# Patient Record
Sex: MALE | Race: OTHER | NOT HISPANIC OR LATINO | URBAN - METROPOLITAN AREA
[De-identification: names, ages, dates, MRNs, and addresses within clinical notes are randomized per-mention and may not be internally consistent; named-entity substitution may affect disease eponyms.]

---

## 2014-12-15 RX ORDER — IPRATROPIUM/ALBUTEROL SULFATE 18-103MCG
3 AEROSOL WITH ADAPTER (GRAM) INHALATION
Qty: 0 | Refills: 0 | COMMUNITY
Start: 2014-12-15

## 2017-01-06 ENCOUNTER — INPATIENT (INPATIENT)
Facility: HOSPITAL | Age: 62
LOS: 6 days | Discharge: HOME HEALTH | End: 2017-01-13
Attending: INTERNAL MEDICINE | Admitting: INTERNAL MEDICINE
Payer: MEDICAID

## 2017-01-06 VITALS
DIASTOLIC BLOOD PRESSURE: 76 MMHG | HEART RATE: 89 BPM | RESPIRATION RATE: 24 BRPM | SYSTOLIC BLOOD PRESSURE: 142 MMHG | OXYGEN SATURATION: 89 % | TEMPERATURE: 98 F

## 2017-01-06 LAB
ALBUMIN SERPL ELPH-MCNC: 4.4 G/DL — SIGNIFICANT CHANGE UP (ref 3.3–5)
ALP SERPL-CCNC: 83 U/L — SIGNIFICANT CHANGE UP (ref 40–120)
ALT FLD-CCNC: 11 U/L — SIGNIFICANT CHANGE UP (ref 4–41)
APTT BLD: 37.7 SEC — HIGH (ref 27.5–37.4)
AST SERPL-CCNC: 18 U/L — SIGNIFICANT CHANGE UP (ref 4–40)
BASE EXCESS BLDV CALC-SCNC: 13.7 MMOL/L — SIGNIFICANT CHANGE UP
BASOPHILS # BLD AUTO: 0.02 K/UL — SIGNIFICANT CHANGE UP (ref 0–0.2)
BASOPHILS NFR BLD AUTO: 0.2 % — SIGNIFICANT CHANGE UP (ref 0–2)
BILIRUB SERPL-MCNC: 0.2 MG/DL — SIGNIFICANT CHANGE UP (ref 0.2–1.2)
BLOOD GAS VENOUS - CREATININE: SIGNIFICANT CHANGE UP MG/DL (ref 0.5–1.3)
BUN SERPL-MCNC: 11 MG/DL — SIGNIFICANT CHANGE UP (ref 7–23)
CALCIUM SERPL-MCNC: 9.7 MG/DL — SIGNIFICANT CHANGE UP (ref 8.4–10.5)
CHLORIDE BLDV-SCNC: 86 MMOL/L — LOW (ref 96–108)
CHLORIDE SERPL-SCNC: 86 MMOL/L — LOW (ref 98–107)
CO2 SERPL-SCNC: 37 MMOL/L — HIGH (ref 22–31)
CREAT SERPL-MCNC: 0.46 MG/DL — LOW (ref 0.5–1.3)
EOSINOPHIL # BLD AUTO: 0.02 K/UL — SIGNIFICANT CHANGE UP (ref 0–0.5)
EOSINOPHIL NFR BLD AUTO: 0.2 % — SIGNIFICANT CHANGE UP (ref 0–6)
GAS PNL BLDV: 129 MMOL/L — LOW (ref 136–146)
GLUCOSE BLDV-MCNC: 138 — HIGH (ref 70–99)
GLUCOSE SERPL-MCNC: 136 MG/DL — HIGH (ref 70–99)
HCO3 BLDV-SCNC: 34 MMOL/L — HIGH (ref 20–27)
HCT VFR BLD CALC: 36.5 % — LOW (ref 39–50)
HCT VFR BLDV CALC: 34.3 % — LOW (ref 39–51)
HGB BLD-MCNC: 11.2 G/DL — LOW (ref 13–17)
HGB BLDV-MCNC: 11.1 G/DL — LOW (ref 13–17)
IMM GRANULOCYTES NFR BLD AUTO: 0.2 % — SIGNIFICANT CHANGE UP (ref 0–1.5)
INR BLD: 0.96 — SIGNIFICANT CHANGE UP (ref 0.87–1.18)
LACTATE BLDV-MCNC: 1.2 MMOL/L — SIGNIFICANT CHANGE UP (ref 0.5–2)
LYMPHOCYTES # BLD AUTO: 1.03 K/UL — SIGNIFICANT CHANGE UP (ref 1–3.3)
LYMPHOCYTES # BLD AUTO: 11.7 % — LOW (ref 13–44)
MCHC RBC-ENTMCNC: 26.8 PG — LOW (ref 27–34)
MCHC RBC-ENTMCNC: 30.7 % — LOW (ref 32–36)
MCV RBC AUTO: 87.3 FL — SIGNIFICANT CHANGE UP (ref 80–100)
MONOCYTES # BLD AUTO: 1.12 K/UL — HIGH (ref 0–0.9)
MONOCYTES NFR BLD AUTO: 12.7 % — SIGNIFICANT CHANGE UP (ref 2–14)
NEUTROPHILS # BLD AUTO: 6.59 K/UL — SIGNIFICANT CHANGE UP (ref 1.8–7.4)
NEUTROPHILS NFR BLD AUTO: 75 % — SIGNIFICANT CHANGE UP (ref 43–77)
PCO2 BLDV: 94 MMHG — HIGH (ref 41–51)
PH BLDV: 7.26 PH — LOW (ref 7.32–7.43)
PLATELET # BLD AUTO: 189 K/UL — SIGNIFICANT CHANGE UP (ref 150–400)
PMV BLD: 9.9 FL — SIGNIFICANT CHANGE UP (ref 7–13)
PO2 BLDV: 31 MMHG — LOW (ref 35–40)
POTASSIUM BLDV-SCNC: 4.7 MMOL/L — HIGH (ref 3.4–4.5)
POTASSIUM SERPL-MCNC: 5 MMOL/L — SIGNIFICANT CHANGE UP (ref 3.5–5.3)
POTASSIUM SERPL-SCNC: 5 MMOL/L — SIGNIFICANT CHANGE UP (ref 3.5–5.3)
PROT SERPL-MCNC: 7.6 G/DL — SIGNIFICANT CHANGE UP (ref 6–8.3)
PROTHROM AB SERPL-ACNC: 10.9 SEC — SIGNIFICANT CHANGE UP (ref 10–13.1)
RBC # BLD: 4.18 M/UL — LOW (ref 4.2–5.8)
RBC # FLD: 15.9 % — HIGH (ref 10.3–14.5)
SAO2 % BLDV: 55.3 % — LOW (ref 60–85)
SODIUM SERPL-SCNC: 133 MMOL/L — LOW (ref 135–145)
WBC # BLD: 8.8 K/UL — SIGNIFICANT CHANGE UP (ref 3.8–10.5)
WBC # FLD AUTO: 8.8 K/UL — SIGNIFICANT CHANGE UP (ref 3.8–10.5)

## 2017-01-06 PROCEDURE — 71010: CPT | Mod: 26

## 2017-01-06 RX ORDER — SODIUM CHLORIDE 9 MG/ML
1000 INJECTION INTRAMUSCULAR; INTRAVENOUS; SUBCUTANEOUS ONCE
Qty: 0 | Refills: 0 | Status: COMPLETED | OUTPATIENT
Start: 2017-01-06 | End: 2017-01-06

## 2017-01-06 RX ORDER — IPRATROPIUM BROMIDE 0.2 MG/ML
500 SOLUTION, NON-ORAL INHALATION
Qty: 0 | Refills: 0 | Status: DISCONTINUED | OUTPATIENT
Start: 2017-01-06 | End: 2017-01-06

## 2017-01-06 RX ORDER — IPRATROPIUM/ALBUTEROL SULFATE 18-103MCG
3 AEROSOL WITH ADAPTER (GRAM) INHALATION
Qty: 0 | Refills: 0 | Status: COMPLETED | OUTPATIENT
Start: 2017-01-06 | End: 2017-01-06

## 2017-01-06 RX ADMIN — Medication 3 MILLILITER(S): at 22:51

## 2017-01-06 RX ADMIN — SODIUM CHLORIDE 1000 MILLILITER(S): 9 INJECTION INTRAMUSCULAR; INTRAVENOUS; SUBCUTANEOUS at 23:42

## 2017-01-06 RX ADMIN — Medication 3 MILLILITER(S): at 22:59

## 2017-01-06 RX ADMIN — Medication 3 MILLILITER(S): at 22:41

## 2017-01-06 RX ADMIN — Medication 125 MILLIGRAM(S): at 23:42

## 2017-01-06 NOTE — ED PROVIDER NOTE - MEDICAL DECISION MAKING DETAILS
60 y/o M with h/o COPD with COPD exacerbation. Increased work of breathing and likely hypercarbic. Will start on BiPAP. Duonebs, steroids, levofloxacin, IVF. cbc, cmp, vbg w/ lactate, CXR, Cristian, EKG

## 2017-01-06 NOTE — ED PROVIDER NOTE - OBJECTIVE STATEMENT
60 y/o M with h/o COPD on home O2 2L 24 hours/day, a-fib not on AC, DM2, HTN, HLD presents with lethargy and difficulty breathing. Patient has been complaining of productive cough x 4 days and worsening shortness of breath. Since last night patient has been lethargic appearing. Decreased PO intake. No fever at home. +chills. Denies chest pain. Patient never intubated but has been on BiPAP previously, most recently one month ago.

## 2017-01-06 NOTE — ED PROVIDER NOTE - ENMT, MLM
Airway patent, Nasal mucosa clear. Mouth with normal mucosa. Throat has no vesicles, no oropharyngeal exudates and uvula is midline. Dry mucous membranes

## 2017-01-06 NOTE — ED CLERICAL - NS ED CARE COORDINATION INFORMATION
This patient is eligible for (or currently enrolled in) an outpatient care management program available through [employer]. This program can coordinate outpatient follow up and assist the patient in accessing a variety of outpatient resources.  If discharged from the ED, the patient will be contacted to see if any additional resources are needed.                                                                                    Please call the Nurse Clinical Call Center at [phone] with any questions or for assistance in discharge planning.

## 2017-01-06 NOTE — ED ADULT TRIAGE NOTE - CHIEF COMPLAINT QUOTE
Pt with a pmhx of a-fib, COPD (2L home O2), prostate CA (s/p radiation 2010), CAD (s/p PCI with stent in 2012), DMII, HTN, HLD who required bipap during his past admission in November 2016 presents with c/o weakness, lethargy, sob and productive cough with white sputum. Pt's family member states "he is weaker now and not himself. If you leave him alone for a few minutes he falls asleep". Denies cp, nausea, vomiting, dizziness, diaphoresis, fever or chills.

## 2017-01-06 NOTE — ED PROVIDER NOTE - ATTENDING CONTRIBUTION TO CARE
ED Attending Dr. Collier: 62 yo female with HTN, COPD on home O2 24/7, DM, brought to ED due to 3 days of worsening SOB.  No fever but pt endorses chills.  On exam pt ill appearing, in moderate respiratory distress, heart RRR, lungs decreased air entry throughout, abd NTND, extremities without swelling, strength 5/5 in all extremities and skin without rash.

## 2017-01-06 NOTE — ED ADULT NURSE NOTE - PMH
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

## 2017-01-06 NOTE — ED ADULT NURSE NOTE - OBJECTIVE STATEMENT
10:20 received pt from Intake Triage sitting in Wheelchair with oxygen via NC 4liters in use, appears tachypenic, +cough, using accessory muscle. Sons x2 with pt. Pt minimal English Greenlandic speaking. Translation service offered. Declined. As per Son..."He has been very weak & confused on & off at home for few days...not eating, and coughing. Also c/o back pain and chills." Recent admission requiring Bipap in November.  HX of COPD, Afib, CAD stents, HTN,DM FBS in triage 130.  Pt placed on CM= sinus, vs as per flow sheet charting. Resp 24-26, desats to 70% on roomair. Decreased BS kesha bases, +upper rhonchi, on ascultation. Resident at bedside. sl & labs obtained. Resp called at bedside. NEB tx x3 given by resp therapist. Pt placed on BIPAP 10/5 fio2 30%. Pt tolerating well, appears less anxious, rr 20, less accessory muscle use, pox=97%, As per Son Pt verbalizing "Feeling better with Bipap".  Does c/o chronic mid back pain...taking tylenol at home. Positioned for comfort . Awaits portable xray. HOB elevated . Report to primary RN Kristin to follow.

## 2017-01-06 NOTE — ED PROVIDER NOTE - PROGRESS NOTE DETAILS
Dr. Shaffer: Pt clinically improved, mental status improved, awake and alert, however repeat pCO2 worse. Seen by MICU attg at bedside, BIPAP setting adjusted, will send repeat ABG in 30 mins and reassess

## 2017-01-07 DIAGNOSIS — I10 ESSENTIAL (PRIMARY) HYPERTENSION: ICD-10-CM

## 2017-01-07 DIAGNOSIS — E11.9 TYPE 2 DIABETES MELLITUS WITHOUT COMPLICATIONS: ICD-10-CM

## 2017-01-07 DIAGNOSIS — J44.1 CHRONIC OBSTRUCTIVE PULMONARY DISEASE WITH (ACUTE) EXACERBATION: ICD-10-CM

## 2017-01-07 DIAGNOSIS — I48.91 UNSPECIFIED ATRIAL FIBRILLATION: ICD-10-CM

## 2017-01-07 DIAGNOSIS — E78.5 HYPERLIPIDEMIA, UNSPECIFIED: ICD-10-CM

## 2017-01-07 DIAGNOSIS — D50.9 IRON DEFICIENCY ANEMIA, UNSPECIFIED: ICD-10-CM

## 2017-01-07 DIAGNOSIS — D29.1 BENIGN NEOPLASM OF PROSTATE: ICD-10-CM

## 2017-01-07 DIAGNOSIS — J44.9 CHRONIC OBSTRUCTIVE PULMONARY DISEASE, UNSPECIFIED: ICD-10-CM

## 2017-01-07 DIAGNOSIS — I25.10 ATHEROSCLEROTIC HEART DISEASE OF NATIVE CORONARY ARTERY WITHOUT ANGINA PECTORIS: ICD-10-CM

## 2017-01-07 LAB
ALBUMIN SERPL ELPH-MCNC: 4 G/DL — SIGNIFICANT CHANGE UP (ref 3.3–5)
ALP SERPL-CCNC: 77 U/L — SIGNIFICANT CHANGE UP (ref 40–120)
ALT FLD-CCNC: 13 U/L — SIGNIFICANT CHANGE UP (ref 4–41)
APPEARANCE UR: CLEAR — SIGNIFICANT CHANGE UP
AST SERPL-CCNC: 23 U/L — SIGNIFICANT CHANGE UP (ref 4–40)
B PERT DNA SPEC QL NAA+PROBE: SIGNIFICANT CHANGE UP
BASE EXCESS BLDA CALC-SCNC: 12.6 MMOL/L — SIGNIFICANT CHANGE UP
BASE EXCESS BLDA CALC-SCNC: 17 MMOL/L — SIGNIFICANT CHANGE UP
BASE EXCESS BLDV CALC-SCNC: 13.8 MMOL/L — SIGNIFICANT CHANGE UP
BASOPHILS # BLD AUTO: 0.01 K/UL — SIGNIFICANT CHANGE UP (ref 0–0.2)
BASOPHILS NFR BLD AUTO: 0.2 % — SIGNIFICANT CHANGE UP (ref 0–2)
BILIRUB SERPL-MCNC: 0.4 MG/DL — SIGNIFICANT CHANGE UP (ref 0.2–1.2)
BILIRUB UR-MCNC: NEGATIVE — SIGNIFICANT CHANGE UP
BLOOD GAS VENOUS - CREATININE: < 0.36 MG/DL — LOW (ref 0.5–1.3)
BLOOD UR QL VISUAL: NEGATIVE — SIGNIFICANT CHANGE UP
BUN SERPL-MCNC: 9 MG/DL — SIGNIFICANT CHANGE UP (ref 7–23)
C PNEUM DNA SPEC QL NAA+PROBE: NOT DETECTED — SIGNIFICANT CHANGE UP
CALCIUM SERPL-MCNC: 9.1 MG/DL — SIGNIFICANT CHANGE UP (ref 8.4–10.5)
CHLORIDE BLDA-SCNC: 88 MMOL/L — LOW (ref 96–108)
CHLORIDE BLDA-SCNC: 90 MMOL/L — LOW (ref 96–108)
CHLORIDE BLDV-SCNC: 91 MMOL/L — LOW (ref 96–108)
CHLORIDE SERPL-SCNC: 89 MMOL/L — LOW (ref 98–107)
CHOLEST SERPL-MCNC: 165 MG/DL — SIGNIFICANT CHANGE UP (ref 120–199)
CK SERPL-CCNC: 62 U/L — SIGNIFICANT CHANGE UP (ref 30–200)
CO2 SERPL-SCNC: 32 MMOL/L — HIGH (ref 22–31)
COLOR SPEC: YELLOW — SIGNIFICANT CHANGE UP
CREAT BLDA-MCNC: < 0.36 MG/DL — LOW (ref 0.5–1.3)
CREAT BLDA-MCNC: < 0.36 MG/DL — LOW (ref 0.5–1.3)
CREAT SERPL-MCNC: 0.39 MG/DL — LOW (ref 0.5–1.3)
EOSINOPHIL # BLD AUTO: 0 K/UL — SIGNIFICANT CHANGE UP (ref 0–0.5)
EOSINOPHIL NFR BLD AUTO: 0 % — SIGNIFICANT CHANGE UP (ref 0–6)
FERRITIN SERPL-MCNC: 38.28 NG/ML — SIGNIFICANT CHANGE UP (ref 30–400)
FLUAV H1 2009 PAND RNA SPEC QL NAA+PROBE: NOT DETECTED — SIGNIFICANT CHANGE UP
FLUAV H1 RNA SPEC QL NAA+PROBE: NOT DETECTED — SIGNIFICANT CHANGE UP
FLUAV H3 RNA SPEC QL NAA+PROBE: NOT DETECTED — SIGNIFICANT CHANGE UP
FLUAV SUBTYP SPEC NAA+PROBE: SIGNIFICANT CHANGE UP
FLUBV RNA SPEC QL NAA+PROBE: NOT DETECTED — SIGNIFICANT CHANGE UP
FOLATE SERPL-MCNC: > 20 NG/ML — HIGH (ref 4.7–20)
GAS PNL BLDV: 130 MMOL/L — LOW (ref 136–146)
GLUCOSE BLDA-MCNC: 125 MG/DL — HIGH (ref 70–99)
GLUCOSE BLDA-MCNC: 134 MG/DL — HIGH (ref 70–99)
GLUCOSE BLDV-MCNC: 137 — HIGH (ref 70–99)
GLUCOSE SERPL-MCNC: 136 MG/DL — HIGH (ref 70–99)
GLUCOSE UR-MCNC: 500 — SIGNIFICANT CHANGE UP
HADV DNA SPEC QL NAA+PROBE: NOT DETECTED — SIGNIFICANT CHANGE UP
HAV IGM SER-ACNC: NONREACTIVE — SIGNIFICANT CHANGE UP
HBA1C BLD-MCNC: 6 % — HIGH (ref 4–5.6)
HBV CORE IGM SER-ACNC: NONREACTIVE — SIGNIFICANT CHANGE UP
HBV SURFACE AG SER-ACNC: NONREACTIVE — SIGNIFICANT CHANGE UP
HCO3 BLDA-SCNC: 34 MMOL/L — HIGH (ref 22–26)
HCO3 BLDA-SCNC: 39 MMOL/L — HIGH (ref 22–26)
HCO3 BLDV-SCNC: 29 MMOL/L — HIGH (ref 20–27)
HCOV 229E RNA SPEC QL NAA+PROBE: NOT DETECTED — SIGNIFICANT CHANGE UP
HCOV HKU1 RNA SPEC QL NAA+PROBE: NOT DETECTED — SIGNIFICANT CHANGE UP
HCOV NL63 RNA SPEC QL NAA+PROBE: NOT DETECTED — SIGNIFICANT CHANGE UP
HCOV OC43 RNA SPEC QL NAA+PROBE: NOT DETECTED — SIGNIFICANT CHANGE UP
HCT VFR BLD CALC: 35.1 % — LOW (ref 39–50)
HCT VFR BLDA CALC: 31.1 % — LOW (ref 39–51)
HCT VFR BLDA CALC: 32.9 % — LOW (ref 39–51)
HCT VFR BLDV CALC: 49.7 % — SIGNIFICANT CHANGE UP (ref 39–51)
HCV AB S/CO SERPL IA: 0.07 S/CO — SIGNIFICANT CHANGE UP
HCV AB SERPL-IMP: SIGNIFICANT CHANGE UP
HDLC SERPL-MCNC: 112 MG/DL — HIGH (ref 35–55)
HGB BLD-MCNC: 10.8 G/DL — LOW (ref 13–17)
HGB BLDA-MCNC: 10.1 G/DL — LOW (ref 13–17)
HGB BLDA-MCNC: 10.6 G/DL — LOW (ref 13–17)
HGB BLDV-MCNC: 16.3 G/DL — SIGNIFICANT CHANGE UP (ref 13–17)
HMPV RNA SPEC QL NAA+PROBE: NOT DETECTED — SIGNIFICANT CHANGE UP
HPIV1 RNA SPEC QL NAA+PROBE: NOT DETECTED — SIGNIFICANT CHANGE UP
HPIV2 RNA SPEC QL NAA+PROBE: NOT DETECTED — SIGNIFICANT CHANGE UP
HPIV3 RNA SPEC QL NAA+PROBE: NOT DETECTED — SIGNIFICANT CHANGE UP
HPIV4 RNA SPEC QL NAA+PROBE: NOT DETECTED — SIGNIFICANT CHANGE UP
IMM GRANULOCYTES NFR BLD AUTO: 0.2 % — SIGNIFICANT CHANGE UP (ref 0–1.5)
IRON SATN MFR SERPL: 25 UG/DL — LOW (ref 45–165)
IRON SATN MFR SERPL: 367 UG/DL — SIGNIFICANT CHANGE UP (ref 155–535)
KETONES UR-MCNC: SIGNIFICANT CHANGE UP
LACTATE BLDA-SCNC: 0.6 MMOL/L — SIGNIFICANT CHANGE UP (ref 0.5–2)
LACTATE BLDA-SCNC: 0.7 MMOL/L — SIGNIFICANT CHANGE UP (ref 0.5–2)
LACTATE BLDV-MCNC: 1.9 MMOL/L — SIGNIFICANT CHANGE UP (ref 0.5–2)
LEUKOCYTE ESTERASE UR-ACNC: NEGATIVE — SIGNIFICANT CHANGE UP
LIPID PNL WITH DIRECT LDL SERPL: 58 MG/DL — SIGNIFICANT CHANGE UP
LYMPHOCYTES # BLD AUTO: 0.37 K/UL — LOW (ref 1–3.3)
LYMPHOCYTES # BLD AUTO: 5.9 % — LOW (ref 13–44)
M PNEUMO DNA SPEC QL NAA+PROBE: NOT DETECTED — SIGNIFICANT CHANGE UP
MAGNESIUM SERPL-MCNC: 1.5 MG/DL — LOW (ref 1.6–2.6)
MCHC RBC-ENTMCNC: 26.7 PG — LOW (ref 27–34)
MCHC RBC-ENTMCNC: 30.8 % — LOW (ref 32–36)
MCV RBC AUTO: 86.9 FL — SIGNIFICANT CHANGE UP (ref 80–100)
MONOCYTES # BLD AUTO: 0.06 K/UL — SIGNIFICANT CHANGE UP (ref 0–0.9)
MONOCYTES NFR BLD AUTO: 1 % — LOW (ref 2–14)
MUCOUS THREADS # UR AUTO: SIGNIFICANT CHANGE UP
NEUTROPHILS # BLD AUTO: 5.8 K/UL — SIGNIFICANT CHANGE UP (ref 1.8–7.4)
NEUTROPHILS NFR BLD AUTO: 92.7 % — HIGH (ref 43–77)
NITRITE UR-MCNC: NEGATIVE — SIGNIFICANT CHANGE UP
PCO2 BLDA: 90 MMHG — CRITICAL HIGH (ref 35–48)
PCO2 BLDA: 92 MMHG — CRITICAL HIGH (ref 35–48)
PCO2 BLDV: > 110 MMHG — HIGH (ref 41–51)
PH BLDA: 7.26 PH — LOW (ref 7.35–7.45)
PH BLDA: 7.31 PH — LOW (ref 7.35–7.45)
PH BLDV: 7.2 PH — LOW (ref 7.32–7.43)
PH UR: 6 — SIGNIFICANT CHANGE UP (ref 4.6–8)
PHOSPHATE SERPL-MCNC: 3.8 MG/DL — SIGNIFICANT CHANGE UP (ref 2.5–4.5)
PLATELET # BLD AUTO: 152 K/UL — SIGNIFICANT CHANGE UP (ref 150–400)
PMV BLD: 9.4 FL — SIGNIFICANT CHANGE UP (ref 7–13)
PO2 BLDA: 143 MMHG — HIGH (ref 83–108)
PO2 BLDA: 98 MMHG — SIGNIFICANT CHANGE UP (ref 83–108)
PO2 BLDV: < 24 MMHG — LOW (ref 35–40)
POTASSIUM BLDA-SCNC: 4.3 MMOL/L — SIGNIFICANT CHANGE UP (ref 3.4–4.5)
POTASSIUM BLDA-SCNC: 4.7 MMOL/L — HIGH (ref 3.4–4.5)
POTASSIUM BLDV-SCNC: 4.1 MMOL/L — SIGNIFICANT CHANGE UP (ref 3.4–4.5)
POTASSIUM SERPL-MCNC: 5.1 MMOL/L — SIGNIFICANT CHANGE UP (ref 3.5–5.3)
POTASSIUM SERPL-SCNC: 5.1 MMOL/L — SIGNIFICANT CHANGE UP (ref 3.5–5.3)
PROT SERPL-MCNC: 7 G/DL — SIGNIFICANT CHANGE UP (ref 6–8.3)
PROT UR-MCNC: 30 — SIGNIFICANT CHANGE UP
RBC # BLD: 4.04 M/UL — LOW (ref 4.2–5.8)
RBC # FLD: 15.8 % — HIGH (ref 10.3–14.5)
RBC CASTS # UR COMP ASSIST: SIGNIFICANT CHANGE UP (ref 0–?)
RSV RNA SPEC QL NAA+PROBE: POSITIVE — HIGH
RV+EV RNA SPEC QL NAA+PROBE: NOT DETECTED — SIGNIFICANT CHANGE UP
SAO2 % BLDA: 98 % — SIGNIFICANT CHANGE UP (ref 95–99)
SAO2 % BLDA: 99.3 % — HIGH (ref 95–99)
SAO2 % BLDV: 11.2 % — LOW (ref 60–85)
SODIUM BLDA-SCNC: 132 MMOL/L — LOW (ref 136–146)
SODIUM BLDA-SCNC: 134 MMOL/L — LOW (ref 136–146)
SODIUM SERPL-SCNC: 135 MMOL/L — SIGNIFICANT CHANGE UP (ref 135–145)
SP GR SPEC: 1.02 — SIGNIFICANT CHANGE UP (ref 1–1.03)
SPECIMEN SOURCE: SIGNIFICANT CHANGE UP
SQUAMOUS # UR AUTO: SIGNIFICANT CHANGE UP
TRIGL SERPL-MCNC: 40 MG/DL — SIGNIFICANT CHANGE UP (ref 10–149)
TSH SERPL-MCNC: 0.13 UIU/ML — LOW (ref 0.27–4.2)
UIBC SERPL-MCNC: 342 UG/DL — SIGNIFICANT CHANGE UP (ref 110–370)
UROBILINOGEN FLD QL: NORMAL E.U. — SIGNIFICANT CHANGE UP (ref 0.1–0.2)
VIT B12 SERPL-MCNC: 1155 PG/ML — HIGH (ref 200–900)
WBC # BLD: 6.25 K/UL — SIGNIFICANT CHANGE UP (ref 3.8–10.5)
WBC # FLD AUTO: 6.25 K/UL — SIGNIFICANT CHANGE UP (ref 3.8–10.5)
WBC UR QL: SIGNIFICANT CHANGE UP (ref 0–?)

## 2017-01-07 PROCEDURE — 99223 1ST HOSP IP/OBS HIGH 75: CPT

## 2017-01-07 RX ORDER — AZITHROMYCIN 500 MG/1
TABLET, FILM COATED ORAL
Qty: 0 | Refills: 0 | Status: DISCONTINUED | OUTPATIENT
Start: 2017-01-07 | End: 2017-01-13

## 2017-01-07 RX ORDER — LACTOBACILLUS ACIDOPHILUS 100MM CELL
1 CAPSULE ORAL EVERY 12 HOURS
Qty: 0 | Refills: 0 | Status: DISCONTINUED | OUTPATIENT
Start: 2017-01-07 | End: 2017-01-13

## 2017-01-07 RX ORDER — MONTELUKAST 4 MG/1
10 TABLET, CHEWABLE ORAL DAILY
Qty: 0 | Refills: 0 | Status: DISCONTINUED | OUTPATIENT
Start: 2017-01-07 | End: 2017-01-13

## 2017-01-07 RX ORDER — TIOTROPIUM BROMIDE 18 UG/1
1 CAPSULE ORAL; RESPIRATORY (INHALATION) DAILY
Qty: 0 | Refills: 0 | Status: DISCONTINUED | OUTPATIENT
Start: 2017-01-07 | End: 2017-01-08

## 2017-01-07 RX ORDER — AZITHROMYCIN 500 MG/1
500 TABLET, FILM COATED ORAL ONCE
Qty: 0 | Refills: 0 | Status: COMPLETED | OUTPATIENT
Start: 2017-01-07 | End: 2017-01-07

## 2017-01-07 RX ORDER — SUCRALFATE 1 G
1 TABLET ORAL THREE TIMES A DAY
Qty: 0 | Refills: 0 | Status: DISCONTINUED | OUTPATIENT
Start: 2017-01-07 | End: 2017-01-13

## 2017-01-07 RX ORDER — DEXTROSE 50 % IN WATER 50 %
1 SYRINGE (ML) INTRAVENOUS ONCE
Qty: 0 | Refills: 0 | Status: DISCONTINUED | OUTPATIENT
Start: 2017-01-07 | End: 2017-01-13

## 2017-01-07 RX ORDER — LOSARTAN POTASSIUM 100 MG/1
50 TABLET, FILM COATED ORAL DAILY
Qty: 0 | Refills: 0 | Status: DISCONTINUED | OUTPATIENT
Start: 2017-01-07 | End: 2017-01-13

## 2017-01-07 RX ORDER — GLUCAGON INJECTION, SOLUTION 0.5 MG/.1ML
1 INJECTION, SOLUTION SUBCUTANEOUS ONCE
Qty: 0 | Refills: 0 | Status: DISCONTINUED | OUTPATIENT
Start: 2017-01-07 | End: 2017-01-13

## 2017-01-07 RX ORDER — TAMSULOSIN HYDROCHLORIDE 0.4 MG/1
0.4 CAPSULE ORAL AT BEDTIME
Qty: 0 | Refills: 0 | Status: DISCONTINUED | OUTPATIENT
Start: 2017-01-07 | End: 2017-01-13

## 2017-01-07 RX ORDER — IPRATROPIUM/ALBUTEROL SULFATE 18-103MCG
3 AEROSOL WITH ADAPTER (GRAM) INHALATION EVERY 6 HOURS
Qty: 0 | Refills: 0 | Status: DISCONTINUED | OUTPATIENT
Start: 2017-01-07 | End: 2017-01-13

## 2017-01-07 RX ORDER — DEXTROSE 50 % IN WATER 50 %
25 SYRINGE (ML) INTRAVENOUS ONCE
Qty: 0 | Refills: 0 | Status: DISCONTINUED | OUTPATIENT
Start: 2017-01-07 | End: 2017-01-13

## 2017-01-07 RX ORDER — DEXTROSE 50 % IN WATER 50 %
12.5 SYRINGE (ML) INTRAVENOUS ONCE
Qty: 0 | Refills: 0 | Status: DISCONTINUED | OUTPATIENT
Start: 2017-01-07 | End: 2017-01-13

## 2017-01-07 RX ORDER — MAGNESIUM SULFATE 500 MG/ML
2 VIAL (ML) INJECTION ONCE
Qty: 0 | Refills: 0 | Status: COMPLETED | OUTPATIENT
Start: 2017-01-07 | End: 2017-01-07

## 2017-01-07 RX ORDER — INSULIN LISPRO 100/ML
VIAL (ML) SUBCUTANEOUS EVERY 6 HOURS
Qty: 0 | Refills: 0 | Status: DISCONTINUED | OUTPATIENT
Start: 2017-01-07 | End: 2017-01-09

## 2017-01-07 RX ORDER — SODIUM CHLORIDE 9 MG/ML
1000 INJECTION, SOLUTION INTRAVENOUS
Qty: 0 | Refills: 0 | Status: DISCONTINUED | OUTPATIENT
Start: 2017-01-07 | End: 2017-01-13

## 2017-01-07 RX ORDER — AZITHROMYCIN 500 MG/1
500 TABLET, FILM COATED ORAL EVERY 24 HOURS
Qty: 0 | Refills: 0 | Status: DISCONTINUED | OUTPATIENT
Start: 2017-01-08 | End: 2017-01-13

## 2017-01-07 RX ORDER — SIMVASTATIN 20 MG/1
20 TABLET, FILM COATED ORAL AT BEDTIME
Qty: 0 | Refills: 0 | Status: DISCONTINUED | OUTPATIENT
Start: 2017-01-07 | End: 2017-01-13

## 2017-01-07 RX ORDER — ASPIRIN/CALCIUM CARB/MAGNESIUM 324 MG
81 TABLET ORAL DAILY
Qty: 0 | Refills: 0 | Status: DISCONTINUED | OUTPATIENT
Start: 2017-01-07 | End: 2017-01-13

## 2017-01-07 RX ORDER — PANTOPRAZOLE SODIUM 20 MG/1
40 TABLET, DELAYED RELEASE ORAL DAILY
Qty: 0 | Refills: 0 | Status: DISCONTINUED | OUTPATIENT
Start: 2017-01-07 | End: 2017-01-13

## 2017-01-07 RX ORDER — FERROUS SULFATE 325(65) MG
325 TABLET ORAL
Qty: 0 | Refills: 0 | Status: DISCONTINUED | OUTPATIENT
Start: 2017-01-07 | End: 2017-01-13

## 2017-01-07 RX ORDER — HEPARIN SODIUM 5000 [USP'U]/ML
5000 INJECTION INTRAVENOUS; SUBCUTANEOUS EVERY 12 HOURS
Qty: 0 | Refills: 0 | Status: DISCONTINUED | OUTPATIENT
Start: 2017-01-07 | End: 2017-01-13

## 2017-01-07 RX ADMIN — Medication 1 TABLET(S): at 07:15

## 2017-01-07 RX ADMIN — SIMVASTATIN 20 MILLIGRAM(S): 20 TABLET, FILM COATED ORAL at 22:44

## 2017-01-07 RX ADMIN — Medication 1 TABLET(S): at 17:47

## 2017-01-07 RX ADMIN — TIOTROPIUM BROMIDE 1 CAPSULE(S): 18 CAPSULE ORAL; RESPIRATORY (INHALATION) at 10:24

## 2017-01-07 RX ADMIN — Medication 40 MILLIGRAM(S): at 07:15

## 2017-01-07 RX ADMIN — HEPARIN SODIUM 5000 UNIT(S): 5000 INJECTION INTRAVENOUS; SUBCUTANEOUS at 07:15

## 2017-01-07 RX ADMIN — Medication 3 MILLILITER(S): at 16:23

## 2017-01-07 RX ADMIN — Medication 40 MILLIGRAM(S): at 13:00

## 2017-01-07 RX ADMIN — LOSARTAN POTASSIUM 50 MILLIGRAM(S): 100 TABLET, FILM COATED ORAL at 09:34

## 2017-01-07 RX ADMIN — Medication 81 MILLIGRAM(S): at 13:00

## 2017-01-07 RX ADMIN — HEPARIN SODIUM 5000 UNIT(S): 5000 INJECTION INTRAVENOUS; SUBCUTANEOUS at 17:47

## 2017-01-07 RX ADMIN — PANTOPRAZOLE SODIUM 40 MILLIGRAM(S): 20 TABLET, DELAYED RELEASE ORAL at 06:59

## 2017-01-07 RX ADMIN — Medication 50 GRAM(S): at 09:35

## 2017-01-07 RX ADMIN — Medication 3 MILLILITER(S): at 22:42

## 2017-01-07 RX ADMIN — AZITHROMYCIN 250 MILLIGRAM(S): 500 TABLET, FILM COATED ORAL at 11:49

## 2017-01-07 RX ADMIN — Medication 40 MILLIGRAM(S): at 22:44

## 2017-01-07 RX ADMIN — Medication 1 GRAM(S): at 13:00

## 2017-01-07 RX ADMIN — Medication 3 MILLILITER(S): at 12:52

## 2017-01-07 RX ADMIN — Medication 1 GRAM(S): at 22:44

## 2017-01-07 RX ADMIN — TAMSULOSIN HYDROCHLORIDE 0.4 MILLIGRAM(S): 0.4 CAPSULE ORAL at 22:44

## 2017-01-07 RX ADMIN — MONTELUKAST 10 MILLIGRAM(S): 4 TABLET, CHEWABLE ORAL at 13:00

## 2017-01-07 NOTE — H&P ADULT. - ASSESSMENT
Note: Need to CONFIRM home Meds in the morning.     62 y/o male HX of COPD on Home O2, EX Smoker, HX of GI Bleed, HX of A Fib on ASA, HTN, High Cholesterol, DM, CAD with Stent, Prostate cancer, S/P RTX, Iron def. Anemia , pt was admitted for SOB, Lethargy, decreased po intake, + Productive cough, + Chills, x 4-5 days, no fever, NO CP, no abdominal pain, no HA, no dizziness, no dysuria, never intubated, has been on BIPAP and PO Steroid in the past, pt with hypercarbia on ABG, seen by MICU and was Rejected, on BIPAP now with help, pt is awake now, A+O X 3, feels better at this time, RVP + for RSV, pt was placed on Isolation, S/P IV Levaquin, Duoneb, IV Solumedrol in the ER, No diarrhea, no Abdominal pain, no legs pain, + weakness, No N/V

## 2017-01-07 NOTE — H&P ADULT. - HISTORY OF PRESENT ILLNESS
Note: Need to CONFIRM home Meds in the morning.     62 y/o male HX of COPD on Home O2, EX Smoker, HX of GI Bleed, HX of A Fib on ASA, HTN, High Cholesterol, DM, CAD with Stent, Prostate cancer, S/P RTX, Iron def. Anemia , pt was admitted for SOB, Lethargy, decreased po intake, + Productive cough, + Chills, x 4-5 days, no fever, NO CP, no abdominal pain, no HA, no dizziness, no dysuria, never intubated, has been on BIPAP and PO Steroid in the past, pt with hypercarbia on ABG, seen by MICU and was Rejected, on BIPAP now with help, pt is awake now, A+O X 3, feels better at this time, RVP + for RSV, pt was placed on Isolation, S/P IV Levaquin, Duoneb, IV Solumedrol in the ER, No diarrhea, no Abdominal pain, no legs pain, + weakness, No N/V     Labs: ABG: PH 7.26, PCO2=92, WQ3=222,   UA: Glucose 500, trace Ketone, RVP+ RSV,   Na 133, K+ 5, CO2=37, BUN 11, Creatinine 0Glucose 136, WBC 8.8, Hgb 11.2, Platelet 189, PT 10.9, INR 0.96, PTT 37.7     Vitals: Tem 98.7, HR 74, /72, RR 18, 99% on Fio2 40% (BIPAP)

## 2017-01-07 NOTE — H&P ADULT. - PROBLEM SELECTOR PLAN 1
Pulmonary Service Care, + RSV, Hold Levaquin for now, IV Solumedrol, IV Protonix, BIPAP, Fall/aspiration precaution  Duoneb, NPO for now , Singulair   F/U CBC, CMP, Cultures   DVT Prophylaxis: SQ Heparin

## 2017-01-07 NOTE — ED ADULT NURSE REASSESSMENT NOTE - NS ED NURSE REASSESS COMMENT FT1
pt received at shift change. in no acute distress. sats 100% on bipap. resting comfortably in bed. awaits micu consult.

## 2017-01-07 NOTE — H&P ADULT. - MUSCULOSKELETAL
details… detailed exam no joint swelling/no calf tenderness/no joint erythema/ROM intact/no joint warmth

## 2017-01-07 NOTE — H&P ADULT. - ATTENDING COMMENTS
Pt was seen & Examined by me, Dr. HOLLIS Beck on 01/07/2017.     Pulmonary Service will resume the care of pt from this morning.

## 2017-01-08 LAB
ALBUMIN SERPL ELPH-MCNC: 4.3 G/DL — SIGNIFICANT CHANGE UP (ref 3.3–5)
ALP SERPL-CCNC: 76 U/L — SIGNIFICANT CHANGE UP (ref 40–120)
ALT FLD-CCNC: 14 U/L — SIGNIFICANT CHANGE UP (ref 4–41)
AST SERPL-CCNC: 25 U/L — SIGNIFICANT CHANGE UP (ref 4–40)
BACTERIA UR CULT: SIGNIFICANT CHANGE UP
BASOPHILS # BLD AUTO: 0.01 K/UL — SIGNIFICANT CHANGE UP (ref 0–0.2)
BASOPHILS NFR BLD AUTO: 0.2 % — SIGNIFICANT CHANGE UP (ref 0–2)
BILIRUB SERPL-MCNC: 0.2 MG/DL — SIGNIFICANT CHANGE UP (ref 0.2–1.2)
BUN SERPL-MCNC: 19 MG/DL — SIGNIFICANT CHANGE UP (ref 7–23)
CALCIUM SERPL-MCNC: 9.5 MG/DL — SIGNIFICANT CHANGE UP (ref 8.4–10.5)
CHLORIDE SERPL-SCNC: 92 MMOL/L — LOW (ref 98–107)
CO2 SERPL-SCNC: 40 MMOL/L — HIGH (ref 22–31)
CREAT SERPL-MCNC: 0.59 MG/DL — SIGNIFICANT CHANGE UP (ref 0.5–1.3)
EOSINOPHIL # BLD AUTO: 0 K/UL — SIGNIFICANT CHANGE UP (ref 0–0.5)
EOSINOPHIL NFR BLD AUTO: 0 % — SIGNIFICANT CHANGE UP (ref 0–6)
GLUCOSE SERPL-MCNC: 174 MG/DL — HIGH (ref 70–99)
HCT VFR BLD CALC: 38.4 % — LOW (ref 39–50)
HGB BLD-MCNC: 11.6 G/DL — LOW (ref 13–17)
IMM GRANULOCYTES NFR BLD AUTO: 0.3 % — SIGNIFICANT CHANGE UP (ref 0–1.5)
LYMPHOCYTES # BLD AUTO: 0.8 K/UL — LOW (ref 1–3.3)
LYMPHOCYTES # BLD AUTO: 12.5 % — LOW (ref 13–44)
MAGNESIUM SERPL-MCNC: 2 MG/DL — SIGNIFICANT CHANGE UP (ref 1.6–2.6)
MCHC RBC-ENTMCNC: 26.6 PG — LOW (ref 27–34)
MCHC RBC-ENTMCNC: 30.2 % — LOW (ref 32–36)
MCV RBC AUTO: 88.1 FL — SIGNIFICANT CHANGE UP (ref 80–100)
MONOCYTES # BLD AUTO: 0.54 K/UL — SIGNIFICANT CHANGE UP (ref 0–0.9)
MONOCYTES NFR BLD AUTO: 8.5 % — SIGNIFICANT CHANGE UP (ref 2–14)
NEUTROPHILS # BLD AUTO: 5.02 K/UL — SIGNIFICANT CHANGE UP (ref 1.8–7.4)
NEUTROPHILS NFR BLD AUTO: 78.5 % — HIGH (ref 43–77)
PHOSPHATE SERPL-MCNC: 3.9 MG/DL — SIGNIFICANT CHANGE UP (ref 2.5–4.5)
PLATELET # BLD AUTO: 213 K/UL — SIGNIFICANT CHANGE UP (ref 150–400)
PMV BLD: 9.6 FL — SIGNIFICANT CHANGE UP (ref 7–13)
POTASSIUM SERPL-MCNC: 5.3 MMOL/L — SIGNIFICANT CHANGE UP (ref 3.5–5.3)
POTASSIUM SERPL-SCNC: 5.3 MMOL/L — SIGNIFICANT CHANGE UP (ref 3.5–5.3)
PROT SERPL-MCNC: 7.5 G/DL — SIGNIFICANT CHANGE UP (ref 6–8.3)
RBC # BLD: 4.36 M/UL — SIGNIFICANT CHANGE UP (ref 4.2–5.8)
RBC # FLD: 16.1 % — HIGH (ref 10.3–14.5)
SODIUM SERPL-SCNC: 141 MMOL/L — SIGNIFICANT CHANGE UP (ref 135–145)
SPECIMEN SOURCE: SIGNIFICANT CHANGE UP
SPECIMEN SOURCE: SIGNIFICANT CHANGE UP
WBC # BLD: 6.39 K/UL — SIGNIFICANT CHANGE UP (ref 3.8–10.5)
WBC # FLD AUTO: 6.39 K/UL — SIGNIFICANT CHANGE UP (ref 3.8–10.5)

## 2017-01-08 PROCEDURE — 99233 SBSQ HOSP IP/OBS HIGH 50: CPT | Mod: GC

## 2017-01-08 RX ORDER — BUDESONIDE AND FORMOTEROL FUMARATE DIHYDRATE 160; 4.5 UG/1; UG/1
2 AEROSOL RESPIRATORY (INHALATION)
Qty: 0 | Refills: 0 | Status: DISCONTINUED | OUTPATIENT
Start: 2017-01-08 | End: 2017-01-13

## 2017-01-08 RX ADMIN — Medication 3 MILLILITER(S): at 04:41

## 2017-01-08 RX ADMIN — AZITHROMYCIN 250 MILLIGRAM(S): 500 TABLET, FILM COATED ORAL at 09:25

## 2017-01-08 RX ADMIN — PANTOPRAZOLE SODIUM 40 MILLIGRAM(S): 20 TABLET, DELAYED RELEASE ORAL at 11:22

## 2017-01-08 RX ADMIN — Medication 40 MILLIGRAM(S): at 13:17

## 2017-01-08 RX ADMIN — HEPARIN SODIUM 5000 UNIT(S): 5000 INJECTION INTRAVENOUS; SUBCUTANEOUS at 07:19

## 2017-01-08 RX ADMIN — HEPARIN SODIUM 5000 UNIT(S): 5000 INJECTION INTRAVENOUS; SUBCUTANEOUS at 18:00

## 2017-01-08 RX ADMIN — Medication 1 TABLET(S): at 18:00

## 2017-01-08 RX ADMIN — Medication 1 GRAM(S): at 22:08

## 2017-01-08 RX ADMIN — Medication 3 MILLILITER(S): at 15:28

## 2017-01-08 RX ADMIN — Medication 1 GRAM(S): at 07:16

## 2017-01-08 RX ADMIN — Medication 1 GRAM(S): at 13:18

## 2017-01-08 RX ADMIN — MONTELUKAST 10 MILLIGRAM(S): 4 TABLET, CHEWABLE ORAL at 12:08

## 2017-01-08 RX ADMIN — Medication 3 MILLILITER(S): at 22:30

## 2017-01-08 RX ADMIN — TAMSULOSIN HYDROCHLORIDE 0.4 MILLIGRAM(S): 0.4 CAPSULE ORAL at 22:08

## 2017-01-08 RX ADMIN — Medication 1: at 12:18

## 2017-01-08 RX ADMIN — Medication 325 MILLIGRAM(S): at 18:00

## 2017-01-08 RX ADMIN — Medication 3 MILLILITER(S): at 09:48

## 2017-01-08 RX ADMIN — Medication 40 MILLIGRAM(S): at 07:17

## 2017-01-08 RX ADMIN — LOSARTAN POTASSIUM 50 MILLIGRAM(S): 100 TABLET, FILM COATED ORAL at 07:16

## 2017-01-08 RX ADMIN — Medication 1: at 17:59

## 2017-01-08 RX ADMIN — BUDESONIDE AND FORMOTEROL FUMARATE DIHYDRATE 2 PUFF(S): 160; 4.5 AEROSOL RESPIRATORY (INHALATION) at 22:31

## 2017-01-08 RX ADMIN — SIMVASTATIN 20 MILLIGRAM(S): 20 TABLET, FILM COATED ORAL at 22:08

## 2017-01-08 RX ADMIN — Medication 81 MILLIGRAM(S): at 11:21

## 2017-01-08 RX ADMIN — Medication 1 TABLET(S): at 07:16

## 2017-01-09 LAB
BUN SERPL-MCNC: 20 MG/DL — SIGNIFICANT CHANGE UP (ref 7–23)
CALCIUM SERPL-MCNC: 9.4 MG/DL — SIGNIFICANT CHANGE UP (ref 8.4–10.5)
CHLORIDE SERPL-SCNC: 91 MMOL/L — LOW (ref 98–107)
CO2 SERPL-SCNC: 44 MMOL/L — HIGH (ref 22–31)
CREAT SERPL-MCNC: 0.44 MG/DL — LOW (ref 0.5–1.3)
GLUCOSE SERPL-MCNC: 136 MG/DL — HIGH (ref 70–99)
POTASSIUM SERPL-MCNC: 4.9 MMOL/L — SIGNIFICANT CHANGE UP (ref 3.5–5.3)
POTASSIUM SERPL-SCNC: 4.9 MMOL/L — SIGNIFICANT CHANGE UP (ref 3.5–5.3)
SODIUM SERPL-SCNC: 142 MMOL/L — SIGNIFICANT CHANGE UP (ref 135–145)

## 2017-01-09 PROCEDURE — 99233 SBSQ HOSP IP/OBS HIGH 50: CPT | Mod: GC

## 2017-01-09 RX ORDER — INSULIN LISPRO 100/ML
VIAL (ML) SUBCUTANEOUS
Qty: 0 | Refills: 0 | Status: DISCONTINUED | OUTPATIENT
Start: 2017-01-09 | End: 2017-01-13

## 2017-01-09 RX ORDER — INSULIN LISPRO 100/ML
VIAL (ML) SUBCUTANEOUS AT BEDTIME
Qty: 0 | Refills: 0 | Status: DISCONTINUED | OUTPATIENT
Start: 2017-01-09 | End: 2017-01-13

## 2017-01-09 RX ADMIN — Medication 1: at 13:16

## 2017-01-09 RX ADMIN — Medication 1 GRAM(S): at 21:40

## 2017-01-09 RX ADMIN — BUDESONIDE AND FORMOTEROL FUMARATE DIHYDRATE 2 PUFF(S): 160; 4.5 AEROSOL RESPIRATORY (INHALATION) at 21:40

## 2017-01-09 RX ADMIN — Medication 81 MILLIGRAM(S): at 13:17

## 2017-01-09 RX ADMIN — BUDESONIDE AND FORMOTEROL FUMARATE DIHYDRATE 2 PUFF(S): 160; 4.5 AEROSOL RESPIRATORY (INHALATION) at 09:33

## 2017-01-09 RX ADMIN — Medication 1 TABLET(S): at 06:18

## 2017-01-09 RX ADMIN — Medication 40 MILLIGRAM(S): at 06:18

## 2017-01-09 RX ADMIN — LOSARTAN POTASSIUM 50 MILLIGRAM(S): 100 TABLET, FILM COATED ORAL at 06:18

## 2017-01-09 RX ADMIN — HEPARIN SODIUM 5000 UNIT(S): 5000 INJECTION INTRAVENOUS; SUBCUTANEOUS at 17:44

## 2017-01-09 RX ADMIN — Medication 1 GRAM(S): at 13:28

## 2017-01-09 RX ADMIN — Medication 325 MILLIGRAM(S): at 09:36

## 2017-01-09 RX ADMIN — TAMSULOSIN HYDROCHLORIDE 0.4 MILLIGRAM(S): 0.4 CAPSULE ORAL at 21:40

## 2017-01-09 RX ADMIN — Medication 3 MILLILITER(S): at 16:07

## 2017-01-09 RX ADMIN — Medication 325 MILLIGRAM(S): at 17:44

## 2017-01-09 RX ADMIN — SIMVASTATIN 20 MILLIGRAM(S): 20 TABLET, FILM COATED ORAL at 21:40

## 2017-01-09 RX ADMIN — HEPARIN SODIUM 5000 UNIT(S): 5000 INJECTION INTRAVENOUS; SUBCUTANEOUS at 06:18

## 2017-01-09 RX ADMIN — Medication 20 MILLIGRAM(S): at 21:55

## 2017-01-09 RX ADMIN — Medication 3 MILLILITER(S): at 23:18

## 2017-01-09 RX ADMIN — Medication 3 MILLILITER(S): at 04:26

## 2017-01-09 RX ADMIN — Medication 20 MILLIGRAM(S): at 13:31

## 2017-01-09 RX ADMIN — Medication 3 MILLILITER(S): at 11:29

## 2017-01-09 RX ADMIN — MONTELUKAST 10 MILLIGRAM(S): 4 TABLET, CHEWABLE ORAL at 13:28

## 2017-01-09 RX ADMIN — Medication 1 TABLET(S): at 17:44

## 2017-01-09 RX ADMIN — PANTOPRAZOLE SODIUM 40 MILLIGRAM(S): 20 TABLET, DELAYED RELEASE ORAL at 13:17

## 2017-01-09 RX ADMIN — AZITHROMYCIN 250 MILLIGRAM(S): 500 TABLET, FILM COATED ORAL at 10:29

## 2017-01-09 RX ADMIN — Medication 1 GRAM(S): at 06:17

## 2017-01-10 LAB
BUN SERPL-MCNC: 19 MG/DL — SIGNIFICANT CHANGE UP (ref 7–23)
CALCIUM SERPL-MCNC: 9.1 MG/DL — SIGNIFICANT CHANGE UP (ref 8.4–10.5)
CHLORIDE SERPL-SCNC: 88 MMOL/L — LOW (ref 98–107)
CO2 SERPL-SCNC: 44 MMOL/L — HIGH (ref 22–31)
CREAT SERPL-MCNC: 0.39 MG/DL — LOW (ref 0.5–1.3)
GLUCOSE SERPL-MCNC: 140 MG/DL — HIGH (ref 70–99)
HCT VFR BLD CALC: 34.1 % — LOW (ref 39–50)
HGB BLD-MCNC: 10.2 G/DL — LOW (ref 13–17)
MCHC RBC-ENTMCNC: 26.4 PG — LOW (ref 27–34)
MCHC RBC-ENTMCNC: 29.9 % — LOW (ref 32–36)
MCV RBC AUTO: 88.3 FL — SIGNIFICANT CHANGE UP (ref 80–100)
PLATELET # BLD AUTO: 175 K/UL — SIGNIFICANT CHANGE UP (ref 150–400)
PMV BLD: 9.4 FL — SIGNIFICANT CHANGE UP (ref 7–13)
POTASSIUM SERPL-MCNC: 5 MMOL/L — SIGNIFICANT CHANGE UP (ref 3.5–5.3)
POTASSIUM SERPL-SCNC: 5 MMOL/L — SIGNIFICANT CHANGE UP (ref 3.5–5.3)
RBC # BLD: 3.86 M/UL — LOW (ref 4.2–5.8)
RBC # FLD: 15.9 % — HIGH (ref 10.3–14.5)
SODIUM SERPL-SCNC: 140 MMOL/L — SIGNIFICANT CHANGE UP (ref 135–145)
WBC # BLD: 4.47 K/UL — SIGNIFICANT CHANGE UP (ref 3.8–10.5)
WBC # FLD AUTO: 4.47 K/UL — SIGNIFICANT CHANGE UP (ref 3.8–10.5)

## 2017-01-10 PROCEDURE — 99233 SBSQ HOSP IP/OBS HIGH 50: CPT | Mod: GC

## 2017-01-10 RX ADMIN — Medication 3 MILLILITER(S): at 17:07

## 2017-01-10 RX ADMIN — SIMVASTATIN 20 MILLIGRAM(S): 20 TABLET, FILM COATED ORAL at 22:18

## 2017-01-10 RX ADMIN — Medication 1 GRAM(S): at 13:45

## 2017-01-10 RX ADMIN — Medication 1 GRAM(S): at 22:18

## 2017-01-10 RX ADMIN — HEPARIN SODIUM 5000 UNIT(S): 5000 INJECTION INTRAVENOUS; SUBCUTANEOUS at 05:28

## 2017-01-10 RX ADMIN — HEPARIN SODIUM 5000 UNIT(S): 5000 INJECTION INTRAVENOUS; SUBCUTANEOUS at 17:56

## 2017-01-10 RX ADMIN — Medication 3 MILLILITER(S): at 09:37

## 2017-01-10 RX ADMIN — Medication 20 MILLIGRAM(S): at 13:45

## 2017-01-10 RX ADMIN — TAMSULOSIN HYDROCHLORIDE 0.4 MILLIGRAM(S): 0.4 CAPSULE ORAL at 22:18

## 2017-01-10 RX ADMIN — LOSARTAN POTASSIUM 50 MILLIGRAM(S): 100 TABLET, FILM COATED ORAL at 05:28

## 2017-01-10 RX ADMIN — MONTELUKAST 10 MILLIGRAM(S): 4 TABLET, CHEWABLE ORAL at 12:38

## 2017-01-10 RX ADMIN — Medication 3 MILLILITER(S): at 04:57

## 2017-01-10 RX ADMIN — Medication 20 MILLIGRAM(S): at 05:29

## 2017-01-10 RX ADMIN — Medication 325 MILLIGRAM(S): at 17:55

## 2017-01-10 RX ADMIN — PANTOPRAZOLE SODIUM 40 MILLIGRAM(S): 20 TABLET, DELAYED RELEASE ORAL at 12:39

## 2017-01-10 RX ADMIN — Medication 81 MILLIGRAM(S): at 12:39

## 2017-01-10 RX ADMIN — Medication 325 MILLIGRAM(S): at 09:13

## 2017-01-10 RX ADMIN — Medication 1 TABLET(S): at 05:28

## 2017-01-10 RX ADMIN — BUDESONIDE AND FORMOTEROL FUMARATE DIHYDRATE 2 PUFF(S): 160; 4.5 AEROSOL RESPIRATORY (INHALATION) at 09:13

## 2017-01-10 RX ADMIN — Medication 20 MILLIGRAM(S): at 22:17

## 2017-01-10 RX ADMIN — Medication 1 TABLET(S): at 17:55

## 2017-01-10 RX ADMIN — Medication 3 MILLILITER(S): at 22:09

## 2017-01-10 RX ADMIN — Medication 1 GRAM(S): at 05:29

## 2017-01-10 RX ADMIN — AZITHROMYCIN 250 MILLIGRAM(S): 500 TABLET, FILM COATED ORAL at 09:47

## 2017-01-10 RX ADMIN — BUDESONIDE AND FORMOTEROL FUMARATE DIHYDRATE 2 PUFF(S): 160; 4.5 AEROSOL RESPIRATORY (INHALATION) at 22:09

## 2017-01-11 LAB
BACTERIA BLD CULT: SIGNIFICANT CHANGE UP
BUN SERPL-MCNC: 19 MG/DL — SIGNIFICANT CHANGE UP (ref 7–23)
CALCIUM SERPL-MCNC: 9.6 MG/DL — SIGNIFICANT CHANGE UP (ref 8.4–10.5)
CHLORIDE SERPL-SCNC: 87 MMOL/L — LOW (ref 98–107)
CO2 SERPL-SCNC: 44 MMOL/L — HIGH (ref 22–31)
CREAT SERPL-MCNC: 0.47 MG/DL — LOW (ref 0.5–1.3)
GLUCOSE SERPL-MCNC: 150 MG/DL — HIGH (ref 70–99)
HCT VFR BLD CALC: 36.3 % — LOW (ref 39–50)
HGB BLD-MCNC: 10.9 G/DL — LOW (ref 13–17)
MCHC RBC-ENTMCNC: 26.4 PG — LOW (ref 27–34)
MCHC RBC-ENTMCNC: 30 % — LOW (ref 32–36)
MCV RBC AUTO: 87.9 FL — SIGNIFICANT CHANGE UP (ref 80–100)
PLATELET # BLD AUTO: 225 K/UL — SIGNIFICANT CHANGE UP (ref 150–400)
PMV BLD: 10.4 FL — SIGNIFICANT CHANGE UP (ref 7–13)
POTASSIUM SERPL-MCNC: 4.2 MMOL/L — SIGNIFICANT CHANGE UP (ref 3.5–5.3)
POTASSIUM SERPL-SCNC: 4.2 MMOL/L — SIGNIFICANT CHANGE UP (ref 3.5–5.3)
RBC # BLD: 4.13 M/UL — LOW (ref 4.2–5.8)
RBC # FLD: 15.6 % — HIGH (ref 10.3–14.5)
SODIUM SERPL-SCNC: 141 MMOL/L — SIGNIFICANT CHANGE UP (ref 135–145)
WBC # BLD: 4.74 K/UL — SIGNIFICANT CHANGE UP (ref 3.8–10.5)
WBC # FLD AUTO: 4.74 K/UL — SIGNIFICANT CHANGE UP (ref 3.8–10.5)

## 2017-01-11 PROCEDURE — 99233 SBSQ HOSP IP/OBS HIGH 50: CPT | Mod: GC

## 2017-01-11 RX ADMIN — Medication 4: at 13:30

## 2017-01-11 RX ADMIN — AZITHROMYCIN 250 MILLIGRAM(S): 500 TABLET, FILM COATED ORAL at 11:21

## 2017-01-11 RX ADMIN — Medication 1 GRAM(S): at 05:52

## 2017-01-11 RX ADMIN — MONTELUKAST 10 MILLIGRAM(S): 4 TABLET, CHEWABLE ORAL at 11:22

## 2017-01-11 RX ADMIN — Medication 1 TABLET(S): at 05:52

## 2017-01-11 RX ADMIN — TAMSULOSIN HYDROCHLORIDE 0.4 MILLIGRAM(S): 0.4 CAPSULE ORAL at 22:36

## 2017-01-11 RX ADMIN — Medication 325 MILLIGRAM(S): at 17:48

## 2017-01-11 RX ADMIN — LOSARTAN POTASSIUM 50 MILLIGRAM(S): 100 TABLET, FILM COATED ORAL at 05:52

## 2017-01-11 RX ADMIN — PANTOPRAZOLE SODIUM 40 MILLIGRAM(S): 20 TABLET, DELAYED RELEASE ORAL at 11:21

## 2017-01-11 RX ADMIN — HEPARIN SODIUM 5000 UNIT(S): 5000 INJECTION INTRAVENOUS; SUBCUTANEOUS at 05:50

## 2017-01-11 RX ADMIN — HEPARIN SODIUM 5000 UNIT(S): 5000 INJECTION INTRAVENOUS; SUBCUTANEOUS at 17:48

## 2017-01-11 RX ADMIN — Medication 1 TABLET(S): at 17:48

## 2017-01-11 RX ADMIN — Medication 1 GRAM(S): at 22:36

## 2017-01-11 RX ADMIN — BUDESONIDE AND FORMOTEROL FUMARATE DIHYDRATE 2 PUFF(S): 160; 4.5 AEROSOL RESPIRATORY (INHALATION) at 09:07

## 2017-01-11 RX ADMIN — Medication 3 MILLILITER(S): at 05:28

## 2017-01-11 RX ADMIN — Medication 1: at 09:06

## 2017-01-11 RX ADMIN — Medication 3 MILLILITER(S): at 10:55

## 2017-01-11 RX ADMIN — Medication 20 MILLIGRAM(S): at 05:51

## 2017-01-11 RX ADMIN — Medication 1 GRAM(S): at 13:30

## 2017-01-11 RX ADMIN — SIMVASTATIN 20 MILLIGRAM(S): 20 TABLET, FILM COATED ORAL at 22:36

## 2017-01-11 RX ADMIN — Medication 40 MILLIGRAM(S): at 11:23

## 2017-01-11 RX ADMIN — Medication 81 MILLIGRAM(S): at 11:21

## 2017-01-11 RX ADMIN — BUDESONIDE AND FORMOTEROL FUMARATE DIHYDRATE 2 PUFF(S): 160; 4.5 AEROSOL RESPIRATORY (INHALATION) at 23:51

## 2017-01-11 RX ADMIN — Medication 325 MILLIGRAM(S): at 09:06

## 2017-01-11 RX ADMIN — Medication 3 MILLILITER(S): at 23:28

## 2017-01-11 RX ADMIN — Medication 3 MILLILITER(S): at 16:27

## 2017-01-12 ENCOUNTER — TRANSCRIPTION ENCOUNTER (OUTPATIENT)
Age: 62
End: 2017-01-12

## 2017-01-12 LAB
BACTERIA BLD CULT: SIGNIFICANT CHANGE UP
BASE EXCESS BLDA CALC-SCNC: 21.9 MMOL/L — SIGNIFICANT CHANGE UP
BUN SERPL-MCNC: 19 MG/DL — SIGNIFICANT CHANGE UP (ref 7–23)
CALCIUM SERPL-MCNC: 9.9 MG/DL — SIGNIFICANT CHANGE UP (ref 8.4–10.5)
CHLORIDE SERPL-SCNC: 88 MMOL/L — LOW (ref 98–107)
CO2 SERPL-SCNC: 46 MMOL/L — CRITICAL HIGH (ref 22–31)
CREAT SERPL-MCNC: 0.41 MG/DL — LOW (ref 0.5–1.3)
GLUCOSE SERPL-MCNC: 114 MG/DL — HIGH (ref 70–99)
HCO3 BLDA-SCNC: 44 MMOL/L — HIGH (ref 22–26)
MAGNESIUM SERPL-MCNC: 2.1 MG/DL — SIGNIFICANT CHANGE UP (ref 1.6–2.6)
PCO2 BLDA: 83 MMHG — CRITICAL HIGH (ref 35–48)
PH BLDA: 7.38 PH — SIGNIFICANT CHANGE UP (ref 7.35–7.45)
PHOSPHATE SERPL-MCNC: 2.2 MG/DL — LOW (ref 2.5–4.5)
PO2 BLDA: 87 MMHG — SIGNIFICANT CHANGE UP (ref 83–108)
POTASSIUM SERPL-MCNC: 3.8 MMOL/L — SIGNIFICANT CHANGE UP (ref 3.5–5.3)
POTASSIUM SERPL-SCNC: 3.8 MMOL/L — SIGNIFICANT CHANGE UP (ref 3.5–5.3)
SAO2 % BLDA: 97.2 % — SIGNIFICANT CHANGE UP (ref 95–99)
SODIUM SERPL-SCNC: 142 MMOL/L — SIGNIFICANT CHANGE UP (ref 135–145)

## 2017-01-12 PROCEDURE — 99233 SBSQ HOSP IP/OBS HIGH 50: CPT | Mod: GC

## 2017-01-12 RX ORDER — SODIUM,POTASSIUM PHOSPHATES 278-250MG
1 POWDER IN PACKET (EA) ORAL
Qty: 0 | Refills: 0 | Status: COMPLETED | OUTPATIENT
Start: 2017-01-12 | End: 2017-01-13

## 2017-01-12 RX ADMIN — Medication 1 GRAM(S): at 13:20

## 2017-01-12 RX ADMIN — Medication 325 MILLIGRAM(S): at 18:04

## 2017-01-12 RX ADMIN — Medication 1 GRAM(S): at 22:09

## 2017-01-12 RX ADMIN — PANTOPRAZOLE SODIUM 40 MILLIGRAM(S): 20 TABLET, DELAYED RELEASE ORAL at 12:04

## 2017-01-12 RX ADMIN — HEPARIN SODIUM 5000 UNIT(S): 5000 INJECTION INTRAVENOUS; SUBCUTANEOUS at 06:36

## 2017-01-12 RX ADMIN — Medication 1 TABLET(S): at 18:04

## 2017-01-12 RX ADMIN — Medication 325 MILLIGRAM(S): at 09:03

## 2017-01-12 RX ADMIN — BUDESONIDE AND FORMOTEROL FUMARATE DIHYDRATE 2 PUFF(S): 160; 4.5 AEROSOL RESPIRATORY (INHALATION) at 22:11

## 2017-01-12 RX ADMIN — Medication 1 TABLET(S): at 06:36

## 2017-01-12 RX ADMIN — HEPARIN SODIUM 5000 UNIT(S): 5000 INJECTION INTRAVENOUS; SUBCUTANEOUS at 18:04

## 2017-01-12 RX ADMIN — Medication 3 MILLILITER(S): at 09:47

## 2017-01-12 RX ADMIN — Medication 2: at 13:20

## 2017-01-12 RX ADMIN — Medication 40 MILLIGRAM(S): at 06:35

## 2017-01-12 RX ADMIN — Medication 3 MILLILITER(S): at 05:45

## 2017-01-12 RX ADMIN — AZITHROMYCIN 250 MILLIGRAM(S): 500 TABLET, FILM COATED ORAL at 10:46

## 2017-01-12 RX ADMIN — BUDESONIDE AND FORMOTEROL FUMARATE DIHYDRATE 2 PUFF(S): 160; 4.5 AEROSOL RESPIRATORY (INHALATION) at 09:03

## 2017-01-12 RX ADMIN — Medication 1 GRAM(S): at 06:41

## 2017-01-12 RX ADMIN — TAMSULOSIN HYDROCHLORIDE 0.4 MILLIGRAM(S): 0.4 CAPSULE ORAL at 22:09

## 2017-01-12 RX ADMIN — Medication 1 TABLET(S): at 12:04

## 2017-01-12 RX ADMIN — Medication 81 MILLIGRAM(S): at 12:03

## 2017-01-12 RX ADMIN — Medication 3 MILLILITER(S): at 21:45

## 2017-01-12 RX ADMIN — LOSARTAN POTASSIUM 50 MILLIGRAM(S): 100 TABLET, FILM COATED ORAL at 06:36

## 2017-01-12 RX ADMIN — Medication 3 MILLILITER(S): at 15:52

## 2017-01-12 RX ADMIN — SIMVASTATIN 20 MILLIGRAM(S): 20 TABLET, FILM COATED ORAL at 22:09

## 2017-01-12 RX ADMIN — MONTELUKAST 10 MILLIGRAM(S): 4 TABLET, CHEWABLE ORAL at 12:03

## 2017-01-12 NOTE — DISCHARGE NOTE ADULT - PLAN OF CARE
infection will resolve Control of chronic illness Blood pressure within normal limits Follow up with Dr. Bailey pulmonologist within one week. Call to make an appointment.  Continue prednisone taper, all inhalers as ordered. Notify Dr. Bailey if you have worsening shortness of breath, fever, chills or if you become very sleepy Use BiPAP 16/5 every night at bedtime. Nasal oxygen during the day. Continue to take your blood pressure medicine and follow up with your medical doctor within one week  Low salt, low fat diet A1C < 7.0 Carbohydrate consistent diet  Take metformin dose as you did before you came to the hospital. Please check you blood glucose before breakfast and dinner. Notify your doctor if it is less than 70 or more than 300. Your blood glucose may start to run lower as you decrease your dose of prednisone. Observe for symptoms of hypoglycemia (low blood sugar) such as confusion, sweating, feeling faint or fainting, increased hunger and notify your doctor immediately if they occur

## 2017-01-12 NOTE — PROVIDER CONTACT NOTE (CRITICAL VALUE NOTIFICATION) - ACTION/TREATMENT ORDERED:
As per NP, pCO2 of 83 is an improvement as previous levels in the 90s.  Will continue to monitor.
provider aware, will change bipap settings to 16/5

## 2017-01-12 NOTE — DISCHARGE NOTE ADULT - HOME CARE AGENCY
[facility] [phone]  [facility] [phone] for Visiting Nurse Services, Home Physical Therapy, The 1st Visiting Nurse Visit is for Saturday 1/14/2017. The Nurse will call to arrange the Visit time. [facility] [phone]  [facility] [phone] for Visiting Nurse Services, Home Physical Therapy, The 1st Visiting Nurse Visit will be within 24-48 hrs of your D/C Home.. The Nurse will call to arrange the Visit time.

## 2017-01-12 NOTE — DISCHARGE NOTE ADULT - DURABLE MEDICAL EQUIPMENT AGENCY
[facility] [phone] for Home Oxygen, Home Nebulizer, & Rollatoer Walker. The Home Oxygen was delivered to your Home today 1/13/17.  A Portable Oxygen Tank will be delivered to your Hospital Room for Transport Home. [facility] [phone] for  A [place] Oxygen Homefill Concentrator, Home Nebulizer, Home BIPAP. The Oxygen Concentrator & Nebulizer will be Delivered to your home this evening. The Home BIPAP Machine will be brought to your Home by a Respiratory Therapist from [facility] after 7PM. Walker delivered to Bedside.

## 2017-01-12 NOTE — DISCHARGE NOTE ADULT - CARE PROVIDERS DIRECT ADDRESSES
[email or web address]

## 2017-01-12 NOTE — DISCHARGE NOTE ADULT - PATIENT PORTAL LINK FT
“You can access the FollowHealth Patient Portal, offered by [facility], by registering with the following website: [email or web address]”

## 2017-01-12 NOTE — DISCHARGE NOTE ADULT - CARE PROVIDER_API CALL
Shannan Bailey), Critical Care Medicine; Internal Medicine; Pulmonary Disease; Sleep Medicine  [address] [place] [address]  Phone: [phone]  Fax: [phone]

## 2017-01-12 NOTE — DISCHARGE NOTE ADULT - CARE PLAN
Principal Discharge DX:	RSV (acute bronchiolitis due to respiratory syncytial virus)  Goal:	infection will resolve  Secondary Diagnosis:	COPD exacerbation  Goal:	Control of chronic illness  Secondary Diagnosis:	HTN (hypertension)  Goal:	Blood pressure within normal limits  Secondary Diagnosis:	Diabetes Mellitus Principal Discharge DX:	RSV (acute bronchiolitis due to respiratory syncytial virus)  Goal:	infection will resolve  Instructions for follow-up, activity and diet:	Follow up with Dr. Bailey pulmonologist within one week. Call to make an appointment.  Continue prednisone taper, all inhalers as ordered. Notify Dr. Bailey if you have worsening shortness of breath, fever, chills or if you become very sleepy  Secondary Diagnosis:	COPD exacerbation  Goal:	Control of chronic illness  Instructions for follow-up, activity and diet:	Use BiPAP 16/5 every night at bedtime. Nasal oxygen during the day.  Secondary Diagnosis:	HTN (hypertension)  Goal:	Blood pressure within normal limits  Instructions for follow-up, activity and diet:	Continue to take your blood pressure medicine and follow up with your medical doctor within one week  Low salt, low fat diet  Secondary Diagnosis:	Diabetes Mellitus  Goal:	A1C < 7.0  Instructions for follow-up, activity and diet:	Carbohydrate consistent diet  Take metformin dose as you did before you came to the hospital. Please check you blood glucose before breakfast and dinner. Notify your doctor if it is less than 70 or more than 300. Your blood glucose may start to run lower as you decrease your dose of prednisone. Observe for symptoms of hypoglycemia (low blood sugar) such as confusion, sweating, feeling faint or fainting, increased hunger and notify your doctor immediately if they occur

## 2017-01-12 NOTE — DISCHARGE NOTE ADULT - HOSPITAL COURSE
60 y/o male HX of COPD on Home O2, EX Smoker,  GI Bleed, A Fib on ASA, HTN, High Cholesterol, DM, CAD with Stent, Prostate cancer, S/P RTX, Iron def. Anemia , presented with  SOB, Lethargy, decreased po intake, found to be RSV positive. Admitted for COPD exacerbation 2/2 viral illness    COPD exacerbation with hypercarbic resp failure in the setting of RSV infection   - admitted to [facility]  - steroids, BIPAPIV Solumedrol, IV Protonix, BIPAP, Fall/aspiration precaution  - Azithro IV  - Duoneb  - met criteria for home BIPAP-- delivery arranged for discharge    Atrial fibrillation.    - continued on ASA.     Diabetes Mellitus.    - HgA1C - 6.0%   - corrective scale insulin    Adenoma of Prostate.    - on Flomax.     Dyslipidemia.   - On Zocor    CAD (coronary artery disease).    - on ASA, HX of Stent. 62 y/o male HX of COPD on Home O2, EX Smoker,  GI Bleed, A Fib on ASA, HTN, High Cholesterol, DM, CAD with Stent, Prostate cancer, S/P RTX, Iron def. Anemia , presented with  SOB, Lethargy, decreased po intake, found to be RSV positive. Admitted for COPD exacerbation 2/2 viral illness    COPD exacerbation with hypercarbic resp failure in the setting of RSV infection   - admitted to [facility]  - steroids, BIPAP, IV Solumedrol, IV Protonix  - Azithro IV  - Duoneb  - met criteria for home BIPAP-- delivery arranged for discharge    Atrial fibrillation.    - continued on ASA.     Diabetes Mellitus.    - HgA1C - 6.0%   - corrective scale insulin   -discharged home on home dose of metformin, pt to f/u with PMD for dosing recommendations, especially as he weans off prednisone    Adenoma of Prostate.    -  Flomax  - no issues with retention    Discharged home in stable condition with home care, home O2, BIPAP, and home PT arranged

## 2017-01-12 NOTE — DISCHARGE NOTE ADULT - MEDICATION SUMMARY - MEDICATIONS TO TAKE
I will START or STAY ON the medications listed below when I get home from the hospital:    predniSONE 10 mg oral tablet  -- 4 tab(s) by mouth once a day for 3 days, then 3 tabs daily x 3 days, then 2 tabs daily x 3 days, then 1 tab daily for 3 days  -- It is very important that you take or use this exactly as directed.  Do not skip doses or discontinue unless directed by your doctor.  Obtain medical advice before taking any non-prescription drugs as some may affect the action of this medication.  Take with food or milk.    -- Indication: For COPD exacerbation    aspirin 81 mg oral tablet, chewable  -- 1 tab(s) by mouth once a day  -- Indication: For CAD (coronary artery disease)    Cozaar 50 mg oral tablet  -- 1 tab(s) by mouth once a day  -- Indication: For HTN (hypertension)    tamsulosin 0.4 mg oral capsule  -- 1 cap(s) by mouth once a day  -- Indication: For Adenoma of Prostate    diltiaZEM 30 mg oral tablet  -- 1 tab(s) by mouth every 6 hours  -- Indication: For HTN (hypertension)    metFORMIN 1000 mg oral tablet  -- 1 tab(s) by mouth 2 times a day (with meals)  -- Indication: For Diabetes Mellitus    simvastatin 20 mg oral tablet  -- 1 tab(s) by mouth once a day (at bedtime)  -- Indication: For Dyslipidemia    ProAir HFA  -- 2 puff(s) inhaled 4 times a day, As Needed  -- Indication: For Chronic obstructive pulmonary disease    tiotropium 18 mcg inhalation capsule  -- 1 cap(s) inhaled once a day  -- Indication: For Chronic obstructive pulmonary disease    fluticasone-salmeterol  -- 1 puff(s) inhaled 2 times a day  -- Indication: For Chronic obstructive pulmonary disease    albuterol-ipratropium 2.5 mg-0.5 mg/3 mL inhalation solution  -- 3 milliliter(s) inhaled every 6 hours, As Needed for shortness of breath or wheezing  -- Indication: For Chronic obstructive pulmonary disease    FeroSul 325 mg (65 mg elemental iron) oral tablet  -- 1 tab(s) by mouth 2 times a day  -- Indication: For Iron deficiency anemia    montelukast 10 mg oral tablet  -- 1 tab(s) by mouth once a day  -- Indication: For Allergic    azithromycin 250 mg oral tablet  -- 1 by mouth 3 times a week on Monday, Wednesday and Friday  -- Do not take dairy products, antacids, or iron preparations within one hour of this medication.  Finish all this medication unless otherwise directed by prescriber.    -- Indication: For Chronic obstructive pulmonary disease    sucralfate 1 g oral tablet  -- 1 tab(s) by mouth 3 times a day  -- Indication: For Acid reflux    lactobacillus acidophilus oral capsule  -- 1 cap(s) by mouth 3 times a day (with meals)  -- Indication: For prevent diarrhea    omeprazole 40 mg oral delayed release capsule  -- 1 cap(s) by mouth once a day  -- Indication: For Acid reflux I will START or STAY ON the medications listed below when I get home from the hospital:    predniSONE 10 mg oral tablet  -- 4 tab(s) by mouth once a day for 3 days, then 3 tabs daily x 3 days, then 2 tabs daily x 3 days, then 1 tab daily for 3 days  -- It is very important that you take or use this exactly as directed.  Do not skip doses or discontinue unless directed by your doctor.  Obtain medical advice before taking any non-prescription drugs as some may affect the action of this medication.  Take with food or milk.    -- Indication: For COPD exacerbation    aspirin 81 mg oral tablet, chewable  -- 1 tab(s) by mouth once a day  -- Indication: For CAD (coronary artery disease)    Cozaar 50 mg oral tablet  -- 1 tab(s) by mouth once a day  -- Indication: For HTN (hypertension)    tamsulosin 0.4 mg oral capsule  -- 1 cap(s) by mouth once a day  -- Indication: For Adenoma of Prostate    diltiaZEM 30 mg oral tablet  -- 1 tab(s) by mouth every 6 hours  -- Indication: For HTN (hypertension)    metFORMIN 1000 mg oral tablet  -- 1 tab(s) by mouth 2 times a day (with meals)  -- Indication: For Diabetes Mellitus    simvastatin 20 mg oral tablet  -- 1 tab(s) by mouth once a day (at bedtime)  -- Indication: For Dyslipidemia    ProAir HFA  -- 2 puff(s) inhaled 4 times a day, As Needed  -- Indication: For Chronic obstructive pulmonary disease    tiotropium 18 mcg inhalation capsule  -- 1 cap(s) inhaled once a day  -- Indication: For Chronic obstructive pulmonary disease    fluticasone-salmeterol  -- 1 puff(s) inhaled 2 times a day  -- Indication: For Chronic obstructive pulmonary disease    albuterol-ipratropium 2.5 mg-0.5 mg/3 mL inhalation solution  -- 3 milliliter(s) inhaled every 6 hours, As Needed for shortness of breath or wheezing  -- Indication: For Chronic obstructive pulmonary disease    FeroSul 325 mg (65 mg elemental iron) oral tablet  -- 1 tab(s) by mouth 2 times a day  -- Indication: For Iron deficiency anemia    montelukast 10 mg oral tablet  -- 1 tab(s) by mouth once a day  -- Indication: For Allergic    azithromycin 250 mg oral tablet  -- 1 by mouth 3 times a week on Monday, Wednesday and Friday  -- Do not take dairy products, antacids, or iron preparations within one hour of this medication.  Finish all this medication unless otherwise directed by prescriber.    -- Indication: For Chronic obstructive pulmonary disease    azithromycin 250 mg oral tablet  -- 1 tab(s) by mouth 3 times a week  -- Do not take dairy products, antacids, or iron preparations within one hour of this medication.  Finish all this medication unless otherwise directed by prescriber.    -- Indication: For Chronic obstructive pulmonary disease    sucralfate 1 g oral tablet  -- 1 tab(s) by mouth 3 times a day  -- Indication: For Acid reflux    lactobacillus acidophilus oral capsule  -- 1 cap(s) by mouth 3 times a day (with meals)  -- Indication: For prevent diarrhea    omeprazole 40 mg oral delayed release capsule  -- 1 cap(s) by mouth once a day  -- Indication: For Acid reflux

## 2017-01-13 VITALS
DIASTOLIC BLOOD PRESSURE: 75 MMHG | HEART RATE: 101 BPM | OXYGEN SATURATION: 100 % | TEMPERATURE: 99 F | SYSTOLIC BLOOD PRESSURE: 115 MMHG | RESPIRATION RATE: 18 BRPM

## 2017-01-13 LAB
BUN SERPL-MCNC: 17 MG/DL — SIGNIFICANT CHANGE UP (ref 7–23)
CALCIUM SERPL-MCNC: 9.5 MG/DL — SIGNIFICANT CHANGE UP (ref 8.4–10.5)
CHLORIDE SERPL-SCNC: 89 MMOL/L — LOW (ref 98–107)
CO2 SERPL-SCNC: 45 MMOL/L — CRITICAL HIGH (ref 22–31)
CREAT SERPL-MCNC: 0.36 MG/DL — LOW (ref 0.5–1.3)
GLUCOSE SERPL-MCNC: 96 MG/DL — SIGNIFICANT CHANGE UP (ref 70–99)
MAGNESIUM SERPL-MCNC: 1.8 MG/DL — SIGNIFICANT CHANGE UP (ref 1.6–2.6)
PHOSPHATE SERPL-MCNC: 2.4 MG/DL — LOW (ref 2.5–4.5)
POTASSIUM SERPL-MCNC: 3.7 MMOL/L — SIGNIFICANT CHANGE UP (ref 3.5–5.3)
POTASSIUM SERPL-SCNC: 3.7 MMOL/L — SIGNIFICANT CHANGE UP (ref 3.5–5.3)
SODIUM SERPL-SCNC: 142 MMOL/L — SIGNIFICANT CHANGE UP (ref 135–145)

## 2017-01-13 PROCEDURE — 99238 HOSP IP/OBS DSCHRG MGMT 30/<: CPT

## 2017-01-13 RX ORDER — DOCUSATE SODIUM 100 MG
100 CAPSULE ORAL
Qty: 0 | Refills: 0 | Status: DISCONTINUED | OUTPATIENT
Start: 2017-01-13 | End: 2017-01-13

## 2017-01-13 RX ORDER — TIOTROPIUM BROMIDE 18 UG/1
1 CAPSULE ORAL; RESPIRATORY (INHALATION) DAILY
Qty: 0 | Refills: 0 | Status: DISCONTINUED | OUTPATIENT
Start: 2017-01-13 | End: 2017-01-13

## 2017-01-13 RX ORDER — LACTOBACILLUS ACIDOPHILUS 100MM CELL
1 CAPSULE ORAL
Qty: 30 | Refills: 0 | OUTPATIENT
Start: 2017-01-13 | End: 2017-01-23

## 2017-01-13 RX ORDER — AZITHROMYCIN 500 MG/1
1 TABLET, FILM COATED ORAL
Qty: 15 | Refills: 0 | OUTPATIENT
Start: 2017-01-13

## 2017-01-13 RX ADMIN — Medication 3 MILLILITER(S): at 03:44

## 2017-01-13 RX ADMIN — Medication 3 MILLILITER(S): at 16:20

## 2017-01-13 RX ADMIN — Medication 1 TABLET(S): at 17:14

## 2017-01-13 RX ADMIN — HEPARIN SODIUM 5000 UNIT(S): 5000 INJECTION INTRAVENOUS; SUBCUTANEOUS at 06:20

## 2017-01-13 RX ADMIN — Medication 1 GRAM(S): at 06:21

## 2017-01-13 RX ADMIN — BUDESONIDE AND FORMOTEROL FUMARATE DIHYDRATE 2 PUFF(S): 160; 4.5 AEROSOL RESPIRATORY (INHALATION) at 09:33

## 2017-01-13 RX ADMIN — PANTOPRAZOLE SODIUM 40 MILLIGRAM(S): 20 TABLET, DELAYED RELEASE ORAL at 12:59

## 2017-01-13 RX ADMIN — Medication 1 TABLET(S): at 10:24

## 2017-01-13 RX ADMIN — Medication 1: at 17:25

## 2017-01-13 RX ADMIN — Medication 1 GRAM(S): at 13:01

## 2017-01-13 RX ADMIN — AZITHROMYCIN 250 MILLIGRAM(S): 500 TABLET, FILM COATED ORAL at 10:22

## 2017-01-13 RX ADMIN — Medication 325 MILLIGRAM(S): at 17:14

## 2017-01-13 RX ADMIN — Medication 3: at 13:00

## 2017-01-13 RX ADMIN — HEPARIN SODIUM 5000 UNIT(S): 5000 INJECTION INTRAVENOUS; SUBCUTANEOUS at 17:14

## 2017-01-13 RX ADMIN — Medication 40 MILLIGRAM(S): at 06:21

## 2017-01-13 RX ADMIN — Medication 3 MILLILITER(S): at 10:11

## 2017-01-13 RX ADMIN — Medication 81 MILLIGRAM(S): at 12:59

## 2017-01-13 RX ADMIN — Medication 325 MILLIGRAM(S): at 09:33

## 2017-01-13 RX ADMIN — Medication 1 TABLET(S): at 06:21

## 2017-01-13 RX ADMIN — LOSARTAN POTASSIUM 50 MILLIGRAM(S): 100 TABLET, FILM COATED ORAL at 06:21

## 2017-01-13 RX ADMIN — TIOTROPIUM BROMIDE 1 CAPSULE(S): 18 CAPSULE ORAL; RESPIRATORY (INHALATION) at 13:00

## 2017-01-13 NOTE — PROVIDER CONTACT NOTE (CRITICAL VALUE NOTIFICATION) - BACKGROUND
61 year old male admitted with copd exacerbation rsv positive requiring bipap at night
Patient with elevated CO2 levels and repeat ABG drawn
61 year old male admitted with rsv
admitted for COPD

## 2017-01-13 NOTE — PROVIDER CONTACT NOTE (CRITICAL VALUE NOTIFICATION) - ASSESSMENT
alert and oriented x4.
pCO2 on ABG 83
Alert, cont on BIpap
forgetful with reorientation of time needed

## 2017-02-01 ENCOUNTER — OTHER (OUTPATIENT)
Age: 62
End: 2017-02-01

## 2017-02-08 ENCOUNTER — APPOINTMENT (OUTPATIENT)
Dept: PULMONOLOGY | Facility: CLINIC | Age: 62
End: 2017-02-08

## 2017-02-13 ENCOUNTER — INPATIENT (INPATIENT)
Facility: HOSPITAL | Age: 62
LOS: 9 days | Discharge: HOME HEALTH | End: 2017-02-23
Attending: INTERNAL MEDICINE | Admitting: INTERNAL MEDICINE
Payer: MEDICAID

## 2017-02-13 VITALS — WEIGHT: 130.07 LBS | HEIGHT: 66 IN

## 2017-02-13 DIAGNOSIS — I95.9 HYPOTENSION, UNSPECIFIED: ICD-10-CM

## 2017-02-13 DIAGNOSIS — E11.9 TYPE 2 DIABETES MELLITUS WITHOUT COMPLICATIONS: ICD-10-CM

## 2017-02-13 DIAGNOSIS — J44.1 CHRONIC OBSTRUCTIVE PULMONARY DISEASE WITH (ACUTE) EXACERBATION: ICD-10-CM

## 2017-02-13 DIAGNOSIS — I48.91 UNSPECIFIED ATRIAL FIBRILLATION: ICD-10-CM

## 2017-02-13 LAB
ALBUMIN SERPL ELPH-MCNC: 2.9 G/DL — LOW (ref 3.3–5)
ALP SERPL-CCNC: 87 U/L — SIGNIFICANT CHANGE UP (ref 40–120)
ALT FLD-CCNC: 17 U/L — SIGNIFICANT CHANGE UP (ref 12–78)
ANION GAP SERPL CALC-SCNC: <5 MMOL/L — SIGNIFICANT CHANGE UP (ref 5–17)
APPEARANCE UR: CLEAR — SIGNIFICANT CHANGE UP
AST SERPL-CCNC: 17 U/L — SIGNIFICANT CHANGE UP (ref 15–37)
BACTERIA # UR AUTO: ABNORMAL
BASE EXCESS BLDA CALC-SCNC: 11 MMOL/L — HIGH (ref -2–2)
BASE EXCESS BLDA CALC-SCNC: 14.5 MMOL/L — HIGH (ref -2–2)
BASOPHILS # BLD AUTO: 0.1 K/UL — SIGNIFICANT CHANGE UP (ref 0–0.2)
BASOPHILS NFR BLD AUTO: 1.8 % — SIGNIFICANT CHANGE UP (ref 0–2)
BILIRUB SERPL-MCNC: 0.2 MG/DL — SIGNIFICANT CHANGE UP (ref 0.2–1.2)
BILIRUB UR-MCNC: NEGATIVE — SIGNIFICANT CHANGE UP
BLOOD GAS COMMENTS: SIGNIFICANT CHANGE UP
BLOOD GAS SOURCE: SIGNIFICANT CHANGE UP
BLOOD GAS SOURCE: SIGNIFICANT CHANGE UP
BUN SERPL-MCNC: 6 MG/DL — LOW (ref 7–23)
CALCIUM SERPL-MCNC: 9.1 MG/DL — SIGNIFICANT CHANGE UP (ref 8.5–10.1)
CHLORIDE SERPL-SCNC: 78 MMOL/L — LOW (ref 96–108)
CO2 SERPL-SCNC: >45 MMOL/L — CRITICAL HIGH (ref 22–31)
COLOR SPEC: YELLOW — SIGNIFICANT CHANGE UP
CREAT SERPL-MCNC: 0.29 MG/DL — LOW (ref 0.5–1.3)
DIFF PNL FLD: NEGATIVE — SIGNIFICANT CHANGE UP
EOSINOPHIL # BLD AUTO: 0.1 K/UL — SIGNIFICANT CHANGE UP (ref 0–0.5)
EOSINOPHIL NFR BLD AUTO: 1.4 % — SIGNIFICANT CHANGE UP (ref 0–6)
EPI CELLS # UR: SIGNIFICANT CHANGE UP
FLUAV SPEC QL CULT: NEGATIVE — SIGNIFICANT CHANGE UP
FLUBV AG SPEC QL IA: NEGATIVE — SIGNIFICANT CHANGE UP
GLUCOSE SERPL-MCNC: 97 MG/DL — SIGNIFICANT CHANGE UP (ref 70–99)
GLUCOSE UR QL: 250 MG/DL
HCO3 BLDA-SCNC: 38 MMOL/L — HIGH (ref 21–29)
HCO3 BLDA-SCNC: 40 MMOL/L — HIGH (ref 21–29)
HCT VFR BLD CALC: 33.5 % — LOW (ref 39–50)
HGB BLD-MCNC: 11.1 G/DL — LOW (ref 13–17)
HOROWITZ INDEX BLDA+IHG-RTO: 30 — SIGNIFICANT CHANGE UP
HOROWITZ INDEX BLDA+IHG-RTO: 30 — SIGNIFICANT CHANGE UP
KETONES UR-MCNC: ABNORMAL
LEUKOCYTE ESTERASE UR-ACNC: NEGATIVE — SIGNIFICANT CHANGE UP
LIDOCAIN IGE QN: 268 U/L — SIGNIFICANT CHANGE UP (ref 73–393)
LYMPHOCYTES # BLD AUTO: 1.8 K/UL — SIGNIFICANT CHANGE UP (ref 1–3.3)
LYMPHOCYTES # BLD AUTO: 23.5 % — SIGNIFICANT CHANGE UP (ref 13–44)
MCHC RBC-ENTMCNC: 27 PG — SIGNIFICANT CHANGE UP (ref 27–34)
MCHC RBC-ENTMCNC: 33.2 GM/DL — SIGNIFICANT CHANGE UP (ref 32–36)
MCV RBC AUTO: 81.2 FL — SIGNIFICANT CHANGE UP (ref 80–100)
MONOCYTES # BLD AUTO: 1.1 K/UL — HIGH (ref 0–0.9)
MONOCYTES NFR BLD AUTO: 14 % — SIGNIFICANT CHANGE UP (ref 2–14)
NEUTROPHILS # BLD AUTO: 4.6 K/UL — SIGNIFICANT CHANGE UP (ref 1.8–7.4)
NEUTROPHILS NFR BLD AUTO: 59.3 % — SIGNIFICANT CHANGE UP (ref 43–77)
NITRITE UR-MCNC: NEGATIVE — SIGNIFICANT CHANGE UP
NT-PROBNP SERPL-SCNC: 935 PG/ML — HIGH (ref 0–125)
PCO2 BLDA: 58 MMHG — HIGH (ref 32–46)
PCO2 BLDA: 66 MMHG — HIGH (ref 32–46)
PH BLD: 7.37 — SIGNIFICANT CHANGE UP (ref 7.35–7.45)
PH BLD: 7.45 — SIGNIFICANT CHANGE UP (ref 7.35–7.45)
PH UR: 7 — SIGNIFICANT CHANGE UP (ref 4.8–8)
PLATELET # BLD AUTO: 554 K/UL — HIGH (ref 150–400)
PO2 BLDA: 65 MMHG — LOW (ref 74–108)
PO2 BLDA: 72 MMHG — LOW (ref 74–108)
POTASSIUM SERPL-MCNC: 4.6 MMOL/L — SIGNIFICANT CHANGE UP (ref 3.5–5.3)
POTASSIUM SERPL-SCNC: 4.6 MMOL/L — SIGNIFICANT CHANGE UP (ref 3.5–5.3)
PROT SERPL-MCNC: 7.7 GM/DL — SIGNIFICANT CHANGE UP (ref 6–8.3)
PROT UR-MCNC: 15 MG/DL
RBC # BLD: 4.13 M/UL — LOW (ref 4.2–5.8)
RBC # FLD: 15.4 % — HIGH (ref 11–15)
SAO2 % BLDA: 94 % — SIGNIFICANT CHANGE UP (ref 92–96)
SAO2 % BLDA: 95 % — SIGNIFICANT CHANGE UP (ref 92–96)
SODIUM SERPL-SCNC: 128 MMOL/L — LOW (ref 135–145)
SP GR SPEC: 1 — LOW (ref 1.01–1.02)
TROPONIN I SERPL-MCNC: 0.03 NG/ML — SIGNIFICANT CHANGE UP (ref 0.01–0.04)
UROBILINOGEN FLD QL: NEGATIVE MG/DL — SIGNIFICANT CHANGE UP
WBC # BLD: 7.7 K/UL — SIGNIFICANT CHANGE UP (ref 3.8–10.5)
WBC # FLD AUTO: 7.7 K/UL — SIGNIFICANT CHANGE UP (ref 3.8–10.5)
WBC UR QL: SIGNIFICANT CHANGE UP

## 2017-02-13 PROCEDURE — 99291 CRITICAL CARE FIRST HOUR: CPT

## 2017-02-13 PROCEDURE — 71010: CPT | Mod: 26

## 2017-02-13 PROCEDURE — 70450 CT HEAD/BRAIN W/O DYE: CPT | Mod: 26

## 2017-02-13 RX ORDER — CHLORHEXIDINE GLUCONATE 213 G/1000ML
1 SOLUTION TOPICAL DAILY
Qty: 0 | Refills: 0 | Status: DISCONTINUED | OUTPATIENT
Start: 2017-02-13 | End: 2017-02-22

## 2017-02-13 RX ORDER — INSULIN LISPRO 100/ML
VIAL (ML) SUBCUTANEOUS EVERY 6 HOURS
Qty: 0 | Refills: 0 | Status: DISCONTINUED | OUTPATIENT
Start: 2017-02-13 | End: 2017-02-14

## 2017-02-13 RX ORDER — SODIUM CHLORIDE 9 MG/ML
1000 INJECTION, SOLUTION INTRAVENOUS
Qty: 0 | Refills: 0 | Status: DISCONTINUED | OUTPATIENT
Start: 2017-02-13 | End: 2017-02-23

## 2017-02-13 RX ORDER — CEFEPIME 1 G/1
2000 INJECTION, POWDER, FOR SOLUTION INTRAMUSCULAR; INTRAVENOUS ONCE
Qty: 0 | Refills: 0 | Status: COMPLETED | OUTPATIENT
Start: 2017-02-13 | End: 2017-02-13

## 2017-02-13 RX ORDER — SIMVASTATIN 20 MG/1
20 TABLET, FILM COATED ORAL AT BEDTIME
Qty: 0 | Refills: 0 | Status: DISCONTINUED | OUTPATIENT
Start: 2017-02-13 | End: 2017-02-14

## 2017-02-13 RX ORDER — ASPIRIN/CALCIUM CARB/MAGNESIUM 324 MG
81 TABLET ORAL DAILY
Qty: 0 | Refills: 0 | Status: DISCONTINUED | OUTPATIENT
Start: 2017-02-13 | End: 2017-02-23

## 2017-02-13 RX ORDER — CEFEPIME 1 G/1
INJECTION, POWDER, FOR SOLUTION INTRAMUSCULAR; INTRAVENOUS
Qty: 0 | Refills: 0 | Status: DISCONTINUED | OUTPATIENT
Start: 2017-02-13 | End: 2017-02-14

## 2017-02-13 RX ORDER — IPRATROPIUM/ALBUTEROL SULFATE 18-103MCG
3 AEROSOL WITH ADAPTER (GRAM) INHALATION
Qty: 0 | Refills: 0 | Status: COMPLETED | OUTPATIENT
Start: 2017-02-13 | End: 2017-02-13

## 2017-02-13 RX ORDER — AZITHROMYCIN 500 MG/1
500 TABLET, FILM COATED ORAL ONCE
Qty: 0 | Refills: 0 | Status: COMPLETED | OUTPATIENT
Start: 2017-02-13 | End: 2017-02-13

## 2017-02-13 RX ORDER — DEXTROSE 50 % IN WATER 50 %
25 SYRINGE (ML) INTRAVENOUS ONCE
Qty: 0 | Refills: 0 | Status: DISCONTINUED | OUTPATIENT
Start: 2017-02-13 | End: 2017-02-23

## 2017-02-13 RX ORDER — ENOXAPARIN SODIUM 100 MG/ML
40 INJECTION SUBCUTANEOUS EVERY 24 HOURS
Qty: 0 | Refills: 0 | Status: DISCONTINUED | OUTPATIENT
Start: 2017-02-13 | End: 2017-02-23

## 2017-02-13 RX ORDER — GLUCAGON INJECTION, SOLUTION 0.5 MG/.1ML
1 INJECTION, SOLUTION SUBCUTANEOUS ONCE
Qty: 0 | Refills: 0 | Status: DISCONTINUED | OUTPATIENT
Start: 2017-02-13 | End: 2017-02-23

## 2017-02-13 RX ORDER — LOSARTAN POTASSIUM 100 MG/1
50 TABLET, FILM COATED ORAL DAILY
Qty: 0 | Refills: 0 | Status: DISCONTINUED | OUTPATIENT
Start: 2017-02-13 | End: 2017-02-13

## 2017-02-13 RX ORDER — DEXTROSE 50 % IN WATER 50 %
12.5 SYRINGE (ML) INTRAVENOUS ONCE
Qty: 0 | Refills: 0 | Status: DISCONTINUED | OUTPATIENT
Start: 2017-02-13 | End: 2017-02-23

## 2017-02-13 RX ORDER — AZITHROMYCIN 500 MG/1
500 TABLET, FILM COATED ORAL EVERY 24 HOURS
Qty: 0 | Refills: 0 | Status: DISCONTINUED | OUTPATIENT
Start: 2017-02-13 | End: 2017-02-22

## 2017-02-13 RX ORDER — DEXTROSE 50 % IN WATER 50 %
1 SYRINGE (ML) INTRAVENOUS ONCE
Qty: 0 | Refills: 0 | Status: DISCONTINUED | OUTPATIENT
Start: 2017-02-13 | End: 2017-02-23

## 2017-02-13 RX ORDER — CEFEPIME 1 G/1
2000 INJECTION, POWDER, FOR SOLUTION INTRAMUSCULAR; INTRAVENOUS EVERY 12 HOURS
Qty: 0 | Refills: 0 | Status: DISCONTINUED | OUTPATIENT
Start: 2017-02-13 | End: 2017-02-14

## 2017-02-13 RX ORDER — VANCOMYCIN HCL 1 G
1000 VIAL (EA) INTRAVENOUS EVERY 12 HOURS
Qty: 0 | Refills: 0 | Status: DISCONTINUED | OUTPATIENT
Start: 2017-02-13 | End: 2017-02-14

## 2017-02-13 RX ADMIN — SIMVASTATIN 20 MILLIGRAM(S): 20 TABLET, FILM COATED ORAL at 21:56

## 2017-02-13 RX ADMIN — CEFEPIME 100 MILLIGRAM(S): 1 INJECTION, POWDER, FOR SOLUTION INTRAMUSCULAR; INTRAVENOUS at 18:14

## 2017-02-13 RX ADMIN — AZITHROMYCIN 255 MILLIGRAM(S): 500 TABLET, FILM COATED ORAL at 12:14

## 2017-02-13 RX ADMIN — CHLORHEXIDINE GLUCONATE 1 APPLICATION(S): 213 SOLUTION TOPICAL at 12:16

## 2017-02-13 RX ADMIN — Medication 3 MILLILITER(S): at 04:57

## 2017-02-13 RX ADMIN — Medication 3 MILLILITER(S): at 00:57

## 2017-02-13 RX ADMIN — CEFEPIME 100 MILLIGRAM(S): 1 INJECTION, POWDER, FOR SOLUTION INTRAMUSCULAR; INTRAVENOUS at 04:52

## 2017-02-13 RX ADMIN — Medication 3 MILLILITER(S): at 04:56

## 2017-02-13 RX ADMIN — AZITHROMYCIN 255 MILLIGRAM(S): 500 TABLET, FILM COATED ORAL at 01:54

## 2017-02-13 RX ADMIN — ENOXAPARIN SODIUM 40 MILLIGRAM(S): 100 INJECTION SUBCUTANEOUS at 05:00

## 2017-02-13 RX ADMIN — Medication 250 MILLIGRAM(S): at 18:15

## 2017-02-13 RX ADMIN — Medication 250 MILLIGRAM(S): at 04:56

## 2017-02-13 RX ADMIN — Medication 40 MILLIGRAM(S): at 14:45

## 2017-02-13 RX ADMIN — Medication 81 MILLIGRAM(S): at 12:14

## 2017-02-13 RX ADMIN — Medication 40 MILLIGRAM(S): at 21:56

## 2017-02-13 NOTE — ED ADULT NURSE NOTE - CAS TRG GENERAL NORM CIRC DET
Strong peripheral pulses/No visible significant external bleeding/Capillary refill less/equal to 2 seconds

## 2017-02-13 NOTE — PROVIDER CONTACT NOTE (EICU) - SITUATION
61M PMH HTN, hyperlipidemia, CAD s/p stent, a-fib, DM, COPD, chronic hyperbaric and hypoxic respiratory failure on home O2 and BiPAP, prostate CA dx 2007 s/p radiation on Lupron, iron deficiency anemia, GERD, lower GI bleed s/p colonoscopy with non-bleeding colonic angioectasia in the cecum s/p  cautery with GI 11/2016, diverticulosis with multiple admissions for COPD exacerbation most recently at [facility] 4 weeks ago for SOB, COPD exacerbation due to RSV infection. Returns with lethargy and worsening SOB with cough x1 day. Placed on BiPAP for hypercarbic respiratory failure. Pt hypotensive in ER 50/30s improved s/p 1L NS bolus.

## 2017-02-13 NOTE — ED PROVIDER NOTE - CRITICAL CARE PROVIDED
direct patient care (not related to procedure)/documentation/consult w/ pt's family directly relating to pts condition/additional history taking/conducted a detailed discussion of DNR status/interpretation of diagnostic studies/consultation with other physicians

## 2017-02-13 NOTE — H&P ADULT. - ATTENDING COMMENTS
Agree with above.  61M with end-stage COPD (NIPPV / home O2 dependent) with multiple recent exacerbations but never intubated  p/w cough dyspnea desaturation and AMS. Admitted to ICU on NIPPV for COPD exacerbation.  Initial ABG 7.38/83/87/44 BE+22; consistent with chronic hypercapneic respiratory failure and metabolic compensation.  Placed on AVAPS @VT 500cc with overcompensation of ventilation.  Patient will be placed back on home NIPPV 16/6, titrate FiO2 as tolerated to maintain spO2 >92%  Empirically on azith / vanco / cefepime. Solu-Medrol 40mg Q8h can rapid taper. CXR is clear. NEBs PRN (lungs are clear with diminished breath sounds B/L but no evidence of wheezing).  RVP negative, influenza rapid screen negative. Awaiting final results.  Continue NIPPV at home levels.  Monitor mental status. If mental status improves can consider transfer to general medicine on NIPPV.  DVT ppx: Lovenox    Critical care time: 35 minutes  Patient will remain in ICU while mental status is questionable. If decompensates will need to have discussion with family regarding overall goals of care given poor baseline respiratory status.

## 2017-02-13 NOTE — H&P ADULT. - ASSESSMENT
Patient is a 60 y/o male HX of COPD on Home O2 and bipap machine (16/6 and 2L oxygen), EX Smoker, HX of GI Bleed, HX of A Fib on ASA, recently stopped AC due to GIB,  HTN, High Cholesterol, DM, CAD s/p PTCA, Prostate cancer, S/P RTX, Iron def. Anemia. Pt being admitted to critical care for COPD exacerbation.

## 2017-02-13 NOTE — H&P ADULT. - PROBLEM SELECTOR PLAN 2
-pt received 1L of normal saline   -BP responded and came up from SBP 50 to 90  -observe, if contineus to drop may need vasopressors

## 2017-02-13 NOTE — ED PROVIDER NOTE - MEDICAL DECISION MAKING DETAILS
Ddx: COPD exacerbation/ CO2 retention/ PNA  Plan: Bipap, cxr, labs, abx, cultures, admit, icu consult

## 2017-02-13 NOTE — H&P ADULT. - PROBLEM SELECTOR PLAN 1
-admit to critical care  -steroids, antibtiotics and duonebs  -on bipap machine now, wean as tolearted  -follow up with abg, if condition worsening may need intubation.  -will do rapid viral panel

## 2017-02-13 NOTE — ED PROVIDER NOTE - OBJECTIVE STATEMENT
Pt is a 60 yo gentleman with a pmhx of Pt is a 60 yo gentleman with a pmhx of COPD, HTN, DM, CAD w stents, afib, prostate ca who presents to the ED with SOB. He uses bipap at home. Sons say he has increased SOB and drowsiness. He denies any chest pain, cough, fevers, abdominal pain. Was in hospital 4 wks ago for similar issue. En route, EMS gave nebs, and 125 solumedrol.

## 2017-02-13 NOTE — H&P ADULT. - HISTORY OF PRESENT ILLNESS
Patient is a 62 y/o male HX of COPD on Home O2 and bipap machine (16/6 and 2L oxygen), EX Smoker, HX of GI Bleed, HX of A Fib on ASA, recently stopped AC due to GIB,  HTN, High Cholesterol, DM, CAD s/p PTCA, Prostate cancer, S/P RTX, Iron def. Anemia , pt was admitted for SOB, Lethargy, decreased po intake, + Productive cough (pink sputum) x 2 days,  no fever, NO CP, no abdominal pain, no HA, no dizziness, no dysuria, never intubated as per son. Pt placed on bipap machine in ED,  hypercarbia on ABG. Pt being admitted to critical care for further care.  Going through records, pt recent admitted to [facility] about 4 weeks ago for similar issue and multiple admissions for COPD exacerbation for the past 6 months.  Spoke to sons on bedside for GOC and wants full code.

## 2017-02-13 NOTE — ED ADULT NURSE NOTE - OBJECTIVE STATEMENT
Reports having shortness of breathing increasing over several days, increased use of home bipap, family at bedside denies patient fever, chills, nausea, vomiting.

## 2017-02-13 NOTE — PROVIDER CONTACT NOTE (EICU) - ASSESSMENT
- BiPAP for mild acute on chronic hypercarbic respiratory failure  - repeat ABG  - check RVP  - cover with broad spectrum antibiotics for HCAP coverage with recent hospitalizations  - GOC discussion with family: pt remains full code

## 2017-02-14 ENCOUNTER — APPOINTMENT (OUTPATIENT)
Dept: PULMONOLOGY | Facility: CLINIC | Age: 62
End: 2017-02-14

## 2017-02-14 LAB
ANION GAP SERPL CALC-SCNC: 7 MMOL/L — SIGNIFICANT CHANGE UP (ref 5–17)
BUN SERPL-MCNC: 9 MG/DL — SIGNIFICANT CHANGE UP (ref 7–23)
CALCIUM SERPL-MCNC: 8.9 MG/DL — SIGNIFICANT CHANGE UP (ref 8.5–10.1)
CHLORIDE SERPL-SCNC: 83 MMOL/L — LOW (ref 96–108)
CO2 SERPL-SCNC: 40 MMOL/L — HIGH (ref 22–31)
CREAT SERPL-MCNC: 0.28 MG/DL — LOW (ref 0.5–1.3)
CULTURE RESULTS: NO GROWTH — SIGNIFICANT CHANGE UP
GLUCOSE SERPL-MCNC: 137 MG/DL — HIGH (ref 70–99)
HBA1C BLD-MCNC: 6.2 % — HIGH (ref 4–5.6)
HCT VFR BLD CALC: 30 % — LOW (ref 39–50)
HGB BLD-MCNC: 10.1 G/DL — LOW (ref 13–17)
LEGIONELLA AG UR QL: NEGATIVE — SIGNIFICANT CHANGE UP
MAGNESIUM SERPL-MCNC: 1.5 MG/DL — LOW (ref 1.8–2.4)
MCHC RBC-ENTMCNC: 27.2 PG — SIGNIFICANT CHANGE UP (ref 27–34)
MCHC RBC-ENTMCNC: 33.8 GM/DL — SIGNIFICANT CHANGE UP (ref 32–36)
MCV RBC AUTO: 80.5 FL — SIGNIFICANT CHANGE UP (ref 80–100)
PHOSPHATE SERPL-MCNC: 3.2 MG/DL — SIGNIFICANT CHANGE UP (ref 2.5–4.5)
PLATELET # BLD AUTO: 547 K/UL — HIGH (ref 150–400)
POTASSIUM SERPL-MCNC: 4.2 MMOL/L — SIGNIFICANT CHANGE UP (ref 3.5–5.3)
POTASSIUM SERPL-SCNC: 4.2 MMOL/L — SIGNIFICANT CHANGE UP (ref 3.5–5.3)
RBC # BLD: 3.73 M/UL — LOW (ref 4.2–5.8)
RBC # FLD: 15.5 % — HIGH (ref 11–15)
SODIUM SERPL-SCNC: 130 MMOL/L — LOW (ref 135–145)
SPECIMEN SOURCE: SIGNIFICANT CHANGE UP
WBC # BLD: 7.3 K/UL — SIGNIFICANT CHANGE UP (ref 3.8–10.5)
WBC # FLD AUTO: 7.3 K/UL — SIGNIFICANT CHANGE UP (ref 3.8–10.5)

## 2017-02-14 PROCEDURE — 99233 SBSQ HOSP IP/OBS HIGH 50: CPT | Mod: GC

## 2017-02-14 RX ORDER — MAGNESIUM SULFATE 500 MG/ML
1 VIAL (ML) INJECTION ONCE
Qty: 0 | Refills: 0 | Status: COMPLETED | OUTPATIENT
Start: 2017-02-14 | End: 2017-02-14

## 2017-02-14 RX ORDER — INSULIN LISPRO 100/ML
VIAL (ML) SUBCUTANEOUS
Qty: 0 | Refills: 0 | Status: DISCONTINUED | OUTPATIENT
Start: 2017-02-14 | End: 2017-02-23

## 2017-02-14 RX ORDER — SIMVASTATIN 20 MG/1
10 TABLET, FILM COATED ORAL AT BEDTIME
Qty: 0 | Refills: 0 | Status: DISCONTINUED | OUTPATIENT
Start: 2017-02-14 | End: 2017-02-23

## 2017-02-14 RX ORDER — ALBUTEROL 90 UG/1
1.25 AEROSOL, METERED ORAL
Qty: 0 | Refills: 0 | Status: DISCONTINUED | OUTPATIENT
Start: 2017-02-14 | End: 2017-02-23

## 2017-02-14 RX ORDER — LOSARTAN POTASSIUM 100 MG/1
50 TABLET, FILM COATED ORAL DAILY
Qty: 0 | Refills: 0 | Status: DISCONTINUED | OUTPATIENT
Start: 2017-02-14 | End: 2017-02-23

## 2017-02-14 RX ORDER — TIOTROPIUM BROMIDE 18 UG/1
1 CAPSULE ORAL; RESPIRATORY (INHALATION) DAILY
Qty: 0 | Refills: 0 | Status: DISCONTINUED | OUTPATIENT
Start: 2017-02-14 | End: 2017-02-23

## 2017-02-14 RX ORDER — PANTOPRAZOLE SODIUM 20 MG/1
40 TABLET, DELAYED RELEASE ORAL
Qty: 0 | Refills: 0 | Status: DISCONTINUED | OUTPATIENT
Start: 2017-02-14 | End: 2017-02-23

## 2017-02-14 RX ORDER — FLUTICASONE PROPIONATE AND SALMETEROL 50; 250 UG/1; UG/1
1 POWDER ORAL; RESPIRATORY (INHALATION)
Qty: 0 | Refills: 0 | Status: DISCONTINUED | OUTPATIENT
Start: 2017-02-14 | End: 2017-02-23

## 2017-02-14 RX ORDER — TAMSULOSIN HYDROCHLORIDE 0.4 MG/1
0.4 CAPSULE ORAL AT BEDTIME
Qty: 0 | Refills: 0 | Status: DISCONTINUED | OUTPATIENT
Start: 2017-02-14 | End: 2017-02-23

## 2017-02-14 RX ORDER — INSULIN LISPRO 100/ML
VIAL (ML) SUBCUTANEOUS AT BEDTIME
Qty: 0 | Refills: 0 | Status: DISCONTINUED | OUTPATIENT
Start: 2017-02-14 | End: 2017-02-23

## 2017-02-14 RX ADMIN — FLUTICASONE PROPIONATE AND SALMETEROL 1 DOSE(S): 50; 250 POWDER ORAL; RESPIRATORY (INHALATION) at 17:59

## 2017-02-14 RX ADMIN — CHLORHEXIDINE GLUCONATE 1 APPLICATION(S): 213 SOLUTION TOPICAL at 12:57

## 2017-02-14 RX ADMIN — TIOTROPIUM BROMIDE 1 CAPSULE(S): 18 CAPSULE ORAL; RESPIRATORY (INHALATION) at 12:51

## 2017-02-14 RX ADMIN — SIMVASTATIN 10 MILLIGRAM(S): 20 TABLET, FILM COATED ORAL at 22:20

## 2017-02-14 RX ADMIN — Medication 81 MILLIGRAM(S): at 12:51

## 2017-02-14 RX ADMIN — Medication 40 MILLIGRAM(S): at 22:21

## 2017-02-14 RX ADMIN — Medication 40 MILLIGRAM(S): at 14:00

## 2017-02-14 RX ADMIN — CEFEPIME 100 MILLIGRAM(S): 1 INJECTION, POWDER, FOR SOLUTION INTRAMUSCULAR; INTRAVENOUS at 05:10

## 2017-02-14 RX ADMIN — Medication 100 GRAM(S): at 06:39

## 2017-02-14 RX ADMIN — Medication 200 MILLIGRAM(S): at 18:07

## 2017-02-14 RX ADMIN — TAMSULOSIN HYDROCHLORIDE 0.4 MILLIGRAM(S): 0.4 CAPSULE ORAL at 22:20

## 2017-02-14 RX ADMIN — Medication 1: at 18:01

## 2017-02-14 RX ADMIN — AZITHROMYCIN 255 MILLIGRAM(S): 500 TABLET, FILM COATED ORAL at 12:51

## 2017-02-14 RX ADMIN — Medication 40 MILLIGRAM(S): at 05:10

## 2017-02-14 RX ADMIN — Medication 250 MILLIGRAM(S): at 05:10

## 2017-02-14 RX ADMIN — ENOXAPARIN SODIUM 40 MILLIGRAM(S): 100 INJECTION SUBCUTANEOUS at 05:11

## 2017-02-14 NOTE — PROGRESS NOTE ADULT - SUBJECTIVE AND OBJECTIVE BOX
CC: Dyspnea    ## HPI:  61M with end-stage COPD (NIPPV / home O2 dependent) with multiple recent exacerbations but never intubated p/w cough dyspnea desaturation and AMS. Admitted to ICU on NIPPV for COPD exacerbation.  Initial ABG 7.38/83/87/44 BE+22; consistent with chronic hypercapneic respiratory failure and metabolic compensation. Placed on AVAPS @VT 500cc with overcompensation of ventilation.  Patient placed back on home NIPPV 16/6.    O/N: Tolerated NIPPV overnight. Now titrated to nasal cannula.    ## ROS:  CONSTITUTIONAL: No fever, chills  EYES: No eye pain, visual disturbances  ENMT:  No difficulty hearing, No sinus or throat pain  RESPIRATORY: No cough, wheezing, hemoptysis; No shortness of breath  CARDIOVASCULAR: No chest pain, palpitations  GASTROINTESTINAL: No abdominal or epigastric pain. No nausea, vomiting, or hematemesis; No diarrhea.  GENITOURINARY: No dysuria, frequency, hematuria  NEUROLOGICAL: No headaches  ENDOCRINE: No heat or cold intolerance  MUSCULOSKELETAL: No joint pain or swelling; No muscle, back, or extremity pain    ## Labs:  CBC:             10.1   7.3   )-----------( 547      ( 14 Feb 2017 03:32 )             30.0     Chem:  14 Feb 2017 03:32  130    |  83     |  9      ----------------------------<  137    4.2     |  40     |  0.28     Ca    8.9        14 Feb 2017 03:32  Phos  3.2       14 Feb 2017 03:32  Mg     1.5       14 Feb 2017 03:32    TPro  7.7    /  Alb  2.9    /  TBili  0.2    /  DBili  x      /  AST  17     /  ALT  17     /  AlkPhos  87     13 Feb 2017 00:50    CAPILLARY BLOOD GLUCOSE  136 (14 Feb 2017 12:20)  145 (14 Feb 2017 06:00)  133 (13 Feb 2017 23:47)  136 (13 Feb 2017 18:27)    ## Imaging:  No new imaging.    ## Medications:  diltiazem    Tablet 30milliGRAM(s) Oral every 6 hours  losartan 50milliGRAM(s) Oral daily  tamsulosin 0.4milliGRAM(s) Oral at bedtime    azithromycin  IVPB 500milliGRAM(s) IV Intermittent every 24 hours    tiotropium 18 MICROgram(s) Capsule 1Capsule(s) Inhalation daily  ALBUTerol   0.042% 1.25milliGRAM(s) Nebulizer every 3 hours PRN  fluticasone / salmeterol 250-50 MICROgram(s) Diskus 1Dose(s) Inhalation two times a day  guaiFENesin   Syrup  (Sugar-Free) 200milliGRAM(s) Oral every 6 hours PRN    enoxaparin Injectable 40milliGRAM(s) SubCutaneous every 24 hours  aspirin  chewable 81milliGRAM(s) Oral daily    pantoprazole    Tablet 40milliGRAM(s) Oral before breakfast  methylPREDNISolone sodium succinate Injectable 40milliGRAM(s) IV Push every 8 hours  insulin lispro (HumaLOG) corrective regimen sliding scale  SubCutaneous every 6 hours  simvastatin 10milliGRAM(s) Oral at bedtime    ## O/E:  T(C): 37.1, Max: 37.1 (02-14 @ 15:45)  HR: 97 (76 - 199)  BP: 133/76 (130/74 - 153/64)  BP(mean): 91 (70 - 113)  RR: 19 (13 - 24)  SpO2: 100% (96% - 100%)  IN: 850 ml / OUT: 1400 ml / NET: -550 ml  Gen: lying comfortably in bed in no apparent distress on nasal cannula  HEENT: PERRL  Resp: diminished breath sounds B/L no wheezes or crackles appreciated  CVS: S1S2 no m/r/g  Abd: soft NT/ND +BS  Ext: no c/c/e  Neuro: A&Ox3    ## Daily Issues  - Analgesia: N/A  - Sedation: N/A  - HOB elevation: Y  - GI ppx (ulcers): N/A  - DVT ppx: Lovenox  - Nutrition: PO diet    ## Assessment / Plan:  61M with COPD  - Exacerbation, now improved.  - Per patient, on 3-4LNC at home and NIPPV QHS and PRN. Can continue in-hospital.  - No evidence of infection, will D/C vanco / cefepime  - Start Spiriva and LABA / ICS with albuterol NEBs PRN. Consider LABA/LAMA on D/C.  - Episode of AF/RVR today, restarted on home-dose of CCB.    ## Code status:  Goals of care discussion: [x] yes [ ] no  [x] full code  [ ] DNR  [ ] DNI  [ ] MOLST    Patient can be transferred to general medicine floor.

## 2017-02-15 DIAGNOSIS — I48.0 PAROXYSMAL ATRIAL FIBRILLATION: ICD-10-CM

## 2017-02-15 DIAGNOSIS — I25.10 ATHEROSCLEROTIC HEART DISEASE OF NATIVE CORONARY ARTERY WITHOUT ANGINA PECTORIS: ICD-10-CM

## 2017-02-15 DIAGNOSIS — I10 ESSENTIAL (PRIMARY) HYPERTENSION: ICD-10-CM

## 2017-02-15 DIAGNOSIS — J96.21 ACUTE AND CHRONIC RESPIRATORY FAILURE WITH HYPOXIA: ICD-10-CM

## 2017-02-15 DIAGNOSIS — E11.65 TYPE 2 DIABETES MELLITUS WITH HYPERGLYCEMIA: ICD-10-CM

## 2017-02-15 DIAGNOSIS — E44.0 MODERATE PROTEIN-CALORIE MALNUTRITION: ICD-10-CM

## 2017-02-15 LAB
ANION GAP SERPL CALC-SCNC: 4 MMOL/L — LOW (ref 5–17)
BASE EXCESS BLDA CALC-SCNC: 17.4 MMOL/L — HIGH (ref -2–2)
BLOOD GAS COMMENTS: SIGNIFICANT CHANGE UP
BLOOD GAS SOURCE: SIGNIFICANT CHANGE UP
BUN SERPL-MCNC: 13 MG/DL — SIGNIFICANT CHANGE UP (ref 7–23)
CALCIUM SERPL-MCNC: 8.9 MG/DL — SIGNIFICANT CHANGE UP (ref 8.5–10.1)
CHLORIDE SERPL-SCNC: 83 MMOL/L — LOW (ref 96–108)
CO2 SERPL-SCNC: 44 MMOL/L — HIGH (ref 22–31)
CREAT SERPL-MCNC: 0.35 MG/DL — LOW (ref 0.5–1.3)
CULTURE RESULTS: SIGNIFICANT CHANGE UP
GLUCOSE SERPL-MCNC: 149 MG/DL — HIGH (ref 70–99)
GRAM STN FLD: SIGNIFICANT CHANGE UP
HCO3 BLDA-SCNC: 45 MMOL/L — HIGH (ref 21–29)
HCT VFR BLD CALC: 30.9 % — LOW (ref 39–50)
HGB BLD-MCNC: 10.4 G/DL — LOW (ref 13–17)
HOROWITZ INDEX BLDA+IHG-RTO: 30 — SIGNIFICANT CHANGE UP
MAGNESIUM SERPL-MCNC: 1.9 MG/DL — SIGNIFICANT CHANGE UP (ref 1.8–2.4)
MCHC RBC-ENTMCNC: 27 PG — SIGNIFICANT CHANGE UP (ref 27–34)
MCHC RBC-ENTMCNC: 33.7 GM/DL — SIGNIFICANT CHANGE UP (ref 32–36)
MCV RBC AUTO: 80 FL — SIGNIFICANT CHANGE UP (ref 80–100)
PCO2 BLDA: 72 MMHG — CRITICAL HIGH (ref 32–46)
PH BLD: 7.41 — SIGNIFICANT CHANGE UP (ref 7.35–7.45)
PHOSPHATE SERPL-MCNC: 2.4 MG/DL — LOW (ref 2.5–4.5)
PLATELET # BLD AUTO: 534 K/UL — HIGH (ref 150–400)
PO2 BLDA: 76 MMHG — SIGNIFICANT CHANGE UP (ref 74–108)
POTASSIUM SERPL-MCNC: 4.5 MMOL/L — SIGNIFICANT CHANGE UP (ref 3.5–5.3)
POTASSIUM SERPL-SCNC: 4.5 MMOL/L — SIGNIFICANT CHANGE UP (ref 3.5–5.3)
RBC # BLD: 3.86 M/UL — LOW (ref 4.2–5.8)
RBC # FLD: 15.4 % — HIGH (ref 11–15)
SAO2 % BLDA: 95 % — SIGNIFICANT CHANGE UP (ref 92–96)
SODIUM SERPL-SCNC: 131 MMOL/L — LOW (ref 135–145)
SPECIMEN SOURCE: SIGNIFICANT CHANGE UP
WBC # BLD: 7.9 K/UL — SIGNIFICANT CHANGE UP (ref 3.8–10.5)
WBC # FLD AUTO: 7.9 K/UL — SIGNIFICANT CHANGE UP (ref 3.8–10.5)

## 2017-02-15 PROCEDURE — 99223 1ST HOSP IP/OBS HIGH 75: CPT

## 2017-02-15 PROCEDURE — 99233 SBSQ HOSP IP/OBS HIGH 50: CPT

## 2017-02-15 RX ORDER — VANCOMYCIN HCL 1 G
VIAL (EA) INTRAVENOUS
Qty: 0 | Refills: 0 | Status: DISCONTINUED | OUTPATIENT
Start: 2017-02-15 | End: 2017-02-15

## 2017-02-15 RX ORDER — INSULIN GLARGINE 100 [IU]/ML
12 INJECTION, SOLUTION SUBCUTANEOUS AT BEDTIME
Qty: 0 | Refills: 0 | Status: DISCONTINUED | OUTPATIENT
Start: 2017-02-15 | End: 2017-02-23

## 2017-02-15 RX ORDER — VANCOMYCIN HCL 1 G
1000 VIAL (EA) INTRAVENOUS ONCE
Qty: 0 | Refills: 0 | Status: DISCONTINUED | OUTPATIENT
Start: 2017-02-15 | End: 2017-02-15

## 2017-02-15 RX ORDER — VANCOMYCIN HCL 1 G
750 VIAL (EA) INTRAVENOUS EVERY 12 HOURS
Qty: 0 | Refills: 0 | Status: DISCONTINUED | OUTPATIENT
Start: 2017-02-16 | End: 2017-02-16

## 2017-02-15 RX ORDER — VANCOMYCIN HCL 1 G
VIAL (EA) INTRAVENOUS
Qty: 0 | Refills: 0 | Status: DISCONTINUED | OUTPATIENT
Start: 2017-02-15 | End: 2017-02-16

## 2017-02-15 RX ORDER — VANCOMYCIN HCL 1 G
750 VIAL (EA) INTRAVENOUS ONCE
Qty: 0 | Refills: 0 | Status: COMPLETED | OUTPATIENT
Start: 2017-02-15 | End: 2017-02-15

## 2017-02-15 RX ORDER — VANCOMYCIN HCL 1 G
1000 VIAL (EA) INTRAVENOUS EVERY 8 HOURS
Qty: 0 | Refills: 0 | Status: DISCONTINUED | OUTPATIENT
Start: 2017-02-15 | End: 2017-02-15

## 2017-02-15 RX ADMIN — FLUTICASONE PROPIONATE AND SALMETEROL 1 DOSE(S): 50; 250 POWDER ORAL; RESPIRATORY (INHALATION) at 06:09

## 2017-02-15 RX ADMIN — CHLORHEXIDINE GLUCONATE 1 APPLICATION(S): 213 SOLUTION TOPICAL at 18:22

## 2017-02-15 RX ADMIN — Medication 40 MILLIGRAM(S): at 22:50

## 2017-02-15 RX ADMIN — Medication 4: at 16:54

## 2017-02-15 RX ADMIN — FLUTICASONE PROPIONATE AND SALMETEROL 1 DOSE(S): 50; 250 POWDER ORAL; RESPIRATORY (INHALATION) at 18:22

## 2017-02-15 RX ADMIN — ENOXAPARIN SODIUM 40 MILLIGRAM(S): 100 INJECTION SUBCUTANEOUS at 06:08

## 2017-02-15 RX ADMIN — AZITHROMYCIN 255 MILLIGRAM(S): 500 TABLET, FILM COATED ORAL at 17:14

## 2017-02-15 RX ADMIN — PANTOPRAZOLE SODIUM 40 MILLIGRAM(S): 20 TABLET, DELAYED RELEASE ORAL at 06:09

## 2017-02-15 RX ADMIN — Medication 40 MILLIGRAM(S): at 06:09

## 2017-02-15 RX ADMIN — Medication 2: at 07:57

## 2017-02-15 RX ADMIN — LOSARTAN POTASSIUM 50 MILLIGRAM(S): 100 TABLET, FILM COATED ORAL at 06:09

## 2017-02-15 RX ADMIN — TIOTROPIUM BROMIDE 1 CAPSULE(S): 18 CAPSULE ORAL; RESPIRATORY (INHALATION) at 12:57

## 2017-02-15 RX ADMIN — TAMSULOSIN HYDROCHLORIDE 0.4 MILLIGRAM(S): 0.4 CAPSULE ORAL at 22:51

## 2017-02-15 RX ADMIN — Medication 150 MILLIGRAM(S): at 18:03

## 2017-02-15 RX ADMIN — INSULIN GLARGINE 12 UNIT(S): 100 INJECTION, SOLUTION SUBCUTANEOUS at 22:50

## 2017-02-15 RX ADMIN — Medication 4: at 12:53

## 2017-02-15 RX ADMIN — SIMVASTATIN 10 MILLIGRAM(S): 20 TABLET, FILM COATED ORAL at 22:51

## 2017-02-15 RX ADMIN — Medication 81 MILLIGRAM(S): at 12:54

## 2017-02-15 RX ADMIN — Medication 40 MILLIGRAM(S): at 16:53

## 2017-02-15 NOTE — DIETITIAN INITIAL EVALUATION ADULT. - ENERGY NEEDS
Height (cm): 167.6 (02-13)  Weight (kg): 45.4 (02-15)  BMI (kg/m2): 16.1 (02-15)  IBW:  61.8 kg +/- 10%        % IBW:    73%         UBW:   50 kg (August 2016)     %UBW:90%

## 2017-02-15 NOTE — CONSULT NOTE ADULT - ASSESSMENT
61 yr old cachectic ex smoker with end stage COPD on home oxygen and BIPAP with multiple admissions in the last 6 months for acute resp distress seen with

## 2017-02-15 NOTE — PROGRESS NOTE ADULT - SUBJECTIVE AND OBJECTIVE BOX
Patient is a 61y old  Male who presents with a chief complaint of sob (13 Feb 2017 19:18)      INTERVAL HPI/OVERNIGHT EVENTS:  feels a little more sob today, says he was able to sit in chair yesterday but not able to tolerate today  MEDICATIONS  (STANDING):  methylPREDNISolone sodium succinate Injectable 40milliGRAM(s) IV Push every 8 hours  azithromycin  IVPB 500milliGRAM(s) IV Intermittent every 24 hours  enoxaparin Injectable 40milliGRAM(s) SubCutaneous every 24 hours  chlorhexidine 4% Liquid 1Application(s) Topical daily  dextrose 5%. 1000milliLiter(s) IV Continuous <Continuous>  dextrose 50% Injectable 12.5Gram(s) IV Push once  dextrose 50% Injectable 25Gram(s) IV Push once  dextrose 50% Injectable 25Gram(s) IV Push once  aspirin  chewable 81milliGRAM(s) Oral daily  tiotropium 18 MICROgram(s) Capsule 1Capsule(s) Inhalation daily  fluticasone / salmeterol 250-50 MICROgram(s) Diskus 1Dose(s) Inhalation two times a day  diltiazem    Tablet 30milliGRAM(s) Oral every 6 hours  simvastatin 10milliGRAM(s) Oral at bedtime  losartan 50milliGRAM(s) Oral daily  pantoprazole    Tablet 40milliGRAM(s) Oral before breakfast  tamsulosin 0.4milliGRAM(s) Oral at bedtime  insulin lispro (HumaLOG) corrective regimen sliding scale  SubCutaneous three times a day before meals  insulin lispro (HumaLOG) corrective regimen sliding scale  SubCutaneous at bedtime  vancomycin  IVPB  IV Intermittent     MEDICATIONS  (PRN):  dextrose Gel 1Dose(s) Oral once PRN Blood Glucose LESS THAN 70 milliGRAM(s)/deciliter  glucagon  Injectable 1milliGRAM(s) IntraMuscular once PRN Glucose LESS THAN 70 milligrams/deciliter  ALBUTerol   0.042% 1.25milliGRAM(s) Nebulizer every 3 hours PRN Shortness of Breath and/or Wheezing  guaiFENesin   Syrup  (Sugar-Free) 200milliGRAM(s) Oral every 6 hours PRN Cough      Allergies    No Known Allergies    Intolerances        REVIEW OF SYSTEMS:  CONSTITUTIONAL:+faitgue, weight loss  EYES: No eye pain, visual disturbances, or discharge  ENMT:  No difficulty hearing, tinnitus, vertigo; No sinus or throat pain  NECK: No pain or stiffness  BREASTS: No pain, masses, or nipple discharge  RESPIRATORY: + sob, +valera, +cough  CARDIOVASCULAR: No chest pain, palpitations, dizziness, or leg swelling  GASTROINTESTINAL: No abdominal or epigastric pain. No nausea, vomiting, or hematemesis; No diarrhea or constipation. No melena or hematochezia.  GENITOURINARY: No dysuria, frequency, hematuria, or incontinence  NEUROLOGICAL: No headaches, memory loss, loss of strength, numbness, or tremors  SKIN: No itching, burning, rashes, or lesions   LYMPH NODES: No enlarged glands  ENDOCRINE: No heat or cold intolerance; No hair loss  MUSCULOSKELETAL: No joint pain or swelling; No muscle, back, or extremity pain  PSYCHIATRIC: No depression, anxiety, mood swings, or difficulty sleeping  HEME/LYMPH: No easy bruising, or bleeding gums  ALLERGY AND IMMUNOLOGIC: No hives or eczema    Vital Signs Last 24 Hrs  T(C): 37.1, Max: 37.1 (02-15 @ 17:48)  T(F): 98.8, Max: 98.8 (02-15 @ 17:48)  HR: 93 (58 - 93)  BP: 110/55 (110/55 - 149/65)  BP(mean): 82 (82 - 82)  RR: 17 (16 - 17)  SpO2: 100% (96% - 100%)    PHYSICAL EXAM:  GENERAL: NAD, well-groomed, well-developed  HEAD:  Atraumatic, Normocephalic  EYES: EOMI, PERRLA, conjunctiva and sclera clear  ENMT: No tonsillar erythema, exudates, or enlargement; Moist mucous membranes, Good dentition, No lesions  NECK: Supple, No JVD, Normal thyroid  NERVOUS SYSTEM:  Alert & Oriented X3, Good concentration; Motor Strength 5/5 B/L upper and lower extremities; DTRs 2+ intact and symmetric  CHEST/LUNG: Clear to percussion bilaterally; No rales, rhonchi, wheezing, or rubs  HEART: Regular rate and rhythm; No murmurs, rubs, or gallops  ABDOMEN: Soft, Nontender, Nondistended; Bowel sounds present  EXTREMITIES:  2+ Peripheral Pulses, No clubbing, cyanosis, or edema  LYMPH: No lymphadenopathy noted  SKIN: No rashes or lesions    LABS:                        10.4   7.9   )-----------( 534      ( 15 Feb 2017 07:42 )             30.9     15 Feb 2017 07:42    131    |  83     |  13     ----------------------------<  149    4.5     |  44     |  0.35     Ca    8.9        15 Feb 2017 07:42  Phos  2.4       15 Feb 2017 07:42  Mg     1.9       15 Feb 2017 07:42          CAPILLARY BLOOD GLUCOSE  219 (15 Feb 2017 16:52)  202 (15 Feb 2017 12:48)  159 (15 Feb 2017 07:25)  178 (15 Feb 2017 00:30)      RADIOLOGY & ADDITIONAL TESTS:    Imaging Personally Reviewed:  [ ] YES  [ ] NO    Consultant(s) Notes Reviewed:  [x ] YES  [ ] NO    Care Discussed with Consultants/Other Providers [x ] YES  [ ] NO

## 2017-02-15 NOTE — DIETITIAN INITIAL EVALUATION ADULT. - PERTINENT LABORATORY DATA
02-15 Na131 mmol/L<L> Glu 149 mg/dL<H> K+ 4.5 mmol/L Cr  0.35 mg/dL<L> BUN 13 mg/dL Phos 2.4 mg/dL<L> Alb n/a   PAB n/a; (2/14) HgbA1c 6.2H

## 2017-02-15 NOTE — PROGRESS NOTE ADULT - ASSESSMENT
Patient is a 62 y/o male HX of COPD on Home O2 and bipap machine (16/6 and 2L oxygen), EX Smoker, HX of GI Bleed, HX of A Fib on ASA, recently stopped AC due to GIB,  HTN, High Cholesterol, DM, CAD s/p PTCA, Prostate cancer, S/P RTX, Iron def. Anemia , pt was admitted for SOB, Lethargy, decreased po intake, + Productive cough (pink sputum) x 2 days,  no fever, NO CP, no abdominal pain, no HA, no dizziness, no dysuria, never intubated as per son. Pt placed on bipap machine in ED,  hypercarbia on ABG. Pt being admitted to critical care   Going through records, pt recent admitted to [facility] about 4 weeks ago for similar issue and multiple admissions for COPD exacerbation for the past 6 months.  Spoke to sons on bedside for GOC and wants full code.   Pt. transferred out of ICU to tele floor

## 2017-02-15 NOTE — DIETITIAN INITIAL EVALUATION ADULT. - OTHER INFO
Pt seen for consult - BMI < 19. Pt c multiple hospital admissions within last 6 months. Pt lives c wife, son, & daughter-in-law who assist in his care. Pt reports UBW for quite sometime was 110#; decreased appetite 2/2 COPD; difficulty breathing; lethargy. Pt c DM, takes Metformin (100 mg x 2/day) to control BG levels. Appetite in hospital poor 2/2 dislike of food; agreeable to trying supplement. Denies any N/V/C/D or chew/swallowing issues.

## 2017-02-15 NOTE — CHART NOTE - NSCHARTNOTEFT_GEN_A_CORE
Upon Nutritional Assessment by the Registered Dietitian your patient was determined to meet criteria / has evidence of the following diagnosis/diagnoses:          [ ]  Mild Protein Calorie Malnutrition        [x ]  Moderate Protein Calorie Malnutrition        [ ] Severe Protein Calorie Malnutrition        [ ] Unspecified Protein Calorie Malnutrition        [x ] Underweight / BMI <19        [ ] Morbid Obesity / BMI > 40      Findings as based on:  •  Comprehensive nutrition assessment and consultation  •  Calorie counts (nutrient intake analysis)  •  Food acceptance and intake status from observations by staff  •  Follow up  •  Patient education  •  Intervention secondary to interdisciplinary rounds  •   concerns      Treatment:    The following diet has been recommended: Consistent CHO diet c snack + Glucerna Shake x 3/day (provides 660 kcal, 30 g protein)    PROVIDER Section:     By signing this assessment you are acknowledging and agree with the diagnosis/diagnoses assigned by the Registered Dietitian    Comments:

## 2017-02-15 NOTE — DIETITIAN INITIAL EVALUATION ADULT. - PHYSICAL APPEARANCE
debilitated/BMI = 16.1; no edema noted; physical signs of malnutrition present: moderate temporal & clavicle muscle wasting; moderate orbital fat depletion/emaciated

## 2017-02-15 NOTE — CONSULT NOTE ADULT - SUBJECTIVE AND OBJECTIVE BOX
Subjective Complaints:  Historian:       Consult requested by ER doctor:                  Attending: Dr MALDONADO    HPI:  Patient is a 62 y/o male HX of COPD on Home O2 and bipap machine (16/6 and 2L oxygen), EX Smoker, HX of GI Bleed, HX of A Fib on ASA, recently stopped AC due to GIB,  HTN, High Cholesterol, DM, CAD s/p PTCA, Prostate cancer, S/P RTX, Iron def. Anemia , pt was admitted for SOB, Lethargy, decreased po intake, + Productive cough (pink sputum) x 2 days,  no fever, NO CP, no abdominal pain, no HA, no dizziness, no dysuria, never intubated as per son. Pt placed on bipap machine in ED,  hypercarbia on ABG. Pt being admitted to critical care for further care.  Going through records, pt recent admitted to [facility] about 4 weeks ago for similar issue and multiple admissions for COPD exacerbation for the past 6 months.  Spoke to sons on bedside for GOC and wants full code. 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 1000milliLiter(s) IV Continuous <Continuous>  dextrose 50% Injectable 12.5Gram(s) IV Push once  dextrose 50% Injectable 25Gram(s) IV Push once  dextrose 50% Injectable 25Gram(s) IV Push once  aspirin  chewable 81milliGRAM(s) Oral daily  tiotropium 18 MICROgram(s) Capsule 1Capsule(s) Inhalation daily  fluticasone / salmeterol 250-50 MICROgram(s) Diskus 1Dose(s) Inhalation two times a day  diltiazem    Tablet 30milliGRAM(s) Oral every 6 hours  simvastatin 10milliGRAM(s) Oral at bedtime  losartan 50milliGRAM(s) Oral daily  pantoprazole    Tablet 40milliGRAM(s) Oral before breakfast  tamsulosin 0.4milliGRAM(s) Oral at bedtime  insulin lispro (HumaLOG) corrective regimen sliding scale  SubCutaneous three times a day before meals  insulin lispro (HumaLOG) corrective regimen sliding scale  SubCutaneous at bedtime    MEDICATIONS  (PRN):  dextrose Gel 1Dose(s) Oral once PRN Blood Glucose LESS THAN 70 milliGRAM(s)/deciliter  glucagon  Injectable 1milliGRAM(s) IntraMuscular once PRN Glucose LESS THAN 70 milligrams/deciliter  ALBUTerol   0.042% 1.25milliGRAM(s) Nebulizer every 3 hours PRN Shortness of Breath and/or Wheezing  guaiFENesin   Syrup  (Sugar-Free) 200milliGRAM(s) Oral every 6 hours PRN Cough      Allergies    No Known Allergies    Intolerances      FAMILY HISTORY:  No pertinent family history in first degree relatives      REVIEW OF SYSTEMS:  General:  No wt loss, fevers, chills, night sweats  Eyes:  Good vision, no reported pain  ENT:  No sore throat, pain, runny nose, dysphagia  CV:  No pain, palpitatioins, hypo/hypertension  Resp:  No dyspnea, cough, tachypnea, wheezing  GI:  No pain, nausea, vomiting, diarrhea, constipatiion  :  No pain, bleeding, incontinence, nocturia  Muscle:  No pain, weakness  Breast:  No pain, abscess, mass, discharge  Neuro:  No weakness, tingling, memory problems CONFUSION  Psych:  No fatigue, insomnia, mood problems, depression  Endocrine:  No polyuria, polydypsia, cold/heat intolerance  Heme:  No petechiae, ecchymosis, easy bruisability  Skin:  No rash, tattoos, scars, edema      Vital Signs Last 24 Hrs  T(C): 36.1, Max: 37.1 (-14 @ 15:45)  T(F): 97, Max: 98.8 (-14 @ 15:45)  HR: 58 (58 - 199)  BP: 126/62 (103/57 - 149/75)  BP(mean): 82 (70 - 109)  RR: 16 (15 - 22)  SpO2: 99% (96% - 100%)    GENERAL PHYSICAL EXAM:  General:  Appears stated age, well-groomed, well-nourished, no distress  HEENT:  NC/AT, patent nares w/ pink mucosa, OP clear w/o lesions, PERRL, EOMI, conjunctivae clear, no thyromegaly, nodules, adenopathy, no JVD  Chest:  Full & symmetric excursion, no increased effort, breath sounds clear  Cardiovascular:  Regular rhythm, S1, S2, no murmur/rub/S3/S4, no carotid/femoral/abdominal bruit, radial/pedal pulses 2+, no edema  Abdomen:  Soft, non-tender, non-distended, normoactive bowel sounds, no HSM  Extremities:  Gait & station:   Digits:   Nails:   Joints, Bones, Muscles:   ROM:   Stability:  Skin:  No rash/erythema/ecchymoses/petechiae/wounds/abscess/warm/dry  Musculoskeletal:  Full ROM in all joints w/o swelling/tenderness/effusion    NEUROLOGICAL EXAM:  HENT:  Normocephalic head; atraumatic head.  Neck supple.  ENT: normal looking.  Mental State:    Alert.  Fully oriented to person, place and date.  Coherent.  Speech clear and intact.  Cooperative.  Responds appropriately.    Cranial Nerves:  II-XII:   Pupils round and reactive to light and accommodation.  Extraocular movements full.  Visual fields full (no homonymous hemianopsia).  Visual acuity wnl.  Facial symmetry intact.  Tongue midline.  Motor Functions:  Moves all extremities.  No pronator drift of UE.  Claps hand well.  Hand  intact bilaterally.  Ambulatory.    Sensory Functions:   Intact to touch and pinprick to face and extremities.    Reflexes:  Deep tendon reflexes normoactive to biceps, knees and ankles.  Babinski absent (present).  Cerebellar Testing:    Finger to nose intact.  Nystagmus absent.  Neurovascular: Carotid auscultation full without bruits.      LABS:                        10.4   7.9   )-----------( 534      ( 15 Feb 2017 07:42 )             30.9     15 Feb 2017 07:42    131    |  83     |  13     ----------------------------<  149    4.5     |  44     |  0.35     Ca    8.9        15 Feb 2017 07:42  Phos  2.4       15 Feb 2017 07:42  Mg     1.9       15 Feb 2017 07:42          Urinalysis Basic - ( 2017 14:37 )    Color: Yellow / Appearance: Clear / S.005 / pH: x  Gluc: x / Ketone: Moderate  / Bili: Negative / Urobili: Negative mg/dL   Blood: x / Protein: 15 mg/dL / Nitrite: Negative   Leuk Esterase: Negative / RBC: x / WBC 0-2   Sq Epi: x / Non Sq Epi: Few / Bacteria: Few        RADIOLOGY & ADDITIONAL STUDIES:    Oxygen Delivery Therapy:   Oxygen Method: Nasal Cannula  LPM: 2  Maintain SpO2 Above: 92    Additional Instructions:  titrate fio2 as tolerated ( @ 10:43)  Diet, Consistent Carbohydrate/No Snacks ( @ 10:47)  tiotropium 18 MICROgram(s) Capsule: Ordered as SPIRIVA  1 Capsule(s), Inhalation, daily  Administration Instructions: for inhalation  Provider&#x27;s Contact #: [phone] ( @ 10:51)  ALBUTerol   0.042%: Known as PROVENTIL 0.042% Inhalation  1.25 milliGRAM(s), Nebulizer, every 3 hours, PRN for Shortness of Breath and/or Wheezing  Administration Instructions: for inhalation  Provider&#x27;s Contact #: [phone] ( @ 10:51)  fluticasone / salmeterol 250-50 MICROgram(s) Diskus: Ordered as ADVAIR 250/50  1 Dose(s), Inhalation, two times a day  Administration Instructions: for inhalation  Provider&#x27;s Contact #: [phone] ( @ 10:51)  Consult- PT Evaluate and Treat:   *Reason for Consult - Must select at least one choice*     Other: copd exac  Weight Bearing Restrictions: No ( @ 10:52)  Transfer in Level of Care and or Service:     Transfer to Service: medical/surgical ( @ 10:53) [discontinued]  Transfer in Physician:     Transfer to Service: medical/surgical    Transfer to Physician: Dr. Meraz ( @ 10:53) [completed]  Vital Signs:     Frequency:  every 4 hours    Additional Instructions:  until transferred out of ICU ( @ 10:59)  Basic Metabolic Panel: AM Sched. Collection: 15-Feb-2017 04:00 ( @ 11:01)  Complete Blood Count: AM Sched. Collection: 15-Feb-2017 04:00 ( @ 11:01)  Magnesium, Serum: AM Sched. Collection: 15-Feb-2017 04:00 ( @ 11:01)  Phosphorus Level, Serum: AM Sched. Collection: 15-Feb-2017 04:00 ( @ 11:01)  diltiazem Injectable: [Ordered as CARDIZEM Injectable]  10 milliGRAM(s), IV Push, once, Stop After 1 Doses  Administration Instructions: IF IV PUSH, ADMINISTER OVER 2 MIN  Provider&#x27;s Contact #: [phone] ( @ 13:11) [completed]  diltiazem    Tablet: [Ordered as CARDIZEM]  30 milliGRAM(s), Oral, every 6 hours  Special Instructions: hold for SBP &lt; 110 and HR &lt; 60  Provider&#x27;s Contact #: [phone] ( @ 13:16)  simvastatin: [Ordered as ZOCOR]  10 milliGRAM(s), Oral, at bedtime  Provider&#x27;s Contact #: [phone] ( @ 13:23)  Transfer in Level of Care and or Service:     Transfer to Service: Telemetry ( @ 15:01)  losartan: [Ordered as COZAAR]  50 milliGRAM(s), Oral, daily  Provider&#x27;s Contact #: [phone] ( @ 15:05)  pantoprazole    Tablet: [Ordered as PROTONIX   DR]  40 milliGRAM(s), Oral, before breakfast  Administration Instructions: swallow whole * don&#x27;t crush/chew  Provider&#x27;s Contact #: [phone] ( @ 15:05)  tamsulosin: [Ordered as FLOMAX]  0.4 milliGRAM(s), Oral, at bedtime  Provider&#x27;s Contact #: [phone] ( @ 15:05)  guaiFENesin   Syrup  (Sugar-Free): [Ordered as ROBITUSSIN (Sugar-Free)]  200 milliGRAM(s), Oral, every 6 hours, PRN for Cough  Administration Instructions: alcohol free * sugar free  Provider&#x27;s Contact #: [phone] ( @ 17:02)  Intra Hospital Transport ( @ 22:09) [completed]  Standby Therapist Each 15 Minutes ( @ 22:09) [completed]  Blood Glucose Point Of Care Testing:     Frequency:  Before meals and at bedtime    Additional Instructions:  Before meals and at bedtime ( @ 22:53)  Blood Glucose Point Of Care Testing:     Frequency:  every 15 minutes    Additional Instructions:  After carbohydrate administration for hypoglycemia, repeat every 15 minute(s) until blood glucose is GREATER THAN or EQUAL  milliGRAM(s)/deciLiter twice consecutively ( @ 22:53)  Notify Provider For:     Additional Instructions:  Blood glucose LESS THAN 70 milliGRAM(s)/deciLiter or GREATER THAN 400 milliGRAM(s)/deciLiter ( @ 22:53)  Education:     Diabetes    Other: Diet, Exercise    Additional Instructions: Diet, Exercise, Diabetes ( @ 22:53)  insulin lispro (HumaLOG) corrective regimen sliding scale:       2 Unit(s) if Glucose 151 - 200      4 Unit(s) if Glucose 201 - 250      6 Unit(s) if Glucose 251 - 300      8 Unit(s) if Glucose 301 - 350      10 Unit(s) if Glucose 351 - 400      12 Unit(s) if Glucose Greater Than 400 + Contact MD  SubCutaneous, three times a day before meals  Special Instructions: Give correctional scale insulin REGARDLESS of PO status NOTIFY Provider for blood glucose LESS THAN 70 milliGRAM(s)/deciLiter or above 400 milliGRAM(s)/deciLiter.  Administration Instructions: *Per Sliding Scale*  Provider&#x27;s Contact #: [phone] ( @ 22:53)  insulin lispro (HumaLOG) corrective regimen sliding scale:       0 Unit(s) if Glucose 61 - 250      2 Unit(s) if Glucose 251 - 300      4 Unit(s) if Glucose 301 - 350      6 Unit(s) if Glucose 351 - 400      8 Unit(s) if Glucose Greater Than 400 + Contact MD  SubCutaneous, at bedtime  Special Instructions: Give correctional scale insulin REGARDLESS of PO status NOTIFY Provider for blood glucose LESS THAN 70 milliGRAM(s)/deciLiter or above 400 milliGRAM(s)/deciLiter.  Administration Instructions: *Per Sliding Scale*  Provider&#x27;s Contact #: [phone] ( @ 22:53)  Administer Carbohydrates:     Additional Instructions:  HYPOGLYCEMIA PROTOCOL ( @ 22:53)  Provider to RN:       UNRESPONSIVE patient and Blood Glucose LESS THAN 70 milliGRAM(s)/deciLiter call Rapid Response.  HYPOGLYCEMIA PROTOCOL ( @ 22:53)  Blood Gas Profile - Arterial: STAT (02-15 @ 10:29)      Assessment & Opinion:metabolic encephalopathy ( MULTIFACTORIAL )    Recommendations:    Carotid doppler.  Echocardiogram.  EEG.   DVT prophylaxis as ordered.  Medications:
Infectious Diseases - Attending at Dr. Brown    HPI:  Patient is a 60 y/o male HX of COPD on Home O2 and bipap machine (16/6 and 2L oxygen), Ex Smoker, HX of GI Bleed, HX of A Fib on ASA, recently stopped AC due to GIB,  HTN, High Cholesterol, DM, CAD s/p PTCA, Prostate cancer, S/P RTX, Iron def. Anemia , pt was admitted for SOB, Lethargy, decreased po intake, + Productive cough (pink sputum) x 2 days,  no fever, NO CP, no abdominal pain, no HA, no dizziness, no dysuria, never intubated as per son. Pt placed on bipap machine in ED,  hypercarbia on ABG. Pt being admitted to critical care for further care.  Going through records, pt recent admitted to [facility] about 4 weeks ago for similar issue and multiple admissions for COPD exacerbation for the past 6 months.  Spoke to sons on bedside for GOC and wants full code. (13 Feb 2017 04:32)  Pt is Sinhala speaking and history taken in Sinhala.He says he is more short of breath since last 1 week. Denies worsening cough ,phlegm production ,fever ,chills ,Nausea,vomitting ,diarrhea, dysuria etc.  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  Accelerated Essential Hypertension: x3 years  S/P appendectomy      Allergies    No Known Allergies    Intolerances        FAMILY HISTORY:  No pertinent family history in first degree relatives      SOCIAL HISTORY:quit smoking    REVIEW OF SYSTEMS:    Constitutional: No fever, weight loss or fatigue  Eyes: No eye pain, visual disturbances, or discharge  ENT:  No difficulty hearing, tinnitus, vertigo; No sinus or throat pain  Respiratory: No cough, wheezing, chills or hemoptysis  Cardiovascular: No chest pain, palpitations,+ shortness of breath, dizziness or leg swelling  Gastrointestinal: No abdominal or epigastric pain. No nausea, vomiting or hematemesis; No diarrhea or constipation. No melena or hematochezia.  Genitourinary: No dysuria, frequency, hematuria or incontinence  Rectal: No pain, hemorrhoids or incontinence  Neurological: No headaches, memory loss, loss of strength, numbness or tremors  Skin: No itching, burning, rashes or lesions   Lymph Nodes: No enlarged glands  Endocrine: No heat or cold intolerance; No hair loss  Musculoskeletal: No joint pain or swelling; No muscle, back or extremity pain  Psychiatric: No depression, anxiety, mood swings or difficulty sleeping  Heme/Lymph: No easy bruising or bleeding gums  Allergy and Immunologic: No hives or eczema    MEDICATIONS  (STANDING):  methylPREDNISolone sodium succinate Injectable 40milliGRAM(s) IV Push every 8 hours  azithromycin  IVPB 500milliGRAM(s) IV Intermittent every 24 hours  enoxaparin Injectable 40milliGRAM(s) SubCutaneous every 24 hours  chlorhexidine 4% Liquid 1Application(s) Topical daily  dextrose 5%. 1000milliLiter(s) IV Continuous <Continuous>  dextrose 50% Injectable 12.5Gram(s) IV Push once  dextrose 50% Injectable 25Gram(s) IV Push once  dextrose 50% Injectable 25Gram(s) IV Push once  aspirin  chewable 81milliGRAM(s) Oral daily  tiotropium 18 MICROgram(s) Capsule 1Capsule(s) Inhalation daily  fluticasone / salmeterol 250-50 MICROgram(s) Diskus 1Dose(s) Inhalation two times a day  diltiazem    Tablet 30milliGRAM(s) Oral every 6 hours  simvastatin 10milliGRAM(s) Oral at bedtime  losartan 50milliGRAM(s) Oral daily  pantoprazole    Tablet 40milliGRAM(s) Oral before breakfast  tamsulosin 0.4milliGRAM(s) Oral at bedtime  insulin lispro (HumaLOG) corrective regimen sliding scale  SubCutaneous three times a day before meals  insulin lispro (HumaLOG) corrective regimen sliding scale  SubCutaneous at bedtime  vancomycin  IVPB 750milliGRAM(s) IV Intermittent every 12 hours  vancomycin  IVPB  IV Intermittent   insulin glargine Injectable (LANTUS) 12Unit(s) SubCutaneous at bedtime    MEDICATIONS  (PRN):  dextrose Gel 1Dose(s) Oral once PRN Blood Glucose LESS THAN 70 milliGRAM(s)/deciliter  glucagon  Injectable 1milliGRAM(s) IntraMuscular once PRN Glucose LESS THAN 70 milligrams/deciliter  ALBUTerol   0.042% 1.25milliGRAM(s) Nebulizer every 3 hours PRN Shortness of Breath and/or Wheezing  guaiFENesin   Syrup  (Sugar-Free) 200milliGRAM(s) Oral every 6 hours PRN Cough      Vital Signs Last 24 Hrs  T(C): 36.8, Max: 37.1 (02-15 @ 17:48)  T(F): 98.2, Max: 98.8 (02-15 @ 17:48)  HR: 65 (61 - 93)  BP: 136/74 (110/55 - 143/76)  BP(mean): --  RR: 18 (17 - 18)  SpO2: 98% (94% - 100%)    PHYSICAL EXAM:    Constitutional: NAD, well-groomed, well-developed  HEENT: PERRLA, EOMI, Normal Hearing, MMM  Neck: No LAD, No JVD  Back: Normal spine flexure, No CVA tenderness  Respiratory: CTAB/L  Cardiovascular: S1 and S2, RRR, no M/G/R  Gastrointestinal: BS+, soft, NT/ND  Extremities: No peripheral edema  Vascular: 2+ peripheral pulses  Neurological: A/O x 3, no focal deficits  Skin: No rashes      LABS:                        10.4   7.9   )-----------( 534      ( 15 Feb 2017 07:42 )             30.9     15 Feb 2017 07:42    131    |  83     |  13     ----------------------------<  149    4.5     |  44     |  0.35     Ca    8.9        15 Feb 2017 07:42  Phos  2.4       15 Feb 2017 07:42  Mg     1.9       15 Feb 2017 07:42            MICROBIOLOGY:  RECENT CULTURES:  02-13 .Urine Clean Catch (Midstream) XXXX XXXX   No growth    02-13 .Blood Blood XXXX   Growth in anaerobic bottle:  Gram Positive Cocci in Clusters   No growth to date.          RADIOLOGY & ADDITIONAL STUDIES:    Cxray  IMPRESSION: There are hyperinflated lungs with bilateral increased   reticular opacities. Questionable trace left pleural effusion. Faint   patchy opacities at the left lung base. Heart size is within normal   limits. The osseous structures are intact.
Patient is a 61y old  Male who presents with a chief complaint of sob .      HPI:  Patient is a 60 y/o male HX of COPD on Home O2 and bipap machine (16/6 and 2L oxygen), EX Smoker, HX of GI Bleed, HX of A Fib on ASA, recently stopped AC due to GIB,  HTN, High Cholesterol, DM, CAD s/p PTCA, Prostate cancer, S/P RTX, Iron def. Anemia , pt was admitted for SOB, Lethargy, decreased po intake, + Productive cough (pink sputum) x 2 days,  no fever, NO CP, no abdominal pain, no HA, no dizziness, no dysuria, never intubated as per son. Pt placed on bipap machine in ED,  hypercarbia on ABG. Admitted to critical care with BIPAP support..  Now out of ICU, awake, responsive but some what confused.    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    Allergies  No Known Allergies    MEDICATIONS  (STANDING):  methylPREDNISolone sodium succinate Injectable 40milliGRAM(s) IV Push every 8 hours  azithromycin  IVPB 500milliGRAM(s) IV Intermittent every 24 hours  enoxaparin Injectable 40milliGRAM(s) SubCutaneous every 24 hours  chlorhexidine 4% Liquid 1Application(s) Topical daily  dextrose 5%. 1000milliLiter(s) IV Continuous <Continuous>  dextrose 50% Injectable 12.5Gram(s) IV Push once  dextrose 50% Injectable 25Gram(s) IV Push once  dextrose 50% Injectable 25Gram(s) IV Push once  aspirin  chewable 81milliGRAM(s) Oral daily  tiotropium 18 MICROgram(s) Capsule 1Capsule(s) Inhalation daily  fluticasone / salmeterol 250-50 MICROgram(s) Diskus 1Dose(s) Inhalation two times a day  diltiazem    Tablet 30milliGRAM(s) Oral every 6 hours  simvastatin 10milliGRAM(s) Oral at bedtime  losartan 50milliGRAM(s) Oral daily  pantoprazole    Tablet 40milliGRAM(s) Oral before breakfast  tamsulosin 0.4milliGRAM(s) Oral at bedtime  insulin lispro (HumaLOG) corrective regimen sliding scale  SubCutaneous three times a day before meals  insulin lispro (HumaLOG) corrective regimen sliding scale  SubCutaneous at bedtime  vancomycin  IVPB  IV Intermittent     MEDICATIONS  (PRN):  dextrose Gel 1Dose(s) Oral once PRN Blood Glucose LESS THAN 70 milliGRAM(s)/deciliter  glucagon  Injectable 1milliGRAM(s) IntraMuscular once PRN Glucose LESS THAN 70 milligrams/deciliter  ALBUTerol   0.042% 1.25milliGRAM(s) Nebulizer every 3 hours PRN Shortness of Breath and/or Wheezing  guaiFENesin   Syrup  (Sugar-Free) 200milliGRAM(s) Oral every 6 hours PRN Cough    REVIEW OF SYSTEMS:    Constitutional:            No fever, weight loss or fatigue  HEENT:                      No difficulty hearing, tinnitus, vertigo; No sinus or throat pain  Respiratory:                sob, productive cough.  Cardiovascular:           No chest pain, palpitations  Gastrointestinal:        No abdominal or epigastric pain. No N/V/diarrhea or hematemesis  Genitourinary:            No dysuria, frequency, hematuria or incontinence  SKIN:                             no rash  Musculoskeletal:        No joint pain or swelling  Extremities:                No swelling  Neurological:              No headaches  Psychiatric:                 No depression, anxiety    Vital Signs Last 24 Hrs  T(C): 36.6, Max: 36.6 (02-15 @ 11:33)  T(F): 97.8, Max: 97.8 (02-15 @ 11:33)  HR: 80 (58 - 97)  BP: 124/69 (103/57 - 149/65)  BP(mean): 82 (70 - 91)  RR: 17 (15 - 19)  SpO2: 98% (96% - 100%)    PHYSICAL EXAM:  GEN:        Awake, responsive and comfortable.  HEENT:    Normal.    RESP:      decreased air entry.  CVS:         Regular rate and rhythm.   ABD:         Soft, non-tender, non-distended;   :            No costovertebral angle tenderness  SKIN:         Warm and dry.  EXTR:          No clubbing, cyanosis or edema  CNS:           Intact sensory and motor function.  PSYCH:        cooperative, no anxiety or depression    LABS:                        10.4   7.9   )-----------( 534      ( 15 Feb 2017 07:42 )             30.9     15 Feb 2017 07:42    131    |  83     |  13     ----------------------------<  149    4.5     |  44     |  0.35     Ca    8.9        15 Feb 2017 07:42  Phos  2.4       15 Feb 2017 07:42  Mg     1.9       15 Feb 2017 07:42    ABG - 02-15 @ 11:16  pH: 7.41 / pcO2: 72 / pO2: 76  on 40% Oxygen.    EKG: A FIB.    RADIOLOGY & ADDITIONAL STUDIES:    EXAM:  CHEST SINGLE VIEW                            PROCEDURE DATE:  02/13/2017        INTERPRETATION:  CLINICAL INFORMATION:Shortness of breath    TECHNIQUE: AP chest film. Comparison is 11/21/2016    FINDINGS AND   IMPRESSION: There are hyperinflated lungs with bilateral increased   reticular opacities. Questionable trace left pleural effusion. Faint   patchy opacities at the left lung base. Heart size is within normal   limits. The osseous structures are intact.    ANJEL SCHROEDER, ATTENDING RADIOLOGIST  This document has been electronically signed. Feb 13 2017  7:36AM      ASSESSMENT AND PLAN:  ·	SOB.  ·	Acute COPD exacerbation  ·	Chronic hypercarbia.  ·	CAD with PTCA.  ·	A Fib.  ·	Anemia.  ·	DM.  ·	HTN.  ·	Prostate CA.    Continue O2 with BIPAP as needed.  Continue Advair, Spiriva and nebulizer.

## 2017-02-15 NOTE — PHYSICAL THERAPY INITIAL EVALUATION ADULT - MODIFIED CLINICAL TEST OF SENSORY INTEGRATION IN BALANCE TEST
Barthel Index: Feeding Score _10__, Bathing Score __0_, Grooming Score _0_, Dressing Score 0___, Bowels Score __0_, Bladder Score _0__, Toilet Score __0_, Transfers Score __0_, Mobility Score _0__, Stairs Score __0_,     Total Score _10__

## 2017-02-16 DIAGNOSIS — A49.9 BACTERIAL INFECTION, UNSPECIFIED: ICD-10-CM

## 2017-02-16 DIAGNOSIS — R78.81 BACTEREMIA: ICD-10-CM

## 2017-02-16 DIAGNOSIS — I48.91 UNSPECIFIED ATRIAL FIBRILLATION: ICD-10-CM

## 2017-02-16 LAB
GRAM STN FLD: SIGNIFICANT CHANGE UP
SPECIMEN SOURCE: SIGNIFICANT CHANGE UP

## 2017-02-16 PROCEDURE — 99232 SBSQ HOSP IP/OBS MODERATE 35: CPT

## 2017-02-16 PROCEDURE — 99233 SBSQ HOSP IP/OBS HIGH 50: CPT

## 2017-02-16 RX ORDER — IPRATROPIUM/ALBUTEROL SULFATE 18-103MCG
3 AEROSOL WITH ADAPTER (GRAM) INHALATION EVERY 6 HOURS
Qty: 0 | Refills: 0 | Status: DISCONTINUED | OUTPATIENT
Start: 2017-02-16 | End: 2017-02-23

## 2017-02-16 RX ORDER — ACETYLCYSTEINE 200 MG/ML
2 VIAL (ML) MISCELLANEOUS EVERY 6 HOURS
Qty: 0 | Refills: 0 | Status: DISCONTINUED | OUTPATIENT
Start: 2017-02-16 | End: 2017-02-21

## 2017-02-16 RX ADMIN — Medication 150 MILLIGRAM(S): at 06:11

## 2017-02-16 RX ADMIN — Medication 2 MILLILITER(S): at 17:32

## 2017-02-16 RX ADMIN — ENOXAPARIN SODIUM 40 MILLIGRAM(S): 100 INJECTION SUBCUTANEOUS at 06:10

## 2017-02-16 RX ADMIN — Medication 6: at 14:25

## 2017-02-16 RX ADMIN — PANTOPRAZOLE SODIUM 40 MILLIGRAM(S): 20 TABLET, DELAYED RELEASE ORAL at 06:10

## 2017-02-16 RX ADMIN — LOSARTAN POTASSIUM 50 MILLIGRAM(S): 100 TABLET, FILM COATED ORAL at 07:58

## 2017-02-16 RX ADMIN — Medication 81 MILLIGRAM(S): at 14:27

## 2017-02-16 RX ADMIN — CHLORHEXIDINE GLUCONATE 1 APPLICATION(S): 213 SOLUTION TOPICAL at 23:59

## 2017-02-16 RX ADMIN — INSULIN GLARGINE 12 UNIT(S): 100 INJECTION, SOLUTION SUBCUTANEOUS at 21:53

## 2017-02-16 RX ADMIN — Medication 200 MILLIGRAM(S): at 14:27

## 2017-02-16 RX ADMIN — Medication 40 MILLIGRAM(S): at 14:27

## 2017-02-16 RX ADMIN — AZITHROMYCIN 255 MILLIGRAM(S): 500 TABLET, FILM COATED ORAL at 17:58

## 2017-02-16 RX ADMIN — Medication 3 MILLILITER(S): at 17:31

## 2017-02-16 RX ADMIN — Medication 40 MILLIGRAM(S): at 21:53

## 2017-02-16 RX ADMIN — SIMVASTATIN 10 MILLIGRAM(S): 20 TABLET, FILM COATED ORAL at 21:53

## 2017-02-16 RX ADMIN — Medication 2: at 08:50

## 2017-02-16 RX ADMIN — TIOTROPIUM BROMIDE 1 CAPSULE(S): 18 CAPSULE ORAL; RESPIRATORY (INHALATION) at 14:26

## 2017-02-16 RX ADMIN — Medication 40 MILLIGRAM(S): at 06:09

## 2017-02-16 RX ADMIN — TAMSULOSIN HYDROCHLORIDE 0.4 MILLIGRAM(S): 0.4 CAPSULE ORAL at 21:53

## 2017-02-16 RX ADMIN — FLUTICASONE PROPIONATE AND SALMETEROL 1 DOSE(S): 50; 250 POWDER ORAL; RESPIRATORY (INHALATION) at 06:10

## 2017-02-16 NOTE — PROGRESS NOTE ADULT - SUBJECTIVE AND OBJECTIVE BOX
Patient is a 61y old  Male who presents with a chief complaint of sob (13 Feb 2017 19:18)      INTERVAL HPI/OVERNIGHT EVENTS:  no new complaints, still with sig sob    MEDICATIONS  (STANDING):  methylPREDNISolone sodium succinate Injectable 40milliGRAM(s) IV Push every 8 hours  azithromycin  IVPB 500milliGRAM(s) IV Intermittent every 24 hours  enoxaparin Injectable 40milliGRAM(s) SubCutaneous every 24 hours  chlorhexidine 4% Liquid 1Application(s) Topical daily  dextrose 5%. 1000milliLiter(s) IV Continuous <Continuous>  dextrose 50% Injectable 12.5Gram(s) IV Push once  dextrose 50% Injectable 25Gram(s) IV Push once  dextrose 50% Injectable 25Gram(s) IV Push once  aspirin  chewable 81milliGRAM(s) Oral daily  tiotropium 18 MICROgram(s) Capsule 1Capsule(s) Inhalation daily  fluticasone / salmeterol 250-50 MICROgram(s) Diskus 1Dose(s) Inhalation two times a day  diltiazem    Tablet 30milliGRAM(s) Oral every 6 hours  simvastatin 10milliGRAM(s) Oral at bedtime  losartan 50milliGRAM(s) Oral daily  pantoprazole    Tablet 40milliGRAM(s) Oral before breakfast  tamsulosin 0.4milliGRAM(s) Oral at bedtime  insulin lispro (HumaLOG) corrective regimen sliding scale  SubCutaneous three times a day before meals  insulin lispro (HumaLOG) corrective regimen sliding scale  SubCutaneous at bedtime  insulin glargine Injectable (LANTUS) 12Unit(s) SubCutaneous at bedtime  ALBUTerol/ipratropium for Nebulization 3milliLiter(s) Nebulizer every 6 hours  acetylcysteine 10% Inhalation 2milliLiter(s) Inhalation every 6 hours    MEDICATIONS  (PRN):  dextrose Gel 1Dose(s) Oral once PRN Blood Glucose LESS THAN 70 milliGRAM(s)/deciliter  glucagon  Injectable 1milliGRAM(s) IntraMuscular once PRN Glucose LESS THAN 70 milligrams/deciliter  ALBUTerol   0.042% 1.25milliGRAM(s) Nebulizer every 3 hours PRN Shortness of Breath and/or Wheezing  guaiFENesin   Syrup  (Sugar-Free) 200milliGRAM(s) Oral every 6 hours PRN Cough      Allergies    No Known Allergies    Intolerances        REVIEW OF SYSTEMS:  CONSTITUTIONAL: No fever, weight loss, + fatigue  EYES: No eye pain, visual disturbances, or discharge  ENMT:  No difficulty hearing, tinnitus, vertigo; No sinus or throat pain  NECK: No pain or stiffness  BREASTS: No pain, masses, or nipple discharge  RESPIRATORY: No cough, wheezing, chills or hemoptysis; +sob  CARDIOVASCULAR: No chest pain, palpitations, dizziness, or leg swelling  GASTROINTESTINAL: No abdominal or epigastric pain. No nausea, vomiting, or hematemesis; No diarrhea or constipation. No melena or hematochezia.  GENITOURINARY: No dysuria, frequency, hematuria, or incontinence  NEUROLOGICAL: No headaches, memory loss, loss of strength, numbness, or tremors  SKIN: No itching, burning, rashes, or lesions   LYMPH NODES: No enlarged glands  ENDOCRINE: No heat or cold intolerance; No hair loss  MUSCULOSKELETAL: No joint pain or swelling; No muscle, back, or extremity pain  PSYCHIATRIC: No depression, anxiety, mood swings, or difficulty sleeping  HEME/LYMPH: No easy bruising, or bleeding gums  ALLERGY AND IMMUNOLOGIC: No hives or eczema    Vital Signs Last 24 Hrs  T(C): 36.6, Max: 36.9 (02-16 @ 11:24)  T(F): 97.8, Max: 98.4 (02-16 @ 11:24)  HR: 79 (61 - 146)  BP: 133/78 (110/62 - 143/76)  BP(mean): --  RR: 18 (17 - 26)  SpO2: 93% (86% - 98%)    PHYSICAL EXAM:  GENERAL: NAD,thin, chronically ill appearing  HEAD:  Atraumatic, Normocephalic  EYES: EOMI, PERRLA, conjunctiva and sclera clear  ENMT: No tonsillar erythema, exudates, or enlargement; Moist mucous membranes, Good dentition, No lesions  NECK: Supple, No JVD, Normal thyroid  NERVOUS SYSTEM:  Alert & Oriented X3,; Motor Strength 4/5 B/L upper and lower extremities; DTRs 2+ intact and symmetric  CHEST/LUNG: Clear to percussion bilaterally; No rales, rhonchi, wheezing, or rubs  HEART: irregularly irregular  ABDOMEN: Soft, Nontender, Nondistended; Bowel sounds present  EXTREMITIES:  2+ Peripheral Pulses, No clubbing, cyanosis, or edema  LYMPH: No lymphadenopathy noted  SKIN: No rashes or lesions    LABS:                        10.4   7.9   )-----------( 534      ( 15 Feb 2017 07:42 )             30.9     15 Feb 2017 07:42    131    |  83     |  13     ----------------------------<  149    4.5     |  44     |  0.35     Ca    8.9        15 Feb 2017 07:42  Phos  2.4       15 Feb 2017 07:42  Mg     1.9       15 Feb 2017 07:42          CAPILLARY BLOOD GLUCOSE  93 (16 Feb 2017 17:14)  264 (16 Feb 2017 11:50)  173 (16 Feb 2017 08:46)  227 (15 Feb 2017 22:45)      RADIOLOGY & ADDITIONAL TESTS:    Imaging Personally Reviewed:  [ x] YES  [ ] NO    Consultant(s) Notes Reviewed:  [ x] YES  [ ] NO    Care Discussed with Consultants/Other Providers [ x] YES  [ ] NO

## 2017-02-16 NOTE — PROGRESS NOTE ADULT - SUBJECTIVE AND OBJECTIVE BOX
Patient is a 61y old  Male who presents with a chief complaint of sob (13 Feb 2017 19:18)      INTERVAL HPI / OVERNIGHT EVENTS: c/o cough but unable to bring up phlegm    MEDICATIONS  (STANDING):  methylPREDNISolone sodium succinate Injectable 40milliGRAM(s) IV Push every 8 hours  azithromycin  IVPB 500milliGRAM(s) IV Intermittent every 24 hours  enoxaparin Injectable 40milliGRAM(s) SubCutaneous every 24 hours  chlorhexidine 4% Liquid 1Application(s) Topical daily  dextrose 5%. 1000milliLiter(s) IV Continuous <Continuous>  dextrose 50% Injectable 12.5Gram(s) IV Push once  dextrose 50% Injectable 25Gram(s) IV Push once  dextrose 50% Injectable 25Gram(s) IV Push once  aspirin  chewable 81milliGRAM(s) Oral daily  tiotropium 18 MICROgram(s) Capsule 1Capsule(s) Inhalation daily  fluticasone / salmeterol 250-50 MICROgram(s) Diskus 1Dose(s) Inhalation two times a day  diltiazem    Tablet 30milliGRAM(s) Oral every 6 hours  simvastatin 10milliGRAM(s) Oral at bedtime  losartan 50milliGRAM(s) Oral daily  pantoprazole    Tablet 40milliGRAM(s) Oral before breakfast  tamsulosin 0.4milliGRAM(s) Oral at bedtime  insulin lispro (HumaLOG) corrective regimen sliding scale  SubCutaneous three times a day before meals  insulin lispro (HumaLOG) corrective regimen sliding scale  SubCutaneous at bedtime  insulin glargine Injectable (LANTUS) 12Unit(s) SubCutaneous at bedtime  ALBUTerol/ipratropium for Nebulization 3milliLiter(s) Nebulizer every 6 hours  acetylcysteine 10% Inhalation 2milliLiter(s) Inhalation every 6 hours    MEDICATIONS  (PRN):  dextrose Gel 1Dose(s) Oral once PRN Blood Glucose LESS THAN 70 milliGRAM(s)/deciliter  glucagon  Injectable 1milliGRAM(s) IntraMuscular once PRN Glucose LESS THAN 70 milligrams/deciliter  ALBUTerol   0.042% 1.25milliGRAM(s) Nebulizer every 3 hours PRN Shortness of Breath and/or Wheezing  guaiFENesin   Syrup  (Sugar-Free) 200milliGRAM(s) Oral every 6 hours PRN Cough      Vital Signs Last 24 Hrs  T(C): 36.9, Max: 37.1 (02-15 @ 17:48)  T(F): 98.4, Max: 98.8 (02-15 @ 17:48)  HR: 146 (61 - 146)  BP: 130/69 (110/55 - 143/76)  BP(mean): --  RR: 26 (17 - 26)  SpO2: 86% (86% - 100%)    PHYSICAL EXAM:    Constitutional: NAD, well-groomed, well-developed  Respiratory: CTAB/L  Cardiovascular: S1 and S2, RRR, no M/G/R  Gastrointestinal: BS+, soft, NT/ND  Extremities: No peripheral edema  Vascular: 2+ peripheral pulses  Skin: No rashes      LABS:                        10.4   7.9   )-----------( 534      ( 15 Feb 2017 07:42 )             30.9     15 Feb 2017 07:42    131    |  83     |  13     ----------------------------<  149    4.5     |  44     |  0.35     Ca    8.9        15 Feb 2017 07:42  Phos  2.4       15 Feb 2017 07:42  Mg     1.9       15 Feb 2017 07:42              MICROBIOLOGY:  RECENT CULTURES:  02-13 .Urine Clean Catch (Midstream) XXXX XXXX   No growth    02-13 .Blood Blood XXXX   Growth in anaerobic bottle:  Gram Positive Cocci in Clusters   Coag neg staph           RADIOLOGY & ADDITIONAL STUDIES:

## 2017-02-16 NOTE — PROGRESS NOTE ADULT - ASSESSMENT
Patient is a 60 y/o male HX of COPD on Home O2 and bipap machine (16/6 and 2L oxygen), EX Smoker, HX of GI Bleed, HX of A Fib on ASA, recently stopped AC due to GIB,  HTN, High Cholesterol, DM, CAD s/p PTCA, Prostate cancer, S/P RTX, Iron def. Anemia , pt was admitted for SOB, Lethargy, decreased po intake, + Productive cough (pink sputum) x 2 days,  no fever, NO CP, no abdominal pain, no HA, no dizziness, no dysuria, never intubated as per son. Pt placed on bipap machine in ED,  hypercarbia on ABG. Pt being admitted to critical care   Going through records, pt recent admitted to [facility] about 4 weeks ago for similar issue and multiple admissions for COPD exacerbation for the past 6 months.  Spoke to sons on bedside for GOC and wants full code.   Pt. transferred out of ICU to tele floor, also with +blood cultures , possibly contaminant

## 2017-02-16 NOTE — PROGRESS NOTE ADULT - SUBJECTIVE AND OBJECTIVE BOX
INTERVAL HPI/OVERNIGHT EVENTS:  Patient is a 60 y/o male HX of COPD on Home O2 and bipap machine (16/6 and 2L oxygen), EX Smoker, HX of GI Bleed, HX of A Fib on ASA, recently stopped AC due to GIB,  HTN, High Cholesterol, DM, CAD s/p PTCA, Prostate cancer, S/P RTX, Iron def. Anemia , pt was admitted for SOB, Lethargy, decreased po intake, + Productive cough (pink sputum) x 2 days,  no fever, NO CP, no abdominal pain, no HA, no dizziness, no dysuria, never intubated as per son. Pt placed on bipap machine in ED,  hypercarbia on ABG. Admitted to critical care with BIPAP support..  Now out of ICU, awake, responsive feels tired. SOB improving.    Vital Signs Last 24 Hrs  T(C): 36.9, Max: 37.1 (02-15 @ 17:48)  T(F): 98.4, Max: 98.8 (02-15 @ 17:48)  HR: 114 (61 - 114)  BP: 130/69 (110/55 - 143/76)  BP(mean): --  RR: 17 (17 - 18)  SpO2: 96% (94% - 100%)    PHYSICAL EXAM:  GEN:        Awake, responsive and comfortable.  HEENT:    Normal.    RESP:      crackles.  CVS:         Regular rate and rhythm.   ABD:         Soft, non-tender, non-distended;   :            No costovertebral angle tenderness  EXTR:          No clubbing, cyanosis or edema  CNS:           Intact sensory and motor function.    MEDICATIONS  (STANDING):  methylPREDNISolone sodium succinate Injectable 40milliGRAM(s) IV Push every 8 hours  azithromycin  IVPB 500milliGRAM(s) IV Intermittent every 24 hours  enoxaparin Injectable 40milliGRAM(s) SubCutaneous every 24 hours  chlorhexidine 4% Liquid 1Application(s) Topical daily  dextrose 5%. 1000milliLiter(s) IV Continuous <Continuous>  dextrose 50% Injectable 12.5Gram(s) IV Push once  dextrose 50% Injectable 25Gram(s) IV Push once  dextrose 50% Injectable 25Gram(s) IV Push once  aspirin  chewable 81milliGRAM(s) Oral daily  tiotropium 18 MICROgram(s) Capsule 1Capsule(s) Inhalation daily  fluticasone / salmeterol 250-50 MICROgram(s) Diskus 1Dose(s) Inhalation two times a day  diltiazem    Tablet 30milliGRAM(s) Oral every 6 hours  simvastatin 10milliGRAM(s) Oral at bedtime  losartan 50milliGRAM(s) Oral daily  pantoprazole    Tablet 40milliGRAM(s) Oral before breakfast  tamsulosin 0.4milliGRAM(s) Oral at bedtime  insulin lispro (HumaLOG) corrective regimen sliding scale  SubCutaneous three times a day before meals  insulin lispro (HumaLOG) corrective regimen sliding scale  SubCutaneous at bedtime  insulin glargine Injectable (LANTUS) 12Unit(s) SubCutaneous at bedtime    MEDICATIONS  (PRN):  dextrose Gel 1Dose(s) Oral once PRN Blood Glucose LESS THAN 70 milliGRAM(s)/deciliter  glucagon  Injectable 1milliGRAM(s) IntraMuscular once PRN Glucose LESS THAN 70 milligrams/deciliter  ALBUTerol   0.042% 1.25milliGRAM(s) Nebulizer every 3 hours PRN Shortness of Breath and/or Wheezing  guaiFENesin   Syrup  (Sugar-Free) 200milliGRAM(s) Oral every 6 hours PRN Cough    LABS:                        10.4   7.9   )-----------( 534      ( 15 Feb 2017 07:42 )             30.9     15 Feb 2017 07:42    131    |  83     |  13     ----------------------------<  149    4.5     |  44     |  0.35     Ca    8.9        15 Feb 2017 07:42  Phos  2.4       15 Feb 2017 07:42  Mg     1.9       15 Feb 2017 07:42    ASSESSMENT AND PLAN:  ·	SOB.  ·	Acute COPD exacerbation  ·	Chronic hypercarbia.  ·	CAD with PTCA.  ·	A Fib.  ·	Anemia.  ·	DM.  ·	HTN.  ·	Prostate CA.    Continue treatment with O2 and PRN BIPAP.  On steroids, antibiotics and nebulizer.

## 2017-02-17 LAB
ANION GAP SERPL CALC-SCNC: 4 MMOL/L — LOW (ref 5–17)
BASOPHILS # BLD AUTO: 0 K/UL — SIGNIFICANT CHANGE UP (ref 0–0.2)
BASOPHILS NFR BLD AUTO: 0.5 % — SIGNIFICANT CHANGE UP (ref 0–2)
BUN SERPL-MCNC: 15 MG/DL — SIGNIFICANT CHANGE UP (ref 7–23)
CALCIUM SERPL-MCNC: 9.1 MG/DL — SIGNIFICANT CHANGE UP (ref 8.5–10.1)
CHLORIDE SERPL-SCNC: 86 MMOL/L — LOW (ref 96–108)
CO2 SERPL-SCNC: 44 MMOL/L — HIGH (ref 22–31)
CREAT SERPL-MCNC: 0.34 MG/DL — LOW (ref 0.5–1.3)
EOSINOPHIL # BLD AUTO: 0 K/UL — SIGNIFICANT CHANGE UP (ref 0–0.5)
EOSINOPHIL NFR BLD AUTO: 0 % — SIGNIFICANT CHANGE UP (ref 0–6)
GLUCOSE SERPL-MCNC: 124 MG/DL — HIGH (ref 70–99)
HCT VFR BLD CALC: 32.2 % — LOW (ref 39–50)
HGB BLD-MCNC: 11.1 G/DL — LOW (ref 13–17)
LYMPHOCYTES # BLD AUTO: 0.6 K/UL — LOW (ref 1–3.3)
LYMPHOCYTES # BLD AUTO: 5.8 % — LOW (ref 13–44)
MCHC RBC-ENTMCNC: 27.5 PG — SIGNIFICANT CHANGE UP (ref 27–34)
MCHC RBC-ENTMCNC: 34.4 GM/DL — SIGNIFICANT CHANGE UP (ref 32–36)
MCV RBC AUTO: 79.8 FL — LOW (ref 80–100)
MONOCYTES # BLD AUTO: 0.5 K/UL — SIGNIFICANT CHANGE UP (ref 0–0.9)
MONOCYTES NFR BLD AUTO: 5.1 % — SIGNIFICANT CHANGE UP (ref 2–14)
NEUTROPHILS # BLD AUTO: 9 K/UL — HIGH (ref 1.8–7.4)
NEUTROPHILS NFR BLD AUTO: 88.6 % — HIGH (ref 43–77)
PLATELET # BLD AUTO: 494 K/UL — HIGH (ref 150–400)
POTASSIUM SERPL-MCNC: 4.5 MMOL/L — SIGNIFICANT CHANGE UP (ref 3.5–5.3)
POTASSIUM SERPL-SCNC: 4.5 MMOL/L — SIGNIFICANT CHANGE UP (ref 3.5–5.3)
RBC # BLD: 4.04 M/UL — LOW (ref 4.2–5.8)
RBC # FLD: 15.4 % — HIGH (ref 11–15)
SODIUM SERPL-SCNC: 134 MMOL/L — LOW (ref 135–145)
WBC # BLD: 10.1 K/UL — SIGNIFICANT CHANGE UP (ref 3.8–10.5)
WBC # FLD AUTO: 10.1 K/UL — SIGNIFICANT CHANGE UP (ref 3.8–10.5)

## 2017-02-17 PROCEDURE — 99233 SBSQ HOSP IP/OBS HIGH 50: CPT

## 2017-02-17 RX ADMIN — Medication 3 MILLILITER(S): at 05:15

## 2017-02-17 RX ADMIN — AZITHROMYCIN 255 MILLIGRAM(S): 500 TABLET, FILM COATED ORAL at 19:01

## 2017-02-17 RX ADMIN — LOSARTAN POTASSIUM 50 MILLIGRAM(S): 100 TABLET, FILM COATED ORAL at 05:05

## 2017-02-17 RX ADMIN — Medication 2 MILLILITER(S): at 17:07

## 2017-02-17 RX ADMIN — Medication 4: at 12:00

## 2017-02-17 RX ADMIN — TAMSULOSIN HYDROCHLORIDE 0.4 MILLIGRAM(S): 0.4 CAPSULE ORAL at 21:52

## 2017-02-17 RX ADMIN — PANTOPRAZOLE SODIUM 40 MILLIGRAM(S): 20 TABLET, DELAYED RELEASE ORAL at 06:13

## 2017-02-17 RX ADMIN — Medication 2 MILLILITER(S): at 00:35

## 2017-02-17 RX ADMIN — Medication 40 MILLIGRAM(S): at 21:54

## 2017-02-17 RX ADMIN — TIOTROPIUM BROMIDE 1 CAPSULE(S): 18 CAPSULE ORAL; RESPIRATORY (INHALATION) at 12:00

## 2017-02-17 RX ADMIN — SIMVASTATIN 10 MILLIGRAM(S): 20 TABLET, FILM COATED ORAL at 21:53

## 2017-02-17 RX ADMIN — Medication 2 MILLILITER(S): at 12:24

## 2017-02-17 RX ADMIN — Medication 40 MILLIGRAM(S): at 05:06

## 2017-02-17 RX ADMIN — FLUTICASONE PROPIONATE AND SALMETEROL 1 DOSE(S): 50; 250 POWDER ORAL; RESPIRATORY (INHALATION) at 19:01

## 2017-02-17 RX ADMIN — CHLORHEXIDINE GLUCONATE 1 APPLICATION(S): 213 SOLUTION TOPICAL at 22:36

## 2017-02-17 RX ADMIN — INSULIN GLARGINE 12 UNIT(S): 100 INJECTION, SOLUTION SUBCUTANEOUS at 21:53

## 2017-02-17 RX ADMIN — Medication 40 MILLIGRAM(S): at 16:06

## 2017-02-17 RX ADMIN — Medication 2 MILLILITER(S): at 05:15

## 2017-02-17 RX ADMIN — Medication 3 MILLILITER(S): at 17:07

## 2017-02-17 RX ADMIN — ENOXAPARIN SODIUM 40 MILLIGRAM(S): 100 INJECTION SUBCUTANEOUS at 05:06

## 2017-02-17 RX ADMIN — FLUTICASONE PROPIONATE AND SALMETEROL 1 DOSE(S): 50; 250 POWDER ORAL; RESPIRATORY (INHALATION) at 05:06

## 2017-02-17 RX ADMIN — FLUTICASONE PROPIONATE AND SALMETEROL 1 DOSE(S): 50; 250 POWDER ORAL; RESPIRATORY (INHALATION) at 22:00

## 2017-02-17 RX ADMIN — Medication 81 MILLIGRAM(S): at 12:01

## 2017-02-17 RX ADMIN — Medication 3 MILLILITER(S): at 12:24

## 2017-02-17 RX ADMIN — Medication 3 MILLILITER(S): at 00:35

## 2017-02-17 NOTE — PROGRESS NOTE ADULT - SUBJECTIVE AND OBJECTIVE BOX
INTERVAL HPI/OVERNIGHT EVENTS:  Patient is a 60 y/o male HX of COPD on Home O2 and bipap machine (16/6 and 2L oxygen), EX Smoker, HX of GI Bleed, HX of A Fib on ASA, recently stopped AC due to GIB,  HTN, High Cholesterol, DM, CAD s/p PTCA, Prostate cancer, S/P RTX, Iron def. Anemia , pt was admitted for SOB, Lethargy, decreased po intake, + Productive cough (pink sputum) x 2 days,  no fever, NO CP, no abdominal pain, no HA, no dizziness, no dysuria, never intubated as per son. Pt placed on bipap machine in ED,  hypercarbia on ABG. Admitted to critical care with BIPAP support..  Now out of ICU, awake, responsive feels tired. SOB improving but still requires BIPAP at times.    Vital Signs Last 24 Hrs  T(C): 37.1, Max: 37.1 (02-17 @ 12:20)  T(F): 98.8, Max: 98.8 (02-17 @ 12:20)  HR: 95 (72 - 146)  BP: 150/82 (133/78 - 150/82)  BP(mean): --  RR: 16 (16 - 26)  SpO2: 100% (86% - 100%)    PHYSICAL EXAM:  GEN:         Awake, responsive and comfortable.  HEENT:    Normal.    RESP:      crackles  CVS:          Regular rate and rhythm.   ABD:         Soft, non-tender, non-distended;   :             No costovertebral angle tenderness  EXTR:            No clubbing, cyanosis or edema  CNS:              Intact sensory and motor function.    MEDICATIONS  (STANDING):  methylPREDNISolone sodium succinate Injectable 40milliGRAM(s) IV Push every 8 hours  azithromycin  IVPB 500milliGRAM(s) IV Intermittent every 24 hours  enoxaparin Injectable 40milliGRAM(s) SubCutaneous every 24 hours  chlorhexidine 4% Liquid 1Application(s) Topical daily  dextrose 5%. 1000milliLiter(s) IV Continuous <Continuous>  dextrose 50% Injectable 12.5Gram(s) IV Push once  dextrose 50% Injectable 25Gram(s) IV Push once  dextrose 50% Injectable 25Gram(s) IV Push once  aspirin  chewable 81milliGRAM(s) Oral daily  tiotropium 18 MICROgram(s) Capsule 1Capsule(s) Inhalation daily  fluticasone / salmeterol 250-50 MICROgram(s) Diskus 1Dose(s) Inhalation two times a day  diltiazem    Tablet 30milliGRAM(s) Oral every 6 hours  simvastatin 10milliGRAM(s) Oral at bedtime  losartan 50milliGRAM(s) Oral daily  pantoprazole    Tablet 40milliGRAM(s) Oral before breakfast  tamsulosin 0.4milliGRAM(s) Oral at bedtime  insulin lispro (HumaLOG) corrective regimen sliding scale  SubCutaneous three times a day before meals  insulin lispro (HumaLOG) corrective regimen sliding scale  SubCutaneous at bedtime  insulin glargine Injectable (LANTUS) 12Unit(s) SubCutaneous at bedtime  ALBUTerol/ipratropium for Nebulization 3milliLiter(s) Nebulizer every 6 hours  acetylcysteine 10% Inhalation 2milliLiter(s) Inhalation every 6 hours    MEDICATIONS  (PRN):  dextrose Gel 1Dose(s) Oral once PRN Blood Glucose LESS THAN 70 milliGRAM(s)/deciliter  glucagon  Injectable 1milliGRAM(s) IntraMuscular once PRN Glucose LESS THAN 70 milligrams/deciliter  ALBUTerol   0.042% 1.25milliGRAM(s) Nebulizer every 3 hours PRN Shortness of Breath and/or Wheezing  guaiFENesin   Syrup  (Sugar-Free) 200milliGRAM(s) Oral every 6 hours PRN Cough    LABS:                        11.1   10.1  )-----------( 494      ( 17 Feb 2017 07:46 )             32.2     17 Feb 2017 07:46    134    |  86     |  15     ----------------------------<  124    4.5     |  44     |  0.34     Ca    9.1        17 Feb 2017 07:46    ASSESSMENT AND PLAN:  ·	SOB.  ·	Acute COPD exacerbation  ·	Chronic hypercarbia.  ·	CAD with PTCA.  ·	A Fib.  ·	Anemia.  ·	DM.  ·	HTN.  ·	Prostate CA.    Try to keep on nasal O2 while awake with SPO2 88-95%.  Use BIPAP on as needed basis.  Continue nebs with Mucomyst.  Out of bed to chair as tolerated.

## 2017-02-17 NOTE — PROGRESS NOTE ADULT - ASSESSMENT
Patient is a 62 y/o male HX of COPD on Home O2 and bipap machine (16/6 and 2L oxygen), EX Smoker, HX of GI Bleed, HX of A Fib on ASA, recently stopped AC due to GIB,  HTN, High Cholesterol, DM, CAD s/p PTCA, Prostate cancer, S/P RTX, Iron def. Anemia , pt was admitted for SOB, Lethargy, decreased po intake, + Productive cough (pink sputum) x 2 days,  no fever, NO CP, no abdominal pain, no HA, no dizziness, no dysuria, never intubated as per son. Pt placed on bipap machine in ED,  hypercarbia on ABG. Pt being admitted to critical care   Going through records, pt recent admitted to [facility] about 4 weeks ago for similar issue and multiple admissions for COPD exacerbation for the past 6 months.  Spoke to sons on bedside for GOC and wants full code.   Pt. transferred out of ICU to tele floor, also with +blood cultures , contaminant as per ID  trying to wean from bipap

## 2017-02-17 NOTE — PROGRESS NOTE ADULT - SUBJECTIVE AND OBJECTIVE BOX
Patient is a 61y old  Male who presents with a chief complaint of sob (13 Feb 2017 19:18)      INTERVAL HPI/OVERNIGHT EVENTS:  still with sob requiring bipap support prn    MEDICATIONS  (STANDING):  methylPREDNISolone sodium succinate Injectable 40milliGRAM(s) IV Push every 8 hours  azithromycin  IVPB 500milliGRAM(s) IV Intermittent every 24 hours  enoxaparin Injectable 40milliGRAM(s) SubCutaneous every 24 hours  chlorhexidine 4% Liquid 1Application(s) Topical daily  dextrose 5%. 1000milliLiter(s) IV Continuous <Continuous>  dextrose 50% Injectable 12.5Gram(s) IV Push once  dextrose 50% Injectable 25Gram(s) IV Push once  dextrose 50% Injectable 25Gram(s) IV Push once  aspirin  chewable 81milliGRAM(s) Oral daily  tiotropium 18 MICROgram(s) Capsule 1Capsule(s) Inhalation daily  fluticasone / salmeterol 250-50 MICROgram(s) Diskus 1Dose(s) Inhalation two times a day  diltiazem    Tablet 30milliGRAM(s) Oral every 6 hours  simvastatin 10milliGRAM(s) Oral at bedtime  losartan 50milliGRAM(s) Oral daily  pantoprazole    Tablet 40milliGRAM(s) Oral before breakfast  tamsulosin 0.4milliGRAM(s) Oral at bedtime  insulin lispro (HumaLOG) corrective regimen sliding scale  SubCutaneous three times a day before meals  insulin lispro (HumaLOG) corrective regimen sliding scale  SubCutaneous at bedtime  insulin glargine Injectable (LANTUS) 12Unit(s) SubCutaneous at bedtime  ALBUTerol/ipratropium for Nebulization 3milliLiter(s) Nebulizer every 6 hours  acetylcysteine 10% Inhalation 2milliLiter(s) Inhalation every 6 hours    MEDICATIONS  (PRN):  dextrose Gel 1Dose(s) Oral once PRN Blood Glucose LESS THAN 70 milliGRAM(s)/deciliter  glucagon  Injectable 1milliGRAM(s) IntraMuscular once PRN Glucose LESS THAN 70 milligrams/deciliter  ALBUTerol   0.042% 1.25milliGRAM(s) Nebulizer every 3 hours PRN Shortness of Breath and/or Wheezing  guaiFENesin   Syrup  (Sugar-Free) 200milliGRAM(s) Oral every 6 hours PRN Cough      Allergies    No Known Allergies    Intolerances        REVIEW OF SYSTEMS:  CONSTITUTIONAL: No fever, weight loss, or fatigue  EYES: No eye pain, visual disturbances, or discharge  ENMT:  No difficulty hearing, tinnitus, vertigo; No sinus or throat pain  NECK: No pain or stiffness  BREASTS: No pain, masses, or nipple discharge  RESPIRATORY:+sob, cough  CARDIOVASCULAR: No chest pain, palpitations, dizziness, or leg swelling  GASTROINTESTINAL: No abdominal or epigastric pain. No nausea, vomiting, or hematemesis; No diarrhea or constipation. No melena or hematochezia.  GENITOURINARY: No dysuria, frequency, hematuria, or incontinence  NEUROLOGICAL: No headaches, memory loss, loss of strength, numbness, or tremors  SKIN: No itching, burning, rashes, or lesions   LYMPH NODES: No enlarged glands  ENDOCRINE: No heat or cold intolerance; No hair loss  MUSCULOSKELETAL: No joint pain or swelling; No muscle, back, or extremity pain  PSYCHIATRIC: No depression, anxiety, mood swings, or difficulty sleeping  HEME/LYMPH: No easy bruising, or bleeding gums  ALLERGY AND IMMUNOLOGIC: No hives or eczema    Vital Signs Last 24 Hrs  T(C): 36.9, Max: 37.1 (02-17 @ 12:20)  T(F): 98.4, Max: 98.8 (02-17 @ 12:20)  HR: 78 (76 - 95)  BP: 143/71 (141/77 - 150/82)  BP(mean): --  RR: 18 (16 - 18)  SpO2: 100% (95% - 100%)    PHYSICAL EXAM:  GENERAL: some distress d/t sob  HEAD:  Atraumatic, Normocephalic  EYES: EOMI, PERRLA, conjunctiva and sclera clear  ENMT: No tonsillar erythema, exudates, or enlargement; Moist mucous membranes,   NECK: Supple, No JVD, Normal thyroid  NERVOUS SYSTEM:  Alert & Oriented X3, Good concentration; Motor Strength 5/5 B/L upper and lower extremities; DTRs 2+ intact and symmetric  CHEST/LUNG: decreased bs b/l, no wheeze  HEART: Regular rate and rhythm; No murmurs, rubs, or gallops  ABDOMEN: Soft, Nontender, Nondistended; Bowel sounds present  EXTREMITIES:  2+ Peripheral Pulses, No clubbing, cyanosis, or edema  LYMPH: No lymphadenopathy noted  SKIN: No rashes or lesions    LABS:                        11.1   10.1  )-----------( 494      ( 17 Feb 2017 07:46 )             32.2     17 Feb 2017 07:46    134    |  86     |  15     ----------------------------<  124    4.5     |  44     |  0.34     Ca    9.1        17 Feb 2017 07:46          CAPILLARY BLOOD GLUCOSE  85 (17 Feb 2017 17:18)  225 (17 Feb 2017 11:38)  124 (17 Feb 2017 09:04)  161 (16 Feb 2017 21:44)      RADIOLOGY & ADDITIONAL TESTS:    Imaging Personally Reviewed:  [ ] YES  [ ] NO    Consultant(s) Notes Reviewed:  [ x] YES  [ ] NO    Care Discussed with Consultants/Other Providers [x ] YES  [ ] NO

## 2017-02-18 LAB
CULTURE RESULTS: SIGNIFICANT CHANGE UP
CULTURE RESULTS: SIGNIFICANT CHANGE UP
GRAM STN FLD: SIGNIFICANT CHANGE UP
SPECIMEN SOURCE: SIGNIFICANT CHANGE UP
SPECIMEN SOURCE: SIGNIFICANT CHANGE UP

## 2017-02-18 PROCEDURE — 99233 SBSQ HOSP IP/OBS HIGH 50: CPT

## 2017-02-18 RX ORDER — DOCUSATE SODIUM 100 MG
100 CAPSULE ORAL
Qty: 0 | Refills: 0 | Status: DISCONTINUED | OUTPATIENT
Start: 2017-02-18 | End: 2017-02-23

## 2017-02-18 RX ADMIN — Medication 100 MILLIGRAM(S): at 18:00

## 2017-02-18 RX ADMIN — Medication 3 MILLILITER(S): at 17:55

## 2017-02-18 RX ADMIN — ENOXAPARIN SODIUM 40 MILLIGRAM(S): 100 INJECTION SUBCUTANEOUS at 05:55

## 2017-02-18 RX ADMIN — Medication 81 MILLIGRAM(S): at 11:57

## 2017-02-18 RX ADMIN — AZITHROMYCIN 255 MILLIGRAM(S): 500 TABLET, FILM COATED ORAL at 17:25

## 2017-02-18 RX ADMIN — Medication 3 MILLILITER(S): at 06:04

## 2017-02-18 RX ADMIN — SIMVASTATIN 10 MILLIGRAM(S): 20 TABLET, FILM COATED ORAL at 21:27

## 2017-02-18 RX ADMIN — CHLORHEXIDINE GLUCONATE 1 APPLICATION(S): 213 SOLUTION TOPICAL at 21:29

## 2017-02-18 RX ADMIN — Medication 2 MILLILITER(S): at 17:55

## 2017-02-18 RX ADMIN — FLUTICASONE PROPIONATE AND SALMETEROL 1 DOSE(S): 50; 250 POWDER ORAL; RESPIRATORY (INHALATION) at 18:00

## 2017-02-18 RX ADMIN — TIOTROPIUM BROMIDE 1 CAPSULE(S): 18 CAPSULE ORAL; RESPIRATORY (INHALATION) at 11:57

## 2017-02-18 RX ADMIN — Medication 6: at 11:46

## 2017-02-18 RX ADMIN — Medication 3 MILLILITER(S): at 00:03

## 2017-02-18 RX ADMIN — Medication 40 MILLIGRAM(S): at 21:27

## 2017-02-18 RX ADMIN — Medication 40 MILLIGRAM(S): at 15:57

## 2017-02-18 RX ADMIN — Medication 2 MILLILITER(S): at 00:03

## 2017-02-18 RX ADMIN — LOSARTAN POTASSIUM 50 MILLIGRAM(S): 100 TABLET, FILM COATED ORAL at 05:55

## 2017-02-18 RX ADMIN — INSULIN GLARGINE 12 UNIT(S): 100 INJECTION, SOLUTION SUBCUTANEOUS at 21:28

## 2017-02-18 RX ADMIN — FLUTICASONE PROPIONATE AND SALMETEROL 1 DOSE(S): 50; 250 POWDER ORAL; RESPIRATORY (INHALATION) at 05:56

## 2017-02-18 RX ADMIN — PANTOPRAZOLE SODIUM 40 MILLIGRAM(S): 20 TABLET, DELAYED RELEASE ORAL at 06:53

## 2017-02-18 RX ADMIN — Medication 2 MILLILITER(S): at 12:02

## 2017-02-18 RX ADMIN — Medication 2 MILLILITER(S): at 06:04

## 2017-02-18 RX ADMIN — Medication 40 MILLIGRAM(S): at 05:55

## 2017-02-18 RX ADMIN — Medication 2: at 08:33

## 2017-02-18 RX ADMIN — TAMSULOSIN HYDROCHLORIDE 0.4 MILLIGRAM(S): 0.4 CAPSULE ORAL at 21:27

## 2017-02-18 RX ADMIN — Medication 3 MILLILITER(S): at 12:02

## 2017-02-18 NOTE — PROGRESS NOTE ADULT - PROBLEM SELECTOR PLAN 1
continue with oxygen and bipap support prn, solumedrol, advair, spiriva,nebs with metabolic alkalosis  continue with oxygen support with bipap hs/prn and O2 via NC, solumedrol, advair, nebs  c/w azithromax  mucomyst  pulmonary following.

## 2017-02-18 NOTE — PROGRESS NOTE ADULT - SUBJECTIVE AND OBJECTIVE BOX
INTERVAL HPI/OVERNIGHT EVENTS:  Patient is a 60 y/o male HX of COPD on Home O2 and bipap machine (16/6 and 2L oxygen), EX Smoker, HX of GI Bleed, HX of A Fib on ASA, recently stopped AC due to GIB,  HTN, High Cholesterol, DM, CAD s/p PTCA, Prostate cancer, S/P RTX, Iron def. Anemia , pt was admitted for SOB, Lethargy, decreased po intake, + Productive cough (pink sputum) x 2 days,  no fever, NO CP, no abdominal pain, no HA, no dizziness, no dysuria, never intubated as per son. Pt placed on bipap machine in ED,  hypercarbia on ABG. Admitted to critical care with BIPAP support..  Now out of ICU,  Starts feeling sob  without BIPAP although spo2 stays above 90%  according to RN.    Vital Signs Last 24 Hrs  T(C): 36.6, Max: 36.7 (02-18 @ 12:10)  T(F): 97.8, Max: 98.1 (02-18 @ 12:10)  HR: 78 (71 - 98)  BP: 137/83 (126/60 - 145/69)  BP(mean): --  RR: 18 (14 - 18)  SpO2: 98% (90% - 100%)    PHYSICAL EXAM:  GEN:         Awake, responsive and comfortable.  HEENT:      On BIPAP    RESP:       no wheezing.  CVS:          Regular rate and rhythm.   ABD:         Soft, non-tender, non-distended;   :             No costovertebral angle tenderness  EXTR:            No clubbing, cyanosis or edema  CNS:              Intact sensory and motor function.    MEDICATIONS  (STANDING):  methylPREDNISolone sodium succinate Injectable 40milliGRAM(s) IV Push every 8 hours  azithromycin  IVPB 500milliGRAM(s) IV Intermittent every 24 hours  enoxaparin Injectable 40milliGRAM(s) SubCutaneous every 24 hours  chlorhexidine 4% Liquid 1Application(s) Topical daily  dextrose 5%. 1000milliLiter(s) IV Continuous <Continuous>  dextrose 50% Injectable 12.5Gram(s) IV Push once  dextrose 50% Injectable 25Gram(s) IV Push once  dextrose 50% Injectable 25Gram(s) IV Push once  aspirin  chewable 81milliGRAM(s) Oral daily  tiotropium 18 MICROgram(s) Capsule 1Capsule(s) Inhalation daily  fluticasone / salmeterol 250-50 MICROgram(s) Diskus 1Dose(s) Inhalation two times a day  diltiazem    Tablet 30milliGRAM(s) Oral every 6 hours  simvastatin 10milliGRAM(s) Oral at bedtime  losartan 50milliGRAM(s) Oral daily  pantoprazole    Tablet 40milliGRAM(s) Oral before breakfast  tamsulosin 0.4milliGRAM(s) Oral at bedtime  insulin lispro (HumaLOG) corrective regimen sliding scale  SubCutaneous three times a day before meals  insulin lispro (HumaLOG) corrective regimen sliding scale  SubCutaneous at bedtime  insulin glargine Injectable (LANTUS) 12Unit(s) SubCutaneous at bedtime  ALBUTerol/ipratropium for Nebulization 3milliLiter(s) Nebulizer every 6 hours  acetylcysteine 10% Inhalation 2milliLiter(s) Inhalation every 6 hours  docusate sodium 100milliGRAM(s) Oral two times a day    MEDICATIONS  (PRN):  dextrose Gel 1Dose(s) Oral once PRN Blood Glucose LESS THAN 70 milliGRAM(s)/deciliter  glucagon  Injectable 1milliGRAM(s) IntraMuscular once PRN Glucose LESS THAN 70 milligrams/deciliter  ALBUTerol   0.042% 1.25milliGRAM(s) Nebulizer every 3 hours PRN Shortness of Breath and/or Wheezing  guaiFENesin   Syrup  (Sugar-Free) 200milliGRAM(s) Oral every 6 hours PRN Cough    LABS:                        11.1   10.1  )-----------( 494      ( 17 Feb 2017 07:46 )             32.2     17 Feb 2017 07:46    134    |  86     |  15     ----------------------------<  124    4.5     |  44     |  0.34     Ca    9.1        17 Feb 2017 07:46    ASSESSMENT AND PLAN:  ·	SOB.  ·	Acute COPD exacerbation  ·	Chronic hypercarbia.  ·	CAD with PTCA.  ·	A Fib.  ·	Anemia.  ·	DM.  ·	HTN.  ·	Prostate CA.    Will start decreasing PSV on BIPAP.  Continue nebulizer with Mucomyst  and steroids.

## 2017-02-18 NOTE — PROGRESS NOTE ADULT - PROBLEM SELECTOR PLAN 4
ISS, add LA, adjust in the setting of IV steroids, FS a little variable, monitor ISS, and LA, FSBS monitoring

## 2017-02-18 NOTE — PROGRESS NOTE ADULT - PROBLEM SELECTOR PLAN 2
continue with aspirin, off full AC d/t GI bleed continue with aspirin, off full AC d/t recent h/o GI bleed  Monitor for nay bleed while on Anti Platelets  cardizem for rate control

## 2017-02-18 NOTE — PROGRESS NOTE ADULT - ASSESSMENT
Patient is a 62 y/o male HX of COPD on Home O2 and bipap machine (16/6 and 2L oxygen), EX Smoker, HX of GI Bleed, HX of A Fib on ASA, recently stopped AC due to GIB,  HTN, High Cholesterol, DM, CAD s/p PTCA, Prostate cancer, S/P RTX, Iron def. Anemia , pt was admitted for SOB, Lethargy, decreased po intake, + Productive cough (pink sputum) x 2 days,  no fever, NO CP, no abdominal pain, no HA, no dizziness, no dysuria, never intubated as per son. Pt placed on bipap machine in ED,  hypercarbia on ABG. Pt being admitted to critical care   Going through records, pt recent admitted to [facility] about 4 weeks ago for similar issue and multiple admissions for COPD exacerbation for the past 6 months.  Spoke to sons on bedside for GOC and wants full code.   Pt. transferred out of ICU to tele floor, also with +blood cultures , contaminant as per ID  trying to wean from bipap Patient is a 62 y/o male HX of COPD on Home O2 and bipap machine (16/6 and 2L oxygen), EX Smoker, HX of GI Bleed, HX of A Fib on ASA, recently stopped AC due to GIB,  HTN, High Cholesterol, DM, CAD s/p PTCA, Prostate cancer, S/P RTX, Iron def. Anemia , pt was admitted to ICU with acute copd exacerbation , for SOB, Lethargy, decreased po intake, + Productive cough (pink sputum) x 2 days,  no fever, Pt placed on bipap machine for acute resp. failure,  hypercarbia on ABG. and monitored in critical care   pt recently admitted to [facility] about 4 weeks ago for similar issue and multiple admissions for COPD exacerbation for the past 6 months.   Now pt. stepped down to telemetry floor on Bipap HS/PRN and oxygen via NC , also found to have +blood cultures , contaminant as per ID

## 2017-02-18 NOTE — PROGRESS NOTE ADULT - SUBJECTIVE AND OBJECTIVE BOX
CHIEF COMPLAINT/INTERVAL HISTORY:    Patient is a 61y old  Male who presents with a chief complaint of sob (13 Feb 2017 19:18)      HPI:  Patient is a 62 y/o male HX of COPD on Home O2 and bipap machine (16/6 and 2L oxygen), EX Smoker, HX of GI Bleed, HX of A Fib on ASA, recently stopped AC due to GIB,  HTN, High Cholesterol, DM, CAD s/p PTCA, Prostate cancer, S/P RTX, Iron def. Anemia , pt was admitted for SOB, Lethargy, decreased po intake, + Productive cough (pink sputum) x 2 days,  no fever, NO CP, no abdominal pain, no HA, no dizziness, no dysuria, never intubated as per son. Pt placed on bipap machine in ED,  hypercarbia on ABG. Pt being admitted to critical care for further care.  Going through records, pt recent admitted to [facility] about 4 weeks ago for similar issue and multiple admissions for COPD exacerbation for the past 6 months.  Spoke to sons on bedside for GOC and wants full code. (13 Feb 2017 04:32)      SUBJECTIVE & OBJECTIVE: Pt seen and examined at bedside.   Still with SOB, unable to expectorate, tolerated BiPAP well last night, no overnight events as per RN, gets sob whiel talking and pursing lips.  Also c/o hard stools and request for stool softners.  Denies chest pain, nausea/vomiting/diarrhea, No dizziness, HA or blurry vision, all other ROS negative.        Vital Signs Last 24 Hrs  T(C): 36.7, Max: 36.9 (02-17 @ 17:52)  T(F): 98.1, Max: 98.4 (02-17 @ 17:52)  HR: 82 (71 - 91)  BP: 128/61 (126/60 - 145/69)  BP(mean): --  ABP: --  ABP(mean): --  RR: 16 (14 - 18)  SpO2: 91% (91% - 100%)        MEDICATIONS  (STANDING):  methylPREDNISolone sodium succinate Injectable 40milliGRAM(s) IV Push every 8 hours  azithromycin  IVPB 500milliGRAM(s) IV Intermittent every 24 hours  enoxaparin Injectable 40milliGRAM(s) SubCutaneous every 24 hours  chlorhexidine 4% Liquid 1Application(s) Topical daily  dextrose 5%. 1000milliLiter(s) IV Continuous <Continuous>  dextrose 50% Injectable 12.5Gram(s) IV Push once  dextrose 50% Injectable 25Gram(s) IV Push once  dextrose 50% Injectable 25Gram(s) IV Push once  aspirin  chewable 81milliGRAM(s) Oral daily  tiotropium 18 MICROgram(s) Capsule 1Capsule(s) Inhalation daily  fluticasone / salmeterol 250-50 MICROgram(s) Diskus 1Dose(s) Inhalation two times a day  diltiazem    Tablet 30milliGRAM(s) Oral every 6 hours  simvastatin 10milliGRAM(s) Oral at bedtime  losartan 50milliGRAM(s) Oral daily  pantoprazole    Tablet 40milliGRAM(s) Oral before breakfast  tamsulosin 0.4milliGRAM(s) Oral at bedtime  insulin lispro (HumaLOG) corrective regimen sliding scale  SubCutaneous three times a day before meals  insulin lispro (HumaLOG) corrective regimen sliding scale  SubCutaneous at bedtime  insulin glargine Injectable (LANTUS) 12Unit(s) SubCutaneous at bedtime  ALBUTerol/ipratropium for Nebulization 3milliLiter(s) Nebulizer every 6 hours  acetylcysteine 10% Inhalation 2milliLiter(s) Inhalation every 6 hours  docusate sodium 100milliGRAM(s) Oral two times a day    MEDICATIONS  (PRN):  dextrose Gel 1Dose(s) Oral once PRN Blood Glucose LESS THAN 70 milliGRAM(s)/deciliter  glucagon  Injectable 1milliGRAM(s) IntraMuscular once PRN Glucose LESS THAN 70 milligrams/deciliter  ALBUTerol   0.042% 1.25milliGRAM(s) Nebulizer every 3 hours PRN Shortness of Breath and/or Wheezing  guaiFENesin   Syrup  (Sugar-Free) 200milliGRAM(s) Oral every 6 hours PRN Cough        PHYSICAL EXAM:    GENERAL: NAD, malnourished  HEAD:  Atraumatic, Normocephalic  EYES: EOMI, PERRLA, conjunctiva and sclera clear  ENMT: Moist mucous membranes  NECK: Supple, No JVD  NERVOUS SYSTEM:  Alert & Oriented X3, Motor Strength 5/5 B/L upper and lower extremities;   HEART: Regular rate and rhythm;   LUNGS: generalized decreased BS  ABDOMEN: Soft, Nontender, Nondistended; Bowel sounds present  EXTREMITIES:  2+ Peripheral Pulses, No clubbing, cyanosis, or edema    LABS:                        11.1   10.1  )-----------( 494      ( 17 Feb 2017 07:46 )             32.2     17 Feb 2017 07:46    134    |  86     |  15     ----------------------------<  124    4.5     |  44     |  0.34     Ca    9.1        17 Feb 2017 07:46            CAPILLARY BLOOD GLUCOSE  257 (18 Feb 2017 11:44)  169 (18 Feb 2017 06:06)  210 (17 Feb 2017 21:24)  85 (17 Feb 2017 17:18) CHIEF COMPLAINT/INTERVAL HISTORY:    Patient is a 61y old  Male who presents with a chief complaint of sob (13 Feb 2017 19:18)      HPI:  Patient is a 62 y/o male HX of COPD on Home O2 and bipap machine (16/6 and 2L oxygen), EX Smoker, HX of GI Bleed, HX of A Fib on ASA, recently stopped AC due to GIB,  HTN, High Cholesterol, DM, CAD s/p PTCA, Prostate cancer, S/P RTX, Iron def. Anemia , pt was admitted for SOB, Lethargy, decreased po intake, + Productive cough (pink sputum) x 2 days,  no fever, NO CP, no abdominal pain, no HA, no dizziness, no dysuria, never intubated as per son. Pt placed on bipap machine in ED,  hypercarbia on ABG. Pt being admitted to critical care for further care.  Going through records, pt recent admitted to [facility] about 4 weeks ago for similar issue and multiple admissions for COPD exacerbation for the past 6 months.  Spoke to sons on bedside for GOC and wants full code. (13 Feb 2017 04:32)      SUBJECTIVE & OBJECTIVE: Pt seen and examined at bedside.   Still with SOB, unable to expectorate, tolerated BiPAP well last night, no overnight events as per RN, gets sob whiel talking and pursing lips.  Also c/o hard stools and request for stool softners.  Denies chest pain, nausea/vomiting/diarrhea, No dizziness, HA or blurry vision, all other ROS negative.        Vital Signs Last 24 Hrs  T(C): 36.7, Max: 36.9 (02-17 @ 17:52)  T(F): 98.1, Max: 98.4 (02-17 @ 17:52)  HR: 82 (71 - 91)  BP: 128/61 (126/60 - 145/69)  BP(mean): --  ABP: --  ABP(mean): --  RR: 16 (14 - 18)  SpO2: 91% (91% - 100%)        MEDICATIONS  (STANDING):  methylPREDNISolone sodium succinate Injectable 40milliGRAM(s) IV Push every 8 hours  azithromycin  IVPB 500milliGRAM(s) IV Intermittent every 24 hours  enoxaparin Injectable 40milliGRAM(s) SubCutaneous every 24 hours  chlorhexidine 4% Liquid 1Application(s) Topical daily  dextrose 5%. 1000milliLiter(s) IV Continuous <Continuous>  dextrose 50% Injectable 12.5Gram(s) IV Push once  dextrose 50% Injectable 25Gram(s) IV Push once  dextrose 50% Injectable 25Gram(s) IV Push once  aspirin  chewable 81milliGRAM(s) Oral daily  tiotropium 18 MICROgram(s) Capsule 1Capsule(s) Inhalation daily  fluticasone / salmeterol 250-50 MICROgram(s) Diskus 1Dose(s) Inhalation two times a day  diltiazem    Tablet 30milliGRAM(s) Oral every 6 hours  simvastatin 10milliGRAM(s) Oral at bedtime  losartan 50milliGRAM(s) Oral daily  pantoprazole    Tablet 40milliGRAM(s) Oral before breakfast  tamsulosin 0.4milliGRAM(s) Oral at bedtime  insulin lispro (HumaLOG) corrective regimen sliding scale  SubCutaneous three times a day before meals  insulin lispro (HumaLOG) corrective regimen sliding scale  SubCutaneous at bedtime  insulin glargine Injectable (LANTUS) 12Unit(s) SubCutaneous at bedtime  ALBUTerol/ipratropium for Nebulization 3milliLiter(s) Nebulizer every 6 hours  acetylcysteine 10% Inhalation 2milliLiter(s) Inhalation every 6 hours  docusate sodium 100milliGRAM(s) Oral two times a day    MEDICATIONS  (PRN):  dextrose Gel 1Dose(s) Oral once PRN Blood Glucose LESS THAN 70 milliGRAM(s)/deciliter  glucagon  Injectable 1milliGRAM(s) IntraMuscular once PRN Glucose LESS THAN 70 milligrams/deciliter  ALBUTerol   0.042% 1.25milliGRAM(s) Nebulizer every 3 hours PRN Shortness of Breath and/or Wheezing  guaiFENesin   Syrup  (Sugar-Free) 200milliGRAM(s) Oral every 6 hours PRN Cough        PHYSICAL EXAM:    GENERAL: NAD, malnourished  HEAD:  Atraumatic, Normocephalic  EYES: EOMI, PERRLA, conjunctiva and sclera clear  ENMT: Moist mucous membranes  NECK: Supple, No JVD  NERVOUS SYSTEM:  Alert & Oriented X3, Motor Strength 5/5 B/L upper and lower extremities;   HEART: Regular rate and rhythm;   LUNGS: generalized decreased BS  ABDOMEN: Soft, Nontender, Nondistended; Bowel sounds present  EXTREMITIES:  2+ Peripheral Pulses, No clubbing, cyanosis, or edema    LABS:                        11.1   10.1  )-----------( 494      ( 17 Feb 2017 07:46 )             32.2     17 Feb 2017 07:46    134    |  86     |  15     ----------------------------<  124    4.5     |  44     |  0.34     Ca    9.1        17 Feb 2017 07:46            CAPILLARY BLOOD GLUCOSE  257 (18 Feb 2017 11:44)  169 (18 Feb 2017 06:06)  210 (17 Feb 2017 21:24)  85 (17 Feb 2017 17:18)    labs and imaging reviewed, care discussed with consultants , labs ordered for am.

## 2017-02-19 LAB
ANION GAP SERPL CALC-SCNC: 4 MMOL/L — LOW (ref 5–17)
BUN SERPL-MCNC: 19 MG/DL — SIGNIFICANT CHANGE UP (ref 7–23)
CALCIUM SERPL-MCNC: 8.9 MG/DL — SIGNIFICANT CHANGE UP (ref 8.5–10.1)
CHLORIDE SERPL-SCNC: 84 MMOL/L — LOW (ref 96–108)
CO2 SERPL-SCNC: 44 MMOL/L — HIGH (ref 22–31)
CREAT SERPL-MCNC: 0.3 MG/DL — LOW (ref 0.5–1.3)
GLUCOSE SERPL-MCNC: 130 MG/DL — HIGH (ref 70–99)
HCT VFR BLD CALC: 33.4 % — LOW (ref 39–50)
HGB BLD-MCNC: 11.1 G/DL — LOW (ref 13–17)
MCHC RBC-ENTMCNC: 26.6 PG — LOW (ref 27–34)
MCHC RBC-ENTMCNC: 33.2 GM/DL — SIGNIFICANT CHANGE UP (ref 32–36)
MCV RBC AUTO: 79.9 FL — LOW (ref 80–100)
PLATELET # BLD AUTO: 426 K/UL — HIGH (ref 150–400)
POTASSIUM SERPL-MCNC: 4.4 MMOL/L — SIGNIFICANT CHANGE UP (ref 3.5–5.3)
POTASSIUM SERPL-SCNC: 4.4 MMOL/L — SIGNIFICANT CHANGE UP (ref 3.5–5.3)
RBC # BLD: 4.18 M/UL — LOW (ref 4.2–5.8)
RBC # FLD: 14.9 % — SIGNIFICANT CHANGE UP (ref 11–15)
SODIUM SERPL-SCNC: 132 MMOL/L — LOW (ref 135–145)
WBC # BLD: 14.4 K/UL — HIGH (ref 3.8–10.5)
WBC # FLD AUTO: 14.4 K/UL — HIGH (ref 3.8–10.5)

## 2017-02-19 PROCEDURE — 99233 SBSQ HOSP IP/OBS HIGH 50: CPT

## 2017-02-19 RX ADMIN — ENOXAPARIN SODIUM 40 MILLIGRAM(S): 100 INJECTION SUBCUTANEOUS at 05:48

## 2017-02-19 RX ADMIN — Medication 3 MILLILITER(S): at 05:30

## 2017-02-19 RX ADMIN — Medication 40 MILLIGRAM(S): at 15:30

## 2017-02-19 RX ADMIN — FLUTICASONE PROPIONATE AND SALMETEROL 1 DOSE(S): 50; 250 POWDER ORAL; RESPIRATORY (INHALATION) at 17:43

## 2017-02-19 RX ADMIN — Medication 2 MILLILITER(S): at 05:31

## 2017-02-19 RX ADMIN — Medication 2 MILLILITER(S): at 00:24

## 2017-02-19 RX ADMIN — INSULIN GLARGINE 12 UNIT(S): 100 INJECTION, SOLUTION SUBCUTANEOUS at 21:59

## 2017-02-19 RX ADMIN — Medication 2 MILLILITER(S): at 18:14

## 2017-02-19 RX ADMIN — LOSARTAN POTASSIUM 50 MILLIGRAM(S): 100 TABLET, FILM COATED ORAL at 05:48

## 2017-02-19 RX ADMIN — AZITHROMYCIN 255 MILLIGRAM(S): 500 TABLET, FILM COATED ORAL at 17:32

## 2017-02-19 RX ADMIN — PANTOPRAZOLE SODIUM 40 MILLIGRAM(S): 20 TABLET, DELAYED RELEASE ORAL at 08:17

## 2017-02-19 RX ADMIN — Medication 6: at 16:47

## 2017-02-19 RX ADMIN — Medication 3 MILLILITER(S): at 18:14

## 2017-02-19 RX ADMIN — Medication 2 MILLILITER(S): at 11:25

## 2017-02-19 RX ADMIN — Medication 3 MILLILITER(S): at 00:25

## 2017-02-19 RX ADMIN — Medication 81 MILLIGRAM(S): at 12:31

## 2017-02-19 RX ADMIN — CHLORHEXIDINE GLUCONATE 1 APPLICATION(S): 213 SOLUTION TOPICAL at 22:00

## 2017-02-19 RX ADMIN — Medication 10 MILLIGRAM(S): at 17:31

## 2017-02-19 RX ADMIN — TAMSULOSIN HYDROCHLORIDE 0.4 MILLIGRAM(S): 0.4 CAPSULE ORAL at 21:57

## 2017-02-19 RX ADMIN — FLUTICASONE PROPIONATE AND SALMETEROL 1 DOSE(S): 50; 250 POWDER ORAL; RESPIRATORY (INHALATION) at 05:48

## 2017-02-19 RX ADMIN — Medication 4: at 12:31

## 2017-02-19 RX ADMIN — SIMVASTATIN 10 MILLIGRAM(S): 20 TABLET, FILM COATED ORAL at 21:57

## 2017-02-19 RX ADMIN — Medication 3 MILLILITER(S): at 11:25

## 2017-02-19 RX ADMIN — Medication 20 MILLIGRAM(S): at 21:57

## 2017-02-19 RX ADMIN — Medication 100 MILLIGRAM(S): at 17:43

## 2017-02-19 RX ADMIN — Medication 100 MILLIGRAM(S): at 05:48

## 2017-02-19 RX ADMIN — TIOTROPIUM BROMIDE 1 CAPSULE(S): 18 CAPSULE ORAL; RESPIRATORY (INHALATION) at 12:36

## 2017-02-19 RX ADMIN — Medication 40 MILLIGRAM(S): at 05:48

## 2017-02-19 NOTE — PROGRESS NOTE ADULT - SUBJECTIVE AND OBJECTIVE BOX
CHIEF COMPLAINT/INTERVAL HISTORY:    Patient is a 61y old  Male who presents with a chief complaint of sob (13 Feb 2017 19:18)      HPI:  Patient is a 62 y/o male HX of COPD on Home O2 and bipap machine (16/6 and 2L oxygen), EX Smoker, HX of GI Bleed, HX of A Fib on ASA, recently stopped AC due to GIB,  HTN, High Cholesterol, DM, CAD s/p PTCA, Prostate cancer, S/P RTX, Iron def. Anemia , pt was admitted for SOB, Lethargy, decreased po intake, + Productive cough (pink sputum) x 2 days,  no fever, NO CP, no abdominal pain, no HA, no dizziness, no dysuria, never intubated as per son. Pt placed on bipap machine in ED,  hypercarbia on ABG. Pt being admitted to critical care for further care.  Going through records, pt recent admitted to [facility] about 4 weeks ago for similar issue and multiple admissions for COPD exacerbation for the past 6 months.  Spoke to sons on bedside for GOC and wants full code. (13 Feb 2017 04:32)      SUBJECTIVE & OBJECTIVE: Pt seen and examined at bedside.   feels better today.  Tolerated BiPAP well last night, no overnight events as per RN, gets sob while talking and minimal exertion.  Denies chest pain, nausea/vomiting/diarrhea, No dizziness, HA or blurry vision, all other ROS negative.  Tolerating PO well, denies pain    Vital Signs Last 24 Hrs  T(C): 36.2, Max: 36.7 (02-18 @ 12:10)  T(F): 97.2, Max: 98.1 (02-18 @ 12:10)  HR: 86 (70 - 107)  BP: 120/65 (120/65 - 137/83)  BP(mean): --  RR: 18 (16 - 18)  SpO2: 97% (90% - 99%)    MEDICATIONS  (STANDING):  methylPREDNISolone sodium succinate Injectable 40milliGRAM(s) IV Push every 8 hours  azithromycin  IVPB 500milliGRAM(s) IV Intermittent every 24 hours  enoxaparin Injectable 40milliGRAM(s) SubCutaneous every 24 hours  chlorhexidine 4% Liquid 1Application(s) Topical daily  dextrose 5%. 1000milliLiter(s) IV Continuous <Continuous>  dextrose 50% Injectable 12.5Gram(s) IV Push once  dextrose 50% Injectable 25Gram(s) IV Push once  dextrose 50% Injectable 25Gram(s) IV Push once  aspirin  chewable 81milliGRAM(s) Oral daily  tiotropium 18 MICROgram(s) Capsule 1Capsule(s) Inhalation daily  fluticasone / salmeterol 250-50 MICROgram(s) Diskus 1Dose(s) Inhalation two times a day  diltiazem    Tablet 30milliGRAM(s) Oral every 6 hours  simvastatin 10milliGRAM(s) Oral at bedtime  losartan 50milliGRAM(s) Oral daily  pantoprazole    Tablet 40milliGRAM(s) Oral before breakfast  tamsulosin 0.4milliGRAM(s) Oral at bedtime  insulin lispro (HumaLOG) corrective regimen sliding scale  SubCutaneous three times a day before meals  insulin lispro (HumaLOG) corrective regimen sliding scale  SubCutaneous at bedtime  insulin glargine Injectable (LANTUS) 12Unit(s) SubCutaneous at bedtime  ALBUTerol/ipratropium for Nebulization 3milliLiter(s) Nebulizer every 6 hours  acetylcysteine 10% Inhalation 2milliLiter(s) Inhalation every 6 hours  docusate sodium 100milliGRAM(s) Oral two times a day    MEDICATIONS  (PRN):  dextrose Gel 1Dose(s) Oral once PRN Blood Glucose LESS THAN 70 milliGRAM(s)/deciliter  glucagon  Injectable 1milliGRAM(s) IntraMuscular once PRN Glucose LESS THAN 70 milligrams/deciliter  ALBUTerol   0.042% 1.25milliGRAM(s) Nebulizer every 3 hours PRN Shortness of Breath and/or Wheezing  guaiFENesin   Syrup  (Sugar-Free) 200milliGRAM(s) Oral every 6 hours PRN Cough        PHYSICAL EXAM:    GENERAL: NAD, malnourished  HEAD:  Atraumatic, Normocephalic  EYES: EOMI, PERRLA, conjunctiva and sclera clear  ENMT: Moist mucous membranes  NECK: Supple, No JVD  NERVOUS SYSTEM:  Alert & Oriented X3, Motor Strength 5/5 B/L upper and lower extremities;   HEART: Regular rate and rhythm;   LUNGS: generalized decreased BS  ABDOMEN: Soft, Nontender, Nondistended; Bowel sounds present  EXTREMITIES:  No cyanosis, or edema    LABS:                          11.1   14.4  )-----------( 426      ( 19 Feb 2017 08:12 )             33.4                           11.1   10.1  )-----------( 494      ( 17 Feb 2017 07:46 )             32.2     19 Feb 2017 08:12    132    |  84     |  19     ----------------------------<  130    4.4     |  44     |  0.30     Ca    8.9        19 Feb 2017 08:12      17 Feb 2017 07:46    134    |  86     |  15     ----------------------------<  124    4.5     |  44     |  0.34     Ca    9.1        17 Feb 2017 07:46      labs and imaging reviewed, care discussed with consultants     CHIEF COMPLAINT/INTERVAL HISTORY:    Patient is a 61y old  Male who presents with a chief complaint of sob (13 Feb 2017 19:18)      HPI:  Patient is a 62 y/o male HX of COPD on Home O2 and bipap machine (16/6 and 2L oxygen), EX Smoker, HX of GI Bleed, HX of A Fib on ASA, recently stopped AC due to GIB,  HTN, High Cholesterol, DM, CAD s/p PTCA, Prostate cancer, S/P RTX, Iron def. Anemia , pt was admitted for SOB, Lethargy, decreased po intake, + Productive cough (pink sputum) x 2 days,  no fever, NO CP, no abdominal pain, no HA, no dizziness, no dysuria, never intubated as per son. Pt placed on bipap machine in ED,  hypercarbia on ABG. Pt being admitted to critical care for further care.  Going through records, pt recent admitted to [facility] about 4 weeks ago for similar issue and multiple admissions for COPD exacerbation for the past 6 months.  Spoke to sons on bedside for GOC and wants full code. (13 Feb 2017 04:32)      SUBJECTIVE & OBJECTIVE: Pt seen and examined at bedside.   Still with SOB, unable to expectorate, tolerated BiPAP well last night, no overnight events as per RN, gets sob whiel talking and pursing lips.  Also c/o hard stools and request for stool softners.  Denies chest pain, nausea/vomiting/diarrhea, No dizziness, HA or blurry vision, all other ROS negative.        Vital Signs Last 24 Hrs  T(C): 36.7, Max: 36.9 (02-17 @ 17:52)  T(F): 98.1, Max: 98.4 (02-17 @ 17:52)  HR: 82 (71 - 91)  BP: 128/61 (126/60 - 145/69)  BP(mean): --  ABP: --  ABP(mean): --  RR: 16 (14 - 18)  SpO2: 91% (91% - 100%)        MEDICATIONS  (STANDING):  methylPREDNISolone sodium succinate Injectable 40milliGRAM(s) IV Push every 8 hours  azithromycin  IVPB 500milliGRAM(s) IV Intermittent every 24 hours  enoxaparin Injectable 40milliGRAM(s) SubCutaneous every 24 hours  chlorhexidine 4% Liquid 1Application(s) Topical daily  dextrose 5%. 1000milliLiter(s) IV Continuous <Continuous>  dextrose 50% Injectable 12.5Gram(s) IV Push once  dextrose 50% Injectable 25Gram(s) IV Push once  dextrose 50% Injectable 25Gram(s) IV Push once  aspirin  chewable 81milliGRAM(s) Oral daily  tiotropium 18 MICROgram(s) Capsule 1Capsule(s) Inhalation daily  fluticasone / salmeterol 250-50 MICROgram(s) Diskus 1Dose(s) Inhalation two times a day  diltiazem    Tablet 30milliGRAM(s) Oral every 6 hours  simvastatin 10milliGRAM(s) Oral at bedtime  losartan 50milliGRAM(s) Oral daily  pantoprazole    Tablet 40milliGRAM(s) Oral before breakfast  tamsulosin 0.4milliGRAM(s) Oral at bedtime  insulin lispro (HumaLOG) corrective regimen sliding scale  SubCutaneous three times a day before meals  insulin lispro (HumaLOG) corrective regimen sliding scale  SubCutaneous at bedtime  insulin glargine Injectable (LANTUS) 12Unit(s) SubCutaneous at bedtime  ALBUTerol/ipratropium for Nebulization 3milliLiter(s) Nebulizer every 6 hours  acetylcysteine 10% Inhalation 2milliLiter(s) Inhalation every 6 hours  docusate sodium 100milliGRAM(s) Oral two times a day    MEDICATIONS  (PRN):  dextrose Gel 1Dose(s) Oral once PRN Blood Glucose LESS THAN 70 milliGRAM(s)/deciliter  glucagon  Injectable 1milliGRAM(s) IntraMuscular once PRN Glucose LESS THAN 70 milligrams/deciliter  ALBUTerol   0.042% 1.25milliGRAM(s) Nebulizer every 3 hours PRN Shortness of Breath and/or Wheezing  guaiFENesin   Syrup  (Sugar-Free) 200milliGRAM(s) Oral every 6 hours PRN Cough        PHYSICAL EXAM:    GENERAL: NAD, malnourished  HEAD:  Atraumatic, Normocephalic  EYES: EOMI, PERRLA, conjunctiva and sclera clear  ENMT: Moist mucous membranes  NECK: Supple, No JVD  NERVOUS SYSTEM:  Alert & Oriented X3, Motor Strength 5/5 B/L upper and lower extremities;   HEART: Regular rate and rhythm;   LUNGS: generalized decreased BS  ABDOMEN: Soft, Nontender, Nondistended; Bowel sounds present  EXTREMITIES:  2+ Peripheral Pulses, No clubbing, cyanosis, or edema    LABS:                        11.1   10.1  )-----------( 494      ( 17 Feb 2017 07:46 )             32.2     17 Feb 2017 07:46    134    |  86     |  15     ----------------------------<  124    4.5     |  44     |  0.34     Ca    9.1        17 Feb 2017 07:46            CAPILLARY BLOOD GLUCOSE  257 (18 Feb 2017 11:44)  169 (18 Feb 2017 06:06)  210 (17 Feb 2017 21:24)  85 (17 Feb 2017 17:18)    Assessment and Plan:   · Assessment		  Patient is a 62 y/o male HX of COPD on Home O2 and bipap machine (16/6 and 2L oxygen), EX Smoker, HX of GI Bleed, HX of A Fib on ASA, recently stopped AC due to GIB,  HTN, High Cholesterol, DM, CAD s/p PTCA, Prostate cancer, S/P RTX, Iron def. Anemia , pt was admitted to ICU with acute copd exacerbation , for SOB, Lethargy, decreased po intake, + Productive cough (pink sputum) x 2 days,  no fever, Pt placed on bipap machine for acute resp. failure,  hypercarbia on ABG. and monitored in critical care   pt recently admitted to [facility] about 4 weeks ago for similar issue and multiple admissions for COPD exacerbation for the past 6 months.   Now pt. stepped down to telemetry floor on Bipap HS/PRN and oxygen via NC , also found to have +blood cultures , contaminant as per ID      Patient is a 62 y/o male HX of COPD on Home O2 and bipap machine (16/6 and 2L oxygen), EX Smoker, HX of GI Bleed, HX of A Fib on ASA, recently stopped AC due to GIB,  HTN, High Cholesterol, DM, CAD s/p PTCA, Prostate cancer, S/P RTX, Iron def. Anemia , pt was admitted for SOB, Lethargy, decreased po intake, + Productive cough (pink sputum) x 2 days,  no fever, NO CP, no abdominal pain, no HA, no dizziness, no dysuria, never intubated as per son. Pt placed on bipap machine in ED,  hypercarbia on ABG. Pt being admitted to critical care   Going through records, pt recent admitted to [facility] about 4 weeks ago for similar issue and multiple admissions for COPD exacerbation for the past 6 months.  Spoke to sons on bedside for GOC and wants full code.   Pt. transferred out of ICU to tele floor, also with +blood cultures , contaminant as per ID  trying to wean from bipap    Problem/Plan - 1:  ·  Problem: Acute on chronic respiratory failure with hypoxia and hypercapnia.  Plan: with metabolic alkalosis secondary to acute copd exacerbation.  continue with oxygen support with bipap hs/prn and O2 via NC, solumedrol, advair,   c/w azithromax  c/w mucomyst and duonebs  pulmonary following.continue with oxygen and bipap support prn, solumedrol, advair, spiriva,nebsas per pulm. start decreasing PSV on BiPAP.     Problem/Plan - 2:  ·  Problem: Paroxysmal atrial fibrillation.  Plan: continue with aspirin, off full AC d/t recent h/o GI bleed  Monitor for nay bleed while on Anti Platelets  cardizem for rate controlcontinue with aspirin, off full AC d/t GI bleed.     Problem/Plan - 3:  ·  Problem: Coronary artery disease due to lipid rich plaque.  Plan: continue with current medscontinue with current cardiac meds.     Problem/Plan - 4:  ·  Problem: Type 2 diabetes mellitus with hyperglycemia, with long-term current use of insulin.  Plan: ISS, and LA, FSBS monitoringISS, add LA, adjust in the setting of IV steroids, FS a little variable, monitor.     Problem/Plan - 5:  ·  Problem: Essential hypertension.  Plan: c/w losartan.     Problem/Plan - 6:  Problem: Moderate protein-calorie malnutrition. Plan: protein supplementsnutrition.    Problem/Plan - 7:  ·  Problem: Bacteremia.  Plan: coag negative staph on BC, as per ID, contaminant, vanco discontinued.     Problem/Plan - 8  Problem: leukocytosis, likely steroid induced  monitor wbc/temp curve.

## 2017-02-19 NOTE — PROGRESS NOTE ADULT - SUBJECTIVE AND OBJECTIVE BOX
INTERVAL HPI/OVERNIGHT EVENTS:  Patient is a 62 y/o male HX of COPD on Home O2 and bipap machine (16/6 and 2L oxygen), EX Smoker, HX of GI Bleed, HX of A Fib on ASA, recently stopped AC due to GIB,  HTN, High Cholesterol, DM, CAD s/p PTCA, Prostate cancer, S/P RTX, Iron def. Anemia , pt was admitted for SOB, Lethargy, decreased po intake, + Productive cough (pink sputum) x 2 days,  no fever, NO CP, no abdominal pain, no HA, no dizziness, no dysuria, never intubated as per son. Pt placed on bipap machine in ED,  hypercarbia on ABG. Admitted to critical care with BIPAP support..  Now out of ICU.  Looks and feels better today. Now on nasal O2.    Vital Signs Last 24 Hrs  T(C): 36.1, Max: 36.6 (02-18 @ 17:42)  T(F): 97, Max: 97.8 (02-18 @ 17:42)  HR: 85 (70 - 107)  BP: 125/73 (120/65 - 137/83)  BP(mean): --  RR: 18 (16 - 18)  SpO2: 97% (90% - 100%)    PHYSICAL EXAM:  GEN:         Awake, responsive and comfortable.  HEENT:    Normal.    RESP:      crackles.  CVS:          Regular rate and rhythm.   ABD:         Soft, non-tender, non-distended;   :             No costovertebral angle tenderness  EXTR:            No clubbing, cyanosis or edema  CNS:              Intact sensory and motor function.    MEDICATIONS  (STANDING):  methylPREDNISolone sodium succinate Injectable 40milliGRAM(s) IV Push every 8 hours  azithromycin  IVPB 500milliGRAM(s) IV Intermittent every 24 hours  enoxaparin Injectable 40milliGRAM(s) SubCutaneous every 24 hours  chlorhexidine 4% Liquid 1Application(s) Topical daily  dextrose 5%. 1000milliLiter(s) IV Continuous <Continuous>  dextrose 50% Injectable 12.5Gram(s) IV Push once  dextrose 50% Injectable 25Gram(s) IV Push once  dextrose 50% Injectable 25Gram(s) IV Push once  aspirin  chewable 81milliGRAM(s) Oral daily  tiotropium 18 MICROgram(s) Capsule 1Capsule(s) Inhalation daily  fluticasone / salmeterol 250-50 MICROgram(s) Diskus 1Dose(s) Inhalation two times a day  diltiazem    Tablet 30milliGRAM(s) Oral every 6 hours  simvastatin 10milliGRAM(s) Oral at bedtime  losartan 50milliGRAM(s) Oral daily  pantoprazole    Tablet 40milliGRAM(s) Oral before breakfast  tamsulosin 0.4milliGRAM(s) Oral at bedtime  insulin lispro (HumaLOG) corrective regimen sliding scale  SubCutaneous three times a day before meals  insulin lispro (HumaLOG) corrective regimen sliding scale  SubCutaneous at bedtime  insulin glargine Injectable (LANTUS) 12Unit(s) SubCutaneous at bedtime  ALBUTerol/ipratropium for Nebulization 3milliLiter(s) Nebulizer every 6 hours  acetylcysteine 10% Inhalation 2milliLiter(s) Inhalation every 6 hours  docusate sodium 100milliGRAM(s) Oral two times a day    MEDICATIONS  (PRN):  dextrose Gel 1Dose(s) Oral once PRN Blood Glucose LESS THAN 70 milliGRAM(s)/deciliter  glucagon  Injectable 1milliGRAM(s) IntraMuscular once PRN Glucose LESS THAN 70 milligrams/deciliter  ALBUTerol   0.042% 1.25milliGRAM(s) Nebulizer every 3 hours PRN Shortness of Breath and/or Wheezing  guaiFENesin   Syrup  (Sugar-Free) 200milliGRAM(s) Oral every 6 hours PRN Cough    LABS:                        11.1   14.4  )-----------( 426      ( 19 Feb 2017 08:12 )             33.4     19 Feb 2017 08:12    132    |  84     |  19     ----------------------------<  130    4.4     |  44     |  0.30     Ca    8.9        19 Feb 2017 08:12    ASSESSMENT AND PLAN:  ·	SOB.  ·	Acute COPD exacerbation  ·	Chronic hypercarbia.  ·	CAD with PTCA.  ·	A Fib.  ·	Anemia.  ·	DM.  ·	HTN.  ·	Prostate CA.    Continue O2 with PRN BIPAP.  Will reduce steroids.

## 2017-02-20 DIAGNOSIS — E11.9 TYPE 2 DIABETES MELLITUS WITHOUT COMPLICATIONS: ICD-10-CM

## 2017-02-20 LAB
HCT VFR BLD CALC: 34.2 % — LOW (ref 39–50)
HGB BLD-MCNC: 11.3 G/DL — LOW (ref 13–17)
MCHC RBC-ENTMCNC: 27 PG — SIGNIFICANT CHANGE UP (ref 27–34)
MCHC RBC-ENTMCNC: 33 GM/DL — SIGNIFICANT CHANGE UP (ref 32–36)
MCV RBC AUTO: 81.7 FL — SIGNIFICANT CHANGE UP (ref 80–100)
PLATELET # BLD AUTO: 369 K/UL — SIGNIFICANT CHANGE UP (ref 150–400)
RBC # BLD: 4.19 M/UL — LOW (ref 4.2–5.8)
RBC # FLD: 15.2 % — HIGH (ref 11–15)
WBC # BLD: 14.1 K/UL — HIGH (ref 3.8–10.5)
WBC # FLD AUTO: 14.1 K/UL — HIGH (ref 3.8–10.5)

## 2017-02-20 PROCEDURE — 99233 SBSQ HOSP IP/OBS HIGH 50: CPT

## 2017-02-20 RX ORDER — SODIUM CHLORIDE 0.65 %
1 AEROSOL, SPRAY (ML) NASAL THREE TIMES A DAY
Qty: 0 | Refills: 0 | Status: DISCONTINUED | OUTPATIENT
Start: 2017-02-20 | End: 2017-02-23

## 2017-02-20 RX ORDER — ACETAMINOPHEN 500 MG
650 TABLET ORAL ONCE
Qty: 0 | Refills: 0 | Status: COMPLETED | OUTPATIENT
Start: 2017-02-20 | End: 2017-02-20

## 2017-02-20 RX ADMIN — ENOXAPARIN SODIUM 40 MILLIGRAM(S): 100 INJECTION SUBCUTANEOUS at 06:10

## 2017-02-20 RX ADMIN — Medication 81 MILLIGRAM(S): at 12:27

## 2017-02-20 RX ADMIN — FLUTICASONE PROPIONATE AND SALMETEROL 1 DOSE(S): 50; 250 POWDER ORAL; RESPIRATORY (INHALATION) at 06:10

## 2017-02-20 RX ADMIN — Medication 100 MILLIGRAM(S): at 17:36

## 2017-02-20 RX ADMIN — TAMSULOSIN HYDROCHLORIDE 0.4 MILLIGRAM(S): 0.4 CAPSULE ORAL at 21:43

## 2017-02-20 RX ADMIN — Medication 3 MILLILITER(S): at 11:44

## 2017-02-20 RX ADMIN — Medication 2 MILLILITER(S): at 11:44

## 2017-02-20 RX ADMIN — Medication 2 MILLILITER(S): at 06:48

## 2017-02-20 RX ADMIN — INSULIN GLARGINE 12 UNIT(S): 100 INJECTION, SOLUTION SUBCUTANEOUS at 21:43

## 2017-02-20 RX ADMIN — Medication 3 MILLILITER(S): at 06:48

## 2017-02-20 RX ADMIN — AZITHROMYCIN 255 MILLIGRAM(S): 500 TABLET, FILM COATED ORAL at 17:39

## 2017-02-20 RX ADMIN — TIOTROPIUM BROMIDE 1 CAPSULE(S): 18 CAPSULE ORAL; RESPIRATORY (INHALATION) at 13:56

## 2017-02-20 RX ADMIN — Medication 2 MILLILITER(S): at 17:22

## 2017-02-20 RX ADMIN — PANTOPRAZOLE SODIUM 40 MILLIGRAM(S): 20 TABLET, DELAYED RELEASE ORAL at 06:10

## 2017-02-20 RX ADMIN — CHLORHEXIDINE GLUCONATE 1 APPLICATION(S): 213 SOLUTION TOPICAL at 21:44

## 2017-02-20 RX ADMIN — Medication 3 MILLILITER(S): at 00:02

## 2017-02-20 RX ADMIN — Medication 100 MILLIGRAM(S): at 06:10

## 2017-02-20 RX ADMIN — FLUTICASONE PROPIONATE AND SALMETEROL 1 DOSE(S): 50; 250 POWDER ORAL; RESPIRATORY (INHALATION) at 17:35

## 2017-02-20 RX ADMIN — Medication 20 MILLIGRAM(S): at 13:59

## 2017-02-20 RX ADMIN — Medication 650 MILLIGRAM(S): at 22:37

## 2017-02-20 RX ADMIN — Medication 3 MILLILITER(S): at 17:22

## 2017-02-20 RX ADMIN — LOSARTAN POTASSIUM 50 MILLIGRAM(S): 100 TABLET, FILM COATED ORAL at 06:10

## 2017-02-20 RX ADMIN — Medication 4: at 12:29

## 2017-02-20 RX ADMIN — Medication 2 MILLILITER(S): at 00:02

## 2017-02-20 RX ADMIN — Medication 20 MILLIGRAM(S): at 06:10

## 2017-02-20 RX ADMIN — Medication 20 MILLIGRAM(S): at 21:43

## 2017-02-20 RX ADMIN — SIMVASTATIN 10 MILLIGRAM(S): 20 TABLET, FILM COATED ORAL at 21:43

## 2017-02-20 NOTE — PROGRESS NOTE ADULT - SUBJECTIVE AND OBJECTIVE BOX
INTERVAL HPI/OVERNIGHT EVENTS:  Patient is a 62 y/o male HX of COPD on Home O2 and bipap machine (16/6 and 2L oxygen), EX Smoker, HX of GI Bleed, HX of A Fib on ASA, recently stopped AC due to GIB,  HTN, High Cholesterol, DM, CAD s/p PTCA, Prostate cancer, S/P RTX, Iron def. Anemia , pt was admitted for SOB, Lethargy, decreased po intake, + Productive cough (pink sputum) x 2 days,  no fever, NO CP, no abdominal pain, no HA, no dizziness, no dysuria, never intubated as per son. Pt placed on bipap machine in ED,  hypercarbia on ABG. Admitted to critical care with BIPAP support..  Now out of ICU.  Looks and feels better today. Now on nasal O2.    Vital Signs Last 24 Hrs  T(C): 36.6, Max: 36.6 (02-20 @ 00:47)  T(F): 97.8, Max: 97.8 (02-20 @ 00:47)  HR: 83 (71 - 107)  BP: 148/78 (113/60 - 166/83)  BP(mean): --  RR: 18 (16 - 20)  SpO2: 100% (84% - 100%)    PHYSICAL EXAM:  GEN:         Awake, responsive and comfortable.  HEENT:    Normal.    RESP:        crackles.  CVS:             Regular rate and rhythm.   ABD:         Soft, non-tender, non-distended;   :             No costovertebral angle tenderness  EXTR:            No clubbing, cyanosis or edema  CNS:              Intact sensory and motor function.    MEDICATIONS  (STANDING):  azithromycin  IVPB 500milliGRAM(s) IV Intermittent every 24 hours  enoxaparin Injectable 40milliGRAM(s) SubCutaneous every 24 hours  chlorhexidine 4% Liquid 1Application(s) Topical daily  dextrose 5%. 1000milliLiter(s) IV Continuous <Continuous>  dextrose 50% Injectable 12.5Gram(s) IV Push once  dextrose 50% Injectable 25Gram(s) IV Push once  dextrose 50% Injectable 25Gram(s) IV Push once  aspirin  chewable 81milliGRAM(s) Oral daily  tiotropium 18 MICROgram(s) Capsule 1Capsule(s) Inhalation daily  fluticasone / salmeterol 250-50 MICROgram(s) Diskus 1Dose(s) Inhalation two times a day  diltiazem    Tablet 30milliGRAM(s) Oral every 6 hours  simvastatin 10milliGRAM(s) Oral at bedtime  losartan 50milliGRAM(s) Oral daily  pantoprazole    Tablet 40milliGRAM(s) Oral before breakfast  tamsulosin 0.4milliGRAM(s) Oral at bedtime  insulin lispro (HumaLOG) corrective regimen sliding scale  SubCutaneous three times a day before meals  insulin lispro (HumaLOG) corrective regimen sliding scale  SubCutaneous at bedtime  insulin glargine Injectable (LANTUS) 12Unit(s) SubCutaneous at bedtime  ALBUTerol/ipratropium for Nebulization 3milliLiter(s) Nebulizer every 6 hours  acetylcysteine 10% Inhalation 2milliLiter(s) Inhalation every 6 hours  docusate sodium 100milliGRAM(s) Oral two times a day  methylPREDNISolone sodium succinate Injectable 20milliGRAM(s) IV Push every 8 hours    MEDICATIONS  (PRN):  dextrose Gel 1Dose(s) Oral once PRN Blood Glucose LESS THAN 70 milliGRAM(s)/deciliter  glucagon  Injectable 1milliGRAM(s) IntraMuscular once PRN Glucose LESS THAN 70 milligrams/deciliter  ALBUTerol   0.042% 1.25milliGRAM(s) Nebulizer every 3 hours PRN Shortness of Breath and/or Wheezing  guaiFENesin   Syrup  (Sugar-Free) 200milliGRAM(s) Oral every 6 hours PRN Cough    LABS:                        11.3   14.1  )-----------( 369      ( 20 Feb 2017 07:51 )             34.2     19 Feb 2017 08:12    132    |  84     |  19     ----------------------------<  130    4.4     |  44     |  0.30     Ca    8.9        19 Feb 2017 08:12    ASSESSMENT AND PLAN:  ·	SOB.  ·	Acute COPD exacerbation  ·	Chronic hypercarbia.  ·	CAD with PTCA.  ·	A Fib.  ·	Anemia.  ·	DM.  ·	HTN.  ·	Prostate CA.    Continue treatment.  Out of bed to chair and PT evaluation.

## 2017-02-20 NOTE — PROGRESS NOTE ADULT - PROBLEM SELECTOR PROBLEM 4
Type 2 diabetes mellitus with hyperglycemia, with long-term current use of insulin Essential hypertension

## 2017-02-20 NOTE — PROGRESS NOTE ADULT - ASSESSMENT
62 y/o male HX of COPD on Home O2 and bipap machine (16/6 and 2L oxygen), EX Smoker, HX of GI Bleed, HX of A Fib on ASA, recently stopped AC due to GIB,  HTN, High Cholesterol, DM, CAD s/p PTCA, Prostate cancer, S/P RTX, Iron def. Anemia , pt was admitted to ICU with acute copd exacerbation , for SOB, Lethargy, decreased po intake, + Productive cough (pink sputum) x 2 days,  no fever, Pt placed on bipap machine for acute resp. failure,  hypercarbia on ABG. and monitored in critical care    Now pt. stepped down to telemetry floor on Bipap HS/PRN and oxygen via NC , also found to have +blood cultures , contaminant as per ID

## 2017-02-20 NOTE — PROGRESS NOTE ADULT - PROBLEM SELECTOR PROBLEM 3
Coronary artery disease due to lipid rich plaque Type 2 diabetes mellitus without complication, without long-term current use of insulin

## 2017-02-20 NOTE — PROGRESS NOTE ADULT - PROBLEM SELECTOR PLAN 1
-continue with oxygen support with bipap hs/prn and O2 via NC, solumedrol, advair, nebs  -c/w azithromax  - pulmonary f/u

## 2017-02-20 NOTE — PROGRESS NOTE ADULT - PROBLEM SELECTOR PLAN 7
as per ID, contaminant, vanco discontinued.

## 2017-02-20 NOTE — PROGRESS NOTE ADULT - SUBJECTIVE AND OBJECTIVE BOX
Patient is a 61y old  Male who presents with a chief complaint of sob (13 Feb 2017 19:18)       OVERNIGHT EVENTS: no reported acute events    MEDICATIONS  (STANDING):  azithromycin  IVPB 500milliGRAM(s) IV Intermittent every 24 hours  enoxaparin Injectable 40milliGRAM(s) SubCutaneous every 24 hours  chlorhexidine 4% Liquid 1Application(s) Topical daily  dextrose 5%. 1000milliLiter(s) IV Continuous <Continuous>  dextrose 50% Injectable 12.5Gram(s) IV Push once  dextrose 50% Injectable 25Gram(s) IV Push once  dextrose 50% Injectable 25Gram(s) IV Push once  aspirin  chewable 81milliGRAM(s) Oral daily  tiotropium 18 MICROgram(s) Capsule 1Capsule(s) Inhalation daily  fluticasone / salmeterol 250-50 MICROgram(s) Diskus 1Dose(s) Inhalation two times a day  diltiazem    Tablet 30milliGRAM(s) Oral every 6 hours  simvastatin 10milliGRAM(s) Oral at bedtime  losartan 50milliGRAM(s) Oral daily  pantoprazole    Tablet 40milliGRAM(s) Oral before breakfast  tamsulosin 0.4milliGRAM(s) Oral at bedtime  insulin lispro (HumaLOG) corrective regimen sliding scale  SubCutaneous three times a day before meals  insulin lispro (HumaLOG) corrective regimen sliding scale  SubCutaneous at bedtime  insulin glargine Injectable (LANTUS) 12Unit(s) SubCutaneous at bedtime  ALBUTerol/ipratropium for Nebulization 3milliLiter(s) Nebulizer every 6 hours  acetylcysteine 10% Inhalation 2milliLiter(s) Inhalation every 6 hours  docusate sodium 100milliGRAM(s) Oral two times a day  methylPREDNISolone sodium succinate Injectable 20milliGRAM(s) IV Push every 8 hours    MEDICATIONS  (PRN):  dextrose Gel 1Dose(s) Oral once PRN Blood Glucose LESS THAN 70 milliGRAM(s)/deciliter  glucagon  Injectable 1milliGRAM(s) IntraMuscular once PRN Glucose LESS THAN 70 milligrams/deciliter  ALBUTerol   0.042% 1.25milliGRAM(s) Nebulizer every 3 hours PRN Shortness of Breath and/or Wheezing  guaiFENesin   Syrup  (Sugar-Free) 200milliGRAM(s) Oral every 6 hours PRN Cough     Vital Signs Last 24 Hrs  T(C): 36.1, Max: 36.6 (02-20 @ 00:47)  T(F): 97, Max: 97.8 (02-20 @ 00:47)  HR: 87 (71 - 107)  BP: 166/83 (113/60 - 166/83)  BP(mean): --  RR: 18 (16 - 18)  SpO2: 97% (94% - 100%)    PHYSICAL EXAM:  GENERAL: NAD, well-groomed   HEAD:  Atraumatic, Normocephalic  EYES: EOMI, PERRLA, conjunctiva and sclera clear  ENMT: No tonsillar erythema, exudates, or enlargement; Moist mucous membranes   NECK: Supple, No JVD  CHEST/LUNG: decreased BS B/L  HEART: Regular rate and rhythm; No murmurs, rubs, or gallops  ABDOMEN: Soft, Nontender, Nondistended; Bowel sounds present  EXTREMITIES:  2+ Peripheral Pulses, No clubbing, cyanosis, or edema       LABS:                        11.3   14.1  )-----------( 369      ( 20 Feb 2017 07:51 )             34.2     19 Feb 2017 08:12    132    |  84     |  19     ----------------------------<  130    4.4     |  44     |  0.30     Ca    8.9        19 Feb 2017 08:12         cardiac markers     CAPILLARY BLOOD GLUCOSE  240 (20 Feb 2017 12:29)  142 (20 Feb 2017 08:53)  190 (19 Feb 2017 22:00)  274 (19 Feb 2017 16:42)    Cultures    RADIOLOGY & ADDITIONAL TESTS:    Imaging Personally Reviewed:  [x ] YES  [ ] NO    Consultant(s) Notes Reviewed:  [x ] YES  [ ] NO    Care Discussed with Consultants/Other Providers [ x] YES  [ ] NO

## 2017-02-20 NOTE — PROGRESS NOTE ADULT - PROBLEM SELECTOR PROBLEM 1
Acute on chronic respiratory failure with hypoxia and hypercapnia
Bacteremia due to Gram-positive bacteria
Acute on chronic respiratory failure with hypoxia and hypercapnia

## 2017-02-21 PROCEDURE — 99232 SBSQ HOSP IP/OBS MODERATE 35: CPT

## 2017-02-21 RX ADMIN — Medication 20 MILLIGRAM(S): at 21:42

## 2017-02-21 RX ADMIN — CHLORHEXIDINE GLUCONATE 1 APPLICATION(S): 213 SOLUTION TOPICAL at 21:47

## 2017-02-21 RX ADMIN — Medication 2 MILLILITER(S): at 05:30

## 2017-02-21 RX ADMIN — Medication 20 MILLIGRAM(S): at 14:36

## 2017-02-21 RX ADMIN — Medication 2 MILLILITER(S): at 12:31

## 2017-02-21 RX ADMIN — Medication 100 MILLIGRAM(S): at 17:15

## 2017-02-21 RX ADMIN — PANTOPRAZOLE SODIUM 40 MILLIGRAM(S): 20 TABLET, DELAYED RELEASE ORAL at 06:11

## 2017-02-21 RX ADMIN — Medication 1 SPRAY(S): at 06:11

## 2017-02-21 RX ADMIN — Medication 100 MILLIGRAM(S): at 06:11

## 2017-02-21 RX ADMIN — Medication 3 MILLILITER(S): at 12:31

## 2017-02-21 RX ADMIN — ENOXAPARIN SODIUM 40 MILLIGRAM(S): 100 INJECTION SUBCUTANEOUS at 06:10

## 2017-02-21 RX ADMIN — Medication 3 MILLILITER(S): at 05:30

## 2017-02-21 RX ADMIN — Medication 2 MILLILITER(S): at 17:52

## 2017-02-21 RX ADMIN — FLUTICASONE PROPIONATE AND SALMETEROL 1 DOSE(S): 50; 250 POWDER ORAL; RESPIRATORY (INHALATION) at 17:14

## 2017-02-21 RX ADMIN — Medication 4: at 11:25

## 2017-02-21 RX ADMIN — TAMSULOSIN HYDROCHLORIDE 0.4 MILLIGRAM(S): 0.4 CAPSULE ORAL at 21:42

## 2017-02-21 RX ADMIN — FLUTICASONE PROPIONATE AND SALMETEROL 1 DOSE(S): 50; 250 POWDER ORAL; RESPIRATORY (INHALATION) at 06:11

## 2017-02-21 RX ADMIN — AZITHROMYCIN 255 MILLIGRAM(S): 500 TABLET, FILM COATED ORAL at 17:18

## 2017-02-21 RX ADMIN — Medication 3 MILLILITER(S): at 17:52

## 2017-02-21 RX ADMIN — Medication 2 MILLILITER(S): at 00:07

## 2017-02-21 RX ADMIN — LOSARTAN POTASSIUM 50 MILLIGRAM(S): 100 TABLET, FILM COATED ORAL at 06:11

## 2017-02-21 RX ADMIN — Medication 81 MILLIGRAM(S): at 11:23

## 2017-02-21 RX ADMIN — SIMVASTATIN 10 MILLIGRAM(S): 20 TABLET, FILM COATED ORAL at 21:42

## 2017-02-21 RX ADMIN — INSULIN GLARGINE 12 UNIT(S): 100 INJECTION, SOLUTION SUBCUTANEOUS at 21:43

## 2017-02-21 RX ADMIN — TIOTROPIUM BROMIDE 1 CAPSULE(S): 18 CAPSULE ORAL; RESPIRATORY (INHALATION) at 11:24

## 2017-02-21 RX ADMIN — Medication 3 MILLILITER(S): at 00:07

## 2017-02-21 RX ADMIN — Medication 650 MILLIGRAM(S): at 00:00

## 2017-02-21 RX ADMIN — Medication 20 MILLIGRAM(S): at 06:11

## 2017-02-21 NOTE — PROGRESS NOTE ADULT - SUBJECTIVE AND OBJECTIVE BOX
INTERVAL HPI/OVERNIGHT EVENTS:  Patient is a 62 y/o male HX of COPD on Home O2 and bipap machine (16/6 and 2L oxygen), EX Smoker, HX of GI Bleed, HX of A Fib on ASA, recently stopped AC due to GIB,  HTN, High Cholesterol, DM, CAD s/p PTCA, Prostate cancer, S/P RTX, Iron def. Anemia , pt was admitted for SOB, Lethargy, decreased po intake, + Productive cough (pink sputum) x 2 days,  no fever, NO CP, no abdominal pain, no HA, no dizziness, no dysuria, never intubated as per son. Pt placed on bipap machine in ED,  hypercarbia on ABG. Admitted to critical care with BIPAP support..  Now out of ICU.  Looks and feels better today. Now on nasal O2. Feels weak.    Vital Signs Last 24 Hrs  T(C): 36.6, Max: 37.1 (02-21 @ 00:19)  T(F): 97.8, Max: 98.8 (02-21 @ 00:19)  HR: 72 (71 - 94)  BP: 102/69 (102/55 - 138/72)  BP(mean): --  RR: 18 (16 - 18)  SpO2: 94% (93% - 99%)    PHYSICAL EXAM:  GEN:         Awake, responsive and comfortable.  HEENT:    Normal.    RESP:      no wheezing.  CVS:         Regular rate and rhythm.   ABD:         Soft, non-tender, non-distended;   :             No costovertebral angle tenderness  EXTR:            No clubbing, cyanosis or edema  CNS:              Intact sensory and motor function.    MEDICATIONS  (STANDING):  azithromycin  IVPB 500milliGRAM(s) IV Intermittent every 24 hours  enoxaparin Injectable 40milliGRAM(s) SubCutaneous every 24 hours  chlorhexidine 4% Liquid 1Application(s) Topical daily  dextrose 5%. 1000milliLiter(s) IV Continuous <Continuous>  dextrose 50% Injectable 12.5Gram(s) IV Push once  dextrose 50% Injectable 25Gram(s) IV Push once  dextrose 50% Injectable 25Gram(s) IV Push once  aspirin  chewable 81milliGRAM(s) Oral daily  tiotropium 18 MICROgram(s) Capsule 1Capsule(s) Inhalation daily  fluticasone / salmeterol 250-50 MICROgram(s) Diskus 1Dose(s) Inhalation two times a day  simvastatin 10milliGRAM(s) Oral at bedtime  losartan 50milliGRAM(s) Oral daily  pantoprazole    Tablet 40milliGRAM(s) Oral before breakfast  tamsulosin 0.4milliGRAM(s) Oral at bedtime  insulin lispro (HumaLOG) corrective regimen sliding scale  SubCutaneous three times a day before meals  insulin lispro (HumaLOG) corrective regimen sliding scale  SubCutaneous at bedtime  insulin glargine Injectable (LANTUS) 12Unit(s) SubCutaneous at bedtime  ALBUTerol/ipratropium for Nebulization 3milliLiter(s) Nebulizer every 6 hours  acetylcysteine 10% Inhalation 2milliLiter(s) Inhalation every 6 hours  docusate sodium 100milliGRAM(s) Oral two times a day  methylPREDNISolone sodium succinate Injectable 20milliGRAM(s) IV Push every 8 hours    MEDICATIONS  (PRN):  dextrose Gel 1Dose(s) Oral once PRN Blood Glucose LESS THAN 70 milliGRAM(s)/deciliter  glucagon  Injectable 1milliGRAM(s) IntraMuscular once PRN Glucose LESS THAN 70 milligrams/deciliter  ALBUTerol   0.042% 1.25milliGRAM(s) Nebulizer every 3 hours PRN Shortness of Breath and/or Wheezing  guaiFENesin   Syrup  (Sugar-Free) 200milliGRAM(s) Oral every 6 hours PRN Cough  sodium chloride 0.65% Nasal 1Spray(s) Both Nostrils three times a day PRN Nasal Congestion    LABS:                        11.3   14.1  )-----------( 369      ( 20 Feb 2017 07:51 )             34.2     ASSESSMENT AND PLAN:  ·	SOB improving  ·	Acute COPD exacerbation improving.  ·	Chronic hypercarbia.  ·	CAD with PTCA.  ·	A Fib.  ·	Anemia.  ·	DM.  ·	HTN.  ·	Prostate CA.      Will DC Mucom[facility].  Continue O2, PRN BIPAP and steroids.

## 2017-02-22 PROCEDURE — 99232 SBSQ HOSP IP/OBS MODERATE 35: CPT

## 2017-02-22 RX ORDER — BACITRACIN ZINC 500 UNIT/G
1 OINTMENT IN PACKET (EA) TOPICAL
Qty: 0 | Refills: 0 | Status: DISCONTINUED | OUTPATIENT
Start: 2017-02-22 | End: 2017-02-23

## 2017-02-22 RX ADMIN — Medication 81 MILLIGRAM(S): at 11:55

## 2017-02-22 RX ADMIN — Medication 3 MILLILITER(S): at 11:09

## 2017-02-22 RX ADMIN — Medication 3 MILLILITER(S): at 05:22

## 2017-02-22 RX ADMIN — Medication 20 MILLIGRAM(S): at 15:10

## 2017-02-22 RX ADMIN — Medication 3 MILLILITER(S): at 00:08

## 2017-02-22 RX ADMIN — LOSARTAN POTASSIUM 50 MILLIGRAM(S): 100 TABLET, FILM COATED ORAL at 06:07

## 2017-02-22 RX ADMIN — SIMVASTATIN 10 MILLIGRAM(S): 20 TABLET, FILM COATED ORAL at 23:04

## 2017-02-22 RX ADMIN — PANTOPRAZOLE SODIUM 40 MILLIGRAM(S): 20 TABLET, DELAYED RELEASE ORAL at 06:07

## 2017-02-22 RX ADMIN — Medication 2: at 11:55

## 2017-02-22 RX ADMIN — Medication 1 APPLICATION(S): at 18:10

## 2017-02-22 RX ADMIN — TAMSULOSIN HYDROCHLORIDE 0.4 MILLIGRAM(S): 0.4 CAPSULE ORAL at 23:04

## 2017-02-22 RX ADMIN — ENOXAPARIN SODIUM 40 MILLIGRAM(S): 100 INJECTION SUBCUTANEOUS at 06:07

## 2017-02-22 RX ADMIN — INSULIN GLARGINE 12 UNIT(S): 100 INJECTION, SOLUTION SUBCUTANEOUS at 23:04

## 2017-02-22 RX ADMIN — FLUTICASONE PROPIONATE AND SALMETEROL 1 DOSE(S): 50; 250 POWDER ORAL; RESPIRATORY (INHALATION) at 06:09

## 2017-02-22 RX ADMIN — Medication 3 MILLILITER(S): at 17:02

## 2017-02-22 RX ADMIN — FLUTICASONE PROPIONATE AND SALMETEROL 1 DOSE(S): 50; 250 POWDER ORAL; RESPIRATORY (INHALATION) at 18:09

## 2017-02-22 RX ADMIN — Medication 100 MILLIGRAM(S): at 06:07

## 2017-02-22 RX ADMIN — TIOTROPIUM BROMIDE 1 CAPSULE(S): 18 CAPSULE ORAL; RESPIRATORY (INHALATION) at 11:55

## 2017-02-22 RX ADMIN — Medication 100 MILLIGRAM(S): at 18:09

## 2017-02-22 RX ADMIN — Medication 20 MILLIGRAM(S): at 06:07

## 2017-02-22 NOTE — PROGRESS NOTE ADULT - SUBJECTIVE AND OBJECTIVE BOX
INTERVAL HPI/OVERNIGHT EVENTS:  Patient is a 62 y/o male HX of COPD on Home O2 and bipap machine (16/6 and 2L oxygen), EX Smoker, HX of GI Bleed, HX of A Fib on ASA, recently stopped AC due to GIB,  HTN, High Cholesterol, DM, CAD s/p PTCA, Prostate cancer, S/P RTX, Iron def. Anemia , pt was admitted for SOB, Lethargy, decreased po intake, + Productive cough (pink sputum) x 2 days,  no fever, NO CP, no abdominal pain, no HA, no dizziness, no dysuria, never intubated as per son. Pt placed on bipap machine in ED,  hypercarbia on ABG. Admitted to critical care with BIPAP support..  Now out of ICU.  Out of bed to chair, comfortable. Seen by PT.    Vital Signs Last 24 Hrs  T(C): 36.6, Max: 36.8 (02-22 @ 11:52)  T(F): 97.8, Max: 98.2 (02-22 @ 11:52)  HR: 99 (75 - 99)  BP: 137/56 (111/70 - 137/56)  BP(mean): --  RR: 18 (17 - 18)  SpO2: 97% (93% - 100%)    PHYSICAL EXAM:  GEN:         Awake, responsive and comfortable.  HEENT:    Normal.    RESP:      crackles.  CVS:           Regular rate and rhythm.   ABD:         Soft, non-tender, non-distended;   :             No costovertebral angle tenderness  EXTR:            No clubbing, cyanosis or edema  CNS:              Intact sensory and motor function.    MEDICATIONS  (STANDING):  enoxaparin Injectable 40milliGRAM(s) SubCutaneous every 24 hours  chlorhexidine 4% Liquid 1Application(s) Topical daily  dextrose 5%. 1000milliLiter(s) IV Continuous <Continuous>  dextrose 50% Injectable 12.5Gram(s) IV Push once  dextrose 50% Injectable 25Gram(s) IV Push once  dextrose 50% Injectable 25Gram(s) IV Push once  aspirin  chewable 81milliGRAM(s) Oral daily  tiotropium 18 MICROgram(s) Capsule 1Capsule(s) Inhalation daily  fluticasone / salmeterol 250-50 MICROgram(s) Diskus 1Dose(s) Inhalation two times a day  simvastatin 10milliGRAM(s) Oral at bedtime  losartan 50milliGRAM(s) Oral daily  pantoprazole    Tablet 40milliGRAM(s) Oral before breakfast  tamsulosin 0.4milliGRAM(s) Oral at bedtime  insulin lispro (HumaLOG) corrective regimen sliding scale  SubCutaneous three times a day before meals  insulin lispro (HumaLOG) corrective regimen sliding scale  SubCutaneous at bedtime  insulin glargine Injectable (LANTUS) 12Unit(s) SubCutaneous at bedtime  ALBUTerol/ipratropium for Nebulization 3milliLiter(s) Nebulizer every 6 hours  docusate sodium 100milliGRAM(s) Oral two times a day  BACItracin   Ointment 1Application(s) Topical two times a day  predniSONE   Tablet 40milliGRAM(s) Oral daily    MEDICATIONS  (PRN):  dextrose Gel 1Dose(s) Oral once PRN Blood Glucose LESS THAN 70 milliGRAM(s)/deciliter  glucagon  Injectable 1milliGRAM(s) IntraMuscular once PRN Glucose LESS THAN 70 milligrams/deciliter  ALBUTerol   0.042% 1.25milliGRAM(s) Nebulizer every 3 hours PRN Shortness of Breath and/or Wheezing  guaiFENesin   Syrup  (Sugar-Free) 200milliGRAM(s) Oral every 6 hours PRN Cough  sodium chloride 0.65% Nasal 1Spray(s) Both Nostrils three times a day PRN Nasal Congestion    ASSESSMENT AND PLAN:  ·	SOB improving  ·	Acute COPD exacerbation improving.  ·	Chronic hypercarbia.  ·	CAD with PTCA.  ·	A Fib.  ·	Anemia.  ·	DM.  ·	HTN.  ·	Prostate CA.    Continue supportive treatment.  Slow prednisone taper.  DC planning.

## 2017-02-22 NOTE — PROGRESS NOTE ADULT - SUBJECTIVE AND OBJECTIVE BOX
CC/F/U for: COPD exacerbation    INTERVAL HPI/OVERNIGHT EVENTS: none  Pt seen and examined at bedside.     Allergies/Intolerance: No Known Allergies      MEDICATIONS  (STANDING):  azithromycin  IVPB 500milliGRAM(s) IV Intermittent every 24 hours  enoxaparin Injectable 40milliGRAM(s) SubCutaneous every 24 hours  chlorhexidine 4% Liquid 1Application(s) Topical daily  dextrose 5%. 1000milliLiter(s) IV Continuous <Continuous>  dextrose 50% Injectable 12.5Gram(s) IV Push once  dextrose 50% Injectable 25Gram(s) IV Push once  dextrose 50% Injectable 25Gram(s) IV Push once  aspirin  chewable 81milliGRAM(s) Oral daily  tiotropium 18 MICROgram(s) Capsule 1Capsule(s) Inhalation daily  fluticasone / salmeterol 250-50 MICROgram(s) Diskus 1Dose(s) Inhalation two times a day  simvastatin 10milliGRAM(s) Oral at bedtime  losartan 50milliGRAM(s) Oral daily  pantoprazole    Tablet 40milliGRAM(s) Oral before breakfast  tamsulosin 0.4milliGRAM(s) Oral at bedtime  insulin lispro (HumaLOG) corrective regimen sliding scale  SubCutaneous three times a day before meals  insulin lispro (HumaLOG) corrective regimen sliding scale  SubCutaneous at bedtime  insulin glargine Injectable (LANTUS) 12Unit(s) SubCutaneous at bedtime  ALBUTerol/ipratropium for Nebulization 3milliLiter(s) Nebulizer every 6 hours  docusate sodium 100milliGRAM(s) Oral two times a day  methylPREDNISolone sodium succinate Injectable 20milliGRAM(s) IV Push every 8 hours  BACItracin   Ointment 1Application(s) Topical two times a day    MEDICATIONS  (PRN):  dextrose Gel 1Dose(s) Oral once PRN Blood Glucose LESS THAN 70 milliGRAM(s)/deciliter  glucagon  Injectable 1milliGRAM(s) IntraMuscular once PRN Glucose LESS THAN 70 milligrams/deciliter  ALBUTerol   0.042% 1.25milliGRAM(s) Nebulizer every 3 hours PRN Shortness of Breath and/or Wheezing  guaiFENesin   Syrup  (Sugar-Free) 200milliGRAM(s) Oral every 6 hours PRN Cough  sodium chloride 0.65% Nasal 1Spray(s) Both Nostrils three times a day PRN Nasal Congestion        ROS: all systems reviewed and wnl      PHYSICAL EXAMINATION:  Vital Signs Last 24 Hrs  T(C): 36.8, Max: 36.8 (02-22 @ 11:52)  T(F): 98.2, Max: 98.2 (02-22 @ 11:52)  HR: 80 (72 - 96)  BP: 126/59 (102/69 - 133/65)  BP(mean): --  RR: 17 (17 - 18)  SpO2: 100% (93% - 100%)  CAPILLARY BLOOD GLUCOSE  178 (22 Feb 2017 11:51)  147 (22 Feb 2017 08:08)  233 (21 Feb 2017 21:40)  86 (21 Feb 2017 17:19)      GENERAL: NAD, well-groomed, well-developed  HEAD:  atraumatic, normocephalic  EYES: sclera anicteric  ENMT: mucous membranes moist  NECK: supple, No JVD  CHEST/LUNG: clear to auscultation bilaterally; no rales, rhonchi, or wheezing b/l  HEART: normal S1, S2  ABDOMEN: BS+, soft, ND, NT   EXTREMITIES:  pulses palpable; no clubbing, cyanosis, or edema b/l LEs  NEURO: awake, alert, interactive; moves all extremities  SKIN: no rashes or lesions      LABS: none past 24 hours.                   RADIOLOGY & ADDITIONAL TESTS: none past 24 hours.       ASSESSMENT AND PLAN: Continue Solumedrol 20 mg every 8 hours with Advair bid and BIPAP 10/5 30 % at night. Blood sugars controlled with daily Lantus. Zithromax for 5 total days then stop. Discharge home when changed to oral prednisone regimen. CC/F/U for: COPD exacerbation    INTERVAL HPI/OVERNIGHT EVENTS: none  Pt seen and examined at bedside.     Allergies/Intolerance: No Known Allergies      MEDICATIONS  (STANDING):  azithromycin  IVPB 500milliGRAM(s) IV Intermittent every 24 hours  enoxaparin Injectable 40milliGRAM(s) SubCutaneous every 24 hours  chlorhexidine 4% Liquid 1Application(s) Topical daily  dextrose 5%. 1000milliLiter(s) IV Continuous <Continuous>  dextrose 50% Injectable 12.5Gram(s) IV Push once  dextrose 50% Injectable 25Gram(s) IV Push once  dextrose 50% Injectable 25Gram(s) IV Push once  aspirin  chewable 81milliGRAM(s) Oral daily  tiotropium 18 MICROgram(s) Capsule 1Capsule(s) Inhalation daily  fluticasone / salmeterol 250-50 MICROgram(s) Diskus 1Dose(s) Inhalation two times a day  simvastatin 10milliGRAM(s) Oral at bedtime  losartan 50milliGRAM(s) Oral daily  pantoprazole    Tablet 40milliGRAM(s) Oral before breakfast  tamsulosin 0.4milliGRAM(s) Oral at bedtime  insulin lispro (HumaLOG) corrective regimen sliding scale  SubCutaneous three times a day before meals  insulin lispro (HumaLOG) corrective regimen sliding scale  SubCutaneous at bedtime  insulin glargine Injectable (LANTUS) 12Unit(s) SubCutaneous at bedtime  ALBUTerol/ipratropium for Nebulization 3milliLiter(s) Nebulizer every 6 hours  docusate sodium 100milliGRAM(s) Oral two times a day  methylPREDNISolone sodium succinate Injectable 20milliGRAM(s) IV Push every 8 hours  BACItracin   Ointment 1Application(s) Topical two times a day    MEDICATIONS  (PRN):  dextrose Gel 1Dose(s) Oral once PRN Blood Glucose LESS THAN 70 milliGRAM(s)/deciliter  glucagon  Injectable 1milliGRAM(s) IntraMuscular once PRN Glucose LESS THAN 70 milligrams/deciliter  ALBUTerol   0.042% 1.25milliGRAM(s) Nebulizer every 3 hours PRN Shortness of Breath and/or Wheezing  guaiFENesin   Syrup  (Sugar-Free) 200milliGRAM(s) Oral every 6 hours PRN Cough  sodium chloride 0.65% Nasal 1Spray(s) Both Nostrils three times a day PRN Nasal Congestion        ROS: all systems reviewed and wnl      PHYSICAL EXAMINATION:  Vital Signs Last 24 Hrs  T(C): 36.8, Max: 36.8 (02-22 @ 11:52)  T(F): 98.2, Max: 98.2 (02-22 @ 11:52)  HR: 80 (72 - 96)  BP: 126/59 (102/69 - 133/65)  BP(mean): --  RR: 17 (17 - 18)  SpO2: 100% (93% - 100%)  CAPILLARY BLOOD GLUCOSE  178 (22 Feb 2017 11:51)  147 (22 Feb 2017 08:08)  233 (21 Feb 2017 21:40)  86 (21 Feb 2017 17:19)      GENERAL: NAD, well-groomed, well-developed  HEAD:  atraumatic, normocephalic  EYES: sclera anicteric  ENMT: mucous membranes moist  NECK: supple, No JVD  CHEST/LUNG: clear to auscultation bilaterally; no rales, rhonchi, or wheezing b/l  HEART: normal S1, S2  ABDOMEN: BS+, soft, ND, NT   EXTREMITIES:  pulses palpable; no clubbing, cyanosis, or edema b/l LEs  NEURO: awake, alert, interactive; moves all extremities  SKIN: no rashes or lesions      LABS: none past 24 hours.                   RADIOLOGY & ADDITIONAL TESTS: none past 24 hours.       ASSESSMENT AND PLAN: Continue Solumedrol 20 mg every 8 hours with Advair bid and Spiriva with BIPAP 10/5 30 % at night. Blood sugars controlled with daily Lantus. Stop Zithromax today. Continue Cozaar for BP and flomax for BPH.     Discharge home when changed to oral prednisone regimen by pulmonary. CC/F/U for: COPD exacerbation    INTERVAL HPI/OVERNIGHT EVENTS: none.  Stable in chair. " I want to go home"   Pt seen and examined at bedside.     Allergies/Intolerance: No Known Allergies      MEDICATIONS  (STANDING):  azithromycin  IVPB 500milliGRAM(s) IV Intermittent every 24 hours  enoxaparin Injectable 40milliGRAM(s) SubCutaneous every 24 hours  chlorhexidine 4% Liquid 1Application(s) Topical daily  dextrose 5%. 1000milliLiter(s) IV Continuous <Continuous>  dextrose 50% Injectable 12.5Gram(s) IV Push once  dextrose 50% Injectable 25Gram(s) IV Push once  dextrose 50% Injectable 25Gram(s) IV Push once  aspirin  chewable 81milliGRAM(s) Oral daily  tiotropium 18 MICROgram(s) Capsule 1Capsule(s) Inhalation daily  fluticasone / salmeterol 250-50 MICROgram(s) Diskus 1Dose(s) Inhalation two times a day  simvastatin 10milliGRAM(s) Oral at bedtime  losartan 50milliGRAM(s) Oral daily  pantoprazole    Tablet 40milliGRAM(s) Oral before breakfast  tamsulosin 0.4milliGRAM(s) Oral at bedtime  insulin lispro (HumaLOG) corrective regimen sliding scale  SubCutaneous three times a day before meals  insulin lispro (HumaLOG) corrective regimen sliding scale  SubCutaneous at bedtime  insulin glargine Injectable (LANTUS) 12Unit(s) SubCutaneous at bedtime  ALBUTerol/ipratropium for Nebulization 3milliLiter(s) Nebulizer every 6 hours  docusate sodium 100milliGRAM(s) Oral two times a day  methylPREDNISolone sodium succinate Injectable 20milliGRAM(s) IV Push every 8 hours  BACItracin   Ointment 1Application(s) Topical two times a day    MEDICATIONS  (PRN):  dextrose Gel 1Dose(s) Oral once PRN Blood Glucose LESS THAN 70 milliGRAM(s)/deciliter  glucagon  Injectable 1milliGRAM(s) IntraMuscular once PRN Glucose LESS THAN 70 milligrams/deciliter  ALBUTerol   0.042% 1.25milliGRAM(s) Nebulizer every 3 hours PRN Shortness of Breath and/or Wheezing  guaiFENesin   Syrup  (Sugar-Free) 200milliGRAM(s) Oral every 6 hours PRN Cough  sodium chloride 0.65% Nasal 1Spray(s) Both Nostrils three times a day PRN Nasal Congestion        ROS: all systems reviewed and wnl      PHYSICAL EXAMINATION:  Vital Signs Last 24 Hrs  T(C): 36.8, Max: 36.8 (02-22 @ 11:52)  T(F): 98.2, Max: 98.2 (02-22 @ 11:52)  HR: 80 (72 - 96)  BP: 126/59 (102/69 - 133/65)  BP(mean): --  RR: 17 (17 - 18)  SpO2: 100% (93% - 100%)  CAPILLARY BLOOD GLUCOSE  178 (22 Feb 2017 11:51)  147 (22 Feb 2017 08:08)  233 (21 Feb 2017 21:40)  86 (21 Feb 2017 17:19)      GENERAL: NAD, well-groomed, well-developed  HEAD:  atraumatic, normocephalic  EYES: sclera anicteric  ENMT: mucous membranes moist  NECK: supple, No JVD  CHEST/LUNG: clear to auscultation bilaterally; no rales, rhonchi, or wheezing b/l  HEART: normal S1, S2  ABDOMEN: BS+, soft, ND, NT   EXTREMITIES:  pulses palpable; no clubbing, cyanosis, or edema b/l LEs  NEURO: awake, alert, interactive; moves all extremities  SKIN: no rashes or lesions      LABS: none past 24 hours.                   RADIOLOGY & ADDITIONAL TESTS: none past 24 hours.       ASSESSMENT AND PLAN: Continue Solumedrol 20 mg every 8 hours with Advair bid and Spiriva with BIPAP 10/5 30 % at night. Has home oxygen and CPAP at night. Blood sugars controlled with daily Lantus. Stop Zithromax today. Continue Cozaar for BP and flomax for BPH.    Discharge home when changed to oral prednisone regimen by pulmonary.

## 2017-02-23 VITALS
SYSTOLIC BLOOD PRESSURE: 112 MMHG | TEMPERATURE: 99 F | RESPIRATION RATE: 16 BRPM | DIASTOLIC BLOOD PRESSURE: 60 MMHG | OXYGEN SATURATION: 98 % | HEART RATE: 100 BPM

## 2017-02-23 PROCEDURE — 99239 HOSP IP/OBS DSCHRG MGMT >30: CPT

## 2017-02-23 RX ORDER — MONTELUKAST 4 MG/1
10 TABLET, CHEWABLE ORAL DAILY
Qty: 0 | Refills: 0 | Status: DISCONTINUED | OUTPATIENT
Start: 2017-02-23 | End: 2017-02-23

## 2017-02-23 RX ADMIN — TIOTROPIUM BROMIDE 1 CAPSULE(S): 18 CAPSULE ORAL; RESPIRATORY (INHALATION) at 11:37

## 2017-02-23 RX ADMIN — FLUTICASONE PROPIONATE AND SALMETEROL 1 DOSE(S): 50; 250 POWDER ORAL; RESPIRATORY (INHALATION) at 06:58

## 2017-02-23 RX ADMIN — LOSARTAN POTASSIUM 50 MILLIGRAM(S): 100 TABLET, FILM COATED ORAL at 10:11

## 2017-02-23 RX ADMIN — PANTOPRAZOLE SODIUM 40 MILLIGRAM(S): 20 TABLET, DELAYED RELEASE ORAL at 06:59

## 2017-02-23 RX ADMIN — FLUTICASONE PROPIONATE AND SALMETEROL 1 DOSE(S): 50; 250 POWDER ORAL; RESPIRATORY (INHALATION) at 17:49

## 2017-02-23 RX ADMIN — Medication 40 MILLIGRAM(S): at 06:59

## 2017-02-23 RX ADMIN — Medication 1 APPLICATION(S): at 06:58

## 2017-02-23 RX ADMIN — Medication 3 MILLILITER(S): at 06:07

## 2017-02-23 RX ADMIN — Medication 100 MILLIGRAM(S): at 06:58

## 2017-02-23 RX ADMIN — Medication 3 MILLILITER(S): at 00:12

## 2017-02-23 RX ADMIN — Medication 81 MILLIGRAM(S): at 11:37

## 2017-02-23 RX ADMIN — Medication 100 MILLIGRAM(S): at 17:51

## 2017-02-23 RX ADMIN — Medication 3 MILLILITER(S): at 17:15

## 2017-02-23 RX ADMIN — ENOXAPARIN SODIUM 40 MILLIGRAM(S): 100 INJECTION SUBCUTANEOUS at 06:57

## 2017-02-23 RX ADMIN — Medication 3 MILLILITER(S): at 11:03

## 2017-02-23 NOTE — PROGRESS NOTE ADULT - SUBJECTIVE AND OBJECTIVE BOX
INTERVAL HPI/OVERNIGHT EVENTS:  Patient is a 60 y/o male HX of COPD on Home O2 and bipap machine (16/6 and 2L oxygen), EX Smoker, HX of GI Bleed, HX of A Fib on ASA, recently stopped AC due to GIB,  HTN, High Cholesterol, DM, CAD s/p PTCA, Prostate cancer, S/P RTX, Iron def. Anemia , pt was admitted for SOB, Lethargy, decreased po intake, + Productive cough (pink sputum) x 2 days,  no fever, NO CP, no abdominal pain, no HA, no dizziness, no dysuria, never intubated as per son. Pt placed on bipap machine in ED,  hypercarbia on ABG. Admitted to critical care with BIPAP support..  Now out of ICU.  Comfortable in bed, no distress.    Vital Signs Last 24 Hrs  T(C): 36.7, Max: 37.1 (02-23 @ 09:41)  T(F): 98, Max: 98.8 (02-23 @ 09:41)  HR: 95 (73 - 99)  BP: 123/69 (109/58 - 137/56)  BP(mean): --  RR: 18 (16 - 18)  SpO2: 94% (93% - 100%)    PHYSICAL EXAM:  GEN:         Awake, responsive and comfortable.  HEENT:    Normal.    RESP:     no wheezing.  CVS:             Regular rate and rhythm.   ABD:         Soft, non-tender, non-distended;     MEDICATIONS  (STANDING):  enoxaparin Injectable 40milliGRAM(s) SubCutaneous every 24 hours  dextrose 5%. 1000milliLiter(s) IV Continuous <Continuous>  dextrose 50% Injectable 12.5Gram(s) IV Push once  dextrose 50% Injectable 25Gram(s) IV Push once  dextrose 50% Injectable 25Gram(s) IV Push once  aspirin  chewable 81milliGRAM(s) Oral daily  tiotropium 18 MICROgram(s) Capsule 1Capsule(s) Inhalation daily  fluticasone / salmeterol 250-50 MICROgram(s) Diskus 1Dose(s) Inhalation two times a day  simvastatin 10milliGRAM(s) Oral at bedtime  losartan 50milliGRAM(s) Oral daily  pantoprazole    Tablet 40milliGRAM(s) Oral before breakfast  tamsulosin 0.4milliGRAM(s) Oral at bedtime  insulin lispro (HumaLOG) corrective regimen sliding scale  SubCutaneous three times a day before meals  insulin lispro (HumaLOG) corrective regimen sliding scale  SubCutaneous at bedtime  insulin glargine Injectable (LANTUS) 12Unit(s) SubCutaneous at bedtime  ALBUTerol/ipratropium for Nebulization 3milliLiter(s) Nebulizer every 6 hours  docusate sodium 100milliGRAM(s) Oral two times a day  BACItracin   Ointment 1Application(s) Topical two times a day  predniSONE   Tablet 40milliGRAM(s) Oral daily    MEDICATIONS  (PRN):  dextrose Gel 1Dose(s) Oral once PRN Blood Glucose LESS THAN 70 milliGRAM(s)/deciliter  glucagon  Injectable 1milliGRAM(s) IntraMuscular once PRN Glucose LESS THAN 70 milligrams/deciliter  ALBUTerol   0.042% 1.25milliGRAM(s) Nebulizer every 3 hours PRN Shortness of Breath and/or Wheezing  guaiFENesin   Syrup  (Sugar-Free) 200milliGRAM(s) Oral every 6 hours PRN Cough  sodium chloride 0.65% Nasal 1Spray(s) Both Nostrils three times a day PRN Nasal Congestion    ASSESSMENT AND PLAN:  ·	SOB improving  ·	Acute COPD exacerbation improving.  ·	Chronic hypercarbia.  ·	CAD with PTCA.  ·	A Fib.  ·	Anemia.  ·	DM.  ·	HTN.  ·	Prostate CA.    Pulmonary wise close to base line . Slow steroid taper.  On Advair, Spiriva and PRN bronchodilator.

## 2017-02-23 NOTE — DISCHARGE NOTE ADULT - MEDICATION SUMMARY - MEDICATIONS TO TAKE
I will START or STAY ON the medications listed below when I get home from the hospital:    predniSONE 10 mg oral tablet  -- 4 tab(s) by mouth once a day for 3 days, then 3 tabs daily x 3 days, then 2 tabs daily x 3 days, then 1 tab daily for 3 days  -- It is very important that you take or use this exactly as directed.  Do not skip doses or discontinue unless directed by your doctor.  Obtain medical advice before taking any non-prescription drugs as some may affect the action of this medication.  Take with food or milk.    -- Indication: For COPD EXACERBATION    aspirin 81 mg oral tablet, chewable  -- 1 tab(s) by mouth once a day  -- Indication: For Atrial fibrillation    Cozaar 50 mg oral tablet  -- 1 tab(s) by mouth once a day  -- Indication: For Essential hypertension    tamsulosin 0.4 mg oral capsule  -- 1 cap(s) by mouth once a day  -- Indication: For BPH    diltiaZEM 30 mg oral tablet  -- 1 tab(s) by mouth every 6 hours  -- Indication: For Atrial fibrillation, unspecified type    metFORMIN 1000 mg oral tablet  -- 1 tab(s) by mouth 2 times a day (with meals)  -- Indication: For Diabetes Mellitus    simvastatin 20 mg oral tablet  -- 1 tab(s) by mouth once a day (at bedtime)  -- Indication: For HLD    ProAir HFA  -- 2 puff(s) inhaled 4 times a day, As Needed  -- Indication: For COPD EXACERBATION    tiotropium 18 mcg inhalation capsule  -- 1 cap(s) inhaled once a day  -- Indication: For COPD EXACERBATION    fluticasone-salmeterol  -- 1 puff(s) inhaled 2 times a day  -- Indication: For COPD EXACERBATION    albuterol-ipratropium 2.5 mg-0.5 mg/3 mL inhalation solution  -- 3 milliliter(s) inhaled every 6 hours, As Needed for shortness of breath or wheezing  -- Indication: For COPD EXACERBATION    FeroSul 325 mg (65 mg elemental iron) oral tablet  -- 1 tab(s) by mouth 2 times a day  -- Indication: For Anemia    montelukast 10 mg oral tablet  -- 1 tab(s) by mouth once a day  -- Indication: For COPD EXACERBATION    sucralfate 1 g oral tablet  -- 1 tab(s) by mouth 3 times a day  -- Indication: For GERD    lactobacillus acidophilus oral capsule  -- 1 cap(s) by mouth 3 times a day (with meals)  -- Indication: For Prpphylaxis    omeprazole 40 mg oral delayed release capsule  -- 1 cap(s) by mouth once a day  -- Indication: For GERD

## 2017-02-23 NOTE — DISCHARGE NOTE ADULT - MEDICATION SUMMARY - MEDICATIONS TO STOP TAKING
I will STOP taking the medications listed below when I get home from the hospital:    azithromycin 250 mg oral tablet  -- 1 tab(s) by mouth 3 times a week  -- Do not take dairy products, antacids, or iron preparations within one hour of this medication.  Finish all this medication unless otherwise directed by prescriber.

## 2017-02-23 NOTE — DISCHARGE NOTE ADULT - PATIENT PORTAL LINK FT
“You can access the [employer] Patient Portal, offered by [facility], by registering with the following website: [email or web address]”

## 2017-02-23 NOTE — DISCHARGE NOTE ADULT - CARE PLAN
Goal:	COPD  Instructions for follow-up, activity and diet:	Follow up with Dr. Garland Pulmonary attending in 1 week

## 2017-02-27 DIAGNOSIS — Z87.891 PERSONAL HISTORY OF NICOTINE DEPENDENCE: ICD-10-CM

## 2017-02-27 DIAGNOSIS — Z79.52 LONG TERM (CURRENT) USE OF SYSTEMIC STEROIDS: ICD-10-CM

## 2017-02-27 DIAGNOSIS — Z79.84 LONG TERM (CURRENT) USE OF ORAL HYPOGLYCEMIC DRUGS: ICD-10-CM

## 2017-02-27 DIAGNOSIS — Z90.49 ACQUIRED ABSENCE OF OTHER SPECIFIED PARTS OF DIGESTIVE TRACT: ICD-10-CM

## 2017-02-27 DIAGNOSIS — J96.21 ACUTE AND CHRONIC RESPIRATORY FAILURE WITH HYPOXIA: ICD-10-CM

## 2017-02-27 DIAGNOSIS — I10 ESSENTIAL (PRIMARY) HYPERTENSION: ICD-10-CM

## 2017-02-27 DIAGNOSIS — E11.44 TYPE 2 DIABETES MELLITUS WITH DIABETIC AMYOTROPHY: ICD-10-CM

## 2017-02-27 DIAGNOSIS — T38.0X5A ADVERSE EFFECT OF GLUCOCORTICOIDS AND SYNTHETIC ANALOGUES, INITIAL ENCOUNTER: ICD-10-CM

## 2017-02-27 DIAGNOSIS — J96.22 ACUTE AND CHRONIC RESPIRATORY FAILURE WITH HYPERCAPNIA: ICD-10-CM

## 2017-02-27 DIAGNOSIS — Z79.82 LONG TERM (CURRENT) USE OF ASPIRIN: ICD-10-CM

## 2017-02-27 DIAGNOSIS — E44.0 MODERATE PROTEIN-CALORIE MALNUTRITION: ICD-10-CM

## 2017-02-27 DIAGNOSIS — E11.65 TYPE 2 DIABETES MELLITUS WITH HYPERGLYCEMIA: ICD-10-CM

## 2017-02-27 DIAGNOSIS — J44.1 CHRONIC OBSTRUCTIVE PULMONARY DISEASE WITH (ACUTE) EXACERBATION: ICD-10-CM

## 2017-02-27 DIAGNOSIS — Z79.51 LONG TERM (CURRENT) USE OF INHALED STEROIDS: ICD-10-CM

## 2017-02-27 DIAGNOSIS — E87.3 ALKALOSIS: ICD-10-CM

## 2017-02-27 DIAGNOSIS — K21.9 GASTRO-ESOPHAGEAL REFLUX DISEASE WITHOUT ESOPHAGITIS: ICD-10-CM

## 2017-02-27 DIAGNOSIS — D72.829 ELEVATED WHITE BLOOD CELL COUNT, UNSPECIFIED: ICD-10-CM

## 2017-02-27 DIAGNOSIS — I25.10 ATHEROSCLEROTIC HEART DISEASE OF NATIVE CORONARY ARTERY WITHOUT ANGINA PECTORIS: ICD-10-CM

## 2017-02-27 DIAGNOSIS — I48.91 UNSPECIFIED ATRIAL FIBRILLATION: ICD-10-CM

## 2017-02-27 DIAGNOSIS — Z99.81 DEPENDENCE ON SUPPLEMENTAL OXYGEN: ICD-10-CM

## 2017-02-27 DIAGNOSIS — N40.0 BENIGN PROSTATIC HYPERPLASIA WITHOUT LOWER URINARY TRACT SYMPTOMS: ICD-10-CM

## 2017-02-27 DIAGNOSIS — E78.5 HYPERLIPIDEMIA, UNSPECIFIED: ICD-10-CM

## 2017-02-27 DIAGNOSIS — D50.9 IRON DEFICIENCY ANEMIA, UNSPECIFIED: ICD-10-CM

## 2017-02-27 DIAGNOSIS — Z95.5 PRESENCE OF CORONARY ANGIOPLASTY IMPLANT AND GRAFT: ICD-10-CM

## 2017-03-08 ENCOUNTER — INPATIENT (INPATIENT)
Facility: HOSPITAL | Age: 62
LOS: 36 days | Discharge: NURSING, REHAB OR LONG-TERM CARE | End: 2017-04-14
Attending: INTERNAL MEDICINE | Admitting: INTERNAL MEDICINE
Payer: MEDICAID

## 2017-03-08 VITALS
HEIGHT: 65 IN | HEART RATE: 89 BPM | SYSTOLIC BLOOD PRESSURE: 126 MMHG | RESPIRATION RATE: 17 BRPM | DIASTOLIC BLOOD PRESSURE: 50 MMHG | OXYGEN SATURATION: 98 % | TEMPERATURE: 99 F | WEIGHT: 130.07 LBS

## 2017-03-08 DIAGNOSIS — J18.9 PNEUMONIA, UNSPECIFIED ORGANISM: ICD-10-CM

## 2017-03-08 DIAGNOSIS — I48.91 UNSPECIFIED ATRIAL FIBRILLATION: ICD-10-CM

## 2017-03-08 DIAGNOSIS — E11.9 TYPE 2 DIABETES MELLITUS WITHOUT COMPLICATIONS: ICD-10-CM

## 2017-03-08 DIAGNOSIS — J44.1 CHRONIC OBSTRUCTIVE PULMONARY DISEASE WITH (ACUTE) EXACERBATION: ICD-10-CM

## 2017-03-08 LAB
ALBUMIN SERPL ELPH-MCNC: 2.1 G/DL — LOW (ref 3.3–5)
ALP SERPL-CCNC: 99 U/L — SIGNIFICANT CHANGE UP (ref 40–120)
ALT FLD-CCNC: 23 U/L — SIGNIFICANT CHANGE UP (ref 12–78)
ANION GAP SERPL CALC-SCNC: <6 MMOL/L — SIGNIFICANT CHANGE UP (ref 5–17)
ANISOCYTOSIS BLD QL: SLIGHT — SIGNIFICANT CHANGE UP
AST SERPL-CCNC: 15 U/L — SIGNIFICANT CHANGE UP (ref 15–37)
BASE EXCESS BLDA CALC-SCNC: 15 MMOL/L — HIGH (ref -2–2)
BASE EXCESS BLDA CALC-SCNC: 15.5 MMOL/L — HIGH (ref -2–2)
BASE EXCESS BLDA CALC-SCNC: 5.4 MMOL/L — HIGH (ref -2–2)
BILIRUB SERPL-MCNC: 0.2 MG/DL — SIGNIFICANT CHANGE UP (ref 0.2–1.2)
BLOOD GAS COMMENTS: SIGNIFICANT CHANGE UP
BLOOD GAS SOURCE: SIGNIFICANT CHANGE UP
BUN SERPL-MCNC: 18 MG/DL — SIGNIFICANT CHANGE UP (ref 7–23)
CALCIUM SERPL-MCNC: 9.6 MG/DL — SIGNIFICANT CHANGE UP (ref 8.5–10.1)
CHLORIDE SERPL-SCNC: 76 MMOL/L — LOW (ref 96–108)
CO2 SERPL-SCNC: >45 MMOL/L — CRITICAL HIGH (ref 22–31)
CREAT SERPL-MCNC: 0.26 MG/DL — LOW (ref 0.5–1.3)
D DIMER BLD IA.RAPID-MCNC: 1721 NG/ML DDU — HIGH
GLUCOSE SERPL-MCNC: 143 MG/DL — HIGH (ref 70–99)
HBA1C BLD-MCNC: 6.8 % — HIGH (ref 4–5.6)
HCO3 BLDA-SCNC: 27 MMOL/L — SIGNIFICANT CHANGE UP (ref 21–29)
HCO3 BLDA-SCNC: 44 MMOL/L — HIGH (ref 21–29)
HCO3 BLDA-SCNC: 44 MMOL/L — HIGH (ref 21–29)
HCT VFR BLD CALC: 31.6 % — LOW (ref 39–50)
HGB BLD-MCNC: 9.8 G/DL — LOW (ref 13–17)
HOROWITZ INDEX BLDA+IHG-RTO: 50 — SIGNIFICANT CHANGE UP
HYPOCHROMIA BLD QL: SLIGHT — SIGNIFICANT CHANGE UP
LYMPHOCYTES # BLD AUTO: 10 % — LOW (ref 13–44)
MAGNESIUM SERPL-MCNC: 1.7 MG/DL — LOW (ref 1.8–2.4)
MCHC RBC-ENTMCNC: 25.6 PG — LOW (ref 27–34)
MCHC RBC-ENTMCNC: 31 GM/DL — LOW (ref 32–36)
MCV RBC AUTO: 82.4 FL — SIGNIFICANT CHANGE UP (ref 80–100)
MICROCYTES BLD QL: SLIGHT — SIGNIFICANT CHANGE UP
MONOCYTES NFR BLD AUTO: 4 % — SIGNIFICANT CHANGE UP (ref 2–14)
NEUTROPHILS NFR BLD AUTO: 81 % — HIGH (ref 43–77)
NEUTS BAND # BLD: 5 % — SIGNIFICANT CHANGE UP (ref 0–8)
NT-PROBNP SERPL-SCNC: 505 PG/ML — HIGH (ref 0–125)
PCO2 BLDA: 102 MMHG — CRITICAL HIGH (ref 32–46)
PCO2 BLDA: 29 MMHG — LOW (ref 32–46)
PCO2 BLDA: 97 MMHG — CRITICAL HIGH (ref 32–46)
PH BLD: 7.26 — LOW (ref 7.35–7.45)
PH BLD: 7.28 — LOW (ref 7.35–7.45)
PH BLD: 7.58 — HIGH (ref 7.35–7.45)
PLAT MORPH BLD: NORMAL — SIGNIFICANT CHANGE UP
PLATELET # BLD AUTO: 332 K/UL — SIGNIFICANT CHANGE UP (ref 150–400)
PO2 BLDA: 133 MMHG — HIGH (ref 74–108)
PO2 BLDA: 137 MMHG — HIGH (ref 74–108)
PO2 BLDA: 200 MMHG — HIGH (ref 74–108)
POLYCHROMASIA BLD QL SMEAR: SLIGHT — SIGNIFICANT CHANGE UP
POTASSIUM SERPL-MCNC: 4.6 MMOL/L — SIGNIFICANT CHANGE UP (ref 3.5–5.3)
POTASSIUM SERPL-SCNC: 4.6 MMOL/L — SIGNIFICANT CHANGE UP (ref 3.5–5.3)
PROT SERPL-MCNC: 7.4 GM/DL — SIGNIFICANT CHANGE UP (ref 6–8.3)
RBC # BLD: 3.84 M/UL — LOW (ref 4.2–5.8)
RBC # FLD: 16.8 % — HIGH (ref 11–15)
RBC BLD AUTO: ABNORMAL
SAO2 % BLDA: 99 % — HIGH (ref 92–96)
SODIUM SERPL-SCNC: 127 MMOL/L — LOW (ref 135–145)
WBC # BLD: 10.8 K/UL — HIGH (ref 3.8–10.5)
WBC # FLD AUTO: 10.8 K/UL — HIGH (ref 3.8–10.5)

## 2017-03-08 PROCEDURE — 99291 CRITICAL CARE FIRST HOUR: CPT | Mod: 25

## 2017-03-08 PROCEDURE — 31500 INSERT EMERGENCY AIRWAY: CPT

## 2017-03-08 PROCEDURE — 71010: CPT | Mod: 26,76

## 2017-03-08 PROCEDURE — 71275 CT ANGIOGRAPHY CHEST: CPT | Mod: 26

## 2017-03-08 PROCEDURE — 99292 CRITICAL CARE ADDL 30 MIN: CPT | Mod: 25

## 2017-03-08 PROCEDURE — 71010: CPT | Mod: 26,77

## 2017-03-08 RX ORDER — DEXTROSE 50 % IN WATER 50 %
25 SYRINGE (ML) INTRAVENOUS ONCE
Qty: 0 | Refills: 0 | Status: DISCONTINUED | OUTPATIENT
Start: 2017-03-08 | End: 2017-04-03

## 2017-03-08 RX ORDER — DEXTROSE 50 % IN WATER 50 %
25 SYRINGE (ML) INTRAVENOUS ONCE
Qty: 0 | Refills: 0 | Status: DISCONTINUED | OUTPATIENT
Start: 2017-03-08 | End: 2017-03-08

## 2017-03-08 RX ORDER — VANCOMYCIN HCL 1 G
1000 VIAL (EA) INTRAVENOUS EVERY 8 HOURS
Qty: 0 | Refills: 0 | Status: DISCONTINUED | OUTPATIENT
Start: 2017-03-08 | End: 2017-03-13

## 2017-03-08 RX ORDER — NOREPINEPHRINE BITARTRATE/D5W 8 MG/250ML
0.5 PLASTIC BAG, INJECTION (ML) INTRAVENOUS
Qty: 8 | Refills: 0 | Status: DISCONTINUED | OUTPATIENT
Start: 2017-03-08 | End: 2017-03-09

## 2017-03-08 RX ORDER — MIDAZOLAM HYDROCHLORIDE 1 MG/ML
6 INJECTION, SOLUTION INTRAMUSCULAR; INTRAVENOUS ONCE
Qty: 0 | Refills: 0 | Status: DISCONTINUED | OUTPATIENT
Start: 2017-03-08 | End: 2017-03-08

## 2017-03-08 RX ORDER — ACETAMINOPHEN 500 MG
650 TABLET ORAL EVERY 6 HOURS
Qty: 0 | Refills: 0 | Status: DISCONTINUED | OUTPATIENT
Start: 2017-03-08 | End: 2017-04-03

## 2017-03-08 RX ORDER — INSULIN LISPRO 100/ML
VIAL (ML) SUBCUTANEOUS EVERY 6 HOURS
Qty: 0 | Refills: 0 | Status: DISCONTINUED | OUTPATIENT
Start: 2017-03-08 | End: 2017-03-14

## 2017-03-08 RX ORDER — VANCOMYCIN HCL 1 G
1000 VIAL (EA) INTRAVENOUS EVERY 12 HOURS
Qty: 0 | Refills: 0 | Status: DISCONTINUED | OUTPATIENT
Start: 2017-03-08 | End: 2017-03-08

## 2017-03-08 RX ORDER — SODIUM CHLORIDE 9 MG/ML
1000 INJECTION, SOLUTION INTRAVENOUS
Qty: 0 | Refills: 0 | Status: DISCONTINUED | OUTPATIENT
Start: 2017-03-08 | End: 2017-04-03

## 2017-03-08 RX ORDER — CEFTRIAXONE 500 MG/1
1 INJECTION, POWDER, FOR SOLUTION INTRAMUSCULAR; INTRAVENOUS EVERY 24 HOURS
Qty: 0 | Refills: 0 | Status: DISCONTINUED | OUTPATIENT
Start: 2017-03-08 | End: 2017-03-08

## 2017-03-08 RX ORDER — GLUCAGON INJECTION, SOLUTION 0.5 MG/.1ML
1 INJECTION, SOLUTION SUBCUTANEOUS ONCE
Qty: 0 | Refills: 0 | Status: DISCONTINUED | OUTPATIENT
Start: 2017-03-08 | End: 2017-04-03

## 2017-03-08 RX ORDER — CEFTRIAXONE 500 MG/1
1 INJECTION, POWDER, FOR SOLUTION INTRAMUSCULAR; INTRAVENOUS ONCE
Qty: 0 | Refills: 0 | Status: COMPLETED | OUTPATIENT
Start: 2017-03-08 | End: 2017-03-08

## 2017-03-08 RX ORDER — AZITHROMYCIN 500 MG/1
500 TABLET, FILM COATED ORAL ONCE
Qty: 0 | Refills: 0 | Status: COMPLETED | OUTPATIENT
Start: 2017-03-08 | End: 2017-03-08

## 2017-03-08 RX ORDER — IPRATROPIUM/ALBUTEROL SULFATE 18-103MCG
3 AEROSOL WITH ADAPTER (GRAM) INHALATION ONCE
Qty: 0 | Refills: 0 | Status: COMPLETED | OUTPATIENT
Start: 2017-03-08 | End: 2017-03-08

## 2017-03-08 RX ORDER — ALBUTEROL 90 UG/1
1 AEROSOL, METERED ORAL EVERY 4 HOURS
Qty: 0 | Refills: 0 | Status: DISCONTINUED | OUTPATIENT
Start: 2017-03-08 | End: 2017-04-03

## 2017-03-08 RX ORDER — PIPERACILLIN AND TAZOBACTAM 4; .5 G/20ML; G/20ML
3.38 INJECTION, POWDER, LYOPHILIZED, FOR SOLUTION INTRAVENOUS ONCE
Qty: 0 | Refills: 0 | Status: COMPLETED | OUTPATIENT
Start: 2017-03-08 | End: 2017-03-08

## 2017-03-08 RX ORDER — MIDAZOLAM HYDROCHLORIDE 1 MG/ML
2 INJECTION, SOLUTION INTRAMUSCULAR; INTRAVENOUS ONCE
Qty: 0 | Refills: 0 | Status: DISCONTINUED | OUTPATIENT
Start: 2017-03-08 | End: 2017-03-08

## 2017-03-08 RX ORDER — SUCCINYLCHOLINE CHLORIDE 100 MG/5ML
100 SYRINGE (ML) INTRAVENOUS ONCE
Qty: 0 | Refills: 0 | Status: COMPLETED | OUTPATIENT
Start: 2017-03-08 | End: 2017-03-08

## 2017-03-08 RX ORDER — PIPERACILLIN AND TAZOBACTAM 4; .5 G/20ML; G/20ML
3.38 INJECTION, POWDER, LYOPHILIZED, FOR SOLUTION INTRAVENOUS EVERY 8 HOURS
Qty: 0 | Refills: 0 | Status: DISCONTINUED | OUTPATIENT
Start: 2017-03-08 | End: 2017-03-20

## 2017-03-08 RX ORDER — PROPOFOL 10 MG/ML
40 INJECTION, EMULSION INTRAVENOUS
Qty: 1000 | Refills: 0 | Status: DISCONTINUED | OUTPATIENT
Start: 2017-03-08 | End: 2017-03-12

## 2017-03-08 RX ORDER — PROPOFOL 10 MG/ML
60 INJECTION, EMULSION INTRAVENOUS
Qty: 1000 | Refills: 0 | Status: DISCONTINUED | OUTPATIENT
Start: 2017-03-08 | End: 2017-03-08

## 2017-03-08 RX ORDER — SODIUM CHLORIDE 9 MG/ML
1000 INJECTION INTRAMUSCULAR; INTRAVENOUS; SUBCUTANEOUS
Qty: 0 | Refills: 0 | Status: DISCONTINUED | OUTPATIENT
Start: 2017-03-08 | End: 2017-03-13

## 2017-03-08 RX ORDER — DEXTROSE 50 % IN WATER 50 %
12.5 SYRINGE (ML) INTRAVENOUS ONCE
Qty: 0 | Refills: 0 | Status: DISCONTINUED | OUTPATIENT
Start: 2017-03-08 | End: 2017-04-03

## 2017-03-08 RX ORDER — ETOMIDATE 2 MG/ML
20 INJECTION INTRAVENOUS ONCE
Qty: 0 | Refills: 0 | Status: COMPLETED | OUTPATIENT
Start: 2017-03-08 | End: 2017-03-08

## 2017-03-08 RX ORDER — PANTOPRAZOLE SODIUM 20 MG/1
40 TABLET, DELAYED RELEASE ORAL DAILY
Qty: 0 | Refills: 0 | Status: DISCONTINUED | OUTPATIENT
Start: 2017-03-08 | End: 2017-03-14

## 2017-03-08 RX ORDER — CHLORHEXIDINE GLUCONATE 213 G/1000ML
15 SOLUTION TOPICAL
Qty: 0 | Refills: 0 | Status: DISCONTINUED | OUTPATIENT
Start: 2017-03-08 | End: 2017-03-13

## 2017-03-08 RX ORDER — SODIUM CHLORIDE 9 MG/ML
2000 INJECTION INTRAMUSCULAR; INTRAVENOUS; SUBCUTANEOUS ONCE
Qty: 0 | Refills: 0 | Status: COMPLETED | OUTPATIENT
Start: 2017-03-08 | End: 2017-03-08

## 2017-03-08 RX ORDER — TIOTROPIUM BROMIDE 18 UG/1
1 CAPSULE ORAL; RESPIRATORY (INHALATION) DAILY
Qty: 0 | Refills: 0 | Status: DISCONTINUED | OUTPATIENT
Start: 2017-03-08 | End: 2017-04-03

## 2017-03-08 RX ORDER — DEXTROSE 50 % IN WATER 50 %
1 SYRINGE (ML) INTRAVENOUS ONCE
Qty: 0 | Refills: 0 | Status: DISCONTINUED | OUTPATIENT
Start: 2017-03-08 | End: 2017-04-03

## 2017-03-08 RX ORDER — CHLORHEXIDINE GLUCONATE 213 G/1000ML
1 SOLUTION TOPICAL DAILY
Qty: 0 | Refills: 0 | Status: DISCONTINUED | OUTPATIENT
Start: 2017-03-08 | End: 2017-03-20

## 2017-03-08 RX ORDER — MAGNESIUM SULFATE 500 MG/ML
2 VIAL (ML) INJECTION ONCE
Qty: 0 | Refills: 0 | Status: COMPLETED | OUTPATIENT
Start: 2017-03-08 | End: 2017-03-08

## 2017-03-08 RX ORDER — IPRATROPIUM/ALBUTEROL SULFATE 18-103MCG
3 AEROSOL WITH ADAPTER (GRAM) INHALATION EVERY 6 HOURS
Qty: 0 | Refills: 0 | Status: DISCONTINUED | OUTPATIENT
Start: 2017-03-08 | End: 2017-04-03

## 2017-03-08 RX ORDER — HEPARIN SODIUM 5000 [USP'U]/ML
5000 INJECTION INTRAVENOUS; SUBCUTANEOUS EVERY 12 HOURS
Qty: 0 | Refills: 0 | Status: DISCONTINUED | OUTPATIENT
Start: 2017-03-08 | End: 2017-04-03

## 2017-03-08 RX ORDER — NOREPINEPHRINE BITARTRATE/D5W 8 MG/250ML
0.5 PLASTIC BAG, INJECTION (ML) INTRAVENOUS
Qty: 8 | Refills: 0 | Status: DISCONTINUED | OUTPATIENT
Start: 2017-03-08 | End: 2017-03-08

## 2017-03-08 RX ADMIN — PIPERACILLIN AND TAZOBACTAM 200 GRAM(S): 4; .5 INJECTION, POWDER, LYOPHILIZED, FOR SOLUTION INTRAVENOUS at 14:25

## 2017-03-08 RX ADMIN — Medication 125 MILLIGRAM(S): at 03:15

## 2017-03-08 RX ADMIN — Medication 55.31 MICROGRAM(S)/KG/MIN: at 22:54

## 2017-03-08 RX ADMIN — SODIUM CHLORIDE 100 MILLILITER(S): 9 INJECTION INTRAMUSCULAR; INTRAVENOUS; SUBCUTANEOUS at 10:49

## 2017-03-08 RX ADMIN — HEPARIN SODIUM 5000 UNIT(S): 5000 INJECTION INTRAVENOUS; SUBCUTANEOUS at 17:44

## 2017-03-08 RX ADMIN — ETOMIDATE 20 MILLIGRAM(S): 2 INJECTION INTRAVENOUS at 06:22

## 2017-03-08 RX ADMIN — CEFTRIAXONE 100 GRAM(S): 500 INJECTION, POWDER, FOR SOLUTION INTRAMUSCULAR; INTRAVENOUS at 06:53

## 2017-03-08 RX ADMIN — Medication 40 MILLIGRAM(S): at 14:25

## 2017-03-08 RX ADMIN — SODIUM CHLORIDE 2000 MILLILITER(S): 9 INJECTION INTRAMUSCULAR; INTRAVENOUS; SUBCUTANEOUS at 07:44

## 2017-03-08 RX ADMIN — Medication 3 MILLILITER(S): at 04:06

## 2017-03-08 RX ADMIN — PROPOFOL 14.16 MICROGRAM(S)/KG/MIN: 10 INJECTION, EMULSION INTRAVENOUS at 17:46

## 2017-03-08 RX ADMIN — CHLORHEXIDINE GLUCONATE 15 MILLILITER(S): 213 SOLUTION TOPICAL at 17:43

## 2017-03-08 RX ADMIN — Medication 55.31 MICROGRAM(S)/KG/MIN: at 06:22

## 2017-03-08 RX ADMIN — PROPOFOL 21.24 MICROGRAM(S)/KG/MIN: 10 INJECTION, EMULSION INTRAVENOUS at 06:54

## 2017-03-08 RX ADMIN — CHLORHEXIDINE GLUCONATE 1 APPLICATION(S): 213 SOLUTION TOPICAL at 12:09

## 2017-03-08 RX ADMIN — Medication 250 MILLIGRAM(S): at 22:55

## 2017-03-08 RX ADMIN — Medication 1: at 12:09

## 2017-03-08 RX ADMIN — Medication 55.31 MICROGRAM(S)/KG/MIN: at 10:49

## 2017-03-08 RX ADMIN — Medication 2: at 17:45

## 2017-03-08 RX ADMIN — MIDAZOLAM HYDROCHLORIDE 6 MILLIGRAM(S): 1 INJECTION, SOLUTION INTRAMUSCULAR; INTRAVENOUS at 07:43

## 2017-03-08 RX ADMIN — PANTOPRAZOLE SODIUM 40 MILLIGRAM(S): 20 TABLET, DELAYED RELEASE ORAL at 12:10

## 2017-03-08 RX ADMIN — Medication 100 MILLIGRAM(S): at 06:23

## 2017-03-08 RX ADMIN — MIDAZOLAM HYDROCHLORIDE 2 MILLIGRAM(S): 1 INJECTION, SOLUTION INTRAMUSCULAR; INTRAVENOUS at 07:41

## 2017-03-08 RX ADMIN — Medication 250 MILLIGRAM(S): at 14:25

## 2017-03-08 RX ADMIN — Medication 40 MILLIGRAM(S): at 22:56

## 2017-03-08 RX ADMIN — Medication 3 MILLILITER(S): at 23:40

## 2017-03-08 RX ADMIN — SODIUM CHLORIDE 4000 MILLILITER(S): 9 INJECTION INTRAMUSCULAR; INTRAVENOUS; SUBCUTANEOUS at 06:27

## 2017-03-08 RX ADMIN — Medication 3 MILLILITER(S): at 17:40

## 2017-03-08 RX ADMIN — SODIUM CHLORIDE 100 MILLILITER(S): 9 INJECTION INTRAMUSCULAR; INTRAVENOUS; SUBCUTANEOUS at 21:00

## 2017-03-08 RX ADMIN — Medication 3 MILLILITER(S): at 03:15

## 2017-03-08 RX ADMIN — PIPERACILLIN AND TAZOBACTAM 25 GRAM(S): 4; .5 INJECTION, POWDER, LYOPHILIZED, FOR SOLUTION INTRAVENOUS at 22:55

## 2017-03-08 RX ADMIN — AZITHROMYCIN 255 MILLIGRAM(S): 500 TABLET, FILM COATED ORAL at 06:24

## 2017-03-08 RX ADMIN — Medication 50 GRAM(S): at 04:07

## 2017-03-08 NOTE — ED PROVIDER NOTE - PHYSICAL EXAMINATION
Gen: Alert, chronically ill appearing male in respiratory disterss   Head: NC, AT   Eyes: PERRL, EOMI, normal lids/conjunctiva  ENT: normal hearing, patent oropharynx without erythema/exudate, uvula midline  Neck: supple, no tenderness, Trachea midline  Pulm: very poor air entry and faint expiratory wheezes. work of breathing significantly increased with abd rec tractions.   CV: tachycaridc, no M/R/G, 2+ radial and dp pulses bl, no edema  Abd: soft, NT/ND, +BS, no hepatosplenomegaly  Mskel: extremities x4 with normal ROM and no joint effusions. no ctl spine ttp.   Skin: no rash, no bruising   Neuro: patient not verablizing but is alert and awake. moving all extremities.

## 2017-03-08 NOTE — H&P ADULT. - HISTORY OF PRESENT ILLNESS
62 Y/O male w/ PMHx of COPD on home O2, cpap at night, afib, htn, hld, dm, pw c/o SOB. ABG worsening in ED showing 7.26/102/133/44 on Bipap. Pt intubated for hypercapnic respiratory failure.

## 2017-03-08 NOTE — ED ADULT NURSE REASSESSMENT NOTE - NS ED NURSE REASSESS COMMENT FT1
0540 - Patient intubated w/ 7.5cm ETT, 25 @ lip, vent settings: Vt 500mL, PEEP 5, FiO2 50%, RR 16, patient paralyzed w/ Etomidate 20mg and Succinylcholine 100mg IVP, 2L NS infused over 30mins to accommodate expected hypotension. 20G to L lower arm inserted for additional access, no respiratory distress noted.   0600 - Patient began to desaturate to high 60's - low 70's, ETT suctioned w/ some improvement, patient began to over breathe ventilator and attempting to extubate, sedated w/ Versed 8mg IVP, continued to desaturate BVM placed and manual respiratory support commenced. Sedation 0540 - Patient intubated w/ 7.5cm ETT, 25 @ lip, vent settings: Vt 500mL, PEEP 5, FiO2 50%, RR 16, patient paralyzed w/ Etomidate 20mg and Succinylcholine 100mg IVP, 2L NS infused over 30mins to accommodate expected hypotension. 20G to L lower arm inserted for additional access, no respiratory distress noted.   0600 - Patient began to desaturate to high 60's - low 70's, ETT suctioned w/ some improvement, patient began to over breathe ventilator and attempting to extubate, sedated w/ Versed 8mg IVP, continued to desaturate BVM placed and manual respiratory support commenced. Patient sedated successfully, another 2L NS infused over 1hr, started on Norepinephrine 4mg started @ 1mcg/min BP increased to (110's/high 50's), Propofol initiated s/p achieving stable BP, Propofol infusing @ 8mcg/kg/min.   0650 - Patient sedated, BP maintained, pending ICU consult, will continue to monitor for safety. 0540 - Patient intubated w/ 7.5cm ETT, 25 @ lip, vent settings: Vt 500mL, PEEP 5, FiO2 50%, RR 16, patient paralyzed w/ Etomidate 20mg and Succinylcholine 100mg IVP, 2L NS infused over 30mins to accommodate expected hypotension. 20G to L lower arm inserted for additional access, no respiratory distress noted.   0600 - Patient began to desaturate to high 60's - low 70's, ETT suctioned w/ some improvement, patient began to over breathe ventilator and attempting to extubate, sedated w/ Versed 8mg IVP, continued to desaturate BVM placed and manual respiratory support commenced. Patient sedated successfully, another 2L NS infused over 1hr, started on Norepinephrine 4mg started @ 1mcg/min BP increased to (110's/high 50's), Propofol initiated s/p achieving stable BP, Propofol infusing @ 8mcg/kg/min.  0620 - Hernandez 16Fr inserted, 220cc michael color urine emptied.     0650 - Patient sedated, BP maintained, pending ICU consult, will continue to monitor for safety. 0540 - Patient intubated w/ 7.5cm ETT, 27 @ lip, vent settings: Vt 500mL, PEEP 5, FiO2 50%, RR 16, patient paralyzed w/ Etomidate 20mg and Succinylcholine 100mg IVP, 2L NS infused over 30mins to accommodate expected hypotension. 20G to L lower arm inserted for additional access, no respiratory distress noted.   0600 - Patient began to desaturate to high 60's - low 70's, ETT suctioned w/ some improvement, patient began to over breathe ventilator and attempting to extubate, sedated w/ Versed 8mg IVP, continued to desaturate BVM placed and manual respiratory support commenced. Patient sedated successfully, another 2L NS infused over 1hr, started on Norepinephrine 4mg started @ 1mcg/min BP increased to (110's/high 50's), Propofol initiated s/p achieving stable BP, Propofol infusing @ 8mcg/kg/min.  0620 - Hernandez 16Fr inserted, 220cc michael color urine emptied.     0650 - Patient sedated, BP maintained, pending ICU consult, will continue to monitor for safety.

## 2017-03-08 NOTE — ED PROCEDURE NOTE - CPROC ED TRACHE INTUB DETAIL1
Difficult/crash intubation (see additional details section)./Patient was pre-oxygenated. An endotracheal tube (ETT) was placed through the vocal cords into the trachea. During intubation, staff applied gentle pressure to the cricoid cartilage. ETT position was confirmed by auscultation of bilateral breath sounds to all lung fields. ETCO2 level was appropriate.

## 2017-03-08 NOTE — ED PROVIDER NOTE - MEDICAL DECISION MAKING DETAILS
patient pw acute respiratory distress 2/2 hypercarbic respiratory pathology. copd with multifocal pna. will give abx and start on bipap. patient pw acute respiratory distress 2/2 hypercarbic respiratory pathology. copd with multifocal pna. will give abx and start on bipap. had to intubate 2/2 worsening abgs. patient hypotensive but requiring sedation. will start levophed while sedation occurs to faciliate appropriate synchronisation with the vent. will admit to ICU.

## 2017-03-08 NOTE — ED PROVIDER NOTE - CRITICAL CARE PROVIDED
interpretation of diagnostic studies/consultation with other physicians/additional history taking/documentation/direct patient care (not related to procedure)

## 2017-03-08 NOTE — ED PROVIDER NOTE - OBJECTIVE STATEMENT
Pertinent PMH/PSH/FHx/SHx and Review of Systems contained within:  61m hx copd on home o2 day, cpap night, afib, htn, hld, dm pw sob. symptoms reported by ems via son to have started suddenly overnight. ems called to seen put patient on cpap. sats holding in the 90s. no fever. patient coughing. no clear productive sputum.   Fh and Sh not otherwise contributory  ROS otherwise negative

## 2017-03-08 NOTE — ED ADULT NURSE NOTE - OBJECTIVE STATEMENT
Patient received lying on stretcher w/ BIPAP (IPAP 12 EPAP 5 RR 15 Vt 358 SaO2 100) in place, noted w/ respiratory distress, +use of accessory muscles noted, respirations shallow, labored, lungs sounds absent, BIPAP helping to alleviate distress. Patient is alert and responsive to verbal stimuli, nebulizer treatment x 2 in progress, will continue to monitor for safety.

## 2017-03-08 NOTE — H&P ADULT. - ATTENDING COMMENTS
Pt is a 60 yo WM with h/o COPD on home O2, nocturnal CPAP, CAD/stent, A fib, HTN, DM, HLD and prostate adenoma presented 2 to SOB. ABG showed worsening acute on chronic resp acidosis despite being  BiPAP. Admitting dx: Acute on chronic resp acidosis/ COPD exacerbation    Vs as per EMR    Mouth: ETT  Lungs: Decreased BS  Heart: Tachy  Abd: Soft/+BS  Ext: No edema  Neuro: Sedated    CXR: L>R infiltrate    a/p: Acute on chronic resp acidosis/ COPD exacerbation/ PNA    Resp: Decrease TV/ Steroids + Nebs  ID: PanCx/ Rx for nosocomial PNA  CVS: Hypotension requiring Levophed ? 2 to sedation + PPV  FEN: Start enteral feeds  Endo: Follow FS and cover with RI  Neuro: Cont sedation with Propofol

## 2017-03-09 LAB
ANION GAP SERPL CALC-SCNC: 9 MMOL/L — SIGNIFICANT CHANGE UP (ref 5–17)
BUN SERPL-MCNC: 10 MG/DL — SIGNIFICANT CHANGE UP (ref 7–23)
CALCIUM SERPL-MCNC: 8.2 MG/DL — LOW (ref 8.5–10.1)
CHLORIDE SERPL-SCNC: 96 MMOL/L — SIGNIFICANT CHANGE UP (ref 96–108)
CO2 SERPL-SCNC: 31 MMOL/L — SIGNIFICANT CHANGE UP (ref 22–31)
CREAT SERPL-MCNC: 0.33 MG/DL — LOW (ref 0.5–1.3)
GLUCOSE SERPL-MCNC: 219 MG/DL — HIGH (ref 70–99)
GRAM STN FLD: SIGNIFICANT CHANGE UP
HCT VFR BLD CALC: 23.8 % — LOW (ref 39–50)
HGB BLD-MCNC: 7.9 G/DL — LOW (ref 13–17)
MAGNESIUM SERPL-MCNC: 1.9 MG/DL — SIGNIFICANT CHANGE UP (ref 1.8–2.4)
MCHC RBC-ENTMCNC: 26.4 PG — LOW (ref 27–34)
MCHC RBC-ENTMCNC: 33.2 GM/DL — SIGNIFICANT CHANGE UP (ref 32–36)
MCV RBC AUTO: 79.3 FL — LOW (ref 80–100)
PHOSPHATE SERPL-MCNC: 1.9 MG/DL — LOW (ref 2.5–4.5)
PLATELET # BLD AUTO: 384 K/UL — SIGNIFICANT CHANGE UP (ref 150–400)
POTASSIUM SERPL-MCNC: 3.6 MMOL/L — SIGNIFICANT CHANGE UP (ref 3.5–5.3)
POTASSIUM SERPL-SCNC: 3.6 MMOL/L — SIGNIFICANT CHANGE UP (ref 3.5–5.3)
RBC # BLD: 3 M/UL — LOW (ref 4.2–5.8)
RBC # FLD: 16.7 % — HIGH (ref 11–15)
SODIUM SERPL-SCNC: 136 MMOL/L — SIGNIFICANT CHANGE UP (ref 135–145)
SPECIMEN SOURCE: SIGNIFICANT CHANGE UP
WBC # BLD: 10.6 K/UL — HIGH (ref 3.8–10.5)
WBC # FLD AUTO: 10.6 K/UL — HIGH (ref 3.8–10.5)

## 2017-03-09 PROCEDURE — 99291 CRITICAL CARE FIRST HOUR: CPT

## 2017-03-09 RX ORDER — POTASSIUM PHOSPHATE, MONOBASIC POTASSIUM PHOSPHATE, DIBASIC 236; 224 MG/ML; MG/ML
15 INJECTION, SOLUTION INTRAVENOUS ONCE
Qty: 0 | Refills: 0 | Status: COMPLETED | OUTPATIENT
Start: 2017-03-09 | End: 2017-03-09

## 2017-03-09 RX ADMIN — PIPERACILLIN AND TAZOBACTAM 25 GRAM(S): 4; .5 INJECTION, POWDER, LYOPHILIZED, FOR SOLUTION INTRAVENOUS at 06:04

## 2017-03-09 RX ADMIN — Medication 2: at 06:09

## 2017-03-09 RX ADMIN — Medication 3 MILLILITER(S): at 11:02

## 2017-03-09 RX ADMIN — SODIUM CHLORIDE 100 MILLILITER(S): 9 INJECTION INTRAMUSCULAR; INTRAVENOUS; SUBCUTANEOUS at 06:04

## 2017-03-09 RX ADMIN — CHLORHEXIDINE GLUCONATE 15 MILLILITER(S): 213 SOLUTION TOPICAL at 18:26

## 2017-03-09 RX ADMIN — SODIUM CHLORIDE 100 MILLILITER(S): 9 INJECTION INTRAMUSCULAR; INTRAVENOUS; SUBCUTANEOUS at 16:46

## 2017-03-09 RX ADMIN — Medication 250 MILLIGRAM(S): at 21:46

## 2017-03-09 RX ADMIN — CHLORHEXIDINE GLUCONATE 1 APPLICATION(S): 213 SOLUTION TOPICAL at 12:14

## 2017-03-09 RX ADMIN — Medication 2: at 18:26

## 2017-03-09 RX ADMIN — PIPERACILLIN AND TAZOBACTAM 25 GRAM(S): 4; .5 INJECTION, POWDER, LYOPHILIZED, FOR SOLUTION INTRAVENOUS at 21:46

## 2017-03-09 RX ADMIN — Medication 40 MILLIGRAM(S): at 06:05

## 2017-03-09 RX ADMIN — Medication 250 MILLIGRAM(S): at 05:00

## 2017-03-09 RX ADMIN — Medication 2: at 12:13

## 2017-03-09 RX ADMIN — Medication 3 MILLILITER(S): at 17:03

## 2017-03-09 RX ADMIN — PANTOPRAZOLE SODIUM 40 MILLIGRAM(S): 20 TABLET, DELAYED RELEASE ORAL at 12:13

## 2017-03-09 RX ADMIN — PIPERACILLIN AND TAZOBACTAM 25 GRAM(S): 4; .5 INJECTION, POWDER, LYOPHILIZED, FOR SOLUTION INTRAVENOUS at 14:17

## 2017-03-09 RX ADMIN — HEPARIN SODIUM 5000 UNIT(S): 5000 INJECTION INTRAVENOUS; SUBCUTANEOUS at 18:27

## 2017-03-09 RX ADMIN — HEPARIN SODIUM 5000 UNIT(S): 5000 INJECTION INTRAVENOUS; SUBCUTANEOUS at 06:15

## 2017-03-09 RX ADMIN — POTASSIUM PHOSPHATE, MONOBASIC POTASSIUM PHOSPHATE, DIBASIC 63.75 MILLIMOLE(S): 236; 224 INJECTION, SOLUTION INTRAVENOUS at 07:12

## 2017-03-09 RX ADMIN — Medication 40 MILLIGRAM(S): at 14:17

## 2017-03-09 RX ADMIN — Medication 3 MILLILITER(S): at 06:08

## 2017-03-09 RX ADMIN — Medication 3 MILLILITER(S): at 23:36

## 2017-03-09 RX ADMIN — Medication 250 MILLIGRAM(S): at 14:21

## 2017-03-09 RX ADMIN — CHLORHEXIDINE GLUCONATE 15 MILLILITER(S): 213 SOLUTION TOPICAL at 06:05

## 2017-03-09 RX ADMIN — Medication 3: at 00:29

## 2017-03-09 RX ADMIN — Medication 40 MILLIGRAM(S): at 21:45

## 2017-03-09 NOTE — PROGRESS NOTE ADULT - ASSESSMENT
Pt is a 62 yo WM with h/o COPD on home O2, nocturnal CPAP, CAD/stent, A fib, HTN, DM, HLD and prostate adenoma presented 2 to SOB. ABG showed worsening acute on chronic resp acidosis despite being  BiPAP. Admitting dx: Acute on chronic resp acidosis/ COPD exacerbation    Resp: Was weaning but then became tachycardic, tachypneic and desat'd/ Cont Nebs + Steroids  ID: Cont Abx/ No sign (+) Cx  CVS: Off Levophed  FEN: Cont enteral feeds  Endo: Follow FS and cover with RI  Neuro: Cont light sedation with Propofol  Social: Family members updated yesterday     CCT 40 min

## 2017-03-09 NOTE — DIETITIAN INITIAL EVALUATION ADULT. - PROBLEM SELECTOR PLAN 1
1. Admit to ICU under Dr. Gamaliel christensen/ hospitalist service for medicine   2. repeat CXR, ABG, make vent changes accordingly

## 2017-03-09 NOTE — DIETITIAN INITIAL EVALUATION ADULT. - OTHER INFO
Pt seen for RN consult 3/8 for Failure to thrive & pt on TF & Pressure ulcer >stage II.  Pt with s/p multiple hospitalizations. Pt's daughter in law reports poor po intake PTA due to Shortness of breath, weakness & altered taste & poor appetite. Last BM x 2(3/9). Pt seen for RN consult 3/8 for Failure to thrive & pt on TF & Pressure ulcer >stage II.  Pt with s/p multiple hospitalizations. Pt's daughter in law reports poor po intake PTA due to Shortness of breath, weakness & altered taste & poor appetite. Pt managed DM type 2 with Metformin 1000mg 2 x day PTA.  Last BM x 2(3/9). Pt currently tolerating OGT feeding; no residuals per RN; Propofol=177kcal x 24 hours.

## 2017-03-09 NOTE — PROGRESS NOTE ADULT - SUBJECTIVE AND OBJECTIVE BOX
HPI:  Pt is a 60 yo WM with h/o COPD on home O2, nocturnal CPAP, CAD/stent, A fib, HTN, DM, HLD and prostate adenoma presented 2 to SOB. ABG showed worsening acute on chronic resp acidosis despite being  BiPAP. Admitting dx: Acute on chronic resp acidosis/ COPD exacerbation    24 hr events: Weaned off Levophed    ## Labs:  CBC:                        7.9    10.6  )-----------( 384      ( 09 Mar 2017 03:39 )             23.8     Chem:  09 Mar 2017 03:39    136    |  96     |  10     ----------------------------<  219    3.6     |  31     |  0.33     Ca    8.2        09 Mar 2017 03:39  Phos  1.9       09 Mar 2017 03:39  Mg     1.9       09 Mar 2017 03:39    TPro  7.4    /  Alb  2.1    /  TBili  0.2    /  DBili  x      /  AST  15     /  ALT  23     /  AlkPhos  99     08 Mar 2017 02:50    Coags:    ## Medications:  piperacillin/tazobactam IVPB. 3.375Gram(s) IV Intermittent every 8 hours  vancomycin  IVPB 1000milliGRAM(s) IV Intermittent every 8 hours    ALBUTerol/ipratropium for Nebulization 3milliLiter(s) Nebulizer every 6 hours  ALBUTerol    90 MICROgram(s) HFA Inhaler 1Puff(s) Inhalation every 4 hours  tiotropium 18 MICROgram(s) Capsule 1Capsule(s) Inhalation daily    insulin lispro (HumaLOG) corrective regimen sliding scale  SubCutaneous every 6 hours  dextrose Gel 1Dose(s) Oral once PRN  dextrose 50% Injectable 12.5Gram(s) IV Push once  dextrose 50% Injectable 25Gram(s) IV Push once  glucagon  Injectable 1milliGRAM(s) IntraMuscular once PRN  methylPREDNISolone sodium succinate Injectable 40milliGRAM(s) IV Push every 8 hours    heparin  Injectable 5000Unit(s) SubCutaneous every 12 hours    pantoprazole  Injectable 40milliGRAM(s) IV Push daily    propofol Infusion 40MICROgram(s)/kG/Min IV Continuous <Continuous>  acetaminophen   Tablet 650milliGRAM(s) Oral every 6 hours PRN    ## Vitals:  T(C): 37.2, Max: 37.7 (03-08 @ 15:53)  HR: 98 (85 - 132)  BP: 105/63 (105/63 - 166/71)  BP(mean): 72 (62 - 102)  RR: 16 (15 - 45)  SpO2: 99% (97% - 100%)  Wt(kg): --  Vent: Mode: AC/ CMV (Assist Control/ Continuous Mandatory Ventilation), RR (machine): 16, RR (patient): 18, TV (machine): 400, FiO2: 30, PEEP: 5, PIP: 31  ABG: ABG - ( 08 Mar 2017 08:13 )  pH: x     /  pCO2: 29    /  pO2: 200   / HCO3: 27    / Base Excess: 5.4   /  SaO2: 99        I & Os for 24h ending 03-09 @ 07:00  =============================================  IN: 4484.5 ml / OUT: 2875 ml / NET: 1609.5 ml    I & Os for current day (as of 03-09 @ 11:56)  =============================================  IN: 670.1 ml / OUT: 400 ml / NET: 270.1 ml      ## P/E:  Gen: lying comfortably in bed in no apparent distress  Mouth: ETT  Lungs: Decreased BS  Heart: Tachy  Abd: Soft/+BS  Ext: No edema  Neuro: Awake/alert    CENTRAL LINE: [ ] YES [ x] NO  LOCATION:   DATE INSERTED:  REMOVE: [ ] YES [ ] NO      MARTINEZ: [x ] YES [ ] NO    DATE INSERTED:  REMOVE:  [ ] YES [ ] NO      A-LINE:  [ ] YES [x ] NO  LOCATION:   DATE INSERTED:  REMOVE:  [ ] YES [ ] NO  EXPLAIN:    CODE STATUS: [x] full code  [ ] DNR  [ ] DNI  [ ] MOLST  Goals of care discussion: [ ] yes

## 2017-03-09 NOTE — PHYSICAL THERAPY INITIAL EVALUATION ADULT - GENERAL OBSERVATIONS, REHAB EVAL
Patient seen supine in bed with no apparent distress with ETT to ventilator with lawler, with IV, with nasogastric tube.

## 2017-03-09 NOTE — DIETITIAN INITIAL EVALUATION ADULT. - PERTINENT LABORATORY DATA
03-09 Na 136 mmol/L Glu 219 mg/dL<H> K+ 3.6 mmol/L Cr  0.33 mg/dL<L> BUN 10 mg/dL Phos 1.9 mg/dL<L> Alb 2.1(3/8)   PAB n/a   Hgb 7.9 g/dL<L> Hct 23.8 %<L>,  WBC=10.6(3/9), MbpB7K=7.8%(3/8)

## 2017-03-09 NOTE — DIETITIAN INITIAL EVALUATION ADULT. - PHYSICAL APPEARANCE
BMI=16.2; +1 generalized edema/debilitated/emaciated emaciated/debilitated/BMI=16.2; +1 generalized edema; Nutrition focused physical exam conducted ; found signs of malnutrition [ ]absent [x ]present.  Subcutaneous fat loss: [moderate ] Orbital fat pads region, [unable ]Buccal fat region, [moderate ]Triceps region,  [moderate ]Ribs region.  Muscle wasting: [moderate ]Temples region, [moderate ]Clavicle region, [moderate ]Shoulder region, [unable ]Scapula region, [moderate ]Interosseous region,  [moderate ]thigh region, [moderate ]Calf region

## 2017-03-09 NOTE — DIETITIAN INITIAL EVALUATION ADULT. - DIET TYPE
3/8/17 Glucerna 1.2 @ 20ml/hr to goal rate 50ml/hr= 1200ml, 1440kcal & 72gm protein/NPO with tube feedings

## 2017-03-09 NOTE — DIETITIAN INITIAL EVALUATION ADULT. - PERTINENT MEDS FT
Heparin, Vanco, Zosyn, Hibiclens, Peridex, NS@100ml/hr, Tylenol, Solumedrol, Spiriva, Albuterol, Humalog, Propofol, Protonix

## 2017-03-09 NOTE — DIETITIAN INITIAL EVALUATION ADULT. - ETIOLOGY
Inadequate energy & protein intake related to COPD with Shortness of breath & presents with multifocal pneumonia

## 2017-03-09 NOTE — PHYSICAL THERAPY INITIAL EVALUATION ADULT - CRITERIA FOR SKILLED THERAPEUTIC INTERVENTIONS
risk reduction/prevention/anticipated discharge recommendation/rehab potential/therapy frequency/impairments found/predicted duration of therapy intervention/functional limitations in following categories/Subacute Rehab

## 2017-03-09 NOTE — PHYSICAL THERAPY INITIAL EVALUATION ADULT - PERTINENT HX OF CURRENT PROBLEM, REHAB EVAL
Patient admitted to hospital secondary to respiratory failure with history of COPD. Patient as per patient's family was bed bound and unable to ambulate, requiring 2 person assist for OOB transfers to wheelchair from hospital bed. Patient admitted to hospital secondary to respiratory failure with history of COPD. Patient as per patient's family was bed bound and unable to ambulate, requiring 2 person assist for OOB transfers to wheelchair from hospital bed. As per P.T. consultation on 2/15/17, patient was able to ambulate approximately 12 feet with rolling walker with maximum assistance of 1 person upon discharge.

## 2017-03-10 LAB
ANION GAP SERPL CALC-SCNC: 7 MMOL/L — SIGNIFICANT CHANGE UP (ref 5–17)
BUN SERPL-MCNC: 15 MG/DL — SIGNIFICANT CHANGE UP (ref 7–23)
CALCIUM SERPL-MCNC: 7.9 MG/DL — LOW (ref 8.5–10.1)
CHLORIDE SERPL-SCNC: 97 MMOL/L — SIGNIFICANT CHANGE UP (ref 96–108)
CO2 SERPL-SCNC: 33 MMOL/L — HIGH (ref 22–31)
CREAT SERPL-MCNC: 0.33 MG/DL — LOW (ref 0.5–1.3)
CULTURE RESULTS: SIGNIFICANT CHANGE UP
GLUCOSE SERPL-MCNC: 267 MG/DL — HIGH (ref 70–99)
GRAM STN FLD: SIGNIFICANT CHANGE UP
HCT VFR BLD CALC: 22.3 % — LOW (ref 39–50)
HGB BLD-MCNC: 7.6 G/DL — LOW (ref 13–17)
MAGNESIUM SERPL-MCNC: 1.9 MG/DL — SIGNIFICANT CHANGE UP (ref 1.8–2.4)
MCHC RBC-ENTMCNC: 26.8 PG — LOW (ref 27–34)
MCHC RBC-ENTMCNC: 34 GM/DL — SIGNIFICANT CHANGE UP (ref 32–36)
MCV RBC AUTO: 78.8 FL — LOW (ref 80–100)
PHOSPHATE SERPL-MCNC: 1.5 MG/DL — LOW (ref 2.5–4.5)
PLATELET # BLD AUTO: 409 K/UL — HIGH (ref 150–400)
POTASSIUM SERPL-MCNC: 3.7 MMOL/L — SIGNIFICANT CHANGE UP (ref 3.5–5.3)
POTASSIUM SERPL-SCNC: 3.7 MMOL/L — SIGNIFICANT CHANGE UP (ref 3.5–5.3)
RBC # BLD: 2.83 M/UL — LOW (ref 4.2–5.8)
RBC # FLD: 16.7 % — HIGH (ref 11–15)
SODIUM SERPL-SCNC: 137 MMOL/L — SIGNIFICANT CHANGE UP (ref 135–145)
SPECIMEN SOURCE: SIGNIFICANT CHANGE UP
VANCOMYCIN TROUGH SERPL-MCNC: 20.4 UG/ML — HIGH (ref 10–20)
WBC # BLD: 8 K/UL — SIGNIFICANT CHANGE UP (ref 3.8–10.5)
WBC # FLD AUTO: 8 K/UL — SIGNIFICANT CHANGE UP (ref 3.8–10.5)

## 2017-03-10 PROCEDURE — 99291 CRITICAL CARE FIRST HOUR: CPT

## 2017-03-10 RX ORDER — POTASSIUM PHOSPHATE, MONOBASIC POTASSIUM PHOSPHATE, DIBASIC 236; 224 MG/ML; MG/ML
15 INJECTION, SOLUTION INTRAVENOUS ONCE
Qty: 0 | Refills: 0 | Status: COMPLETED | OUTPATIENT
Start: 2017-03-10 | End: 2017-03-10

## 2017-03-10 RX ORDER — ALPRAZOLAM 0.25 MG
0.25 TABLET ORAL
Qty: 0 | Refills: 0 | Status: DISCONTINUED | OUTPATIENT
Start: 2017-03-10 | End: 2017-03-16

## 2017-03-10 RX ADMIN — SODIUM CHLORIDE 100 MILLILITER(S): 9 INJECTION INTRAMUSCULAR; INTRAVENOUS; SUBCUTANEOUS at 02:00

## 2017-03-10 RX ADMIN — Medication 0.25 MILLIGRAM(S): at 12:00

## 2017-03-10 RX ADMIN — Medication 40 MILLIGRAM(S): at 05:26

## 2017-03-10 RX ADMIN — Medication 40 MILLIGRAM(S): at 14:07

## 2017-03-10 RX ADMIN — Medication 2: at 17:54

## 2017-03-10 RX ADMIN — Medication 40 MILLIGRAM(S): at 22:40

## 2017-03-10 RX ADMIN — Medication 0.25 MILLIGRAM(S): at 17:58

## 2017-03-10 RX ADMIN — CHLORHEXIDINE GLUCONATE 1 APPLICATION(S): 213 SOLUTION TOPICAL at 11:51

## 2017-03-10 RX ADMIN — PIPERACILLIN AND TAZOBACTAM 25 GRAM(S): 4; .5 INJECTION, POWDER, LYOPHILIZED, FOR SOLUTION INTRAVENOUS at 22:40

## 2017-03-10 RX ADMIN — Medication 250 MILLIGRAM(S): at 05:26

## 2017-03-10 RX ADMIN — Medication 3: at 06:16

## 2017-03-10 RX ADMIN — Medication 3 MILLILITER(S): at 18:00

## 2017-03-10 RX ADMIN — PROPOFOL 14.16 MICROGRAM(S)/KG/MIN: 10 INJECTION, EMULSION INTRAVENOUS at 08:59

## 2017-03-10 RX ADMIN — Medication 3 MILLILITER(S): at 05:18

## 2017-03-10 RX ADMIN — CHLORHEXIDINE GLUCONATE 15 MILLILITER(S): 213 SOLUTION TOPICAL at 17:54

## 2017-03-10 RX ADMIN — PROPOFOL 14.16 MICROGRAM(S)/KG/MIN: 10 INJECTION, EMULSION INTRAVENOUS at 23:00

## 2017-03-10 RX ADMIN — Medication 3: at 11:50

## 2017-03-10 RX ADMIN — SODIUM CHLORIDE 100 MILLILITER(S): 9 INJECTION INTRAMUSCULAR; INTRAVENOUS; SUBCUTANEOUS at 23:00

## 2017-03-10 RX ADMIN — HEPARIN SODIUM 5000 UNIT(S): 5000 INJECTION INTRAVENOUS; SUBCUTANEOUS at 17:54

## 2017-03-10 RX ADMIN — PIPERACILLIN AND TAZOBACTAM 25 GRAM(S): 4; .5 INJECTION, POWDER, LYOPHILIZED, FOR SOLUTION INTRAVENOUS at 05:26

## 2017-03-10 RX ADMIN — Medication 250 MILLIGRAM(S): at 14:06

## 2017-03-10 RX ADMIN — Medication 250 MILLIGRAM(S): at 22:40

## 2017-03-10 RX ADMIN — PANTOPRAZOLE SODIUM 40 MILLIGRAM(S): 20 TABLET, DELAYED RELEASE ORAL at 11:50

## 2017-03-10 RX ADMIN — HEPARIN SODIUM 5000 UNIT(S): 5000 INJECTION INTRAVENOUS; SUBCUTANEOUS at 05:26

## 2017-03-10 RX ADMIN — PROPOFOL 14.16 MICROGRAM(S)/KG/MIN: 10 INJECTION, EMULSION INTRAVENOUS at 22:40

## 2017-03-10 RX ADMIN — PIPERACILLIN AND TAZOBACTAM 25 GRAM(S): 4; .5 INJECTION, POWDER, LYOPHILIZED, FOR SOLUTION INTRAVENOUS at 14:07

## 2017-03-10 RX ADMIN — Medication 3: at 00:09

## 2017-03-10 RX ADMIN — POTASSIUM PHOSPHATE, MONOBASIC POTASSIUM PHOSPHATE, DIBASIC 63.75 MILLIMOLE(S): 236; 224 INJECTION, SOLUTION INTRAVENOUS at 07:53

## 2017-03-10 RX ADMIN — Medication 3 MILLILITER(S): at 11:56

## 2017-03-10 RX ADMIN — CHLORHEXIDINE GLUCONATE 15 MILLILITER(S): 213 SOLUTION TOPICAL at 05:26

## 2017-03-10 NOTE — PROGRESS NOTE ADULT - ASSESSMENT
Pt is a 62 yo WM with h/o COPD on home O2, nocturnal CPAP, CAD/stent, A fib, HTN, DM, HLD and prostate adenoma presented 2 to SOB. ABG showed worsening acute on chronic resp acidosis despite being  BiPAP. Admitting dx: Acute on chronic resp acidosis/ COPD exacerbation/ PNA    Resp: Daily SBT/ Cont Nebs + Steroids  ID: Cont Abx  FEN: Cont enteral feeds  Endo: Follow FS and cover with RI  Neuro: Cont light sedation with Propofol  Social: Family members at bedside    CCT 35 min Pt is a 60 yo WM with h/o COPD on home O2, nocturnal CPAP, CAD/stent, A fib, HTN, DM, HLD and prostate adenoma presented 2 to SOB. ABG showed worsening acute on chronic resp acidosis despite being  BiPAP. Admitting dx: Acute on chronic resp acidosis/ COPD exacerbation/ PNA    Resp: Daily SBT/ Cont Nebs + Steroids  ID: Cont Abx  FEN: Cont enteral feeds  Endo: Follow FS and cover with RI  Neuro/Psych: Cont light sedation with Propofol/ Started on low dose Xanax  Social: Family members at bedside    CCT 35 min

## 2017-03-10 NOTE — PROGRESS NOTE ADULT - SUBJECTIVE AND OBJECTIVE BOX
HPI:  Pt is a 60 yo M with h/o COPD on home O2, nocturnal CPAP, CAD/stent, A fib, HTN, DM, HLD and prostate adenoma presented 2 to SOB. ABG showed worsening acute on chronic resp acidosis despite being  BiPAP. Admitting dx: Acute on chronic resp acidosis/ COPD exacerbation/ PNA    ## Labs:  CBC:                        7.6    8.0   )-----------( 409      ( 10 Mar 2017 03:22 )             22.3     Chem:  10 Mar 2017 03:22    137    |  97     |  15     ----------------------------<  267    3.7     |  33     |  0.33     Ca    7.9        10 Mar 2017 03:22  Phos  1.5       10 Mar 2017 03:22  Mg     1.9       10 Mar 2017 03:22    Coags:    ## Medications:  piperacillin/tazobactam IVPB. 3.375Gram(s) IV Intermittent every 8 hours  vancomycin  IVPB 1000milliGRAM(s) IV Intermittent every 8 hours      ALBUTerol/ipratropium for Nebulization 3milliLiter(s) Nebulizer every 6 hours  ALBUTerol    90 MICROgram(s) HFA Inhaler 1Puff(s) Inhalation every 4 hours  tiotropium 18 MICROgram(s) Capsule 1Capsule(s) Inhalation daily    insulin lispro (HumaLOG) corrective regimen sliding scale  SubCutaneous every 6 hours  dextrose Gel 1Dose(s) Oral once PRN  dextrose 50% Injectable 12.5Gram(s) IV Push once  dextrose 50% Injectable 25Gram(s) IV Push once  glucagon  Injectable 1milliGRAM(s) IntraMuscular once PRN  methylPREDNISolone sodium succinate Injectable 40milliGRAM(s) IV Push every 8 hours    heparin  Injectable 5000Unit(s) SubCutaneous every 12 hours    pantoprazole  Injectable 40milliGRAM(s) IV Push daily    propofol Infusion 40MICROgram(s)/kG/Min IV Continuous <Continuous>  acetaminophen   Tablet 650milliGRAM(s) Oral every 6 hours PRN  ALPRAZolam 0.25milliGRAM(s) Oral two times a day      ## Vitals:  T(C): 37.1, Max: 37.6 (03-10 @ 05:00)  HR: 97 (62 - 176)  BP: 145/75 (109/71 - 168/67)  BP(mean): 92 (74 - 103)  RR: 17 (12 - 27)  SpO2: 100% (88% - 100%)  Wt(kg): --  Vent: Mode: AC/ CMV (Assist Control/ Continuous Mandatory Ventilation), RR (machine): 16, RR (patient): 17, TV (machine): 400, FiO2: 30, PEEP: 5, PIP: 25  ABG:     I & Os for 24h ending 03-10 @ 07:00  =============================================  IN: 4771.8 ml / OUT: 1580 ml / NET: 3191.8 ml    I & Os for current day (as of 03-10 @ 20:11)  =============================================  IN: 2113.2 ml / OUT: 801 ml / NET: 1312.2 ml    ## P/E:  Gen: lying comfortably in bed in no apparent distress  Mouth: ETT  Lungs: Decreased BS with occas wheeze  Heart: RRR  Abd: Soft/+BS  Ext: No edema  Neuro: Sedated but arousable    CENTRAL LINE: [ ] YES [ x] NO  LOCATION:   DATE INSERTED:  REMOVE: [ ] YES [ ] NO      MARTINEZ: [x ] YES [ ] NO    DATE INSERTED:  REMOVE:  [ ] YES [ ] NO      A-LINE:  [ ] YES [x ] NO  LOCATION:   DATE INSERTED:  REMOVE:  [ ] YES [ ] NO  EXPLAIN:    CODE STATUS: [x] full code  [ ] DNR  [ ] DNI  [ ] MOLST  Goals of care discussion: [ ] yes

## 2017-03-11 LAB
ANION GAP SERPL CALC-SCNC: 9 MMOL/L — SIGNIFICANT CHANGE UP (ref 5–17)
BUN SERPL-MCNC: 13 MG/DL — SIGNIFICANT CHANGE UP (ref 7–23)
CALCIUM SERPL-MCNC: 7.8 MG/DL — LOW (ref 8.5–10.1)
CHLORIDE SERPL-SCNC: 96 MMOL/L — SIGNIFICANT CHANGE UP (ref 96–108)
CO2 SERPL-SCNC: 32 MMOL/L — HIGH (ref 22–31)
CREAT SERPL-MCNC: 0.31 MG/DL — LOW (ref 0.5–1.3)
GLUCOSE SERPL-MCNC: 219 MG/DL — HIGH (ref 70–99)
HCT VFR BLD CALC: 22.7 % — LOW (ref 39–50)
HGB BLD-MCNC: 7.2 G/DL — LOW (ref 13–17)
MAGNESIUM SERPL-MCNC: 1.9 MG/DL — SIGNIFICANT CHANGE UP (ref 1.8–2.4)
MCHC RBC-ENTMCNC: 25.1 PG — LOW (ref 27–34)
MCHC RBC-ENTMCNC: 31.9 GM/DL — LOW (ref 32–36)
MCV RBC AUTO: 78.7 FL — LOW (ref 80–100)
PHOSPHATE SERPL-MCNC: 1.7 MG/DL — LOW (ref 2.5–4.5)
PLATELET # BLD AUTO: 479 K/UL — HIGH (ref 150–400)
POTASSIUM SERPL-MCNC: 3.9 MMOL/L — SIGNIFICANT CHANGE UP (ref 3.5–5.3)
POTASSIUM SERPL-SCNC: 3.9 MMOL/L — SIGNIFICANT CHANGE UP (ref 3.5–5.3)
RBC # BLD: 2.89 M/UL — LOW (ref 4.2–5.8)
RBC # FLD: 16.7 % — HIGH (ref 11–15)
SODIUM SERPL-SCNC: 137 MMOL/L — SIGNIFICANT CHANGE UP (ref 135–145)
WBC # BLD: 8.6 K/UL — SIGNIFICANT CHANGE UP (ref 3.8–10.5)
WBC # FLD AUTO: 8.6 K/UL — SIGNIFICANT CHANGE UP (ref 3.8–10.5)

## 2017-03-11 PROCEDURE — 71010: CPT | Mod: 26

## 2017-03-11 PROCEDURE — 99291 CRITICAL CARE FIRST HOUR: CPT

## 2017-03-11 RX ORDER — POTASSIUM PHOSPHATE, MONOBASIC POTASSIUM PHOSPHATE, DIBASIC 236; 224 MG/ML; MG/ML
15 INJECTION, SOLUTION INTRAVENOUS ONCE
Qty: 0 | Refills: 0 | Status: COMPLETED | OUTPATIENT
Start: 2017-03-11 | End: 2017-03-11

## 2017-03-11 RX ORDER — METOPROLOL TARTRATE 50 MG
5 TABLET ORAL ONCE
Qty: 0 | Refills: 0 | Status: COMPLETED | OUTPATIENT
Start: 2017-03-11 | End: 2017-03-11

## 2017-03-11 RX ORDER — SODIUM CHLORIDE 9 MG/ML
500 INJECTION INTRAMUSCULAR; INTRAVENOUS; SUBCUTANEOUS ONCE
Qty: 0 | Refills: 0 | Status: COMPLETED | OUTPATIENT
Start: 2017-03-11 | End: 2017-03-11

## 2017-03-11 RX ORDER — DEXMEDETOMIDINE HYDROCHLORIDE IN 0.9% SODIUM CHLORIDE 4 UG/ML
0.2 INJECTION INTRAVENOUS
Qty: 200 | Refills: 0 | Status: DISCONTINUED | OUTPATIENT
Start: 2017-03-11 | End: 2017-03-12

## 2017-03-11 RX ADMIN — Medication 2: at 01:03

## 2017-03-11 RX ADMIN — Medication 0.25 MILLIGRAM(S): at 06:59

## 2017-03-11 RX ADMIN — POTASSIUM PHOSPHATE, MONOBASIC POTASSIUM PHOSPHATE, DIBASIC 63.75 MILLIMOLE(S): 236; 224 INJECTION, SOLUTION INTRAVENOUS at 11:40

## 2017-03-11 RX ADMIN — Medication 3 MILLILITER(S): at 23:54

## 2017-03-11 RX ADMIN — HEPARIN SODIUM 5000 UNIT(S): 5000 INJECTION INTRAVENOUS; SUBCUTANEOUS at 06:59

## 2017-03-11 RX ADMIN — Medication 3 MILLILITER(S): at 06:47

## 2017-03-11 RX ADMIN — CHLORHEXIDINE GLUCONATE 15 MILLILITER(S): 213 SOLUTION TOPICAL at 06:59

## 2017-03-11 RX ADMIN — Medication 250 MILLIGRAM(S): at 07:00

## 2017-03-11 RX ADMIN — DEXMEDETOMIDINE HYDROCHLORIDE IN 0.9% SODIUM CHLORIDE 2.22 MICROGRAM(S)/KG/HR: 4 INJECTION INTRAVENOUS at 12:44

## 2017-03-11 RX ADMIN — POTASSIUM PHOSPHATE, MONOBASIC POTASSIUM PHOSPHATE, DIBASIC 63.75 MILLIMOLE(S): 236; 224 INJECTION, SOLUTION INTRAVENOUS at 06:40

## 2017-03-11 RX ADMIN — HEPARIN SODIUM 5000 UNIT(S): 5000 INJECTION INTRAVENOUS; SUBCUTANEOUS at 18:36

## 2017-03-11 RX ADMIN — PANTOPRAZOLE SODIUM 40 MILLIGRAM(S): 20 TABLET, DELAYED RELEASE ORAL at 12:42

## 2017-03-11 RX ADMIN — SODIUM CHLORIDE 1000 MILLILITER(S): 9 INJECTION INTRAMUSCULAR; INTRAVENOUS; SUBCUTANEOUS at 10:41

## 2017-03-11 RX ADMIN — Medication 2: at 18:39

## 2017-03-11 RX ADMIN — Medication 3 MILLILITER(S): at 11:39

## 2017-03-11 RX ADMIN — Medication 2: at 07:00

## 2017-03-11 RX ADMIN — Medication 3 MILLILITER(S): at 01:07

## 2017-03-11 RX ADMIN — Medication 2: at 12:42

## 2017-03-11 RX ADMIN — Medication 40 MILLIGRAM(S): at 23:00

## 2017-03-11 RX ADMIN — PIPERACILLIN AND TAZOBACTAM 25 GRAM(S): 4; .5 INJECTION, POWDER, LYOPHILIZED, FOR SOLUTION INTRAVENOUS at 23:00

## 2017-03-11 RX ADMIN — Medication 40 MILLIGRAM(S): at 07:00

## 2017-03-11 RX ADMIN — PIPERACILLIN AND TAZOBACTAM 25 GRAM(S): 4; .5 INJECTION, POWDER, LYOPHILIZED, FOR SOLUTION INTRAVENOUS at 07:00

## 2017-03-11 RX ADMIN — Medication 250 MILLIGRAM(S): at 13:34

## 2017-03-11 RX ADMIN — SODIUM CHLORIDE 100 MILLILITER(S): 9 INJECTION INTRAMUSCULAR; INTRAVENOUS; SUBCUTANEOUS at 08:00

## 2017-03-11 RX ADMIN — CHLORHEXIDINE GLUCONATE 15 MILLILITER(S): 213 SOLUTION TOPICAL at 18:36

## 2017-03-11 RX ADMIN — CHLORHEXIDINE GLUCONATE 1 APPLICATION(S): 213 SOLUTION TOPICAL at 12:43

## 2017-03-11 RX ADMIN — Medication 250 MILLIGRAM(S): at 23:00

## 2017-03-11 RX ADMIN — PIPERACILLIN AND TAZOBACTAM 25 GRAM(S): 4; .5 INJECTION, POWDER, LYOPHILIZED, FOR SOLUTION INTRAVENOUS at 13:34

## 2017-03-11 RX ADMIN — Medication 3 MILLILITER(S): at 17:18

## 2017-03-11 RX ADMIN — Medication 40 MILLIGRAM(S): at 13:35

## 2017-03-11 NOTE — PROVIDER CONTACT NOTE (EICU) - BACKGROUND
Pt with sepsis now improved. off pressors. h/o afib. appears comfortable on vent. was taking diltiazem at home. .

## 2017-03-11 NOTE — PROGRESS NOTE ADULT - ASSESSMENT
Pt is a 60 yo WM with h/o COPD on home O2, nocturnal CPAP, CAD/stent, A fib, HTN, DM, HLD and prostate adenoma presented 2 to SOB. ABG showed worsening acute on chronic resp acidosis despite being  BiPAP intubated. Admitting dx: Acute on chronic resp acidosis/ COPD exacerbation/ PNA    Resp: Daily SBT/ Cont Nebs , taper Steroids  ID: Cont Abx vancoa ndzosyn  FEN: Cont enteral feeds  Endo: Follow FS and cover with RI  Neuro/Psych: on light sedation with Propofol, switch to precedex, cont on low dose Xanax    DVT/GI ppx

## 2017-03-11 NOTE — PROGRESS NOTE ADULT - SUBJECTIVE AND OBJECTIVE BOX
HPI:  62 Y/O male w/ PMHx of COPD on home O2, cpap at night, afib, htn, hld, dm, pw c/o SOB. ABG worsening in ED showing 7.26/102/133/44 on Bipap. Pt intubated for hypercapnic respiratory failure. (08 Mar 2017 06:36)    Over 24 Hrs: failed weaning this am, becomes very anxious and tachycardic.    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  Accelerated Essential Hypertension: x3 years  S/P appendectomy      ## ROS: Unable to obtain      ## O/E:  Vitals: T(C): 37.7, Max: 37.7 (03-10 @ 21:23)  HR: 94 (66 - 175)  BP: 145/74 (78/55 - 173/81)  BP(mean): 92 (61 - 119)  RR: 16 (11 - 28)  SpO2: 100% (76% - 100%)  Wt(kg): --  Gen: lying comfortably in bed in no apparent distress  HEENT: PERRL, EOMI  Resp: CTA B/L no c/r/w  CVS: S1S2 no m/r/g  Abd: soft NT/ND +BS  Ext: no c/c/e  Neuro: A&Ox3    I & Os for 24h ending 03-11 @ 07:00  =============================================  IN: 4936.9 ml / OUT: 2301 ml / NET: 2635.9 ml    I & Os for current day (as of 03-11 @ 15:18)  =============================================  IN: 1911.1 ml / OUT: 675 ml / NET: 1236.1 ml    Mode: AC/ CMV (Assist Control/ Continuous Mandatory Ventilation), RR (machine): 16, TV (machine): 400, FiO2: 30, PEEP: 5, ITime: 1.25, MAP: 12, PIP: 26    ## Labs:                        7.2    8.6   )-----------( 479      ( 11 Mar 2017 04:09 )             22.7     11 Mar 2017 04:09    137    |  96     |  13     ----------------------------<  219    3.9     |  32     |  0.31     Ca    7.8        11 Mar 2017 04:09  Phos  1.7       11 Mar 2017 04:09  Mg     1.9       11 Mar 2017 04:09      ## Imaging: reviewed    MEDICATIONS  (STANDING):  heparin  Injectable 5000Unit(s) SubCutaneous every 12 hours  pantoprazole  Injectable 40milliGRAM(s) IV Push daily  chlorhexidine 0.12% Liquid 15milliLiter(s) Swish and Spit two times a day  chlorhexidine 4% Liquid 1Application(s) Topical daily  sodium chloride 0.9%. 1000milliLiter(s) IV Continuous <Continuous>  insulin lispro (HumaLOG) corrective regimen sliding scale  SubCutaneous every 6 hours  dextrose 5%. 1000milliLiter(s) IV Continuous <Continuous>  dextrose 50% Injectable 12.5Gram(s) IV Push once  dextrose 50% Injectable 25Gram(s) IV Push once  propofol Infusion 40MICROgram(s)/kG/Min IV Continuous <Continuous>  piperacillin/tazobactam IVPB. 3.375Gram(s) IV Intermittent every 8 hours  ALBUTerol/ipratropium for Nebulization 3milliLiter(s) Nebulizer every 6 hours  ALBUTerol    90 MICROgram(s) HFA Inhaler 1Puff(s) Inhalation every 4 hours  tiotropium 18 MICROgram(s) Capsule 1Capsule(s) Inhalation daily  methylPREDNISolone sodium succinate Injectable 40milliGRAM(s) IV Push every 8 hours  vancomycin  IVPB 1000milliGRAM(s) IV Intermittent every 8 hours  ALPRAZolam 0.25milliGRAM(s) Oral two times a day  diltiazem    Tablet 30milliGRAM(s) Oral every 8 hours  dexmedetomidine Infusion 0.2MICROgram(s)/kG/Hr IV Continuous <Continuous>    MEDICATIONS  (PRN):  dextrose Gel 1Dose(s) Oral once PRN Blood Glucose LESS THAN 70 milliGRAM(s)/deciliter  glucagon  Injectable 1milliGRAM(s) IntraMuscular once PRN Glucose LESS THAN 70 milligrams/deciliter  acetaminophen   Tablet 650milliGRAM(s) Oral every 6 hours PRN For Temp greater than 38 C (100.4 F)      ## Code status:  Goals of care discussion: [x] yes [ ] no  [x] full code  [ ] DNR  [ ] DNI  [ ] MOLST HPI:  62 Y/O male w/ PMHx of COPD on home O2, cpap at night, afib, htn, hld, dm, pw c/o SOB. ABG worsening in ED showing 7.26/102/133/44 on Bipap. Pt intubated for hypercapnic respiratory failure. (08 Mar 2017 06:36)    Over 24 Hrs: failed weaning this am, becomes very anxious and tachycardic.    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  Accelerated Essential Hypertension: x3 years  S/P appendectomy      ## ROS: Unable to obtain      ## O/E:  Vitals: T(C): 37.7, Max: 37.7 (03-10 @ 21:23)  HR: 94 (66 - 175)  BP: 145/74 (78/55 - 173/81)  BP(mean): 92 (61 - 119)  RR: 16 (11 - 28)  SpO2: 100% (76% - 100%)  Wt(kg): --  Gen: lying comfortably in bed intubated  HEENT: PERRL, EOMI  Resp: CTA B/L no c/r/w  CVS: S1S2 no m/r/g  Abd: soft NT/ND +BS  Ext: no c/c/e  Neuro: awake and responsive    I & Os for 24h ending 03-11 @ 07:00  =============================================  IN: 4936.9 ml / OUT: 2301 ml / NET: 2635.9 ml    I & Os for current day (as of 03-11 @ 15:18)  =============================================  IN: 1911.1 ml / OUT: 675 ml / NET: 1236.1 ml    Mode: AC/ CMV (Assist Control/ Continuous Mandatory Ventilation), RR (machine): 16, TV (machine): 400, FiO2: 30, PEEP: 5, ITime: 1.25, MAP: 12, PIP: 26    ## Labs:                        7.2    8.6   )-----------( 479      ( 11 Mar 2017 04:09 )             22.7     11 Mar 2017 04:09    137    |  96     |  13     ----------------------------<  219    3.9     |  32     |  0.31     Ca    7.8        11 Mar 2017 04:09  Phos  1.7       11 Mar 2017 04:09  Mg     1.9       11 Mar 2017 04:09      ## Imaging: reviewed    MEDICATIONS  (STANDING):  heparin  Injectable 5000Unit(s) SubCutaneous every 12 hours  pantoprazole  Injectable 40milliGRAM(s) IV Push daily  chlorhexidine 0.12% Liquid 15milliLiter(s) Swish and Spit two times a day  chlorhexidine 4% Liquid 1Application(s) Topical daily  sodium chloride 0.9%. 1000milliLiter(s) IV Continuous <Continuous>  insulin lispro (HumaLOG) corrective regimen sliding scale  SubCutaneous every 6 hours  dextrose 5%. 1000milliLiter(s) IV Continuous <Continuous>  dextrose 50% Injectable 12.5Gram(s) IV Push once  dextrose 50% Injectable 25Gram(s) IV Push once  propofol Infusion 40MICROgram(s)/kG/Min IV Continuous <Continuous>  piperacillin/tazobactam IVPB. 3.375Gram(s) IV Intermittent every 8 hours  ALBUTerol/ipratropium for Nebulization 3milliLiter(s) Nebulizer every 6 hours  ALBUTerol    90 MICROgram(s) HFA Inhaler 1Puff(s) Inhalation every 4 hours  tiotropium 18 MICROgram(s) Capsule 1Capsule(s) Inhalation daily  methylPREDNISolone sodium succinate Injectable 40milliGRAM(s) IV Push every 8 hours  vancomycin  IVPB 1000milliGRAM(s) IV Intermittent every 8 hours  ALPRAZolam 0.25milliGRAM(s) Oral two times a day  diltiazem    Tablet 30milliGRAM(s) Oral every 8 hours  dexmedetomidine Infusion 0.2MICROgram(s)/kG/Hr IV Continuous <Continuous>    MEDICATIONS  (PRN):  dextrose Gel 1Dose(s) Oral once PRN Blood Glucose LESS THAN 70 milliGRAM(s)/deciliter  glucagon  Injectable 1milliGRAM(s) IntraMuscular once PRN Glucose LESS THAN 70 milligrams/deciliter  acetaminophen   Tablet 650milliGRAM(s) Oral every 6 hours PRN For Temp greater than 38 C (100.4 F)      ## Code status:  Goals of care discussion: [x] yes [ ] no  [x] full code  [ ] DNR  [ ] DNI  [ ] MOLST

## 2017-03-11 NOTE — PROGRESS NOTE ADULT - SUBJECTIVE AND OBJECTIVE BOX
INTERVAL HPI/OVERNIGHT EVENTS:  60 Y/O male w/ PMHx of COPD on home O2, CPAP at night, A FIB,, HTN, HLD, DM, presented with SOB. Intubated for hypercapnic respiratory failure in ED. CT chest and CXR showed multifocal pneumonia.  NO more details of history are available as intubated and not able to provide information.   Was recently admitted with similar presentation, was in ICU with BIPAP support.  Did not tolerate CPAP today, Started on Precedex.    Vital Signs Last 24 Hrs  T(C): 37.3, Max: 37.7 (03-10 @ 21:23)  T(F): 99.2, Max: 99.8 (03-10 @ 21:23)  HR: 83 (66 - 175)  BP: 132/61 (78/55 - 173/81)  BP(mean): 78 (61 - 119)  RR: 13 (11 - 28)  SpO2: 100% (76% - 100%)    Mode: AC/ CMV (Assist Control/ Continuous Mandatory Ventilation)  RR (machine): 16  TV (machine): 400  FiO2: 30  PEEP: 5  ITime: 1  MAP: 11  PIP: 25  PHYSICAL EXAM:  GEN:         Awake, responsive and comfortable.  HEENT:    Normal.    RESP:      crackles  CVS:          Regular rate and rhythm.   ABD:         Soft, non-tender, non-distended;       MEDICATIONS  (STANDING):  heparin  Injectable 5000Unit(s) SubCutaneous every 12 hours  pantoprazole  Injectable 40milliGRAM(s) IV Push daily  chlorhexidine 0.12% Liquid 15milliLiter(s) Swish and Spit two times a day  chlorhexidine 4% Liquid 1Application(s) Topical daily  sodium chloride 0.9%. 1000milliLiter(s) IV Continuous <Continuous>  insulin lispro (HumaLOG) corrective regimen sliding scale  SubCutaneous every 6 hours  dextrose 5%. 1000milliLiter(s) IV Continuous <Continuous>  dextrose 50% Injectable 12.5Gram(s) IV Push once  dextrose 50% Injectable 25Gram(s) IV Push once  propofol Infusion 40MICROgram(s)/kG/Min IV Continuous <Continuous>  piperacillin/tazobactam IVPB. 3.375Gram(s) IV Intermittent every 8 hours  ALBUTerol/ipratropium for Nebulization 3milliLiter(s) Nebulizer every 6 hours  ALBUTerol    90 MICROgram(s) HFA Inhaler 1Puff(s) Inhalation every 4 hours  tiotropium 18 MICROgram(s) Capsule 1Capsule(s) Inhalation daily  methylPREDNISolone sodium succinate Injectable 40milliGRAM(s) IV Push every 8 hours  vancomycin  IVPB 1000milliGRAM(s) IV Intermittent every 8 hours  ALPRAZolam 0.25milliGRAM(s) Oral two times a day  diltiazem    Tablet 30milliGRAM(s) Oral every 8 hours  dexmedetomidine Infusion 0.2MICROgram(s)/kG/Hr IV Continuous <Continuous>    MEDICATIONS  (PRN):  dextrose Gel 1Dose(s) Oral once PRN Blood Glucose LESS THAN 70 milliGRAM(s)/deciliter  glucagon  Injectable 1milliGRAM(s) IntraMuscular once PRN Glucose LESS THAN 70 milligrams/deciliter  acetaminophen   Tablet 650milliGRAM(s) Oral every 6 hours PRN For Temp greater than 38 C (100.4 F)    LABS:                        7.2    8.6   )-----------( 479      ( 11 Mar 2017 04:09 )             22.7     11 Mar 2017 04:09    137    |  96     |  13     ----------------------------<  219    3.9     |  32     |  0.31     Ca    7.8        11 Mar 2017 04:09  Phos  1.7       11 Mar 2017 04:09  Mg     1.9       11 Mar 2017 04:09    ASSESSMENT AND PLAN:  ·	Acute Hypoxic/hypercarbic Respiratory failure.  ·	Multifocal pneumonia.  ·	Acute COPD exacerbation.  ·	Bilateral pleural effusion.  ·	Anemia.  ·	CAD with angioplasty.  ·	HTN.  ·	A Fib.  ·	HLD.    Daily weaning trial.

## 2017-03-11 NOTE — PROVIDER CONTACT NOTE (EICU) - RECOMMENDATIONS
Lopressor 5mg IV given x1.   Restarted diltiazem 30mg q8h for sustained control. may need to be titrated up.   d/w Melanie on primary team.

## 2017-03-12 LAB
-  AMPICILLIN/SULBACTAM: SIGNIFICANT CHANGE UP
-  CEFAZOLIN: SIGNIFICANT CHANGE UP
-  CIPROFLOXACIN: SIGNIFICANT CHANGE UP
-  CLINDAMYCIN: SIGNIFICANT CHANGE UP
-  ERYTHROMYCIN: SIGNIFICANT CHANGE UP
-  GENTAMICIN: SIGNIFICANT CHANGE UP
-  LEVOFLOXACIN: SIGNIFICANT CHANGE UP
-  MOXIFLOXACIN(AEROBIC): SIGNIFICANT CHANGE UP
-  OXACILLIN: SIGNIFICANT CHANGE UP
-  PENICILLIN: SIGNIFICANT CHANGE UP
-  RIFAMPIN: SIGNIFICANT CHANGE UP
-  TETRACYCLINE: SIGNIFICANT CHANGE UP
-  TRIMETHOPRIM/SULFAMETHOXAZOLE: SIGNIFICANT CHANGE UP
-  VANCOMYCIN: SIGNIFICANT CHANGE UP
ANION GAP SERPL CALC-SCNC: 5 MMOL/L — SIGNIFICANT CHANGE UP (ref 5–17)
BASE EXCESS BLDA CALC-SCNC: 13.3 MMOL/L — HIGH (ref -2–2)
BLOOD GAS COMMENTS: SIGNIFICANT CHANGE UP
BLOOD GAS COMMENTS: SIGNIFICANT CHANGE UP
BLOOD GAS SOURCE: SIGNIFICANT CHANGE UP
BUN SERPL-MCNC: 14 MG/DL — SIGNIFICANT CHANGE UP (ref 7–23)
CALCIUM SERPL-MCNC: 8 MG/DL — LOW (ref 8.5–10.1)
CHLORIDE SERPL-SCNC: 98 MMOL/L — SIGNIFICANT CHANGE UP (ref 96–108)
CO2 SERPL-SCNC: 38 MMOL/L — HIGH (ref 22–31)
CREAT SERPL-MCNC: 0.4 MG/DL — LOW (ref 0.5–1.3)
CULTURE RESULTS: SIGNIFICANT CHANGE UP
CULTURE RESULTS: SIGNIFICANT CHANGE UP
GLUCOSE SERPL-MCNC: 166 MG/DL — HIGH (ref 70–99)
GRAM STN FLD: SIGNIFICANT CHANGE UP
GRAM STN FLD: SIGNIFICANT CHANGE UP
HCO3 BLDA-SCNC: 39 MMOL/L — HIGH (ref 21–29)
HCT VFR BLD CALC: 24 % — LOW (ref 39–50)
HGB BLD-MCNC: 8 G/DL — LOW (ref 13–17)
HOROWITZ INDEX BLDA+IHG-RTO: 30 — SIGNIFICANT CHANGE UP
MAGNESIUM SERPL-MCNC: 2 MG/DL — SIGNIFICANT CHANGE UP (ref 1.8–2.4)
MCHC RBC-ENTMCNC: 26.6 PG — LOW (ref 27–34)
MCHC RBC-ENTMCNC: 33.4 GM/DL — SIGNIFICANT CHANGE UP (ref 32–36)
MCV RBC AUTO: 79.6 FL — LOW (ref 80–100)
METHOD TYPE: SIGNIFICANT CHANGE UP
ORGANISM # SPEC MICROSCOPIC CNT: SIGNIFICANT CHANGE UP
ORGANISM # SPEC MICROSCOPIC CNT: SIGNIFICANT CHANGE UP
PCO2 BLDA: 59 MMHG — HIGH (ref 32–46)
PH BLD: 7.44 — SIGNIFICANT CHANGE UP (ref 7.35–7.45)
PHOSPHATE SERPL-MCNC: 2.2 MG/DL — LOW (ref 2.5–4.5)
PLATELET # BLD AUTO: 515 K/UL — HIGH (ref 150–400)
PO2 BLDA: 88 MMHG — SIGNIFICANT CHANGE UP (ref 74–108)
POTASSIUM SERPL-MCNC: 4.4 MMOL/L — SIGNIFICANT CHANGE UP (ref 3.5–5.3)
POTASSIUM SERPL-SCNC: 4.4 MMOL/L — SIGNIFICANT CHANGE UP (ref 3.5–5.3)
RBC # BLD: 3.02 M/UL — LOW (ref 4.2–5.8)
RBC # FLD: 17.2 % — HIGH (ref 11–15)
SAO2 % BLDA: 97 % — HIGH (ref 92–96)
SODIUM SERPL-SCNC: 141 MMOL/L — SIGNIFICANT CHANGE UP (ref 135–145)
SPECIMEN SOURCE: SIGNIFICANT CHANGE UP
SPECIMEN SOURCE: SIGNIFICANT CHANGE UP
VANCOMYCIN TROUGH SERPL-MCNC: 15.8 UG/ML — SIGNIFICANT CHANGE UP (ref 10–20)
WBC # BLD: 9.8 K/UL — SIGNIFICANT CHANGE UP (ref 3.8–10.5)
WBC # FLD AUTO: 9.8 K/UL — SIGNIFICANT CHANGE UP (ref 3.8–10.5)

## 2017-03-12 PROCEDURE — 99291 CRITICAL CARE FIRST HOUR: CPT

## 2017-03-12 RX ORDER — DEXMEDETOMIDINE HYDROCHLORIDE IN 0.9% SODIUM CHLORIDE 4 UG/ML
0.2 INJECTION INTRAVENOUS
Qty: 200 | Refills: 0 | Status: DISCONTINUED | OUTPATIENT
Start: 2017-03-12 | End: 2017-03-13

## 2017-03-12 RX ORDER — FENTANYL CITRATE 50 UG/ML
50 INJECTION INTRAVENOUS ONCE
Qty: 0 | Refills: 0 | Status: DISCONTINUED | OUTPATIENT
Start: 2017-03-12 | End: 2017-03-12

## 2017-03-12 RX ORDER — METOPROLOL TARTRATE 50 MG
5 TABLET ORAL ONCE
Qty: 0 | Refills: 0 | Status: COMPLETED | OUTPATIENT
Start: 2017-03-12 | End: 2017-03-12

## 2017-03-12 RX ADMIN — SODIUM CHLORIDE 100 MILLILITER(S): 9 INJECTION INTRAMUSCULAR; INTRAVENOUS; SUBCUTANEOUS at 22:30

## 2017-03-12 RX ADMIN — Medication 3 MILLILITER(S): at 17:06

## 2017-03-12 RX ADMIN — FENTANYL CITRATE 50 MICROGRAM(S): 50 INJECTION INTRAVENOUS at 00:43

## 2017-03-12 RX ADMIN — PIPERACILLIN AND TAZOBACTAM 25 GRAM(S): 4; .5 INJECTION, POWDER, LYOPHILIZED, FOR SOLUTION INTRAVENOUS at 05:36

## 2017-03-12 RX ADMIN — Medication 0.25 MILLIGRAM(S): at 17:31

## 2017-03-12 RX ADMIN — HEPARIN SODIUM 5000 UNIT(S): 5000 INJECTION INTRAVENOUS; SUBCUTANEOUS at 17:32

## 2017-03-12 RX ADMIN — DEXMEDETOMIDINE HYDROCHLORIDE IN 0.9% SODIUM CHLORIDE 2.22 MICROGRAM(S)/KG/HR: 4 INJECTION INTRAVENOUS at 21:49

## 2017-03-12 RX ADMIN — Medication 2: at 17:32

## 2017-03-12 RX ADMIN — Medication 2: at 11:59

## 2017-03-12 RX ADMIN — Medication 0.25 MILLIGRAM(S): at 05:36

## 2017-03-12 RX ADMIN — PIPERACILLIN AND TAZOBACTAM 25 GRAM(S): 4; .5 INJECTION, POWDER, LYOPHILIZED, FOR SOLUTION INTRAVENOUS at 14:22

## 2017-03-12 RX ADMIN — Medication 40 MILLIGRAM(S): at 14:23

## 2017-03-12 RX ADMIN — CHLORHEXIDINE GLUCONATE 15 MILLILITER(S): 213 SOLUTION TOPICAL at 05:35

## 2017-03-12 RX ADMIN — HEPARIN SODIUM 5000 UNIT(S): 5000 INJECTION INTRAVENOUS; SUBCUTANEOUS at 05:35

## 2017-03-12 RX ADMIN — PANTOPRAZOLE SODIUM 40 MILLIGRAM(S): 20 TABLET, DELAYED RELEASE ORAL at 11:59

## 2017-03-12 RX ADMIN — Medication 63.75 MILLIMOLE(S): at 06:05

## 2017-03-12 RX ADMIN — Medication 40 MILLIGRAM(S): at 05:38

## 2017-03-12 RX ADMIN — CHLORHEXIDINE GLUCONATE 15 MILLILITER(S): 213 SOLUTION TOPICAL at 17:32

## 2017-03-12 RX ADMIN — Medication 250 MILLIGRAM(S): at 18:03

## 2017-03-12 RX ADMIN — Medication 2: at 23:19

## 2017-03-12 RX ADMIN — CHLORHEXIDINE GLUCONATE 1 APPLICATION(S): 213 SOLUTION TOPICAL at 11:58

## 2017-03-12 RX ADMIN — Medication 1: at 05:37

## 2017-03-12 RX ADMIN — SODIUM CHLORIDE 100 MILLILITER(S): 9 INJECTION INTRAMUSCULAR; INTRAVENOUS; SUBCUTANEOUS at 01:02

## 2017-03-12 RX ADMIN — PIPERACILLIN AND TAZOBACTAM 25 GRAM(S): 4; .5 INJECTION, POWDER, LYOPHILIZED, FOR SOLUTION INTRAVENOUS at 22:54

## 2017-03-12 RX ADMIN — Medication 40 MILLIGRAM(S): at 22:54

## 2017-03-12 RX ADMIN — Medication 5 MILLIGRAM(S): at 07:45

## 2017-03-12 RX ADMIN — Medication 3 MILLILITER(S): at 12:08

## 2017-03-12 RX ADMIN — Medication 250 MILLIGRAM(S): at 05:36

## 2017-03-12 RX ADMIN — Medication 3 MILLILITER(S): at 06:18

## 2017-03-12 RX ADMIN — Medication 2: at 01:00

## 2017-03-12 RX ADMIN — SODIUM CHLORIDE 100 MILLILITER(S): 9 INJECTION INTRAMUSCULAR; INTRAVENOUS; SUBCUTANEOUS at 12:05

## 2017-03-12 NOTE — PROGRESS NOTE ADULT - SUBJECTIVE AND OBJECTIVE BOX
HPI:  60 Y/O male w/ PMHx of COPD on home O2, cpap at night, afib, htn, hld, dm, pw c/o SOB. ABG worsening in ED showing 7.26/102/133/44 on Bipap. Pt intubated for hypercapnic respiratory failure. (08 Mar 2017 06:36)    Over 24 Hrs: remains intubated, failed weaning trial again this am, became tachycardia and very anxiuos.    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  Accelerated Essential Hypertension: x3 years  S/P appendectomy      ## ROS: Unable to obtain      ## O/E:  Vitals: T(C): 37.7, Max: 37.7 (03-12 @ 08:00)  HR: 91 (64 - 139)  BP: 144/72 (107/62 - 169/90)  BP(mean): 89 (71 - 117)  RR: 21 (13 - 21)  SpO2: 100% (93% - 100%)  Wt(kg): --  Gen: lying comfortably in bed in no apparent distress, intubated  HEENT: PERRL, EOMI  Resp: CTA B/L no c/r/w  CVS: S1S2 no m/r/g  Abd: soft NT/ND +BS  Ext: no c/c/e  Neuro: Awake and repsonsive    I & Os for 24h ending 03-12 @ 07:00  =============================================  IN: 5578.7 ml / OUT: 3750 ml / NET: 1828.7 ml    I & Os for current day (as of 03-12 @ 13:49)  =============================================  IN: 750 ml / OUT: 825 ml / NET: -75 ml    Mode: AC/ CMV (Assist Control/ Continuous Mandatory Ventilation), RR (machine): 16, TV (machine): 400, FiO2: 30, PEEP: 5, ITime: 1, MAP: 11, PIP: 23    ## Labs:                        8.0    9.8   )-----------( 515      ( 12 Mar 2017 04:27 )             24.0     12 Mar 2017 04:27    141    |  98     |  14     ----------------------------<  166    4.4     |  38     |  0.40     Ca    8.0        12 Mar 2017 04:27  Phos  2.2       12 Mar 2017 04:27  Mg     2.0       12 Mar 2017 04:27        ABG - ( 12 Mar 2017 13:41 )  pH: x     /  pCO2: 59    /  pO2: 88    / HCO3: 39    / Base Excess: 13.3  /  SaO2: 97            ## Imaging: reviewed    MEDICATIONS  (STANDING):  heparin  Injectable 5000Unit(s) SubCutaneous every 12 hours  pantoprazole  Injectable 40milliGRAM(s) IV Push daily  chlorhexidine 0.12% Liquid 15milliLiter(s) Swish and Spit two times a day  chlorhexidine 4% Liquid 1Application(s) Topical daily  sodium chloride 0.9%. 1000milliLiter(s) IV Continuous <Continuous>  insulin lispro (HumaLOG) corrective regimen sliding scale  SubCutaneous every 6 hours  dextrose 5%. 1000milliLiter(s) IV Continuous <Continuous>  dextrose 50% Injectable 12.5Gram(s) IV Push once  dextrose 50% Injectable 25Gram(s) IV Push once  piperacillin/tazobactam IVPB. 3.375Gram(s) IV Intermittent every 8 hours  ALBUTerol/ipratropium for Nebulization 3milliLiter(s) Nebulizer every 6 hours  ALBUTerol    90 MICROgram(s) HFA Inhaler 1Puff(s) Inhalation every 4 hours  tiotropium 18 MICROgram(s) Capsule 1Capsule(s) Inhalation daily  methylPREDNISolone sodium succinate Injectable 40milliGRAM(s) IV Push every 8 hours  vancomycin  IVPB 1000milliGRAM(s) IV Intermittent every 8 hours  ALPRAZolam 0.25milliGRAM(s) Oral two times a day  diltiazem    Tablet 60milliGRAM(s) Oral every 8 hours  dexmedetomidine Infusion 0.2MICROgram(s)/kG/Hr IV Continuous <Continuous>    MEDICATIONS  (PRN):  dextrose Gel 1Dose(s) Oral once PRN Blood Glucose LESS THAN 70 milliGRAM(s)/deciliter  glucagon  Injectable 1milliGRAM(s) IntraMuscular once PRN Glucose LESS THAN 70 milligrams/deciliter  acetaminophen   Tablet 650milliGRAM(s) Oral every 6 hours PRN For Temp greater than 38 C (100.4 F)      ## Code status:  Goals of care discussion: [x] yes [ ] no  [x] full code  [ ] DNR  [ ] DNI  [ ] MOLST

## 2017-03-12 NOTE — PROGRESS NOTE ADULT - SUBJECTIVE AND OBJECTIVE BOX
INTERVAL HPI/OVERNIGHT EVENTS:  60 Y/O male w/ PMHx of COPD on home O2, CPAP at night, A FIB,, HTN, HLD, DM, presented with SOB. Intubated for hypercapnic respiratory failure in ED. CT chest and CXR showed multifocal pneumonia.  NO more details of history are available as intubated and not able to provide information.   Was recently admitted with similar presentation, was in ICU with BIPAP support.   Again did not tolerate CPAP today, as became  tachycardic and tachypneic On Precedex. Awake and responsive.    Vital Signs Last 24 Hrs  T(C): 37.3, Max: 37.7 (03-12 @ 08:00)  T(F): 99.2, Max: 99.8 (03-12 @ 08:00)  HR: 60 (53 - 139)  BP: 119/73 (107/62 - 169/90)  BP(mean): 84 (66 - 117)  RR: 15 (14 - 21)  SpO2: 100% (94% - 100%)    Mode: AC/ CMV (Assist Control/ Continuous Mandatory Ventilation)  RR (machine): 16  TV (machine): 400  FiO2: 30  PEEP: 5  ITime: 1  MAP: 11  PIP: 24    PHYSICAL EXAM:  GEN:        Awake, responsive and comfortable.on vent  HEENT:    ETT   RESP:       crackles.  CVS:          Regular rate and rhythm.   ABD:         Soft, non-tender, non-distended;     MEDICATIONS  (STANDING):  heparin  Injectable 5000Unit(s) SubCutaneous every 12 hours  pantoprazole  Injectable 40milliGRAM(s) IV Push daily  chlorhexidine 0.12% Liquid 15milliLiter(s) Swish and Spit two times a day  chlorhexidine 4% Liquid 1Application(s) Topical daily  sodium chloride 0.9%. 1000milliLiter(s) IV Continuous <Continuous>  insulin lispro (HumaLOG) corrective regimen sliding scale  SubCutaneous every 6 hours  dextrose 5%. 1000milliLiter(s) IV Continuous <Continuous>  dextrose 50% Injectable 12.5Gram(s) IV Push once  dextrose 50% Injectable 25Gram(s) IV Push once  piperacillin/tazobactam IVPB. 3.375Gram(s) IV Intermittent every 8 hours  ALBUTerol/ipratropium for Nebulization 3milliLiter(s) Nebulizer every 6 hours  ALBUTerol    90 MICROgram(s) HFA Inhaler 1Puff(s) Inhalation every 4 hours  tiotropium 18 MICROgram(s) Capsule 1Capsule(s) Inhalation daily  methylPREDNISolone sodium succinate Injectable 40milliGRAM(s) IV Push every 8 hours  vancomycin  IVPB 1000milliGRAM(s) IV Intermittent every 8 hours  ALPRAZolam 0.25milliGRAM(s) Oral two times a day  diltiazem    Tablet 60milliGRAM(s) Oral every 8 hours  dexmedetomidine Infusion 0.2MICROgram(s)/kG/Hr IV Continuous <Continuous>    MEDICATIONS  (PRN):  dextrose Gel 1Dose(s) Oral once PRN Blood Glucose LESS THAN 70 milliGRAM(s)/deciliter  glucagon  Injectable 1milliGRAM(s) IntraMuscular once PRN Glucose LESS THAN 70 milligrams/deciliter  acetaminophen   Tablet 650milliGRAM(s) Oral every 6 hours PRN For Temp greater than 38 C (100.4 F)    LABS:                        8.0    9.8   )-----------( 515      ( 12 Mar 2017 04:27 )             24.0     12 Mar 2017 04:27    141    |  98     |  14     ----------------------------<  166    4.4     |  38     |  0.40     Ca    8.0        12 Mar 2017 04:27  Phos  2.2       12 Mar 2017 04:27  Mg     2.0       12 Mar 2017 04:27    ABG - 03-12 @ 13:41  pH: 7.44 / pCO2: 59 / pO2: 88 on 30% O2.    ASSESSMENT AND PLAN:  ·	Acute Hypoxic/hypercarbic Respiratory failure.  ·	Multifocal pneumonia.  ·	Acute COPD exacerbation.  ·	Bilateral pleural effusion.  ·	Anemia.  ·	CAD with angioplasty.  ·	HTN.  ·	A Fib.  ·	HLD.      Continue Vent, nebulizer and steroids.  On antibiotics.  Daily weaning trial.

## 2017-03-12 NOTE — PROGRESS NOTE ADULT - ASSESSMENT
Pt is a 60 yo WM with h/o COPD on home O2, nocturnal CPAP, CAD/stent, A fib, HTN, DM, HLD and prostate adenoma presented 2 to SOB. ABG showed worsening acute on chronic resp acidosis despite being  BiPAP intubated. Admitting dx: Acute on chronic resp acidosis/ COPD exacerbation/ PNA    Resp: Daily SBT/ Cont Nebs , taper Steroids, fup ABG  ID: Cont Abx vanco and zosyn, recheck trough level and adjust dose accordingly,   -growing SA in blood fup sensitivities  FEN: Cont enteral feeds  monitor and replete elctrolytes  Endo: Follow FS and cover with RI  Neuro/Psych: on light sedation with Propofol, switched to precedex, cont on low dose Xanax    DVT/GI ppx

## 2017-03-13 LAB
ANION GAP SERPL CALC-SCNC: 7 MMOL/L — SIGNIFICANT CHANGE UP (ref 5–17)
BASE EXCESS BLDA CALC-SCNC: 11.7 MMOL/L — HIGH (ref -2–2)
BASE EXCESS BLDA CALC-SCNC: 18.6 MMOL/L — HIGH (ref -2–2)
BLD GP AB SCN SERPL QL: SIGNIFICANT CHANGE UP
BLOOD GAS COMMENTS: SIGNIFICANT CHANGE UP
BLOOD GAS SOURCE: SIGNIFICANT CHANGE UP
BLOOD GAS SOURCE: SIGNIFICANT CHANGE UP
BUN SERPL-MCNC: 15 MG/DL — SIGNIFICANT CHANGE UP (ref 7–23)
CALCIUM SERPL-MCNC: 7.6 MG/DL — LOW (ref 8.5–10.1)
CHLORIDE SERPL-SCNC: 92 MMOL/L — LOW (ref 96–108)
CO2 SERPL-SCNC: 37 MMOL/L — HIGH (ref 22–31)
CREAT SERPL-MCNC: 0.35 MG/DL — LOW (ref 0.5–1.3)
GLUCOSE SERPL-MCNC: 261 MG/DL — HIGH (ref 70–99)
HCO3 BLDA-SCNC: 39 MMOL/L — HIGH (ref 21–29)
HCO3 BLDA-SCNC: 47 MMOL/L — HIGH (ref 21–29)
HCT VFR BLD CALC: 21.5 % — LOW (ref 39–50)
HGB BLD-MCNC: 7.2 G/DL — LOW (ref 13–17)
HOROWITZ INDEX BLDA+IHG-RTO: 21 — SIGNIFICANT CHANGE UP
HOROWITZ INDEX BLDA+IHG-RTO: 30 — SIGNIFICANT CHANGE UP
MAGNESIUM SERPL-MCNC: 1.8 MG/DL — SIGNIFICANT CHANGE UP (ref 1.8–2.4)
MCHC RBC-ENTMCNC: 26.3 PG — LOW (ref 27–34)
MCHC RBC-ENTMCNC: 33.3 GM/DL — SIGNIFICANT CHANGE UP (ref 32–36)
MCV RBC AUTO: 79 FL — LOW (ref 80–100)
PCO2 BLDA: 75 MMHG — CRITICAL HIGH (ref 32–46)
PCO2 BLDA: 81 MMHG — CRITICAL HIGH (ref 32–46)
PH BLD: 7.33 — LOW (ref 7.35–7.45)
PH BLD: 7.38 — SIGNIFICANT CHANGE UP (ref 7.35–7.45)
PHOSPHATE SERPL-MCNC: 2.3 MG/DL — LOW (ref 2.5–4.5)
PLATELET # BLD AUTO: 495 K/UL — HIGH (ref 150–400)
PO2 BLDA: 68 MMHG — LOW (ref 74–108)
PO2 BLDA: 85 MMHG — SIGNIFICANT CHANGE UP (ref 74–108)
POTASSIUM SERPL-MCNC: 4 MMOL/L — SIGNIFICANT CHANGE UP (ref 3.5–5.3)
POTASSIUM SERPL-SCNC: 4 MMOL/L — SIGNIFICANT CHANGE UP (ref 3.5–5.3)
RBC # BLD: 2.73 M/UL — LOW (ref 4.2–5.8)
RBC # FLD: 17 % — HIGH (ref 11–15)
SAO2 % BLDA: 91 % — LOW (ref 92–96)
SAO2 % BLDA: 96 % — SIGNIFICANT CHANGE UP (ref 92–96)
SODIUM SERPL-SCNC: 136 MMOL/L — SIGNIFICANT CHANGE UP (ref 135–145)
WBC # BLD: 10 K/UL — SIGNIFICANT CHANGE UP (ref 3.8–10.5)
WBC # FLD AUTO: 10 K/UL — SIGNIFICANT CHANGE UP (ref 3.8–10.5)

## 2017-03-13 PROCEDURE — 99291 CRITICAL CARE FIRST HOUR: CPT

## 2017-03-13 PROCEDURE — 71010: CPT | Mod: 26

## 2017-03-13 RX ORDER — FUROSEMIDE 40 MG
20 TABLET ORAL ONCE
Qty: 0 | Refills: 0 | Status: COMPLETED | OUTPATIENT
Start: 2017-03-13 | End: 2017-03-13

## 2017-03-13 RX ORDER — POTASSIUM PHOSPHATE, MONOBASIC POTASSIUM PHOSPHATE, DIBASIC 236; 224 MG/ML; MG/ML
15 INJECTION, SOLUTION INTRAVENOUS ONCE
Qty: 0 | Refills: 0 | Status: COMPLETED | OUTPATIENT
Start: 2017-03-13 | End: 2017-03-13

## 2017-03-13 RX ORDER — DEXMEDETOMIDINE HYDROCHLORIDE IN 0.9% SODIUM CHLORIDE 4 UG/ML
0.2 INJECTION INTRAVENOUS
Qty: 200 | Refills: 0 | Status: DISCONTINUED | OUTPATIENT
Start: 2017-03-13 | End: 2017-03-14

## 2017-03-13 RX ORDER — METOPROLOL TARTRATE 50 MG
5 TABLET ORAL ONCE
Qty: 0 | Refills: 0 | Status: COMPLETED | OUTPATIENT
Start: 2017-03-13 | End: 2017-03-13

## 2017-03-13 RX ADMIN — DEXMEDETOMIDINE HYDROCHLORIDE IN 0.9% SODIUM CHLORIDE 2.22 MICROGRAM(S)/KG/HR: 4 INJECTION INTRAVENOUS at 20:00

## 2017-03-13 RX ADMIN — Medication 0.25 MILLIGRAM(S): at 17:49

## 2017-03-13 RX ADMIN — Medication 2: at 17:52

## 2017-03-13 RX ADMIN — Medication 0.25 MILLIGRAM(S): at 05:57

## 2017-03-13 RX ADMIN — PANTOPRAZOLE SODIUM 40 MILLIGRAM(S): 20 TABLET, DELAYED RELEASE ORAL at 12:22

## 2017-03-13 RX ADMIN — Medication 40 MILLIGRAM(S): at 05:57

## 2017-03-13 RX ADMIN — CHLORHEXIDINE GLUCONATE 15 MILLILITER(S): 213 SOLUTION TOPICAL at 05:57

## 2017-03-13 RX ADMIN — Medication 3 MILLILITER(S): at 17:57

## 2017-03-13 RX ADMIN — PIPERACILLIN AND TAZOBACTAM 25 GRAM(S): 4; .5 INJECTION, POWDER, LYOPHILIZED, FOR SOLUTION INTRAVENOUS at 23:00

## 2017-03-13 RX ADMIN — Medication 3 MILLILITER(S): at 00:34

## 2017-03-13 RX ADMIN — PIPERACILLIN AND TAZOBACTAM 25 GRAM(S): 4; .5 INJECTION, POWDER, LYOPHILIZED, FOR SOLUTION INTRAVENOUS at 05:56

## 2017-03-13 RX ADMIN — Medication 2: at 12:21

## 2017-03-13 RX ADMIN — Medication 5 MILLIGRAM(S): at 18:20

## 2017-03-13 RX ADMIN — Medication 3 MILLILITER(S): at 05:53

## 2017-03-13 RX ADMIN — POTASSIUM PHOSPHATE, MONOBASIC POTASSIUM PHOSPHATE, DIBASIC 63.75 MILLIMOLE(S): 236; 224 INJECTION, SOLUTION INTRAVENOUS at 12:22

## 2017-03-13 RX ADMIN — Medication 250 MILLIGRAM(S): at 01:16

## 2017-03-13 RX ADMIN — PIPERACILLIN AND TAZOBACTAM 25 GRAM(S): 4; .5 INJECTION, POWDER, LYOPHILIZED, FOR SOLUTION INTRAVENOUS at 14:20

## 2017-03-13 RX ADMIN — HEPARIN SODIUM 5000 UNIT(S): 5000 INJECTION INTRAVENOUS; SUBCUTANEOUS at 05:57

## 2017-03-13 RX ADMIN — Medication 40 MILLIGRAM(S): at 14:20

## 2017-03-13 RX ADMIN — Medication 40 MILLIGRAM(S): at 17:50

## 2017-03-13 RX ADMIN — Medication 3: at 05:58

## 2017-03-13 RX ADMIN — CHLORHEXIDINE GLUCONATE 1 APPLICATION(S): 213 SOLUTION TOPICAL at 12:22

## 2017-03-13 RX ADMIN — Medication 3 MILLILITER(S): at 12:20

## 2017-03-13 RX ADMIN — HEPARIN SODIUM 5000 UNIT(S): 5000 INJECTION INTRAVENOUS; SUBCUTANEOUS at 17:49

## 2017-03-13 RX ADMIN — Medication 20 MILLIGRAM(S): at 18:21

## 2017-03-13 RX ADMIN — SODIUM CHLORIDE 100 MILLILITER(S): 9 INJECTION INTRAMUSCULAR; INTRAVENOUS; SUBCUTANEOUS at 06:59

## 2017-03-13 NOTE — PROGRESS NOTE ADULT - SUBJECTIVE AND OBJECTIVE BOX
HPI:  60 Y/O male w/ PMHx of COPD on home O2, cpap at night, afib, htn, hld, dm, pw c/o SOB. ABG worsening in ED showing 7.26/102/133/44 on Bipap. Pt intubated for hypercapnic respiratory failure. 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  Accelerated Essential Hypertension: x3 years  S/P appendectomy      ## ROS: Unable to obtain      ## O/E:  Vitals: T(C): 36.7, Max: 37.7 (03-12 @ 15:25)  HR: 100 (46 - 100)  BP: 127/87 (104/76 - 160/68)  BP(mean): 96 (66 - 129)  RR: 26 (12 - 26)  SpO2: 94% (94% - 100%)  Wt(kg): --  Gen: lying comfortably in bed in no apparent distress  HEENT: PERRL, EOMI  Resp: CTA B/L no c/r/w  CVS: S1S2 no m/r/g  Abd: soft NT/ND +BS  Ext: no c/c/e  Neuro: Awake and ersponsive    I & Os for 24h ending 03-13 @ 07:00  =============================================  IN: 4415.4 ml / OUT: 2675 ml / NET: 1740.4 ml    I & Os for current day (as of 03-13 @ 14:21)  =============================================  IN: 1150 ml / OUT: 1225 ml / NET: -75 ml    Mode: CPAP with PS, FiO2: 30, PEEP: 5, MAP: 7, PC: 5, PIP: 11    ## Labs:                        7.2    10.0  )-----------( 495      ( 13 Mar 2017 03:54 )             21.5     13 Mar 2017 03:54    136    |  92     |  15     ----------------------------<  261    4.0     |  37     |  0.35     Ca    7.6        13 Mar 2017 03:54  Phos  2.3       13 Mar 2017 03:54  Mg     1.8       13 Mar 2017 03:54        ABG - ( 12 Mar 2017 13:41 )  pH: x     /  pCO2: 59    /  pO2: 88    / HCO3: 39    / Base Excess: 13.3  /  SaO2: 97            ## Imaging: reviewed    MEDICATIONS  (STANDING):  heparin  Injectable 5000Unit(s) SubCutaneous every 12 hours  pantoprazole  Injectable 40milliGRAM(s) IV Push daily  chlorhexidine 0.12% Liquid 15milliLiter(s) Swish and Spit two times a day  chlorhexidine 4% Liquid 1Application(s) Topical daily  sodium chloride 0.9%. 1000milliLiter(s) IV Continuous <Continuous>  insulin lispro (HumaLOG) corrective regimen sliding scale  SubCutaneous every 6 hours  dextrose 5%. 1000milliLiter(s) IV Continuous <Continuous>  dextrose 50% Injectable 12.5Gram(s) IV Push once  dextrose 50% Injectable 25Gram(s) IV Push once  piperacillin/tazobactam IVPB. 3.375Gram(s) IV Intermittent every 8 hours  ALBUTerol/ipratropium for Nebulization 3milliLiter(s) Nebulizer every 6 hours  ALBUTerol    90 MICROgram(s) HFA Inhaler 1Puff(s) Inhalation every 4 hours  tiotropium 18 MICROgram(s) Capsule 1Capsule(s) Inhalation daily  methylPREDNISolone sodium succinate Injectable 40milliGRAM(s) IV Push every 8 hours  ALPRAZolam 0.25milliGRAM(s) Oral two times a day  diltiazem    Tablet 60milliGRAM(s) Oral every 8 hours  dexmedetomidine Infusion 0.2MICROgram(s)/kG/Hr IV Continuous <Continuous>    MEDICATIONS  (PRN):  dextrose Gel 1Dose(s) Oral once PRN Blood Glucose LESS THAN 70 milliGRAM(s)/deciliter  glucagon  Injectable 1milliGRAM(s) IntraMuscular once PRN Glucose LESS THAN 70 milligrams/deciliter  acetaminophen   Tablet 650milliGRAM(s) Oral every 6 hours PRN For Temp greater than 38 C (100.4 F)        ## Code status:  Goals of care discussion: [x] yes [ ] no  [x] full code  [ ] DNR  [ ] DNI  [ ] MOLST

## 2017-03-13 NOTE — PROGRESS NOTE ADULT - ASSESSMENT
Pt is a 62 yo WM with h/o COPD on home O2, nocturnal CPAP, CAD/stent, A fib, HTN, DM, HLD and prostate adenoma presented 2 to SOB. ABG showed worsening acute on chronic resp acidosis despite being  BiPAP intubated. Admitting dx: Acute on chronic resp acidosis/ COPD exacerbation/ PNA    Resp: Daily SBT, failed weaning yesterday, tolerating well today  Cont Nebs , taper Steroids to 40 q12  ID: Cont Abx vanco and zosyn, vanco trough 15.8.   growing MSSA in blood, will d/c vanco  FEN: Cont enteral feeds  monitor and replete electrolytes  Endo: Follow FS and cover with RI  Neuro/Psych: cont on precedex, cont on low dose Xanax, appears more calm today    DVT/GI ppx

## 2017-03-13 NOTE — PROGRESS NOTE ADULT - SUBJECTIVE AND OBJECTIVE BOX
INTERVAL HPI/OVERNIGHT EVENTS:  62 Y/O male w/ PMHx of COPD on home O2, CPAP at night, A FIB,, HTN, HLD, DM, presented with SOB. Intubated for hypercapnic respiratory failure in ED. CT chest and CXR showed multifocal pneumonia.  NO more details of history are available as intubated and not able to provide information.   Was recently admitted with similar presentation, was in ICU with BIPAP support.  Failed weaning for few days but did well on 03/13/17 and got extubated.  On BIPAP awake and responsive.    Vital Signs Last 24 Hrs  T(C): 37.1, Max: 37.1 (03-13 @ 19:38)  T(F): 98.8, Max: 98.8 (03-13 @ 19:38)  HR: 71 (46 - 112)  BP: 142/61 (104/76 - 191/73)  BP(mean): 83 (68 - 129)  RR: 18 (12 - 26)  SpO2: 96% (93% - 100%)    Mode: CPAP with PS  FiO2: 30  PEEP: 5  MAP: 7  PC: 5  PIP: 11    PHYSICAL EXAM:  GEN:       Awake, responsive on BIPAP  HEENT:    BIPAP   RESP:      decreased air entry.  CVS:          Regular rate and rhythm.   ABD:         Soft, non-tender, non-distended;   EXTR:            edema    MEDICATIONS  (STANDING):  heparin  Injectable 5000Unit(s) SubCutaneous every 12 hours  pantoprazole  Injectable 40milliGRAM(s) IV Push daily  chlorhexidine 4% Liquid 1Application(s) Topical daily  insulin lispro (HumaLOG) corrective regimen sliding scale  SubCutaneous every 6 hours  dextrose 5%. 1000milliLiter(s) IV Continuous <Continuous>  dextrose 50% Injectable 12.5Gram(s) IV Push once  dextrose 50% Injectable 25Gram(s) IV Push once  piperacillin/tazobactam IVPB. 3.375Gram(s) IV Intermittent every 8 hours  ALBUTerol/ipratropium for Nebulization 3milliLiter(s) Nebulizer every 6 hours  ALBUTerol    90 MICROgram(s) HFA Inhaler 1Puff(s) Inhalation every 4 hours  tiotropium 18 MICROgram(s) Capsule 1Capsule(s) Inhalation daily  ALPRAZolam 0.25milliGRAM(s) Oral two times a day  diltiazem    Tablet 60milliGRAM(s) Oral every 8 hours  methylPREDNISolone sodium succinate Injectable 40milliGRAM(s) IV Push every 12 hours  dexmedetomidine Infusion 0.2MICROgram(s)/kG/Hr IV Continuous <Continuous>    MEDICATIONS  (PRN):  dextrose Gel 1Dose(s) Oral once PRN Blood Glucose LESS THAN 70 milliGRAM(s)/deciliter  glucagon  Injectable 1milliGRAM(s) IntraMuscular once PRN Glucose LESS THAN 70 milligrams/deciliter  acetaminophen   Tablet 650milliGRAM(s) Oral every 6 hours PRN For Temp greater than 38 C (100.4 F)    LABS:                        7.2    10.0  )-----------( 495      ( 13 Mar 2017 03:54 )             21.5     13 Mar 2017 03:54    136    |  92     |  15     ----------------------------<  261    4.0     |  37     |  0.35     Ca    7.6        13 Mar 2017 03:54  Phos  2.3       13 Mar 2017 03:54  Mg     1.8       13 Mar 2017 03:54    ABG - 03-13 @ 14:49  pH: 7.33 / pO2: 75 / HCO3: 39 / Base Excess: 11.7 / SaO2: 91    ASSESSMENT AND PLAN:  ·	S/P Acute Hypoxic/hypercarbic Respiratory failure.  ·	Multifocal pneumonia.  ·	Acute COPD exacerbation.  ·	Bilateral pleural effusion.  ·	Anemia.  ·	CAD with angioplasty.  ·	HTN.  ·	A Fib.  ·	HLD.    Continue O2, BIPAP.  Sterods, antibiotics and nebulizer.

## 2017-03-13 NOTE — AIRWAY REMOVAL NOTE  ADULT & PEDS - ARTIFICAL AIRWAY REMOVAL COMMENTS
patient extubated by rrt margie and rn milton. placed onto bipap 12/5. no complications. patient stable

## 2017-03-14 LAB
ANION GAP SERPL CALC-SCNC: 5 MMOL/L — SIGNIFICANT CHANGE UP (ref 5–17)
BASE EXCESS BLDA CALC-SCNC: 19.6 MMOL/L — HIGH (ref -2–2)
BLOOD GAS COMMENTS: SIGNIFICANT CHANGE UP
BLOOD GAS SOURCE: SIGNIFICANT CHANGE UP
BUN SERPL-MCNC: 17 MG/DL — SIGNIFICANT CHANGE UP (ref 7–23)
CALCIUM SERPL-MCNC: 7.9 MG/DL — LOW (ref 8.5–10.1)
CHLORIDE SERPL-SCNC: 90 MMOL/L — LOW (ref 96–108)
CO2 SERPL-SCNC: 45 MMOL/L — CRITICAL HIGH (ref 22–31)
CREAT SERPL-MCNC: 0.33 MG/DL — LOW (ref 0.5–1.3)
GLUCOSE SERPL-MCNC: 188 MG/DL — HIGH (ref 70–99)
HCO3 BLDA-SCNC: 48 MMOL/L — HIGH (ref 21–29)
HCT VFR BLD CALC: 29.1 % — LOW (ref 39–50)
HGB BLD-MCNC: 9.1 G/DL — LOW (ref 13–17)
HOROWITZ INDEX BLDA+IHG-RTO: 35 — SIGNIFICANT CHANGE UP
MAGNESIUM SERPL-MCNC: 1.8 MG/DL — SIGNIFICANT CHANGE UP (ref 1.8–2.4)
MCHC RBC-ENTMCNC: 24.8 PG — LOW (ref 27–34)
MCHC RBC-ENTMCNC: 31.2 GM/DL — LOW (ref 32–36)
MCV RBC AUTO: 79.7 FL — LOW (ref 80–100)
PCO2 BLDA: 78 MMHG — CRITICAL HIGH (ref 32–46)
PH BLD: 7.4 — SIGNIFICANT CHANGE UP (ref 7.35–7.45)
PHOSPHATE SERPL-MCNC: 3.6 MG/DL — SIGNIFICANT CHANGE UP (ref 2.5–4.5)
PLATELET # BLD AUTO: 569 K/UL — HIGH (ref 150–400)
PO2 BLDA: 83 MMHG — SIGNIFICANT CHANGE UP (ref 74–108)
POTASSIUM SERPL-MCNC: 3.8 MMOL/L — SIGNIFICANT CHANGE UP (ref 3.5–5.3)
POTASSIUM SERPL-SCNC: 3.8 MMOL/L — SIGNIFICANT CHANGE UP (ref 3.5–5.3)
RBC # BLD: 3.66 M/UL — LOW (ref 4.2–5.8)
RBC # FLD: 16.2 % — HIGH (ref 11–15)
SAO2 % BLDA: 96 % — SIGNIFICANT CHANGE UP (ref 92–96)
SODIUM SERPL-SCNC: 140 MMOL/L — SIGNIFICANT CHANGE UP (ref 135–145)
WBC # BLD: 12.4 K/UL — HIGH (ref 3.8–10.5)
WBC # FLD AUTO: 12.4 K/UL — HIGH (ref 3.8–10.5)

## 2017-03-14 PROCEDURE — 99291 CRITICAL CARE FIRST HOUR: CPT

## 2017-03-14 RX ORDER — PANTOPRAZOLE SODIUM 20 MG/1
40 TABLET, DELAYED RELEASE ORAL
Qty: 0 | Refills: 0 | Status: DISCONTINUED | OUTPATIENT
Start: 2017-03-14 | End: 2017-03-29

## 2017-03-14 RX ORDER — INSULIN LISPRO 100/ML
VIAL (ML) SUBCUTANEOUS
Qty: 0 | Refills: 0 | Status: DISCONTINUED | OUTPATIENT
Start: 2017-03-14 | End: 2017-03-29

## 2017-03-14 RX ORDER — ZINC OXIDE 200 MG/G
1 OINTMENT TOPICAL
Qty: 0 | Refills: 0 | Status: DISCONTINUED | OUTPATIENT
Start: 2017-03-14 | End: 2017-04-01

## 2017-03-14 RX ADMIN — Medication 40 MILLIGRAM(S): at 17:16

## 2017-03-14 RX ADMIN — Medication 3 MILLILITER(S): at 23:57

## 2017-03-14 RX ADMIN — Medication 1: at 21:17

## 2017-03-14 RX ADMIN — DEXMEDETOMIDINE HYDROCHLORIDE IN 0.9% SODIUM CHLORIDE 2.22 MICROGRAM(S)/KG/HR: 4 INJECTION INTRAVENOUS at 06:04

## 2017-03-14 RX ADMIN — Medication 3 MILLILITER(S): at 12:05

## 2017-03-14 RX ADMIN — ZINC OXIDE 1 APPLICATION(S): 200 OINTMENT TOPICAL at 23:32

## 2017-03-14 RX ADMIN — Medication 3 MILLILITER(S): at 17:43

## 2017-03-14 RX ADMIN — Medication 3 MILLILITER(S): at 01:29

## 2017-03-14 RX ADMIN — Medication: at 00:33

## 2017-03-14 RX ADMIN — Medication 3 MILLILITER(S): at 05:47

## 2017-03-14 RX ADMIN — HEPARIN SODIUM 5000 UNIT(S): 5000 INJECTION INTRAVENOUS; SUBCUTANEOUS at 05:57

## 2017-03-14 RX ADMIN — Medication 1: at 17:17

## 2017-03-14 RX ADMIN — PIPERACILLIN AND TAZOBACTAM 25 GRAM(S): 4; .5 INJECTION, POWDER, LYOPHILIZED, FOR SOLUTION INTRAVENOUS at 21:19

## 2017-03-14 RX ADMIN — DEXMEDETOMIDINE HYDROCHLORIDE IN 0.9% SODIUM CHLORIDE 2.22 MICROGRAM(S)/KG/HR: 4 INJECTION INTRAVENOUS at 00:40

## 2017-03-14 RX ADMIN — PANTOPRAZOLE SODIUM 40 MILLIGRAM(S): 20 TABLET, DELAYED RELEASE ORAL at 12:33

## 2017-03-14 RX ADMIN — PIPERACILLIN AND TAZOBACTAM 25 GRAM(S): 4; .5 INJECTION, POWDER, LYOPHILIZED, FOR SOLUTION INTRAVENOUS at 14:39

## 2017-03-14 RX ADMIN — Medication 40 MILLIGRAM(S): at 05:57

## 2017-03-14 RX ADMIN — Medication 0.25 MILLIGRAM(S): at 17:18

## 2017-03-14 RX ADMIN — PIPERACILLIN AND TAZOBACTAM 25 GRAM(S): 4; .5 INJECTION, POWDER, LYOPHILIZED, FOR SOLUTION INTRAVENOUS at 05:58

## 2017-03-14 RX ADMIN — HEPARIN SODIUM 5000 UNIT(S): 5000 INJECTION INTRAVENOUS; SUBCUTANEOUS at 17:17

## 2017-03-14 NOTE — PROGRESS NOTE ADULT - SUBJECTIVE AND OBJECTIVE BOX
HPI:  61M PMHx of COPD on home O2, cpap at night, afib, htn, hld, dm, hx of hypercarbic respiratory failure presents with complaints of SOB. ABG worsening respiratory acidosis on Bipap. Pt intubated for acute on chronic hypercapnic respiratory failure.       24 hr events:  weaned and extubated yesterday 3/13 however required biPAP post extubation  weaned off bipap this morning and tolerating nasal cannula for most of the day today  pt pulled out NGT, passed bedside dysphagia screen    ## ROS:  CONSTITUTIONAL: No fever, no chills  ENMT:  No sinus or throat pain  RESPIRATORY: No cough, wheezing,  No shortness of breath  CARDIOVASCULAR: No chest pain, palpitations  GASTROINTESTINAL: No abdominal or epigastric pain. No nausea, vomiting  NEUROLOGICAL: No headaches    ## Labs:  CBC:                        9.1    12.4  )-----------( 569      ( 14 Mar 2017 03:56 )             29.1     Chem:  14 Mar 2017 03:56    140    |  90     |  17     ----------------------------<  188    3.8     |  45     |  0.33     Ca    7.9        14 Mar 2017 03:56  Phos  3.6       14 Mar 2017 03:56  Mg     1.8       14 Mar 2017 03:56    Culture - Blood (03.08.17 @ 09:52)    Specimen Source: .Blood Blood    Culture Results: Growth in aerobic bottle: Staphylococcus aureus        Culture - Sputum . (03.09.17 @ 02:41)    Specimen Source: .Sputum Sputum    Culture Results: Normal Respiratory Cinthia present    ## Imaging:  CXR bilateral lung opacities and small left pleural effusion without significant change      ## Medications:  piperacillin/tazobactam IVPB. 3.375Gram(s) IV Intermittent every 8 hours    diltiazem    Tablet 60milliGRAM(s) Oral every 6 hours    ALBUTerol/ipratropium for Nebulization 3milliLiter(s) Nebulizer every 6 hours  ALBUTerol    90 MICROgram(s) HFA Inhaler 1Puff(s) Inhalation every 4 hours  tiotropium 18 MICROgram(s) Capsule 1Capsule(s) Inhalation daily    dextrose Gel 1Dose(s) Oral once PRN  dextrose 50% Injectable 12.5Gram(s) IV Push once  dextrose 50% Injectable 25Gram(s) IV Push once  glucagon  Injectable 1milliGRAM(s) IntraMuscular once PRN  methylPREDNISolone sodium succinate Injectable 40milliGRAM(s) IV Push every 12 hours  insulin lispro (HumaLOG) corrective regimen sliding scale  SubCutaneous Before meals and at bedtime    heparin  Injectable 5000Unit(s) SubCutaneous every 12 hours    pantoprazole    Tablet 40milliGRAM(s) Oral before breakfast    acetaminophen   Tablet 650milliGRAM(s) Oral every 6 hours PRN  ALPRAZolam 0.25milliGRAM(s) Oral two times a day      ## Vitals:  T(C): 36.7, Max: 37.2 (03-14 @ 15:30)  HR: 74 (43 - 104)  BP: 133/59 (133/59 - 175/72)  BP(mean): 79 (75 - 107)  RR: 16 (10 - 19)  SpO2: 99% (91% - 100%)  Wt(kg): 46  ABG: ABG - ( 14 Mar 2017 08:06 )  pH:7.40     /  pCO2: 78    /  pO2: 83    / HCO3: 48    / Base Excess: 19.6  /  SaO2: 96                  I & Os for 24h ending 03-14 @ 07:00  =============================================  IN: 2121.2 ml / OUT: 6300 ml / NET: -4178.8 ml            ## P/E:  Gen: lying comfortably in bed,  no respiratory distress  HEENT: PERRL, EOMI  Resp: CTA B/L no wheeze/rales  CVS: S1S2 RRR  Abd: soft NT/ND +BS  Ext: no edema, cyanosis  Neuro: Awake, responsive following simple commands    CENTRAL LINE: [ ] YES [x] NO    MARTINEZ: [ ] YES [x] NO      A-LINE:  [ ] YES [x] NO     GLOBAL ISSUE/BEST PRACTICE:  Analgesia: n/a  Sedation: n/a  HOB elevation: yes  Stress ulcer prophylaxis: protonix  VTE prophylaxis: heparin sc  Oral Care: n/a  Glycemic control: sliding scale coverage  Nutrition: diabetic low salt low cholesterol diet    CODE STATUS: [x] full code  [ ] DNR  [ ] DNI  [ ] MOLST  Goals of care discussion: [x] yes

## 2017-03-14 NOTE — PROGRESS NOTE ADULT - ASSESSMENT
61M PMHx of COPD on home O2, cpap at night, afib, htn, hld, dm, hx of hypercarbic respiratory failure presents with complaints of SOB. Admitted for acute on chronic hypercarbic respiratory failure requiring intubation, COPD exacerbation, complex PNA, staph bacteremia    1. PULM  - pt today tolerating time off biPAP  - cont nocturnal BiPAP for acute on chronic hypercarbic respiratory failure  - cont steroids with taper for COPD exacerbation  - on zosyn for complex PNA  - ABG with metabolic alkalosis with respiratory acidosis: will repeat blood gas in AM, if HCO3 continues to increase and remains in the upper 40 will consider a dose of Diamox for underlying metabolic alkalosis. hold on lasix diuresis    2. ID  - staph bacteremia: MSSA sensitive  - complex PNA  - cont with zosyn for now  - repeat blood culture to evaluate for clearance of bacteremia    3. ENDO  - DM: cont to monitor blood sugar with insulin sliding scale coverage    4. CV  - BP stable  - cont cardizem both for HTN and rate control, pt currently in sinus rhythm, though has hx of a-fib    5. Gen  - physical therapy  - if pt remains stable in next 24 hrs can transfer to medical floor 61M PMHx of COPD on home O2, cpap at night, afib, htn, hld, dm, hx of hypercarbic respiratory failure presents with complaints of SOB. Admitted for acute on chronic hypercarbic respiratory failure requiring intubation, COPD exacerbation, complex PNA, staph bacteremia    1. PULM  - pt today tolerating time off biPAP  - cont nocturnal BiPAP for acute on chronic hypercarbic respiratory failure  - cont steroids with taper for COPD exacerbation  - on zosyn for complex PNA  - ABG with metabolic alkalosis with respiratory acidosis: will repeat blood gas in AM, if HCO3 continues to increase and remains in the upper 40 will consider a dose of Diamox for underlying metabolic alkalosis. hold on lasix diuresis    2. ID  - staph bacteremia: MSSA sensitive  - complex PNA  - cont with zosyn for now  - repeat blood culture to evaluate for clearance of bacteremia    3. ENDO  - DM: cont to monitor blood sugar with insulin sliding scale coverage    4. CV  - BP stable  - cont cardizem both for HTN and rate control, pt currently in sinus rhythm, though has hx of a-fib    5. Gen  - physical therapy  - if pt remains stable in next 24 hrs can transfer to medical floor    Critical Care Time: 35 min

## 2017-03-14 NOTE — PROGRESS NOTE ADULT - SUBJECTIVE AND OBJECTIVE BOX
INTERVAL HPI/OVERNIGHT EVENTS:  60 Y/O male w/ PMHx of COPD on home O2, CPAP at night, A FIB,, HTN, HLD, DM, presented with SOB. Intubated for hypercapnic respiratory failure in ED. CT chest and CXR showed multifocal pneumonia.  NO more details of history are available as intubated and not able to provide information.   Was recently admitted with similar presentation, was in ICU with BIPAP support.  Failed weaning for few days but did well on 03/13/17 and got extubated.  Still in ICU, on nasal O2.    Vital Signs Last 24 Hrs  T(C): 37.2, Max: 37.2 (03-14 @ 15:30)  T(F): 99, Max: 99 (03-14 @ 15:30)  HR: 84 (43 - 104)  BP: 159/81 (136/56 - 175/72)  BP(mean): 102 (73 - 107)  RR: 14 (10 - 18)  SpO2: 97% (96% - 100%)    PHYSICAL EXAM:  GEN:         Awake, responsive and comfortable.  HEENT:    Normal.    RESP:      no wheezing.  CVS:         Regular rate and rhythm.   ABD:         Soft, non-tender, non-distended;     MEDICATIONS  (STANDING):  heparin  Injectable 5000Unit(s) SubCutaneous every 12 hours  chlorhexidine 4% Liquid 1Application(s) Topical daily  insulin lispro (HumaLOG) corrective regimen sliding scale  SubCutaneous every 6 hours  dextrose 5%. 1000milliLiter(s) IV Continuous <Continuous>  dextrose 50% Injectable 12.5Gram(s) IV Push once  dextrose 50% Injectable 25Gram(s) IV Push once  piperacillin/tazobactam IVPB. 3.375Gram(s) IV Intermittent every 8 hours  ALBUTerol/ipratropium for Nebulization 3milliLiter(s) Nebulizer every 6 hours  ALBUTerol    90 MICROgram(s) HFA Inhaler 1Puff(s) Inhalation every 4 hours  tiotropium 18 MICROgram(s) Capsule 1Capsule(s) Inhalation daily  ALPRAZolam 0.25milliGRAM(s) Oral two times a day  methylPREDNISolone sodium succinate Injectable 40milliGRAM(s) IV Push every 12 hours  dexmedetomidine Infusion 0.2MICROgram(s)/kG/Hr IV Continuous <Continuous>  diltiazem    Tablet 60milliGRAM(s) Oral every 6 hours  pantoprazole    Tablet 40milliGRAM(s) Oral before breakfast    MEDICATIONS  (PRN):  dextrose Gel 1Dose(s) Oral once PRN Blood Glucose LESS THAN 70 milliGRAM(s)/deciliter  glucagon  Injectable 1milliGRAM(s) IntraMuscular once PRN Glucose LESS THAN 70 milligrams/deciliter  acetaminophen   Tablet 650milliGRAM(s) Oral every 6 hours PRN For Temp greater than 38 C (100.4 F)    LABS:                        9.1    12.4  )-----------( 569      ( 14 Mar 2017 03:56 )             29.1     14 Mar 2017 03:56    140    |  90     |  17     ----------------------------<  188    3.8     |  45     |  0.33     Ca    7.9        14 Mar 2017 03:56  Phos  3.6       14 Mar 2017 03:56  Mg     1.8       14 Mar 2017 03:56    ABG - 03-14 @ 08:06  pH: 7.40 / pcO2: 78 / pO2: 83 , on BIPAP with 35% FIO2.    ASSESSMENT AND PLAN:  ·	S/P Acute Hypoxic/hypercarbic Respiratory failure.  ·	Multifocal pneumonia.  ·	Acute COPD exacerbation.  ·	Bilateral pleural effusion.  ·	Anemia.  ·	CAD with angioplasty.  ·	HTN.  ·	A Fib.  ·	HLD.    Continue treatment. Reduce BIPAP pressures.  On antibiotics, nebulizer and steroids.

## 2017-03-15 LAB
ANION GAP SERPL CALC-SCNC: 7 MMOL/L — SIGNIFICANT CHANGE UP (ref 5–17)
BASE EXCESS BLDA CALC-SCNC: 20.8 MMOL/L — HIGH (ref -2–2)
BLOOD GAS COMMENTS: SIGNIFICANT CHANGE UP
BLOOD GAS COMMENTS: SIGNIFICANT CHANGE UP
BLOOD GAS SOURCE: SIGNIFICANT CHANGE UP
BUN SERPL-MCNC: 15 MG/DL — SIGNIFICANT CHANGE UP (ref 7–23)
CALCIUM SERPL-MCNC: 8.4 MG/DL — LOW (ref 8.5–10.1)
CHLORIDE SERPL-SCNC: 84 MMOL/L — LOW (ref 96–108)
CO2 SERPL-SCNC: 44 MMOL/L — HIGH (ref 22–31)
CREAT SERPL-MCNC: 0.36 MG/DL — LOW (ref 0.5–1.3)
GLUCOSE SERPL-MCNC: 220 MG/DL — HIGH (ref 70–99)
HCO3 BLDA-SCNC: 50 MMOL/L — HIGH (ref 21–29)
HCT VFR BLD CALC: 30.8 % — LOW (ref 39–50)
HGB BLD-MCNC: 10.1 G/DL — LOW (ref 13–17)
HOROWITZ INDEX BLDA+IHG-RTO: 28 — SIGNIFICANT CHANGE UP
MAGNESIUM SERPL-MCNC: 1.9 MG/DL — SIGNIFICANT CHANGE UP (ref 1.8–2.4)
MCHC RBC-ENTMCNC: 26.9 PG — LOW (ref 27–34)
MCHC RBC-ENTMCNC: 32.8 GM/DL — SIGNIFICANT CHANGE UP (ref 32–36)
MCV RBC AUTO: 81.8 FL — SIGNIFICANT CHANGE UP (ref 80–100)
PCO2 BLDA: 83 MMHG — CRITICAL HIGH (ref 32–46)
PH BLD: 7.39 — SIGNIFICANT CHANGE UP (ref 7.35–7.45)
PHOSPHATE SERPL-MCNC: 2.5 MG/DL — SIGNIFICANT CHANGE UP (ref 2.5–4.5)
PLATELET # BLD AUTO: 631 K/UL — HIGH (ref 150–400)
PO2 BLDA: 62 MMHG — LOW (ref 74–108)
POTASSIUM SERPL-MCNC: 4 MMOL/L — SIGNIFICANT CHANGE UP (ref 3.5–5.3)
POTASSIUM SERPL-SCNC: 4 MMOL/L — SIGNIFICANT CHANGE UP (ref 3.5–5.3)
RBC # BLD: 3.76 M/UL — LOW (ref 4.2–5.8)
RBC # FLD: 16.6 % — HIGH (ref 11–15)
SAO2 % BLDA: 90 % — LOW (ref 92–96)
SODIUM SERPL-SCNC: 135 MMOL/L — SIGNIFICANT CHANGE UP (ref 135–145)
WBC # BLD: 11.6 K/UL — HIGH (ref 3.8–10.5)
WBC # FLD AUTO: 11.6 K/UL — HIGH (ref 3.8–10.5)

## 2017-03-15 PROCEDURE — 99291 CRITICAL CARE FIRST HOUR: CPT

## 2017-03-15 RX ORDER — METOPROLOL TARTRATE 50 MG
25 TABLET ORAL
Qty: 0 | Refills: 0 | Status: DISCONTINUED | OUTPATIENT
Start: 2017-03-15 | End: 2017-03-16

## 2017-03-15 RX ORDER — ACETAZOLAMIDE 250 MG/1
250 TABLET ORAL ONCE
Qty: 0 | Refills: 0 | Status: COMPLETED | OUTPATIENT
Start: 2017-03-15 | End: 2017-03-15

## 2017-03-15 RX ORDER — METOPROLOL TARTRATE 50 MG
5 TABLET ORAL ONCE
Qty: 0 | Refills: 0 | Status: COMPLETED | OUTPATIENT
Start: 2017-03-15 | End: 2017-03-15

## 2017-03-15 RX ORDER — BENZOCAINE AND MENTHOL 5; 1 G/100ML; G/100ML
1 LIQUID ORAL EVERY 4 HOURS
Qty: 0 | Refills: 0 | Status: DISCONTINUED | OUTPATIENT
Start: 2017-03-15 | End: 2017-04-03

## 2017-03-15 RX ADMIN — Medication 3 MILLILITER(S): at 11:15

## 2017-03-15 RX ADMIN — HEPARIN SODIUM 5000 UNIT(S): 5000 INJECTION INTRAVENOUS; SUBCUTANEOUS at 05:13

## 2017-03-15 RX ADMIN — ZINC OXIDE 1 APPLICATION(S): 200 OINTMENT TOPICAL at 05:14

## 2017-03-15 RX ADMIN — Medication 40 MILLIGRAM(S): at 13:07

## 2017-03-15 RX ADMIN — ACETAZOLAMIDE 250 MILLIGRAM(S): 250 TABLET ORAL at 10:17

## 2017-03-15 RX ADMIN — BENZOCAINE AND MENTHOL 1 LOZENGE: 5; 1 LIQUID ORAL at 17:12

## 2017-03-15 RX ADMIN — Medication 5 MILLIGRAM(S): at 23:48

## 2017-03-15 RX ADMIN — HEPARIN SODIUM 5000 UNIT(S): 5000 INJECTION INTRAVENOUS; SUBCUTANEOUS at 17:11

## 2017-03-15 RX ADMIN — Medication 1: at 17:11

## 2017-03-15 RX ADMIN — Medication 3 MILLILITER(S): at 17:09

## 2017-03-15 RX ADMIN — Medication 0.25 MILLIGRAM(S): at 17:12

## 2017-03-15 RX ADMIN — PIPERACILLIN AND TAZOBACTAM 25 GRAM(S): 4; .5 INJECTION, POWDER, LYOPHILIZED, FOR SOLUTION INTRAVENOUS at 21:42

## 2017-03-15 RX ADMIN — Medication 2: at 21:42

## 2017-03-15 RX ADMIN — PANTOPRAZOLE SODIUM 40 MILLIGRAM(S): 20 TABLET, DELAYED RELEASE ORAL at 10:17

## 2017-03-15 RX ADMIN — Medication 1: at 13:06

## 2017-03-15 RX ADMIN — Medication 1: at 10:17

## 2017-03-15 RX ADMIN — PIPERACILLIN AND TAZOBACTAM 25 GRAM(S): 4; .5 INJECTION, POWDER, LYOPHILIZED, FOR SOLUTION INTRAVENOUS at 13:06

## 2017-03-15 RX ADMIN — Medication 3 MILLILITER(S): at 06:44

## 2017-03-15 RX ADMIN — Medication 40 MILLIGRAM(S): at 05:13

## 2017-03-15 RX ADMIN — ZINC OXIDE 1 APPLICATION(S): 200 OINTMENT TOPICAL at 17:13

## 2017-03-15 RX ADMIN — Medication 0.25 MILLIGRAM(S): at 05:13

## 2017-03-15 RX ADMIN — Medication 25 MILLIGRAM(S): at 17:12

## 2017-03-15 RX ADMIN — PIPERACILLIN AND TAZOBACTAM 25 GRAM(S): 4; .5 INJECTION, POWDER, LYOPHILIZED, FOR SOLUTION INTRAVENOUS at 05:13

## 2017-03-15 NOTE — PROGRESS NOTE ADULT - SUBJECTIVE AND OBJECTIVE BOX
HPI:  60 Y/O male w/ PMHx of COPD on home O2, cpap at night, afib, htn, hld, dm, pw c/o SOB. ABG worsening in ED showing 7.26/102/133/44 on Bipap. Pt intubated for hypercapnic respiratory failure. 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 No nausea, vomiting, or hematemesis; No diarrhea. No melena or hematochezia.  GENITOURINARY: No dysuria, frequency, hematuria  NEUROLOGICAL: No headaches  ENDOCRINE: No heat or cold intolerance  MUSCULOSKELETAL: No joint pain or swelling; No muscle, back, or extremity pain    ## O/E:  Vitals: T(C): 36.6, Max: 37.2 (03-14 @ 15:30)  HR: 108 (54 - 108)  BP: 156/67 (129/61 - 165/78)  BP(mean): 90 (73 - 118)  RR: 17 (11 - 19)  SpO2: 91% (84% - 100%)  Wt(kg): --  Gen: lying comfortably in bed in no apparent distress  HEENT: PERRL, EOMI  Resp: CTA B/L no c/r/w  CVS: S1S2 no m/r/g  Abd: soft NT/ND +BS  Ext: no c/c/e  Neuro: A&Ox3    I & Os for 24h ending 03-15 @ 07:00  =============================================  IN: 575.4 ml / OUT: 1950 ml / NET: -1374.6 ml    I & Os for current day (as of 03-15 @ 14:20)  =============================================  IN: 960 ml / OUT: 500 ml / NET: 460 ml        ## Labs:                        10.1   11.6  )-----------( 631      ( 15 Mar 2017 04:19 )             30.8     15 Mar 2017 04:19    135    |  84     |  15     ----------------------------<  220    4.0     |  44     |  0.36     Ca    8.4        15 Mar 2017 04:19  Phos  2.5       15 Mar 2017 04:19  Mg     1.9       15 Mar 2017 04:19        ABG - ( 15 Mar 2017 08:48 )  pH: x     /  pCO2: 83    /  pO2: 62    / HCO3: 50    / Base Excess: 20.8  /  SaO2: 90            ## Imaging: reviewed    MEDICATIONS  (STANDING):  heparin  Injectable 5000Unit(s) SubCutaneous every 12 hours  chlorhexidine 4% Liquid 1Application(s) Topical daily  dextrose 5%. 1000milliLiter(s) IV Continuous <Continuous>  dextrose 50% Injectable 12.5Gram(s) IV Push once  dextrose 50% Injectable 25Gram(s) IV Push once  piperacillin/tazobactam IVPB. 3.375Gram(s) IV Intermittent every 8 hours  ALBUTerol/ipratropium for Nebulization 3milliLiter(s) Nebulizer every 6 hours  ALBUTerol    90 MICROgram(s) HFA Inhaler 1Puff(s) Inhalation every 4 hours  tiotropium 18 MICROgram(s) Capsule 1Capsule(s) Inhalation daily  ALPRAZolam 0.25milliGRAM(s) Oral two times a day  pantoprazole    Tablet 40milliGRAM(s) Oral before breakfast  zinc oxide 40%/lanolin Ointment 1Application(s) Topical two times a day  insulin lispro (HumaLOG) corrective regimen sliding scale  SubCutaneous Before meals and at bedtime  predniSONE   Tablet 40milliGRAM(s) Oral daily  metoprolol 25milliGRAM(s) Oral two times a day    MEDICATIONS  (PRN):  dextrose Gel 1Dose(s) Oral once PRN Blood Glucose LESS THAN 70 milliGRAM(s)/deciliter  glucagon  Injectable 1milliGRAM(s) IntraMuscular once PRN Glucose LESS THAN 70 milligrams/deciliter  acetaminophen   Tablet 650milliGRAM(s) Oral every 6 hours PRN For Temp greater than 38 C (100.4 F)  benzocaine 15 mG/menthol 3.6 mG Lozenge 1Lozenge Oral every 4 hours PRN Sore Throat      ## Code status:  Goals of care discussion: [x] yes [ ] no  [x] full code  [ ] DNR  [ ] DNI  [ ] LUC

## 2017-03-15 NOTE — PROGRESS NOTE ADULT - ASSESSMENT
61M PMHx of COPD on home O2, cpap at night, afib, htn, hld, dm, hx of hypercarbic respiratory failure presents with complaints of SOB. Admitted for acute on chronic hypercarbic respiratory failure requiring intubation, COPD exacerbation, complex PNA, staph bacteremia    1. PULM  - pt today tolerating time off biPAP  - cont nocturnal BiPAP for acute on chronic hypercarbic respiratory failure  - cont steroids with taper for COPD exacerbation  - on zosyn for complex PNA  - ABG with metabolic alkalosis with respiratory acidosis: will givea dose of diamox today for underlying metabolic alkalosis.    2. ID  - staph bacteremia: MSSA sensitive  - complex PNA  - cont with zosyn for now    3. ENDO  - DM: cont to monitor blood sugar with insulin sliding scale coverage    4. CV  - BP stable  - cont cardizem both for HTN and rate control, has hx of a-fib, will add beta blocker as pt still tachy and responds well to cardioselective beta blockers    5. Gen  - physical therapy

## 2017-03-15 NOTE — PROGRESS NOTE ADULT - SUBJECTIVE AND OBJECTIVE BOX
INTERVAL HPI/OVERNIGHT EVENTS:  60 Y/O male w/ PMHx of COPD on home O2, CPAP at night, A FIB,, HTN, HLD, DM, presented with SOB. Intubated for hypercapnic respiratory failure in ED. CT chest and CXR showed multifocal pneumonia.  NO more details of history are available as intubated and not able to provide information.   Was recently admitted with similar presentation, was in ICU with BIPAP support.  Failed weaning for few days but did well on 03/13/17 and got extubated.  Still in ICU, on nasal O2. Anxious.    Vital Signs Last 24 Hrs  T(C): 36.6, Max: 37.1 (03-14 @ 20:00)  T(F): 97.8, Max: 98.8 (03-14 @ 20:00)  HR: 98 (54 - 108)  BP: 157/82 (129/61 - 165/78)  BP(mean): 92 (73 - 118)  RR: 15 (11 - 19)  SpO2: 98% (84% - 100%)    PHYSICAL EXAM:  GEN:        Awake, responsive and comfortable.  HEENT:    Normal.    RESP:      crackles.     CVS:         Regular rate and rhythm.   ABD:         Soft, non-tender, non-distended;     MEDICATIONS  (STANDING):  heparin  Injectable 5000Unit(s) SubCutaneous every 12 hours  chlorhexidine 4% Liquid 1Application(s) Topical daily  dextrose 5%. 1000milliLiter(s) IV Continuous <Continuous>  dextrose 50% Injectable 12.5Gram(s) IV Push once  dextrose 50% Injectable 25Gram(s) IV Push once  piperacillin/tazobactam IVPB. 3.375Gram(s) IV Intermittent every 8 hours  ALBUTerol/ipratropium for Nebulization 3milliLiter(s) Nebulizer every 6 hours  ALBUTerol    90 MICROgram(s) HFA Inhaler 1Puff(s) Inhalation every 4 hours  tiotropium 18 MICROgram(s) Capsule 1Capsule(s) Inhalation daily  ALPRAZolam 0.25milliGRAM(s) Oral two times a day  pantoprazole    Tablet 40milliGRAM(s) Oral before breakfast  zinc oxide 40%/lanolin Ointment 1Application(s) Topical two times a day  insulin lispro (HumaLOG) corrective regimen sliding scale  SubCutaneous Before meals and at bedtime  predniSONE   Tablet 40milliGRAM(s) Oral daily  metoprolol 25milliGRAM(s) Oral two times a day    MEDICATIONS  (PRN):  dextrose Gel 1Dose(s) Oral once PRN Blood Glucose LESS THAN 70 milliGRAM(s)/deciliter  glucagon  Injectable 1milliGRAM(s) IntraMuscular once PRN Glucose LESS THAN 70 milligrams/deciliter  acetaminophen   Tablet 650milliGRAM(s) Oral every 6 hours PRN For Temp greater than 38 C (100.4 F)  benzocaine 15 mG/menthol 3.6 mG Lozenge 1Lozenge Oral every 4 hours PRN Sore Throat    LABS:                        10.1   11.6  )-----------( 631      ( 15 Mar 2017 04:19 )             30.8     15 Mar 2017 04:19    135    |  84     |  15     ----------------------------<  220    4.0     |  44     |  0.36     Ca    8.4        15 Mar 2017 04:19  Phos  2.5       15 Mar 2017 04:19  Mg     1.9       15 Mar 2017 04:19    ABG - 03-15 @ 08:48  pH: 7.39 / pcO2: 83 / pO2: 62 on 2 liters.    ASSESSMENT AND PLAN:  ·	S/P Acute Hypoxic/hypercarbic Respiratory failure.  ·	Multifocal pneumonia.  ·	Acute COPD exacerbation.  ·	Bilateral pleural effusion.  ·	Anemia.  ·	CAD with angioplasty.  ·	HTN.  ·	A Fib.  ·	HLD.    Reduce steroids.  Continue nebulizer.  O2 with PRN BIPAP.  PT evaluation.

## 2017-03-16 LAB
ALBUMIN SERPL ELPH-MCNC: 2.5 G/DL — LOW (ref 3.3–5)
ALP SERPL-CCNC: 143 U/L — HIGH (ref 40–120)
ALT FLD-CCNC: 36 U/L — SIGNIFICANT CHANGE UP (ref 12–78)
ANION GAP SERPL CALC-SCNC: 8 MMOL/L — SIGNIFICANT CHANGE UP (ref 5–17)
AST SERPL-CCNC: 27 U/L — SIGNIFICANT CHANGE UP (ref 15–37)
BASE EXCESS BLDA CALC-SCNC: 16.1 MMOL/L — HIGH (ref -2–2)
BILIRUB SERPL-MCNC: 0.4 MG/DL — SIGNIFICANT CHANGE UP (ref 0.2–1.2)
BLOOD GAS COMMENTS: SIGNIFICANT CHANGE UP
BLOOD GAS COMMENTS: SIGNIFICANT CHANGE UP
BLOOD GAS SOURCE: SIGNIFICANT CHANGE UP
BUN SERPL-MCNC: 15 MG/DL — SIGNIFICANT CHANGE UP (ref 7–23)
CALCIUM SERPL-MCNC: 9 MG/DL — SIGNIFICANT CHANGE UP (ref 8.5–10.1)
CHLORIDE SERPL-SCNC: 88 MMOL/L — LOW (ref 96–108)
CO2 SERPL-SCNC: 38 MMOL/L — HIGH (ref 22–31)
CREAT SERPL-MCNC: 0.56 MG/DL — SIGNIFICANT CHANGE UP (ref 0.5–1.3)
GLUCOSE SERPL-MCNC: 174 MG/DL — HIGH (ref 70–99)
HCO3 BLDA-SCNC: 45 MMOL/L — HIGH (ref 21–29)
HCT VFR BLD CALC: 36.9 % — LOW (ref 39–50)
HGB BLD-MCNC: 12.2 G/DL — LOW (ref 13–17)
HOROWITZ INDEX BLDA+IHG-RTO: 28 — SIGNIFICANT CHANGE UP
MAGNESIUM SERPL-MCNC: 2 MG/DL — SIGNIFICANT CHANGE UP (ref 1.8–2.4)
MCHC RBC-ENTMCNC: 27.5 PG — SIGNIFICANT CHANGE UP (ref 27–34)
MCHC RBC-ENTMCNC: 33.2 GM/DL — SIGNIFICANT CHANGE UP (ref 32–36)
MCV RBC AUTO: 82.8 FL — SIGNIFICANT CHANGE UP (ref 80–100)
PCO2 BLDA: 85 MMHG — CRITICAL HIGH (ref 32–46)
PH BLD: 7.34 — LOW (ref 7.35–7.45)
PHOSPHATE SERPL-MCNC: 3.2 MG/DL — SIGNIFICANT CHANGE UP (ref 2.5–4.5)
PLATELET # BLD AUTO: 672 K/UL — HIGH (ref 150–400)
PO2 BLDA: 68 MMHG — LOW (ref 74–108)
POTASSIUM SERPL-MCNC: 4.5 MMOL/L — SIGNIFICANT CHANGE UP (ref 3.5–5.3)
POTASSIUM SERPL-SCNC: 4.5 MMOL/L — SIGNIFICANT CHANGE UP (ref 3.5–5.3)
PROT SERPL-MCNC: 7 GM/DL — SIGNIFICANT CHANGE UP (ref 6–8.3)
RBC # BLD: 4.45 M/UL — SIGNIFICANT CHANGE UP (ref 4.2–5.8)
RBC # FLD: 16.8 % — HIGH (ref 11–15)
SAO2 % BLDA: 93 % — SIGNIFICANT CHANGE UP (ref 92–96)
SODIUM SERPL-SCNC: 134 MMOL/L — LOW (ref 135–145)
WBC # BLD: 13.3 K/UL — HIGH (ref 3.8–10.5)
WBC # FLD AUTO: 13.3 K/UL — HIGH (ref 3.8–10.5)

## 2017-03-16 PROCEDURE — 99291 CRITICAL CARE FIRST HOUR: CPT

## 2017-03-16 RX ORDER — MULTIVIT-MIN/FERROUS GLUCONATE 9 MG/15 ML
1 LIQUID (ML) ORAL DAILY
Qty: 0 | Refills: 0 | Status: DISCONTINUED | OUTPATIENT
Start: 2017-03-16 | End: 2017-04-03

## 2017-03-16 RX ORDER — METOPROLOL TARTRATE 50 MG
25 TABLET ORAL
Qty: 0 | Refills: 0 | Status: DISCONTINUED | OUTPATIENT
Start: 2017-03-16 | End: 2017-03-29

## 2017-03-16 RX ORDER — MORPHINE SULFATE 50 MG/1
2 CAPSULE, EXTENDED RELEASE ORAL ONCE
Qty: 0 | Refills: 0 | Status: DISCONTINUED | OUTPATIENT
Start: 2017-03-16 | End: 2017-03-16

## 2017-03-16 RX ORDER — METOPROLOL TARTRATE 50 MG
5 TABLET ORAL ONCE
Qty: 0 | Refills: 0 | Status: COMPLETED | OUTPATIENT
Start: 2017-03-16 | End: 2017-03-16

## 2017-03-16 RX ORDER — ALPRAZOLAM 0.25 MG
0.25 TABLET ORAL THREE TIMES A DAY
Qty: 0 | Refills: 0 | Status: DISCONTINUED | OUTPATIENT
Start: 2017-03-16 | End: 2017-03-23

## 2017-03-16 RX ORDER — METOPROLOL TARTRATE 50 MG
50 TABLET ORAL
Qty: 0 | Refills: 0 | Status: DISCONTINUED | OUTPATIENT
Start: 2017-03-16 | End: 2017-03-16

## 2017-03-16 RX ADMIN — CHLORHEXIDINE GLUCONATE 1 APPLICATION(S): 213 SOLUTION TOPICAL at 11:04

## 2017-03-16 RX ADMIN — HEPARIN SODIUM 5000 UNIT(S): 5000 INJECTION INTRAVENOUS; SUBCUTANEOUS at 17:43

## 2017-03-16 RX ADMIN — Medication 25 MILLIGRAM(S): at 22:05

## 2017-03-16 RX ADMIN — PIPERACILLIN AND TAZOBACTAM 25 GRAM(S): 4; .5 INJECTION, POWDER, LYOPHILIZED, FOR SOLUTION INTRAVENOUS at 14:38

## 2017-03-16 RX ADMIN — MORPHINE SULFATE 2 MILLIGRAM(S): 50 CAPSULE, EXTENDED RELEASE ORAL at 06:38

## 2017-03-16 RX ADMIN — Medication 5 MILLIGRAM(S): at 02:43

## 2017-03-16 RX ADMIN — Medication 3 MILLILITER(S): at 00:51

## 2017-03-16 RX ADMIN — Medication 0.25 MILLIGRAM(S): at 11:04

## 2017-03-16 RX ADMIN — Medication 3 MILLILITER(S): at 05:41

## 2017-03-16 RX ADMIN — Medication 2: at 12:31

## 2017-03-16 RX ADMIN — HEPARIN SODIUM 5000 UNIT(S): 5000 INJECTION INTRAVENOUS; SUBCUTANEOUS at 05:10

## 2017-03-16 RX ADMIN — Medication 3 MILLILITER(S): at 17:05

## 2017-03-16 RX ADMIN — Medication 5 MILLIGRAM(S): at 11:04

## 2017-03-16 RX ADMIN — Medication 0.25 MILLIGRAM(S): at 22:05

## 2017-03-16 RX ADMIN — PIPERACILLIN AND TAZOBACTAM 25 GRAM(S): 4; .5 INJECTION, POWDER, LYOPHILIZED, FOR SOLUTION INTRAVENOUS at 05:10

## 2017-03-16 RX ADMIN — MORPHINE SULFATE 2 MILLIGRAM(S): 50 CAPSULE, EXTENDED RELEASE ORAL at 07:01

## 2017-03-16 RX ADMIN — Medication 0.25 MILLIGRAM(S): at 05:10

## 2017-03-16 RX ADMIN — Medication 3 MILLILITER(S): at 11:39

## 2017-03-16 RX ADMIN — ZINC OXIDE 1 APPLICATION(S): 200 OINTMENT TOPICAL at 05:13

## 2017-03-16 RX ADMIN — Medication 40 MILLIGRAM(S): at 05:10

## 2017-03-16 RX ADMIN — ZINC OXIDE 1 APPLICATION(S): 200 OINTMENT TOPICAL at 18:03

## 2017-03-16 RX ADMIN — Medication 50 MILLIGRAM(S): at 05:10

## 2017-03-16 RX ADMIN — PANTOPRAZOLE SODIUM 40 MILLIGRAM(S): 20 TABLET, DELAYED RELEASE ORAL at 09:16

## 2017-03-16 RX ADMIN — PIPERACILLIN AND TAZOBACTAM 25 GRAM(S): 4; .5 INJECTION, POWDER, LYOPHILIZED, FOR SOLUTION INTRAVENOUS at 22:05

## 2017-03-16 NOTE — PROGRESS NOTE ADULT - SUBJECTIVE AND OBJECTIVE BOX
HPI:  62 Y/O male w/ PMHx of COPD on home O2, cpap at night, afib, htn, hld, dm, pw c/o SOB. ABG worsening in ED showing 7.26/102/133/44 on Bipap. Pt intubated for hypercapnic respiratory failure. (08 Mar 2017 06:36)    Over 24 Hrs: s/p extubation, on intermittent BIPAP, episodes of afib controlled with lopressor.    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 No nausea, vomiting, or hematemesis; No diarrhea. No melena or hematochezia.  GENITOURINARY: No dysuria, frequency, hematuria  NEUROLOGICAL: No headaches  ENDOCRINE: No heat or cold intolerance  MUSCULOSKELETAL: No joint pain or swelling; No muscle, back, or extremity pain    ## O/E:  Vitals: T(C): 37.1, Max: 37.1 (03-16 @ 11:31)  HR: 53 (53 - 145)  BP: 131/55 (122/110 - 194/105)  BP(mean): 74 (73 - 115)  RR: 14 (12 - 25)  SpO2: 94% (86% - 100%)  Wt(kg): --  Gen: lying comfortably in bed with BIPAP on  HEENT: PERRL, EOMI  Resp: CTA B/L no c/r/w  CVS: S1S2 no m/r/g  Abd: soft NT/ND +BS  Ext: no c/c/e  Neuro: A&Ox3    I & Os for 24h ending 03-16 @ 07:00  =============================================  IN: 1260 ml / OUT: 1850 ml / NET: -590 ml    I & Os for current day (as of 03-16 @ 14:43)  =============================================  IN: 100 ml / OUT: 0 ml / NET: 100 ml        ## Labs:                        12.2   13.3  )-----------( 672      ( 16 Mar 2017 03:58 )             36.9     16 Mar 2017 03:58    134    |  88     |  15     ----------------------------<  174    4.5     |  38     |  0.56     Ca    9.0        16 Mar 2017 03:58  Phos  3.2       16 Mar 2017 03:58  Mg     2.0       16 Mar 2017 03:58    TPro  7.0    /  Alb  2.5    /  TBili  0.4    /  DBili  x      /  AST  27     /  ALT  36     /  AlkPhos  143    16 Mar 2017 03:58      ABG - ( 16 Mar 2017 09:56 )  pH: x     /  pCO2: 85    /  pO2: 68    / HCO3: 45    / Base Excess: 16.1  /  SaO2: 93              ## Imaging: reviewed    MEDICATIONS  (STANDING):  heparin  Injectable 5000Unit(s) SubCutaneous every 12 hours  chlorhexidine 4% Liquid 1Application(s) Topical daily  dextrose 5%. 1000milliLiter(s) IV Continuous <Continuous>  dextrose 50% Injectable 12.5Gram(s) IV Push once  dextrose 50% Injectable 25Gram(s) IV Push once  piperacillin/tazobactam IVPB. 3.375Gram(s) IV Intermittent every 8 hours  ALBUTerol/ipratropium for Nebulization 3milliLiter(s) Nebulizer every 6 hours  ALBUTerol    90 MICROgram(s) HFA Inhaler 1Puff(s) Inhalation every 4 hours  tiotropium 18 MICROgram(s) Capsule 1Capsule(s) Inhalation daily  pantoprazole    Tablet 40milliGRAM(s) Oral before breakfast  zinc oxide 40%/lanolin Ointment 1Application(s) Topical two times a day  insulin lispro (HumaLOG) corrective regimen sliding scale  SubCutaneous Before meals and at bedtime  predniSONE   Tablet 40milliGRAM(s) Oral daily  diltiazem    Tablet 60milliGRAM(s) Oral every 6 hours  metoprolol 25milliGRAM(s) Oral two times a day  ALPRAZolam 0.25milliGRAM(s) Oral three times a day    MEDICATIONS  (PRN):  dextrose Gel 1Dose(s) Oral once PRN Blood Glucose LESS THAN 70 milliGRAM(s)/deciliter  glucagon  Injectable 1milliGRAM(s) IntraMuscular once PRN Glucose LESS THAN 70 milligrams/deciliter  acetaminophen   Tablet 650milliGRAM(s) Oral every 6 hours PRN For Temp greater than 38 C (100.4 F)  benzocaine 15 mG/menthol 3.6 mG Lozenge 1Lozenge Oral every 4 hours PRN Sore Throat        ## Code status:  Goals of care discussion: [x] yes [ ] no  [x] full code  [ ] DNR  [ ] DNI  [ ] LUC

## 2017-03-16 NOTE — PROGRESS NOTE ADULT - SUBJECTIVE AND OBJECTIVE BOX
INTERVAL HPI/OVERNIGHT EVENTS:  60 Y/O male w/ PMHx of COPD on home O2, CPAP at night, A FIB,, HTN, HLD, DM, presented with SOB. Intubated for hypercapnic respiratory failure in ED. CT chest and CXR showed multifocal pneumonia.  NO more details of history are available as intubated and not able to provide information.   Was recently admitted with similar presentation, was in ICU with BIPAP support.  Failed weaning for few days but did well on 03/13/17 and got extubated.  In ICU, on BIPAP. Awake and responsive.    Vital Signs Last 24 Hrs  T(C): 37.1, Max: 37.1 (03-16 @ 11:31)  T(F): 98.7, Max: 98.7 (03-16 @ 11:31)  HR: 102 (76 - 145)  BP: 161/88 (122/110 - 194/105)  BP(mean): 104 (90 - 115)  RR: 13 (12 - 25)  SpO2: 95% (84% - 100%)    PHYSICAL EXAM:  GEN:       Awake, responsive and comfortable.  HEENT:    Normal.    RESP:     crackles  CVS:          Regular rate and rhythm.   ABD:         Soft, non-tender, non-distended;     MEDICATIONS  (STANDING):  heparin  Injectable 5000Unit(s) SubCutaneous every 12 hours  chlorhexidine 4% Liquid 1Application(s) Topical daily  dextrose 5%. 1000milliLiter(s) IV Continuous <Continuous>  dextrose 50% Injectable 12.5Gram(s) IV Push once  dextrose 50% Injectable 25Gram(s) IV Push once  piperacillin/tazobactam IVPB. 3.375Gram(s) IV Intermittent every 8 hours  ALBUTerol/ipratropium for Nebulization 3milliLiter(s) Nebulizer every 6 hours  ALBUTerol    90 MICROgram(s) HFA Inhaler 1Puff(s) Inhalation every 4 hours  tiotropium 18 MICROgram(s) Capsule 1Capsule(s) Inhalation daily  pantoprazole    Tablet 40milliGRAM(s) Oral before breakfast  zinc oxide 40%/lanolin Ointment 1Application(s) Topical two times a day  insulin lispro (HumaLOG) corrective regimen sliding scale  SubCutaneous Before meals and at bedtime  predniSONE   Tablet 40milliGRAM(s) Oral daily  diltiazem    Tablet 60milliGRAM(s) Oral every 6 hours  metoprolol 25milliGRAM(s) Oral two times a day  ALPRAZolam 0.25milliGRAM(s) Oral three times a day    MEDICATIONS  (PRN):  dextrose Gel 1Dose(s) Oral once PRN Blood Glucose LESS THAN 70 milliGRAM(s)/deciliter  glucagon  Injectable 1milliGRAM(s) IntraMuscular once PRN Glucose LESS THAN 70 milligrams/deciliter  acetaminophen   Tablet 650milliGRAM(s) Oral every 6 hours PRN For Temp greater than 38 C (100.4 F)  benzocaine 15 mG/menthol 3.6 mG Lozenge 1Lozenge Oral every 4 hours PRN Sore Throat    LABS:                        12.2   13.3  )-----------( 672      ( 16 Mar 2017 03:58 )             36.9     16 Mar 2017 03:58    134    |  88     |  15     ----------------------------<  174    4.5     |  38     |  0.56     Ca    9.0        16 Mar 2017 03:58  Phos  3.2       16 Mar 2017 03:58  Mg     2.0       16 Mar 2017 03:58    TPro  7.0    /  Alb  2.5    /  TBili  0.4    /  DBili  x      /  AST  27     /  ALT  36     /  AlkPhos  143    16 Mar 2017 03:58    ABG - 03-16 @ 09:56  pH: 7.34 / pcO2: 85 / pO2: 68 on 2 litres    ASSESSMENT AND PLAN:  ·	S/P Acute Hypoxic/hypercarbic Respiratory failure.  ·	Multifocal pneumonia.  ·	Acute COPD exacerbation.  ·	Bilateral pleural effusion.  ·	Anemia.  ·	CAD with angioplasty.  ·	HTN.  ·	A Fib.  ·	HLD.    On nebulizer, steroids and antibiotics.  O2 with PRN BIPAP.  Out of bed to chair with PT evaluation.

## 2017-03-16 NOTE — PROGRESS NOTE ADULT - ASSESSMENT
61M PMHx of COPD on home O2, cpap at night, afib, htn, hld, dm, hx of hypercarbic respiratory failure presents with complaints of SOB. Admitted for acute on chronic hypercarbic respiratory failure requiring intubation, COPD exacerbation, complex PNA, staph bacteremia    1. PULM  - pt tolerating time off biPAP  - cont nocturnal BiPAP and prn sob for acute on chronic hypercarbic respiratory failure  - cont steroid taper for COPD exacerbation  -cont abx for complex PNA  -metabolic alkalosis improved with dose of diamox. Biacrb near baseline of 38    2. ID  - staph bacteremia: MSSA sensitive  - complex PNA  - cont with zosyn for now    3. ENDO  - DM: cont to monitor blood sugar with insulin sliding scale coverage    4. CV  - BP stable  - cont cardizem both for HTN and rate control, has hx of a-fib, cont beta blocker as pt  responds well to cardioselective beta blockers    5. Gen  - physical therapy  - cont xanax for anxiety, pt gets episodes of panic attack.  - psych eval once stable

## 2017-03-17 LAB
ANION GAP SERPL CALC-SCNC: 7 MMOL/L — SIGNIFICANT CHANGE UP (ref 5–17)
BASE EXCESS BLDA CALC-SCNC: 19.8 MMOL/L — HIGH (ref -2–2)
BLOOD GAS COMMENTS: SIGNIFICANT CHANGE UP
BLOOD GAS COMMENTS: SIGNIFICANT CHANGE UP
BLOOD GAS SOURCE: SIGNIFICANT CHANGE UP
BUN SERPL-MCNC: 14 MG/DL — SIGNIFICANT CHANGE UP (ref 7–23)
CALCIUM SERPL-MCNC: 8.5 MG/DL — SIGNIFICANT CHANGE UP (ref 8.5–10.1)
CHLORIDE SERPL-SCNC: 86 MMOL/L — LOW (ref 96–108)
CO2 SERPL-SCNC: 43 MMOL/L — HIGH (ref 22–31)
CREAT SERPL-MCNC: 0.46 MG/DL — LOW (ref 0.5–1.3)
GLUCOSE SERPL-MCNC: 205 MG/DL — HIGH (ref 70–99)
HCO3 BLDA-SCNC: 49 MMOL/L — HIGH (ref 21–29)
HCT VFR BLD CALC: 34.2 % — LOW (ref 39–50)
HGB BLD-MCNC: 10.4 G/DL — LOW (ref 13–17)
HOROWITZ INDEX BLDA+IHG-RTO: 28 — SIGNIFICANT CHANGE UP
MAGNESIUM SERPL-MCNC: 1.9 MG/DL — SIGNIFICANT CHANGE UP (ref 1.8–2.4)
MCHC RBC-ENTMCNC: 25.2 PG — LOW (ref 27–34)
MCHC RBC-ENTMCNC: 30.5 GM/DL — LOW (ref 32–36)
MCV RBC AUTO: 82.8 FL — SIGNIFICANT CHANGE UP (ref 80–100)
PCO2 BLDA: 85 MMHG — CRITICAL HIGH (ref 32–46)
PH BLD: 7.38 — SIGNIFICANT CHANGE UP (ref 7.35–7.45)
PHOSPHATE SERPL-MCNC: 2.7 MG/DL — SIGNIFICANT CHANGE UP (ref 2.5–4.5)
PLATELET # BLD AUTO: 499 K/UL — HIGH (ref 150–400)
PO2 BLDA: 70 MMHG — LOW (ref 74–108)
POTASSIUM SERPL-MCNC: 3.6 MMOL/L — SIGNIFICANT CHANGE UP (ref 3.5–5.3)
POTASSIUM SERPL-SCNC: 3.6 MMOL/L — SIGNIFICANT CHANGE UP (ref 3.5–5.3)
RBC # BLD: 4.13 M/UL — LOW (ref 4.2–5.8)
RBC # FLD: 17.4 % — HIGH (ref 11–15)
SAO2 % BLDA: 94 % — SIGNIFICANT CHANGE UP (ref 92–96)
SODIUM SERPL-SCNC: 136 MMOL/L — SIGNIFICANT CHANGE UP (ref 135–145)
WBC # BLD: 10 K/UL — SIGNIFICANT CHANGE UP (ref 3.8–10.5)
WBC # FLD AUTO: 10 K/UL — SIGNIFICANT CHANGE UP (ref 3.8–10.5)

## 2017-03-17 PROCEDURE — 99291 CRITICAL CARE FIRST HOUR: CPT

## 2017-03-17 PROCEDURE — 99231 SBSQ HOSP IP/OBS SF/LOW 25: CPT

## 2017-03-17 RX ORDER — ACETAZOLAMIDE 250 MG/1
250 TABLET ORAL ONCE
Qty: 0 | Refills: 0 | Status: COMPLETED | OUTPATIENT
Start: 2017-03-17 | End: 2017-03-17

## 2017-03-17 RX ADMIN — PIPERACILLIN AND TAZOBACTAM 25 GRAM(S): 4; .5 INJECTION, POWDER, LYOPHILIZED, FOR SOLUTION INTRAVENOUS at 21:11

## 2017-03-17 RX ADMIN — PANTOPRAZOLE SODIUM 40 MILLIGRAM(S): 20 TABLET, DELAYED RELEASE ORAL at 06:57

## 2017-03-17 RX ADMIN — Medication 0.25 MILLIGRAM(S): at 06:57

## 2017-03-17 RX ADMIN — CHLORHEXIDINE GLUCONATE 1 APPLICATION(S): 213 SOLUTION TOPICAL at 11:58

## 2017-03-17 RX ADMIN — Medication 3 MILLILITER(S): at 06:35

## 2017-03-17 RX ADMIN — Medication 1 TABLET(S): at 11:58

## 2017-03-17 RX ADMIN — HEPARIN SODIUM 5000 UNIT(S): 5000 INJECTION INTRAVENOUS; SUBCUTANEOUS at 06:57

## 2017-03-17 RX ADMIN — PIPERACILLIN AND TAZOBACTAM 25 GRAM(S): 4; .5 INJECTION, POWDER, LYOPHILIZED, FOR SOLUTION INTRAVENOUS at 06:57

## 2017-03-17 RX ADMIN — HEPARIN SODIUM 5000 UNIT(S): 5000 INJECTION INTRAVENOUS; SUBCUTANEOUS at 17:32

## 2017-03-17 RX ADMIN — ZINC OXIDE 1 APPLICATION(S): 200 OINTMENT TOPICAL at 06:59

## 2017-03-17 RX ADMIN — PIPERACILLIN AND TAZOBACTAM 25 GRAM(S): 4; .5 INJECTION, POWDER, LYOPHILIZED, FOR SOLUTION INTRAVENOUS at 13:19

## 2017-03-17 RX ADMIN — Medication 3 MILLILITER(S): at 17:00

## 2017-03-17 RX ADMIN — ZINC OXIDE 1 APPLICATION(S): 200 OINTMENT TOPICAL at 17:35

## 2017-03-17 RX ADMIN — Medication 1: at 08:40

## 2017-03-17 RX ADMIN — Medication 25 MILLIGRAM(S): at 21:12

## 2017-03-17 RX ADMIN — ACETAZOLAMIDE 105 MILLIGRAM(S): 250 TABLET ORAL at 11:58

## 2017-03-17 RX ADMIN — Medication 2: at 21:11

## 2017-03-17 RX ADMIN — Medication 1: at 17:32

## 2017-03-17 RX ADMIN — Medication 1: at 11:14

## 2017-03-17 RX ADMIN — Medication 3 MILLILITER(S): at 11:14

## 2017-03-17 RX ADMIN — Medication 30 MILLIGRAM(S): at 07:02

## 2017-03-17 RX ADMIN — Medication 2: at 00:41

## 2017-03-17 RX ADMIN — Medication 3 MILLILITER(S): at 00:39

## 2017-03-17 NOTE — SWALLOW BEDSIDE ASSESSMENT ADULT - COMMENTS
CT Chest 3/13/2017 Impression: Lung opacities and small left pleural effusion without significant change.     CT angio chest 3/8/2017 Impression: No pulmonary embolism. Airspace opacities and airspace consolidation superimposed on centrilobular emphysema. Left lower lobe mucous plugging. Findings are likely on an infectious basis, however, pulmonary edema or neoplastic process cannot be entirely excluded. Small left pleural effusion. Pt became fatigued during po trials of soft solid and gestured for a break. Pt became fatigued during po trials of soft solid and gestured for a break and then refused trials.

## 2017-03-17 NOTE — PROGRESS NOTE ADULT - SUBJECTIVE AND OBJECTIVE BOX
INTERVAL HPI/OVERNIGHT EVENTS:  62 Y/O male w/ PMHx of COPD on home O2, CPAP at night, A FIB,, HTN, HLD, DM, presented with SOB. Intubated for hypercapnic respiratory failure in ED. CT chest and CXR showed multifocal pneumonia.  NO more details of history are available as intubated and not able to provide information.   Was recently admitted with similar presentation, was in ICU with BIPAP support.  Failed weaning for few days but did well on 03/13/17 and got extubated.  Resting on BIPAP in ICU. Over all is weak.    Vital Signs Last 24 Hrs  T(C): 37.1, Max: 37.3 (03-17 @ 05:58)  T(F): 98.8, Max: 99.1 (03-17 @ 05:58)  HR: 82 (53 - 98)  BP: 112/52 (97/53 - 147/56)  BP(mean): 68 (61 - 115)  RR: 16 (13 - 19)  SpO2: 96% (85% - 100%)    PHYSICAL EXAM:  GEN:        Resting and comfortable.comfortable.  HEENT:    Normal.    RESP:     no wheezing.  CVS:         Regular rate and rhythm.   ABD:         Soft, non-tender, non-distended;     MEDICATIONS  (STANDING):  heparin  Injectable 5000Unit(s) SubCutaneous every 12 hours  chlorhexidine 4% Liquid 1Application(s) Topical daily  dextrose 5%. 1000milliLiter(s) IV Continuous <Continuous>  dextrose 50% Injectable 12.5Gram(s) IV Push once  dextrose 50% Injectable 25Gram(s) IV Push once  piperacillin/tazobactam IVPB. 3.375Gram(s) IV Intermittent every 8 hours  ALBUTerol/ipratropium for Nebulization 3milliLiter(s) Nebulizer every 6 hours  ALBUTerol    90 MICROgram(s) HFA Inhaler 1Puff(s) Inhalation every 4 hours  tiotropium 18 MICROgram(s) Capsule 1Capsule(s) Inhalation daily  pantoprazole    Tablet 40milliGRAM(s) Oral before breakfast  zinc oxide 40%/lanolin Ointment 1Application(s) Topical two times a day  insulin lispro (HumaLOG) corrective regimen sliding scale  SubCutaneous Before meals and at bedtime  predniSONE   Tablet 30milliGRAM(s) Oral daily  diltiazem    Tablet 60milliGRAM(s) Oral every 6 hours  metoprolol 25milliGRAM(s) Oral two times a day  ALPRAZolam 0.25milliGRAM(s) Oral three times a day  multivitamin/minerals 1Tablet(s) Oral daily    MEDICATIONS  (PRN):  dextrose Gel 1Dose(s) Oral once PRN Blood Glucose LESS THAN 70 milliGRAM(s)/deciliter  glucagon  Injectable 1milliGRAM(s) IntraMuscular once PRN Glucose LESS THAN 70 milligrams/deciliter  acetaminophen   Tablet 650milliGRAM(s) Oral every 6 hours PRN For Temp greater than 38 C (100.4 F)  benzocaine 15 mG/menthol 3.6 mG Lozenge 1Lozenge Oral every 4 hours PRN Sore Throat    LABS:                        10.4   10.0  )-----------( 499      ( 17 Mar 2017 04:41 )             34.2     17 Mar 2017 04:41    136    |  86     |  14     ----------------------------<  205    3.6     |  43     |  0.46     Ca    8.5        17 Mar 2017 04:41  Phos  2.7       17 Mar 2017 04:41  Mg     1.9       17 Mar 2017 04:41    TPro  7.0    /  Alb  2.5    /  TBili  0.4    /  DBili  x      /  AST  27     /  ALT  36     /  AlkPhos  143    16 Mar 2017 03:58      ABG - 03-17 @ 11:00  pH: 7.38 / pcO2: 85 / pO2: 70 on 2 litres.      ASSESSMENT AND PLAN:  ·	S/P Acute Hypoxic/hypercarbic Respiratory failure.  ·	Multifocal pneumonia.  ·	Acute COPD exacerbation.  ·	Bilateral pleural effusion.  ·	Anemia.  ·	CAD with angioplasty.  ·	HTN.  ·	A Fib.  ·	HLD.    Continue present treatment.  Out of bed to chair daily.  PT evaluation.

## 2017-03-17 NOTE — SWALLOW BEDSIDE ASSESSMENT ADULT - SWALLOW EVAL: RECOMMENDED FEEDING/EATING TECHNIQUES
small sips/bites/position upright (90 degrees)/allow for swallow between intakes/maintain upright posture during/after eating for 30 mins

## 2017-03-17 NOTE — PROGRESS NOTE ADULT - SUBJECTIVE AND OBJECTIVE BOX
HPI:  62 Y/O male w/ PMHx of COPD on home O2, cpap at night, afib, htn, hld, dm, pw c/o SOB. ABG worsening in ED showing 7.26/102/133/44 on Bipap. Pt intubated for hypercapnic respiratory failure. (08 Mar 2017 06:36)    Over 24 Hrs: pt remains on intermittent BIPAP. 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 No nausea, vomiting, or hematemesis; No diarrhea. No melena or hematochezia.  GENITOURINARY: No dysuria, frequency, hematuria  NEUROLOGICAL: No headaches  ENDOCRINE: No heat or cold intolerance  MUSCULOSKELETAL: No joint pain or swelling; No muscle, back, or extremity pain    ## O/E:  Vitals: T(C): 37, Max: 37.3 (03-17 @ 05:58)  HR: 59 (53 - 98)  BP: 115/55 (109/61 - 162/48)  BP(mean): 69 (61 - 115)  RR: 13 (12 - 19)  SpO2: 96% (85% - 100%)  Wt(kg): --  Gen: lying comfortably in bed in no apparent distress  HEENT: PERRL, EOMI  Resp: CTA B/L no c/r/w  CVS: S1S2 no m/r/g  Abd: soft NT/ND +BS  Ext: no c/c/e  Neuro: A&Ox3    I & Os for 24h ending 03-17 @ 07:00  =============================================  IN: 770 ml / OUT: 500 ml / NET: 270 ml    I & Os for current day (as of 03-17 @ 13:01)  =============================================  IN: 275 ml / OUT: 250 ml / NET: 25 ml        ## Labs:                        10.4   10.0  )-----------( 499      ( 17 Mar 2017 04:41 )             34.2     17 Mar 2017 04:41    136    |  86     |  14     ----------------------------<  205    3.6     |  43     |  0.46     Ca    8.5        17 Mar 2017 04:41  Phos  2.7       17 Mar 2017 04:41  Mg     1.9       17 Mar 2017 04:41    TPro  7.0    /  Alb  2.5    /  TBili  0.4    /  DBili  x      /  AST  27     /  ALT  36     /  AlkPhos  143    16 Mar 2017 03:58      ABG - ( 17 Mar 2017 11:00 )  pH: x     /  pCO2: 85    /  pO2: 70    / HCO3: 49    / Base Excess: 19.8  /  SaO2: 94              ## Imaging: reviewed    MEDICATIONS  (STANDING):  heparin  Injectable 5000Unit(s) SubCutaneous every 12 hours  chlorhexidine 4% Liquid 1Application(s) Topical daily  dextrose 5%. 1000milliLiter(s) IV Continuous <Continuous>  dextrose 50% Injectable 12.5Gram(s) IV Push once  dextrose 50% Injectable 25Gram(s) IV Push once  piperacillin/tazobactam IVPB. 3.375Gram(s) IV Intermittent every 8 hours  ALBUTerol/ipratropium for Nebulization 3milliLiter(s) Nebulizer every 6 hours  ALBUTerol    90 MICROgram(s) HFA Inhaler 1Puff(s) Inhalation every 4 hours  tiotropium 18 MICROgram(s) Capsule 1Capsule(s) Inhalation daily  pantoprazole    Tablet 40milliGRAM(s) Oral before breakfast  zinc oxide 40%/lanolin Ointment 1Application(s) Topical two times a day  insulin lispro (HumaLOG) corrective regimen sliding scale  SubCutaneous Before meals and at bedtime  predniSONE   Tablet 30milliGRAM(s) Oral daily  diltiazem    Tablet 60milliGRAM(s) Oral every 6 hours  metoprolol 25milliGRAM(s) Oral two times a day  ALPRAZolam 0.25milliGRAM(s) Oral three times a day  multivitamin/minerals 1Tablet(s) Oral daily    MEDICATIONS  (PRN):  dextrose Gel 1Dose(s) Oral once PRN Blood Glucose LESS THAN 70 milliGRAM(s)/deciliter  glucagon  Injectable 1milliGRAM(s) IntraMuscular once PRN Glucose LESS THAN 70 milligrams/deciliter  acetaminophen   Tablet 650milliGRAM(s) Oral every 6 hours PRN For Temp greater than 38 C (100.4 F)  benzocaine 15 mG/menthol 3.6 mG Lozenge 1Lozenge Oral every 4 hours PRN Sore Throat      ## Code status:  Goals of care discussion: [x] yes [ ] no  [x] full code  [ ] DNR  [ ] DNI  [ ] LUC

## 2017-03-17 NOTE — PROGRESS NOTE ADULT - ASSESSMENT
61M PMHx of COPD on home O2, cpap at night, afib, htn, hld, dm, hx of hypercarbic respiratory failure presents with complaints of SOB. Admitted for acute on chronic hypercarbic respiratory failure requiring intubation, COPD exacerbation, complex PNA, staph bacteremia    1. PULM  - pt tolerating time off biPAP  - cont nocturnal BiPAP and prn sob for acute on chronic hypercarbic respiratory failure  - cont steroid taper for COPD exacerbation  -cont abx for complex PNA  -metabolic alkalosis likely sec to steroids , will give a dose of diamox again today.     2. ID  - staph bacteremia: MSSA sensitive  - complex PNA  - cont with zosyn for now    3. ENDO  - DM: cont to monitor blood sugar with insulin sliding scale coverage    4. CV  - BP stable  - cont cardizem both for HTN and rate control, has hx of a-fib, cont beta blocker as pt  responds well to cardioselective beta blockers    5. Gen  - physical therapy  - cont xanax for anxiety, pt gets episodes of panic attack.  - psych eval

## 2017-03-17 NOTE — SWALLOW BEDSIDE ASSESSMENT ADULT - SLP PERTINENT HISTORY OF CURRENT PROBLEM
62 Y/O male w/ PMHx of COPD on home O2, cpap at night, afib, htn, hld, dm, pw c/o SOB. ABG worsening in ED showing 7.26/102/133/44 on Bipap. Pt intubated for hypercapnic respiratory failure. Extubated on 3/13.

## 2017-03-17 NOTE — SWALLOW BEDSIDE ASSESSMENT ADULT - SLP GENERAL OBSERVATIONS
Pt was seen bedside alert and oriented to self. Pt followed 1-step directions. Noted periods of aphonia however pt communicated using gestures and some 1 word utterances. Pt was received on 2 L nasal canula and maintained good 02 stats throughout the eval. Pt was seen bedside alert and oriented to self. Pt followed 1-step directions. Noted periods of aphonia however pt communicated using gestures and some 1 word utterances. Pt was received on 2 L nasal canula and maintained good 02 saturation ~88 throughout the eval.

## 2017-03-17 NOTE — SWALLOW BEDSIDE ASSESSMENT ADULT - PHARYNGEAL PHASE
Decreased laryngeal elevation/Delayed pharyngeal swallow Delayed pharyngeal swallow/Decreased laryngeal elevation

## 2017-03-18 LAB
ANION GAP SERPL CALC-SCNC: 6 MMOL/L — SIGNIFICANT CHANGE UP (ref 5–17)
BUN SERPL-MCNC: 20 MG/DL — SIGNIFICANT CHANGE UP (ref 7–23)
CALCIUM SERPL-MCNC: 9.1 MG/DL — SIGNIFICANT CHANGE UP (ref 8.5–10.1)
CHLORIDE SERPL-SCNC: 93 MMOL/L — LOW (ref 96–108)
CO2 SERPL-SCNC: 40 MMOL/L — HIGH (ref 22–31)
CREAT SERPL-MCNC: 0.46 MG/DL — LOW (ref 0.5–1.3)
GLUCOSE SERPL-MCNC: 118 MG/DL — HIGH (ref 70–99)
HCT VFR BLD CALC: 35.7 % — LOW (ref 39–50)
HGB BLD-MCNC: 12.1 G/DL — LOW (ref 13–17)
MAGNESIUM SERPL-MCNC: 2.1 MG/DL — SIGNIFICANT CHANGE UP (ref 1.8–2.4)
MCHC RBC-ENTMCNC: 27.7 PG — SIGNIFICANT CHANGE UP (ref 27–34)
MCHC RBC-ENTMCNC: 33.8 GM/DL — SIGNIFICANT CHANGE UP (ref 32–36)
MCV RBC AUTO: 81.9 FL — SIGNIFICANT CHANGE UP (ref 80–100)
PHOSPHATE SERPL-MCNC: 2.4 MG/DL — LOW (ref 2.5–4.5)
PLATELET # BLD AUTO: 368 K/UL — SIGNIFICANT CHANGE UP (ref 150–400)
POTASSIUM SERPL-MCNC: 3.6 MMOL/L — SIGNIFICANT CHANGE UP (ref 3.5–5.3)
POTASSIUM SERPL-SCNC: 3.6 MMOL/L — SIGNIFICANT CHANGE UP (ref 3.5–5.3)
RBC # BLD: 4.36 M/UL — SIGNIFICANT CHANGE UP (ref 4.2–5.8)
RBC # FLD: 17 % — HIGH (ref 11–15)
SODIUM SERPL-SCNC: 139 MMOL/L — SIGNIFICANT CHANGE UP (ref 135–145)
WBC # BLD: 11.4 K/UL — HIGH (ref 3.8–10.5)
WBC # FLD AUTO: 11.4 K/UL — HIGH (ref 3.8–10.5)

## 2017-03-18 PROCEDURE — 99291 CRITICAL CARE FIRST HOUR: CPT

## 2017-03-18 RX ORDER — ACETAMINOPHEN 500 MG
650 TABLET ORAL EVERY 6 HOURS
Qty: 0 | Refills: 0 | Status: DISCONTINUED | OUTPATIENT
Start: 2017-03-18 | End: 2017-04-03

## 2017-03-18 RX ORDER — POTASSIUM PHOSPHATE, MONOBASIC POTASSIUM PHOSPHATE, DIBASIC 236; 224 MG/ML; MG/ML
15 INJECTION, SOLUTION INTRAVENOUS ONCE
Qty: 0 | Refills: 0 | Status: COMPLETED | OUTPATIENT
Start: 2017-03-18 | End: 2017-03-18

## 2017-03-18 RX ADMIN — ZINC OXIDE 1 APPLICATION(S): 200 OINTMENT TOPICAL at 17:39

## 2017-03-18 RX ADMIN — POTASSIUM PHOSPHATE, MONOBASIC POTASSIUM PHOSPHATE, DIBASIC 63.75 MILLIMOLE(S): 236; 224 INJECTION, SOLUTION INTRAVENOUS at 06:10

## 2017-03-18 RX ADMIN — PIPERACILLIN AND TAZOBACTAM 25 GRAM(S): 4; .5 INJECTION, POWDER, LYOPHILIZED, FOR SOLUTION INTRAVENOUS at 22:58

## 2017-03-18 RX ADMIN — Medication 2: at 17:37

## 2017-03-18 RX ADMIN — PIPERACILLIN AND TAZOBACTAM 25 GRAM(S): 4; .5 INJECTION, POWDER, LYOPHILIZED, FOR SOLUTION INTRAVENOUS at 13:42

## 2017-03-18 RX ADMIN — Medication 1 TABLET(S): at 12:26

## 2017-03-18 RX ADMIN — Medication 5: at 12:25

## 2017-03-18 RX ADMIN — HEPARIN SODIUM 5000 UNIT(S): 5000 INJECTION INTRAVENOUS; SUBCUTANEOUS at 05:12

## 2017-03-18 RX ADMIN — Medication 2: at 23:20

## 2017-03-18 RX ADMIN — Medication 1: at 08:25

## 2017-03-18 RX ADMIN — ZINC OXIDE 1 APPLICATION(S): 200 OINTMENT TOPICAL at 05:13

## 2017-03-18 RX ADMIN — Medication 3 MILLILITER(S): at 06:23

## 2017-03-18 RX ADMIN — Medication 3 MILLILITER(S): at 00:56

## 2017-03-18 RX ADMIN — Medication 30 MILLIGRAM(S): at 06:10

## 2017-03-18 RX ADMIN — HEPARIN SODIUM 5000 UNIT(S): 5000 INJECTION INTRAVENOUS; SUBCUTANEOUS at 17:38

## 2017-03-18 RX ADMIN — PANTOPRAZOLE SODIUM 40 MILLIGRAM(S): 20 TABLET, DELAYED RELEASE ORAL at 08:25

## 2017-03-18 RX ADMIN — Medication 25 MILLIGRAM(S): at 09:07

## 2017-03-18 RX ADMIN — Medication 25 MILLIGRAM(S): at 22:00

## 2017-03-18 RX ADMIN — Medication 3 MILLILITER(S): at 17:18

## 2017-03-18 RX ADMIN — Medication 3 MILLILITER(S): at 11:46

## 2017-03-18 RX ADMIN — Medication 0.25 MILLIGRAM(S): at 13:37

## 2017-03-18 RX ADMIN — PIPERACILLIN AND TAZOBACTAM 25 GRAM(S): 4; .5 INJECTION, POWDER, LYOPHILIZED, FOR SOLUTION INTRAVENOUS at 05:12

## 2017-03-18 NOTE — PROGRESS NOTE ADULT - SUBJECTIVE AND OBJECTIVE BOX
Initial Consultaion Note  Requested by Name:  Date/ Time:3/18/17  Reason for referral/ Consultation:    HPI:  60 Y/O male w/ PMHx of COPD on home O2, cpap at night, afib, htn, hld, dm, pw c/o SOB. ABG worsening in ED showing 7.26/102/133/44 on Bipap. Pt intubated for hypercapnic respiratory failure. 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 Name/ Phone#:  Significant other/partner:                     Children:                         Caodaism/Spirituality:    Baseline ADLs (Prior to admission)    ADVANCE DIRECTIVES:  [ ] YES [ ] NO   DNR [ ] YES [ ] NO  Completed on:                     MOLST  [ ] YES [ ] NO   Completed on:  Living Will  [ ] YES [ ] NO   Completed on:    Allergies    No Known Allergies    Intolerances        Review of Systems:     PAIN : (Y/N) (0-10)  PAIN SITE :  QUALITY/QUANTITY OF PAIN :  PAIN RADIATING :( Y/N)  SEVERITY :  FREQUENCY :  IMPACT ON ADLs :      DYSPNEA: (Y/N) Mild [ ] Moderate [ ] Severe [ ]  NAUSEA/VOMITING: (Y/N)  DEPRESSION: (Y/N) Mild [ ]Moderate [ ] Severe [ ]  ANXIETY: (Y/N)  AGITATION: (Y/N):  SECRETIONS:(Y/N)  CONTIPATION : (Y/N)  DIARRHEA : (Y/N)  FRAILTY SYNDROM (Y/N):  FAILURE TO THRIVE (Y/N):  DIBILITY (Y/N);  OTHER SYMPTOMS :  UNABLE TO OBTAIN DUE TO POOR MENTATION (   )    PHYSICAL EXAM:  T(F): 98.5, Max: 98.8 (03-17 @ 12:00)  HR: 75 (57 - 101)  BP: 139/49 (85/63 - 139/54)  RR: 15 (12 - 20)  SpO2: 97% (92% - 98%)  Wt(kg): --   WEIGHT :    [id number]                  BMI:  I&O's Summary  I & Os for 24h ending 18 Mar 2017 07:00  =============================================  IN: 875 ml / OUT: 600 ml / NET: 275 ml    I & Os for current day (as of 18 Mar 2017 10:57)  =============================================  IN: 100 ml / OUT: 225 ml / NET: -125 ml    CAPILLARY BLOOD GLUCOSE  176 (18 Mar 2017 08:11)  218 (17 Mar 2017 21:09)  174 (17 Mar 2017 17:28)      GENERAL: alert [ ] oriented x ______[ ] lethargic [ ] agitated [ ] cachexia [ ] nonverbal [ ] coma [ ]  HEENT: normal [ ] dry mouth [ ] ET tube/trach [ ]   LUNGS: ]  CV :  normal [ ]  GI : normal [ ]  PEG /NG tube [ ]   : normal [ ] incontinent [ ] oliguria/anuria [ ] lawler [ ]  MSK : normal [ ] weakness [ ] edema [ ]              ambulatory [ ] bebbound/wheelchair bound [ ]   SKIN : normal [ ] pressure ulcer (Y/N) Stage ______________ rash (Y/N)    Functional Assessment:Karnofsky Performance Score :  Palliative Performance Status Version 2:         %    LABS:                        12.1   11.4  )-----------( 368      ( 18 Mar 2017 03:40 )             35.7     18 Mar 2017 03:40    139    |  93     |  20     ----------------------------<  118    3.6     |  40     |  0.46     Ca    9.1        18 Mar 2017 03:40  Phos  2.4       18 Mar 2017 03:40  Mg     2.1       18 Mar 2017 03:40            I&O's Detail  I & Os for 24h ending 18 Mar 2017 07:00  =============================================  IN:    IV PiggyBack: 450 ml    Oral Fluid: 325 ml    Solution: 100 ml    Total IN: 875 ml  ---------------------------------------------  OUT:    Voided: 600 ml    Total OUT: 600 ml  ---------------------------------------------  Total NET: 275 ml    I & Os for current day (as of 18 Mar 2017 10:57)  =============================================  IN:    Oral Fluid: 100 ml    Total IN: 100 ml  ---------------------------------------------  OUT:    Voided: 225 ml    Total OUT: 225 ml  ---------------------------------------------  Total NET: -125 ml      Assessment:   61y Male admitted with MULTIFOCAL PNEUMONIA COPD EXACERBATION  SHORTNESS OF BREATH      PROBLEM LIST :  PROBLEM/RECOMMENDATION: 1  Problem : Goals of care, counseling/ discussion.  Recommendation: met with patient/ family and discussed GOC & Advanced care planning                                    Palliative care info/counseling provided (Y/N)                                      Family meeting                                   Advanced Directives addressed (Y/N)  PROBLEM/ RECOMMENDATION:2  Problem :Resuscitation/ DNR/ DNI,   Recommendation: DNR/ DNI    PROBLEM/ RECOMMENDATION :3  Problem : Medical order for life sustaning treatment  Recommendation : MOLST Form ( initiated/ completed)    PROBLEM/RECOMMENDATION :4  Problem : Advanced care planning  Recommendation : Family meeting.(Y/N)     PLAN:  REFFERALS:                           Unit SW/Case Mgmt (Y/N)                          (Y/N)                         Speech/Swallow (Y/N)                           nutrition                                              Ethics (Y/N)                         PT/OT (Y/N)

## 2017-03-18 NOTE — PROGRESS NOTE ADULT - SUBJECTIVE AND OBJECTIVE BOX
HPI:  Pt is a 62 yo WM with h/o COPD on home O2, nocturnal CPAP, CAD/stent, A fib, HTN, DM, HLD and prostate adenoma presented 2 to SOB. ABG showed worsening acute on chronic resp acidosis despite being BiPAP, s/p intubation. Admitting dx: Acute on chronic resp acidosis/ COPD exacerbation/ PNA/ Staph sepsis    CBC:                        12.1   11.4  )-----------( 368      ( 18 Mar 2017 03:40 )             35.7     Chem:  18 Mar 2017 03:40    139    |  93     |  20     ----------------------------<  118    3.6     |  40     |  0.46     Ca    9.1        18 Mar 2017 03:40  Phos  2.4       18 Mar 2017 03:40  Mg     2.1       18 Mar 2017 03:40    Coags:    ## Medications:  piperacillin/tazobactam IVPB. 3.375Gram(s) IV Intermittent every 8 hours    metoprolol 25milliGRAM(s) Oral two times a day  diltiazem    Tablet 60milliGRAM(s) Oral every 8 hours    ALBUTerol/ipratropium for Nebulization 3milliLiter(s) Nebulizer every 6 hours  ALBUTerol    90 MICROgram(s) HFA Inhaler 1Puff(s) Inhalation every 4 hours  tiotropium 18 MICROgram(s) Capsule 1Capsule(s) Inhalation daily    dextrose Gel 1Dose(s) Oral once PRN  dextrose 50% Injectable 12.5Gram(s) IV Push once  dextrose 50% Injectable 25Gram(s) IV Push once  glucagon  Injectable 1milliGRAM(s) IntraMuscular once PRN  insulin lispro (HumaLOG) corrective regimen sliding scale  SubCutaneous Before meals and at bedtime    heparin  Injectable 5000Unit(s) SubCutaneous every 12 hours    pantoprazole    Tablet 40milliGRAM(s) Oral before breakfast    acetaminophen   Tablet 650milliGRAM(s) Oral every 6 hours PRN  ALPRAZolam 0.25milliGRAM(s) Oral three times a day  acetaminophen   Tablet. 650milliGRAM(s) Oral every 6 hours PRN    ## Vitals:  T(C): 36.6, Max: 37.4 (03-18 @ 11:14)  HR: 64 (58 - 89)  BP: 124/63 (85/63 - 139/54)  BP(mean): 78 (54 - 80)  RR: 14 (12 - 24)  SpO2: 99% (92% - 100%)  Wt(kg): --  Vent:   ABG: ABG - ( 17 Mar 2017 11:00 )  pH: x     /  pCO2: 85    /  pO2: 70    / HCO3: 49    / Base Excess: 19.8  /  SaO2: 94        I & Os for 24h ending 03-18 @ 07:00  =============================================  IN: 875 ml / OUT: 600 ml / NET: 275 ml    I & Os for current day (as of 03-18 @ 20:07)  =============================================  IN: 400 ml / OUT: 425 ml / NET: -25 ml    ## P/E:  Gen: lying comfortably in bed in no apparent distress    Resp: Decrease BS  CVS: RRR  Abd: Soft/ +BS  Ext: No edema  Neuro: Awake/alert    CENTRAL LINE: [ ] YES [x ] NO  LOCATION:   DATE INSERTED:  REMOVE: [ ] YES [ ] NO      MARTINEZ: [ ] YES [x ] NO    DATE INSERTED:  REMOVE:  [ ] YES [ ] NO      A-LINE:  [ ] YES [ x] NO  LOCATION:   DATE INSERTED:  REMOVE:  [ ] YES [ ] NO  EXPLAIN:    CODE STATUS: [] full code  [ ] DNR  [ ] DNI  [ ] MOLST  Goals of care discussion: [x) yes discussed by palliative care

## 2017-03-18 NOTE — PROGRESS NOTE ADULT - SUBJECTIVE AND OBJECTIVE BOX
INTERVAL HPI/OVERNIGHT EVENTS:  60 Y/O male w/ PMHx of COPD on home O2, CPAP at night, A FIB,, HTN, HLD, DM, presented with SOB. Intubated for hypercapnic respiratory failure in ED. CT chest and CXR showed multifocal pneumonia.  NO more details of history are available as intubated and not able to provide information.   Was recently admitted with similar presentation, was in ICU with BIPAP support.  Failed weaning for few days but did well on 03/13/17 and got extubated.  Still in ICU and comfortable on BIPAP.    Vital Signs Last 24 Hrs  T(C): 36.6, Max: 37.4 (03-18 @ 11:14)  T(F): 97.8, Max: 99.4 (03-18 @ 11:14)  HR: 71 (58 - 93)  BP: 106/42 (85/63 - 139/54)  BP(mean): 56 (54 - 84)  RR: 24 (12 - 24)  SpO2: 98% (92% - 100%)    PHYSICAL EXAM:  GEN:        Resting, comfortable.  HEENT:    Normal.    RESP:     no wheezing.  CVS:         Regular rate and rhythm.   ABD:         Soft, non-tender, non-distended;     MEDICATIONS  (STANDING):  heparin  Injectable 5000Unit(s) SubCutaneous every 12 hours  chlorhexidine 4% Liquid 1Application(s) Topical daily  dextrose 5%. 1000milliLiter(s) IV Continuous <Continuous>  dextrose 50% Injectable 12.5Gram(s) IV Push once  dextrose 50% Injectable 25Gram(s) IV Push once  piperacillin/tazobactam IVPB. 3.375Gram(s) IV Intermittent every 8 hours  ALBUTerol/ipratropium for Nebulization 3milliLiter(s) Nebulizer every 6 hours  ALBUTerol    90 MICROgram(s) HFA Inhaler 1Puff(s) Inhalation every 4 hours  tiotropium 18 MICROgram(s) Capsule 1Capsule(s) Inhalation daily  pantoprazole    Tablet 40milliGRAM(s) Oral before breakfast  zinc oxide 40%/lanolin Ointment 1Application(s) Topical two times a day  insulin lispro (HumaLOG) corrective regimen sliding scale  SubCutaneous Before meals and at bedtime  metoprolol 25milliGRAM(s) Oral two times a day  ALPRAZolam 0.25milliGRAM(s) Oral three times a day  multivitamin/minerals 1Tablet(s) Oral daily  diltiazem    Tablet 60milliGRAM(s) Oral every 8 hours    MEDICATIONS  (PRN):  dextrose Gel 1Dose(s) Oral once PRN Blood Glucose LESS THAN 70 milliGRAM(s)/deciliter  glucagon  Injectable 1milliGRAM(s) IntraMuscular once PRN Glucose LESS THAN 70 milligrams/deciliter  acetaminophen   Tablet 650milliGRAM(s) Oral every 6 hours PRN For Temp greater than 38 C (100.4 F)  benzocaine 15 mG/menthol 3.6 mG Lozenge 1Lozenge Oral every 4 hours PRN Sore Throat  acetaminophen   Tablet. 650milliGRAM(s) Oral every 6 hours PRN Mild Pain (1 - 3)    LABS:                        12.1   11.4  )-----------( 368      ( 18 Mar 2017 03:40 )             35.7     18 Mar 2017 03:40    139    |  93     |  20     ----------------------------<  118    3.6     |  40     |  0.46     Ca    9.1        18 Mar 2017 03:40  Phos  2.4       18 Mar 2017 03:40  Mg     2.1       18 Mar 2017 03:40    ASSESSMENT AND PLAN:  ·	S/P Acute Hypoxic/hypercarbic Respiratory failure.  ·	Multifocal pneumonia.  ·	Acute COPD exacerbation.  ·	Bilateral pleural effusion.  ·	Anemia.  ·	CAD with angioplasty.  ·	HTN.  ·	A Fib.  ·	HLD.    On bronchodilators and antibiotics.  Continue with PRN BIPAP.

## 2017-03-18 NOTE — PROGRESS NOTE ADULT - ASSESSMENT
Pt is a 60 yo WM with h/o COPD on home O2, nocturnal CPAP, CAD/stent, A fib, HTN, DM, HLD and prostate adenoma presented 2 to SOB. ABG showed worsening acute on chronic resp acidosis despite being BiPAP, s/p intubation. Admitting dx: Acute on chronic resp acidosis/ COPD exacerbation/ PNA/ Staph sepsis/ s/p extubation but requiring frequent BiPAP    Resp: Cont BiPAP prn/ Cont Nebs + Steroids  ID: Finish course of Abx  CVS: Cont BB + CCB  FEN: Po diet  Endo: Follow FS and cover with [facility]  Neuro/Psych: Cont Xanax  Social: Pt seen by Palliative care today/ Family members at bedside    CCT 35 min

## 2017-03-19 LAB
ANION GAP SERPL CALC-SCNC: 6 MMOL/L — SIGNIFICANT CHANGE UP (ref 5–17)
BUN SERPL-MCNC: 22 MG/DL — SIGNIFICANT CHANGE UP (ref 7–23)
CALCIUM SERPL-MCNC: 8.9 MG/DL — SIGNIFICANT CHANGE UP (ref 8.5–10.1)
CHLORIDE SERPL-SCNC: 94 MMOL/L — LOW (ref 96–108)
CO2 SERPL-SCNC: 39 MMOL/L — HIGH (ref 22–31)
CREAT SERPL-MCNC: 0.54 MG/DL — SIGNIFICANT CHANGE UP (ref 0.5–1.3)
GLUCOSE SERPL-MCNC: 222 MG/DL — HIGH (ref 70–99)
HCT VFR BLD CALC: 34.1 % — LOW (ref 39–50)
HGB BLD-MCNC: 11.1 G/DL — LOW (ref 13–17)
MAGNESIUM SERPL-MCNC: 1.9 MG/DL — SIGNIFICANT CHANGE UP (ref 1.8–2.4)
MCHC RBC-ENTMCNC: 26.7 PG — LOW (ref 27–34)
MCHC RBC-ENTMCNC: 32.6 GM/DL — SIGNIFICANT CHANGE UP (ref 32–36)
MCV RBC AUTO: 81.8 FL — SIGNIFICANT CHANGE UP (ref 80–100)
PHOSPHATE SERPL-MCNC: 2.5 MG/DL — SIGNIFICANT CHANGE UP (ref 2.5–4.5)
PLATELET # BLD AUTO: 363 K/UL — SIGNIFICANT CHANGE UP (ref 150–400)
POTASSIUM SERPL-MCNC: 3.9 MMOL/L — SIGNIFICANT CHANGE UP (ref 3.5–5.3)
POTASSIUM SERPL-SCNC: 3.9 MMOL/L — SIGNIFICANT CHANGE UP (ref 3.5–5.3)
RBC # BLD: 4.17 M/UL — LOW (ref 4.2–5.8)
RBC # FLD: 17 % — HIGH (ref 11–15)
SODIUM SERPL-SCNC: 139 MMOL/L — SIGNIFICANT CHANGE UP (ref 135–145)
WBC # BLD: 12 K/UL — HIGH (ref 3.8–10.5)
WBC # FLD AUTO: 12 K/UL — HIGH (ref 3.8–10.5)

## 2017-03-19 PROCEDURE — 99291 CRITICAL CARE FIRST HOUR: CPT

## 2017-03-19 RX ADMIN — ZINC OXIDE 1 APPLICATION(S): 200 OINTMENT TOPICAL at 05:13

## 2017-03-19 RX ADMIN — Medication 3: at 18:00

## 2017-03-19 RX ADMIN — PIPERACILLIN AND TAZOBACTAM 25 GRAM(S): 4; .5 INJECTION, POWDER, LYOPHILIZED, FOR SOLUTION INTRAVENOUS at 05:11

## 2017-03-19 RX ADMIN — Medication 0.25 MILLIGRAM(S): at 14:52

## 2017-03-19 RX ADMIN — Medication 20 MILLIGRAM(S): at 05:11

## 2017-03-19 RX ADMIN — Medication 3 MILLILITER(S): at 17:28

## 2017-03-19 RX ADMIN — Medication 1: at 08:42

## 2017-03-19 RX ADMIN — Medication 3 MILLILITER(S): at 11:17

## 2017-03-19 RX ADMIN — Medication 3: at 21:56

## 2017-03-19 RX ADMIN — HEPARIN SODIUM 5000 UNIT(S): 5000 INJECTION INTRAVENOUS; SUBCUTANEOUS at 05:11

## 2017-03-19 RX ADMIN — HEPARIN SODIUM 5000 UNIT(S): 5000 INJECTION INTRAVENOUS; SUBCUTANEOUS at 18:00

## 2017-03-19 RX ADMIN — Medication 3 MILLILITER(S): at 06:36

## 2017-03-19 RX ADMIN — PIPERACILLIN AND TAZOBACTAM 25 GRAM(S): 4; .5 INJECTION, POWDER, LYOPHILIZED, FOR SOLUTION INTRAVENOUS at 21:56

## 2017-03-19 RX ADMIN — Medication 1 TABLET(S): at 12:00

## 2017-03-19 RX ADMIN — Medication 2: at 12:00

## 2017-03-19 RX ADMIN — Medication 25 MILLIGRAM(S): at 09:39

## 2017-03-19 RX ADMIN — ZINC OXIDE 1 APPLICATION(S): 200 OINTMENT TOPICAL at 18:10

## 2017-03-19 RX ADMIN — Medication 0.25 MILLIGRAM(S): at 21:56

## 2017-03-19 RX ADMIN — Medication 3 MILLILITER(S): at 01:03

## 2017-03-19 RX ADMIN — PANTOPRAZOLE SODIUM 40 MILLIGRAM(S): 20 TABLET, DELAYED RELEASE ORAL at 08:59

## 2017-03-19 RX ADMIN — PIPERACILLIN AND TAZOBACTAM 25 GRAM(S): 4; .5 INJECTION, POWDER, LYOPHILIZED, FOR SOLUTION INTRAVENOUS at 14:52

## 2017-03-19 RX ADMIN — CHLORHEXIDINE GLUCONATE 1 APPLICATION(S): 213 SOLUTION TOPICAL at 12:00

## 2017-03-19 RX ADMIN — Medication 3 MILLILITER(S): at 23:12

## 2017-03-19 NOTE — PROGRESS NOTE ADULT - ASSESSMENT
Pt is a 60 yo WM with h/o COPD on home O2, nocturnal CPAP, CAD/stent, A fib, HTN, DM, HLD and prostate adenoma presented 2 to SOB. ABG showed worsening acute on chronic resp acidosis despite being BiPAP, s/p intubation. Admitting dx: Acute on chronic resp acidosis/ COPD exacerbation/ PNA/ Staph sepsis/ s/p extubation but has required frequent BiPAP    Resp: Cont only nocturnal BiPAP as tolerated/ Cont Nebs + Steroids  ID: Finish course of Abx  CVS: Cont BB + CCB  FEN: Po diet  Endo: Follow FS and cover with [facility]  Neuro/Psych: Cont Xanax  Social: Pt seen by Palliative care; full code/ If pt only requires BiPAP at the nighttime, will transfer to [facility] tomorrow    CCT 35 min

## 2017-03-19 NOTE — PROGRESS NOTE ADULT - SUBJECTIVE AND OBJECTIVE BOX
INTERVAL HPI/OVERNIGHT EVENTS:  60 Y/O male w/ PMHx of COPD on home O2, CPAP at night, A FIB,, HTN, HLD, DM, presented with SOB. Intubated for hypercapnic respiratory failure in ED. CT chest and CXR showed multifocal pneumonia.  NO more details of history are available as intubated and not able to provide information.   Was recently admitted with similar presentation, was in ICU with BIPAP support.  Failed weaning for few days but did well on 03/13/17 and got extubated.  Still in ICU, more comfortable looking  on nasal O2    Vital Signs Last 24 Hrs  T(C): 37.1, Max: 37.2 (03-19 @ 08:00)  T(F): 98.8, Max: 99 (03-19 @ 08:00)  HR: 80 (57 - 82)  BP: 136/56 (106/42 - 142/48)  BP(mean): 69 (56 - 82)  RR: 16 (13 - 26)  SpO2: 97% (95% - 100%)    PHYSICAL EXAM:  GEN:         Awake, responsive and comfortable.  HEENT:    Normal.    RESP:      crackles.  CVS:         Regular rate and rhythm.   ABD:         Soft, non-tender, non-distended;   :             No costovertebral angle tenderness  EXTR:            No clubbing, cyanosis or edema  CNS:              Intact sensory and motor function.    MEDICATIONS  (STANDING):  heparin  Injectable 5000Unit(s) SubCutaneous every 12 hours  chlorhexidine 4% Liquid 1Application(s) Topical daily  dextrose 5%. 1000milliLiter(s) IV Continuous <Continuous>  dextrose 50% Injectable 12.5Gram(s) IV Push once  dextrose 50% Injectable 25Gram(s) IV Push once  piperacillin/tazobactam IVPB. 3.375Gram(s) IV Intermittent every 8 hours  ALBUTerol/ipratropium for Nebulization 3milliLiter(s) Nebulizer every 6 hours  ALBUTerol    90 MICROgram(s) HFA Inhaler 1Puff(s) Inhalation every 4 hours  tiotropium 18 MICROgram(s) Capsule 1Capsule(s) Inhalation daily  pantoprazole    Tablet 40milliGRAM(s) Oral before breakfast  zinc oxide 40%/lanolin Ointment 1Application(s) Topical two times a day  insulin lispro (HumaLOG) corrective regimen sliding scale  SubCutaneous Before meals and at bedtime  predniSONE   Tablet 20milliGRAM(s) Oral daily  metoprolol 25milliGRAM(s) Oral two times a day  ALPRAZolam 0.25milliGRAM(s) Oral three times a day  multivitamin/minerals 1Tablet(s) Oral daily  diltiazem    Tablet 60milliGRAM(s) Oral every 8 hours    MEDICATIONS  (PRN):  dextrose Gel 1Dose(s) Oral once PRN Blood Glucose LESS THAN 70 milliGRAM(s)/deciliter  glucagon  Injectable 1milliGRAM(s) IntraMuscular once PRN Glucose LESS THAN 70 milligrams/deciliter  acetaminophen   Tablet 650milliGRAM(s) Oral every 6 hours PRN For Temp greater than 38 C (100.4 F)  benzocaine 15 mG/menthol 3.6 mG Lozenge 1Lozenge Oral every 4 hours PRN Sore Throat  acetaminophen   Tablet. 650milliGRAM(s) Oral every 6 hours PRN Mild Pain (1 - 3)    LABS:                        11.1   12.0  )-----------( 363      ( 19 Mar 2017 03:17 )             34.1     19 Mar 2017 03:17    139    |  94     |  22     ----------------------------<  222    3.9     |  39     |  0.54     Ca    8.9        19 Mar 2017 03:17  Phos  2.5       19 Mar 2017 03:17  Mg     1.9       19 Mar 2017 03:17    ASSESSMENT AND PLAN:  ·	S/P Acute Hypoxic/hypercarbic Respiratory failure.  ·	Multifocal pneumonia.  ·	Acute COPD exacerbation.  ·	Bilateral pleural effusion.  ·	Anemia.  ·	CAD with angioplasty.  ·	HTN.  ·	A Fib.  ·	HLD.    Clinically improving. Will continue treatment.

## 2017-03-19 NOTE — PROGRESS NOTE ADULT - SUBJECTIVE AND OBJECTIVE BOX
HPI:  Pt is a 62 yo WM with h/o COPD on home O2, nocturnal CPAP, CAD/stent, A fib, HTN, DM, HLD and prostate adenoma presented 2 to SOB. ABG showed worsening acute on chronic resp acidosis despite being BiPAP, s/p intubation. Admitting dx: Acute on chronic resp acidosis/ COPD exacerbation/ PNA/ Staph sepsis/ s/p extubation but has required frequent BiPAP    ## Labs:  CBC:                        11.1   12.0  )-----------( 363      ( 19 Mar 2017 03:17 )             34.1     Chem:  19 Mar 2017 03:17    139    |  94     |  22     ----------------------------<  222    3.9     |  39     |  0.54     Ca    8.9        19 Mar 2017 03:17  Phos  2.5       19 Mar 2017 03:17  Mg     1.9       19 Mar 2017 03:17    Coags:    # Medications:  piperacillin/tazobactam IVPB. 3.375Gram(s) IV Intermittent every 8 hours    metoprolol 25milliGRAM(s) Oral two times a day  diltiazem    Tablet 60milliGRAM(s) Oral every 8 hours    ALBUTerol/ipratropium for Nebulization 3milliLiter(s) Nebulizer every 6 hours  ALBUTerol    90 MICROgram(s) HFA Inhaler 1Puff(s) Inhalation every 4 hours  tiotropium 18 MICROgram(s) Capsule 1Capsule(s) Inhalation daily    dextrose Gel 1Dose(s) Oral once PRN  dextrose 50% Injectable 12.5Gram(s) IV Push once  dextrose 50% Injectable 25Gram(s) IV Push once  glucagon  Injectable 1milliGRAM(s) IntraMuscular once PRN  insulin lispro (HumaLOG) corrective regimen sliding scale  SubCutaneous Before meals and at bedtime  predniSONE   Tablet 20milliGRAM(s) Oral daily    heparin  Injectable 5000Unit(s) SubCutaneous every 12 hours    pantoprazole    Tablet 40milliGRAM(s) Oral before breakfast    acetaminophen   Tablet 650milliGRAM(s) Oral every 6 hours PRN  ALPRAZolam 0.25milliGRAM(s) Oral three times a day  acetaminophen   Tablet. 650milliGRAM(s) Oral every 6 hours PRN      ## Vitals:  T(C): 37.1, Max: 37.2 (03-19 @ 08:00)  HR: 80 (57 - 82)  BP: 136/56 (106/42 - 142/48)  BP(mean): 69 (56 - 82)  RR: 16 (13 - 26)  SpO2: 97% (95% - 100%)  Wt(kg): --  Vent:   ABG:     I & Os for 24h ending 03-19 @ 07:00  =============================================  IN: 600 ml / OUT: 625 ml / NET: -25 ml    I & Os for current day (as of 03-19 @ 16:39)  =============================================  IN: 120 ml / OUT: 0 ml / NET: 120 ml    ## P/E:  Gen: lying comfortably in bed in no apparent distress  Resp: Decrease BS  CVS: RRR  Abd: Soft/ +BS  Ext: No edema  Neuro: Awake/alert    CENTRAL LINE: [ ] YES [x ] NO  LOCATION:   DATE INSERTED:  REMOVE: [ ] YES [ ] NO      MARTINEZ: [ ] YES [x ] NO    DATE INSERTED:  REMOVE:  [ ] YES [ ] NO      A-LINE:  [ ] YES [ x] NO  LOCATION:   DATE INSERTED:  REMOVE:  [ ] YES [ ] NO  EXPLAIN:    CODE STATUS: [] full code  [ ] DNR  [ ] DNI  [ ] MOLST  Goals of care discussion: [x) yes discussed by palliative care

## 2017-03-20 LAB
ANION GAP SERPL CALC-SCNC: 5 MMOL/L — SIGNIFICANT CHANGE UP (ref 5–17)
BASE EXCESS BLDA CALC-SCNC: 13.9 MMOL/L — HIGH (ref -2–2)
BLOOD GAS COMMENTS: SIGNIFICANT CHANGE UP
BLOOD GAS SOURCE: SIGNIFICANT CHANGE UP
BUN SERPL-MCNC: 17 MG/DL — SIGNIFICANT CHANGE UP (ref 7–23)
CALCIUM SERPL-MCNC: 8.9 MG/DL — SIGNIFICANT CHANGE UP (ref 8.5–10.1)
CHLORIDE SERPL-SCNC: 90 MMOL/L — LOW (ref 96–108)
CO2 SERPL-SCNC: 40 MMOL/L — HIGH (ref 22–31)
CREAT SERPL-MCNC: 0.47 MG/DL — LOW (ref 0.5–1.3)
CULTURE RESULTS: SIGNIFICANT CHANGE UP
GLUCOSE SERPL-MCNC: 246 MG/DL — HIGH (ref 70–99)
HCO3 BLDA-SCNC: 41 MMOL/L — HIGH (ref 21–29)
HCT VFR BLD CALC: 34.3 % — LOW (ref 39–50)
HGB BLD-MCNC: 11.8 G/DL — LOW (ref 13–17)
HOROWITZ INDEX BLDA+IHG-RTO: 28 — SIGNIFICANT CHANGE UP
MAGNESIUM SERPL-MCNC: 1.9 MG/DL — SIGNIFICANT CHANGE UP (ref 1.8–2.4)
MCHC RBC-ENTMCNC: 28.1 PG — SIGNIFICANT CHANGE UP (ref 27–34)
MCHC RBC-ENTMCNC: 34.3 GM/DL — SIGNIFICANT CHANGE UP (ref 32–36)
MCV RBC AUTO: 81.8 FL — SIGNIFICANT CHANGE UP (ref 80–100)
PCO2 BLDA: 68 MMHG — HIGH (ref 32–46)
PH BLD: 7.4 — SIGNIFICANT CHANGE UP (ref 7.35–7.45)
PHOSPHATE SERPL-MCNC: 2.7 MG/DL — SIGNIFICANT CHANGE UP (ref 2.5–4.5)
PLATELET # BLD AUTO: 302 K/UL — SIGNIFICANT CHANGE UP (ref 150–400)
PO2 BLDA: 116 MMHG — HIGH (ref 74–108)
POTASSIUM SERPL-MCNC: 4.3 MMOL/L — SIGNIFICANT CHANGE UP (ref 3.5–5.3)
POTASSIUM SERPL-SCNC: 4.3 MMOL/L — SIGNIFICANT CHANGE UP (ref 3.5–5.3)
RBC # BLD: 4.19 M/UL — LOW (ref 4.2–5.8)
RBC # FLD: 17.2 % — HIGH (ref 11–15)
SAO2 % BLDA: 98 % — HIGH (ref 92–96)
SODIUM SERPL-SCNC: 135 MMOL/L — SIGNIFICANT CHANGE UP (ref 135–145)
SPECIMEN SOURCE: SIGNIFICANT CHANGE UP
WBC # BLD: 11.7 K/UL — HIGH (ref 3.8–10.5)
WBC # FLD AUTO: 11.7 K/UL — HIGH (ref 3.8–10.5)

## 2017-03-20 PROCEDURE — 99291 CRITICAL CARE FIRST HOUR: CPT

## 2017-03-20 PROCEDURE — 99233 SBSQ HOSP IP/OBS HIGH 50: CPT

## 2017-03-20 RX ADMIN — Medication 2: at 17:07

## 2017-03-20 RX ADMIN — Medication 1: at 22:03

## 2017-03-20 RX ADMIN — PANTOPRAZOLE SODIUM 40 MILLIGRAM(S): 20 TABLET, DELAYED RELEASE ORAL at 08:47

## 2017-03-20 RX ADMIN — Medication 20 MILLIGRAM(S): at 05:11

## 2017-03-20 RX ADMIN — ZINC OXIDE 1 APPLICATION(S): 200 OINTMENT TOPICAL at 05:12

## 2017-03-20 RX ADMIN — HEPARIN SODIUM 5000 UNIT(S): 5000 INJECTION INTRAVENOUS; SUBCUTANEOUS at 05:08

## 2017-03-20 RX ADMIN — PIPERACILLIN AND TAZOBACTAM 25 GRAM(S): 4; .5 INJECTION, POWDER, LYOPHILIZED, FOR SOLUTION INTRAVENOUS at 05:08

## 2017-03-20 RX ADMIN — Medication 25 MILLIGRAM(S): at 13:20

## 2017-03-20 RX ADMIN — Medication 25 MILLIGRAM(S): at 21:57

## 2017-03-20 RX ADMIN — ZINC OXIDE 1 APPLICATION(S): 200 OINTMENT TOPICAL at 17:07

## 2017-03-20 RX ADMIN — Medication 2: at 13:16

## 2017-03-20 RX ADMIN — Medication 1 TABLET(S): at 13:16

## 2017-03-20 RX ADMIN — Medication 0.25 MILLIGRAM(S): at 05:08

## 2017-03-20 RX ADMIN — Medication 0.25 MILLIGRAM(S): at 21:57

## 2017-03-20 RX ADMIN — Medication 3 MILLILITER(S): at 05:37

## 2017-03-20 RX ADMIN — Medication 2: at 08:47

## 2017-03-20 RX ADMIN — Medication 3 MILLILITER(S): at 11:56

## 2017-03-20 RX ADMIN — Medication 3 MILLILITER(S): at 17:23

## 2017-03-20 RX ADMIN — HEPARIN SODIUM 5000 UNIT(S): 5000 INJECTION INTRAVENOUS; SUBCUTANEOUS at 17:07

## 2017-03-20 NOTE — PROGRESS NOTE ADULT - ASSESSMENT
Pt is a 60 yo WM with h/o COPD on home O2, nocturnal CPAP, CAD/stent, A fib, HTN, DM, HLD and prostate adenoma presented 2 to SOB. ABG showed worsening acute on chronic resp acidosis despite being BiPAP, s/p intubation. Admitting dx: Acute on chronic resp acidosis/ COPD exacerbation/ PNA/ Staph sepsis/ s/p extubation. Last 24 hrs only required BIPAP overnight       Resp: Cont only nocturnal BiPAP/ Cont Nebs + Steroids  ID: Dc Abx  CVS: Cont BB + CCB  FEN: Po diet  Endo: Follow FS and cover with [place]  Neuro/Psych: Cont Xanax  Social: Pt seen by Palliative care; full code/ May transfer to [facility]     CCT 35 min

## 2017-03-20 NOTE — PROGRESS NOTE ADULT - SUBJECTIVE AND OBJECTIVE BOX
HPI:  Pt is a 60 yo WM with h/o COPD on home O2, nocturnal CPAP, CAD/stent, A fib, HTN, DM, HLD and prostate adenoma presented 2 to SOB. ABG showed worsening acute on chronic resp acidosis despite being BiPAP, s/p intubation. Admitting dx: Acute on chronic resp acidosis/ COPD exacerbation/ PNA/ Staph sepsis/ s/p extubation. Last 24 hrs only required BIPAP overnight     ## Labs:  CBC:                        11.8   11.7  )-----------( 302      ( 20 Mar 2017 03:04 )             34.3     Chem:  20 Mar 2017 03:04    135    |  90     |  17     ----------------------------<  246    4.3     |  40     |  0.47     Ca    8.9        20 Mar 2017 03:04  Phos  2.7       20 Mar 2017 03:04  Mg     1.9       20 Mar 2017 03:04    Coags:    ## Medications:    metoprolol 25milliGRAM(s) Oral two times a day  diltiazem    Tablet 60milliGRAM(s) Oral every 8 hours    ALBUTerol/ipratropium for Nebulization 3milliLiter(s) Nebulizer every 6 hours  ALBUTerol    90 MICROgram(s) HFA Inhaler 1Puff(s) Inhalation every 4 hours  tiotropium 18 MICROgram(s) Capsule 1Capsule(s) Inhalation daily    dextrose Gel 1Dose(s) Oral once PRN  dextrose 50% Injectable 12.5Gram(s) IV Push once  dextrose 50% Injectable 25Gram(s) IV Push once  glucagon  Injectable 1milliGRAM(s) IntraMuscular once PRN  insulin lispro (HumaLOG) corrective regimen sliding scale  SubCutaneous Before meals and at bedtime    heparin  Injectable 5000Unit(s) SubCutaneous every 12 hours    pantoprazole    Tablet 40milliGRAM(s) Oral before breakfast    acetaminophen   Tablet 650milliGRAM(s) Oral every 6 hours PRN  ALPRAZolam 0.25milliGRAM(s) Oral three times a day  acetaminophen   Tablet. 650milliGRAM(s) Oral every 6 hours PRN      ## Vitals:  T(C): 36.6, Max: 37 (03-20 @ 00:06)  HR: 77 (56 - 80)  BP: 103/64 (97/47 - 175/56)  BP(mean): 72 (58 - 89)  RR: 19 (14 - 19)  SpO2: 95% (89% - 100%)  Wt(kg): --  Vent:   ABG: ABG - ( 20 Mar 2017 11:29 )  pH: x     /  pCO2: 68    /  pO2: 116   / HCO3: 41    / Base Excess: 13.9  /  SaO2: 98          I & Os for 24h ending 03-20 @ 07:00  =============================================  IN: 980 ml / OUT: 200 ml / NET: 780 ml    I & Os for current day (as of 03-20 @ 20:00)  =============================================  IN: 380 ml / OUT: 0 ml / NET: 380 ml    ## P/E:  Gen: lying comfortably in bed in no apparent distress  Resp: Decrease BS  CVS: RRR  Abd: Soft/ +BS  Ext: No edema  Neuro: Awake/alert    CENTRAL LINE: [ ] YES [x ] NO  LOCATION:   DATE INSERTED:  REMOVE: [ ] YES [ ] NO      MARTINEZ: [ ] YES [x ] NO    DATE INSERTED:  REMOVE:  [ ] YES [ ] NO      A-LINE:  [ ] YES [ x] NO  LOCATION:   DATE INSERTED:  REMOVE:  [ ] YES [ ] NO  EXPLAIN:    CODE STATUS: [] full code  [ ] DNR  [ ] DNI  [ ] MOLST  Goals of care discussion: [x) yes discussed by palliative care HPI:  Pt is a 60 yo WM with h/o COPD on home O2, nocturnal CPAP, CAD/stent, A fib, HTN, DM, HLD and prostate adenoma presented 2 to SOB. ABG showed worsening acute on chronic resp acidosis despite being BiPAP, s/p intubation. Admitting dx: Acute on chronic resp acidosis/ COPD exacerbation/ PNA/ Staph sepsis/ s/p extubation. Last 24 hrs only required BIPAP overnight     ## Labs:  CBC:                        11.8   11.7  )-----------( 302      ( 20 Mar 2017 03:04 )             34.3     Chem:  20 Mar 2017 03:04    135    |  90     |  17     ----------------------------<  246    4.3     |  40     |  0.47     Ca    8.9        20 Mar 2017 03:04  Phos  2.7       20 Mar 2017 03:04  Mg     1.9       20 Mar 2017 03:04    Coags:    ## Medications:    metoprolol 25milliGRAM(s) Oral two times a day  diltiazem    Tablet 60milliGRAM(s) Oral every 8 hours    ALBUTerol/ipratropium for Nebulization 3milliLiter(s) Nebulizer every 6 hours  ALBUTerol    90 MICROgram(s) HFA Inhaler 1Puff(s) Inhalation every 4 hours  tiotropium 18 MICROgram(s) Capsule 1Capsule(s) Inhalation daily    dextrose Gel 1Dose(s) Oral once PRN  dextrose 50% Injectable 12.5Gram(s) IV Push once  dextrose 50% Injectable 25Gram(s) IV Push once  glucagon  Injectable 1milliGRAM(s) IntraMuscular once PRN  insulin lispro (HumaLOG) corrective regimen sliding scale  SubCutaneous Before meals and at bedtime    heparin  Injectable 5000Unit(s) SubCutaneous every 12 hours    pantoprazole    Tablet 40milliGRAM(s) Oral before breakfast    acetaminophen   Tablet 650milliGRAM(s) Oral every 6 hours PRN  ALPRAZolam 0.25milliGRAM(s) Oral three times a day  acetaminophen   Tablet. 650milliGRAM(s) Oral every 6 hours PRN      ## Vitals:  T(C): 36.6, Max: 37 (03-20 @ 00:06)  HR: 77 (56 - 80)  BP: 103/64 (97/47 - 175/56)  BP(mean): 72 (58 - 89)  RR: 19 (14 - 19)  SpO2: 95% (89% - 100%)  Wt(kg): --  Vent:   ABG: ABG - ( 20 Mar 2017 11:29 )  pH: x     /  pCO2: 68    /  pO2: 116   / HCO3: 41    / Base Excess: 13.9  /  SaO2: 98          I & Os for 24h ending 03-20 @ 07:00  =============================================  IN: 980 ml / OUT: 200 ml / NET: 780 ml    I & Os for current day (as of 03-20 @ 20:00)  =============================================  IN: 380 ml / OUT: 0 ml / NET: 380 ml    ## P/E:  Gen: lying comfortably in bed in no apparent distress  Resp: Better aeration L lung  CVS: RRR  Abd: Soft/ +BS  Ext: No edema  Neuro: Awake/alert    CENTRAL LINE: [ ] YES [x ] NO  LOCATION:   DATE INSERTED:  REMOVE: [ ] YES [ ] NO      MARTINEZ: [ ] YES [x ] NO    DATE INSERTED:  REMOVE:  [ ] YES [ ] NO      A-LINE:  [ ] YES [ x] NO  LOCATION:   DATE INSERTED:  REMOVE:  [ ] YES [ ] NO  EXPLAIN:    CODE STATUS: [] full code  [ ] DNR  [ ] DNI  [ ] MOLST  Goals of care discussion: [x) yes discussed by palliative care

## 2017-03-20 NOTE — GOALS OF CARE CONVERSATION - PERSONAL ADVANCE DIRECTIVE - CONVERSATION DETAILS
Unable to speak with pt presently getting med neb treatment.  Called & spoke with Miguel Amezcua (son) who informed me he was at work but will be coming in tonight around 5pm & we will meet and discuss GOC & Advance Care Planning at that time. Unable to speak with pt presently getting med neb treatment.  Called & spoke with Miguel Amezcua (son) who informed me he was at work but will be coming in tonight around 5pm & we will meet and discuss GOC & Advance Care Planning at that time. Spoke with son Miguel today 3/21/17 educated him about MOLST/DNR/DNI, & about Palliative Care & he said he wants to speak with his mother & other siblings & we can meet to go over any questions they might have.

## 2017-03-20 NOTE — PROGRESS NOTE ADULT - SUBJECTIVE AND OBJECTIVE BOX
INTERVAL HPI/OVERNIGHT EVENTS:  62 Y/O male w/ PMHx of COPD on home O2, CPAP at night, A FIB,, HTN, HLD, DM, presented with SOB. Intubated for hypercapnic respiratory failure in ED. CT chest and CXR showed multifocal pneumonia.  NO more details of history are available as intubated and not able to provide information.   Was recently admitted with similar presentation, was in ICU with BIPAP support.  Failed weaning for few days but did well on 03/13/17 and got extubated.  Still in ICU, more comfortable looking  on nasal O2. Over all week.    Vital Signs Last 24 Hrs  T(C): 36.6, Max: 37 (03-20 @ 00:06)  T(F): 97.8, Max: 98.6 (03-20 @ 00:06)  HR: 77 (56 - 88)  BP: 103/64 (97/47 - 175/56)  BP(mean): 72 (58 - 89)  RR: 19 (14 - 19)  SpO2: 95% (89% - 100%)    PHYSICAL EXAM:  GEN:        Awake, responsive and comfortable.  HEENT:    Normal.    RESP:      no wheezing.  CVS:         Regular rate and rhythm.   ABD:         Soft, non-tender, non-distended;     MEDICATIONS  (STANDING):  heparin  Injectable 5000Unit(s) SubCutaneous every 12 hours  dextrose 5%. 1000milliLiter(s) IV Continuous <Continuous>  dextrose 50% Injectable 12.5Gram(s) IV Push once  dextrose 50% Injectable 25Gram(s) IV Push once  ALBUTerol/ipratropium for Nebulization 3milliLiter(s) Nebulizer every 6 hours  ALBUTerol    90 MICROgram(s) HFA Inhaler 1Puff(s) Inhalation every 4 hours  tiotropium 18 MICROgram(s) Capsule 1Capsule(s) Inhalation daily  pantoprazole    Tablet 40milliGRAM(s) Oral before breakfast  zinc oxide 40%/lanolin Ointment 1Application(s) Topical two times a day  insulin lispro (HumaLOG) corrective regimen sliding scale  SubCutaneous Before meals and at bedtime  metoprolol 25milliGRAM(s) Oral two times a day  ALPRAZolam 0.25milliGRAM(s) Oral three times a day  multivitamin/minerals 1Tablet(s) Oral daily  diltiazem    Tablet 60milliGRAM(s) Oral every 8 hours    MEDICATIONS  (PRN):  dextrose Gel 1Dose(s) Oral once PRN Blood Glucose LESS THAN 70 milliGRAM(s)/deciliter  glucagon  Injectable 1milliGRAM(s) IntraMuscular once PRN Glucose LESS THAN 70 milligrams/deciliter  acetaminophen   Tablet 650milliGRAM(s) Oral every 6 hours PRN For Temp greater than 38 C (100.4 F)  benzocaine 15 mG/menthol 3.6 mG Lozenge 1Lozenge Oral every 4 hours PRN Sore Throat  acetaminophen   Tablet. 650milliGRAM(s) Oral every 6 hours PRN Mild Pain (1 - 3)    LABS:                        11.8   11.7  )-----------( 302      ( 20 Mar 2017 03:04 )             34.3     20 Mar 2017 03:04    135    |  90     |  17     ----------------------------<  246    4.3     |  40     |  0.47     Ca    8.9        20 Mar 2017 03:04  Phos  2.7       20 Mar 2017 03:04  Mg     1.9       20 Mar 2017 03:04    ABG - 03-20 @ 11:29  pH: 7.40 / pcO2: 68 / 116 on 2 litres.    ASSESSMENT AND PLAN:  ·	S/P Acute Hypoxic/hypercarbic Respiratory failure.  ·	Multifocal pneumonia.  ·	Acute COPD exacerbation.  ·	Bilateral pleural effusion.  ·	Anemia.  ·	CAD with angioplasty.  ·	HTN.  ·	A Fib.  ·	HLD.  ·	Anxiety.    Continue nebulizer and as needed O2.  Physical therapy.

## 2017-03-21 DIAGNOSIS — J96.21 ACUTE AND CHRONIC RESPIRATORY FAILURE WITH HYPOXIA: ICD-10-CM

## 2017-03-21 DIAGNOSIS — E43 UNSPECIFIED SEVERE PROTEIN-CALORIE MALNUTRITION: ICD-10-CM

## 2017-03-21 DIAGNOSIS — R13.12 DYSPHAGIA, OROPHARYNGEAL PHASE: ICD-10-CM

## 2017-03-21 LAB
ANION GAP SERPL CALC-SCNC: 3 MMOL/L — LOW (ref 5–17)
BUN SERPL-MCNC: 18 MG/DL — SIGNIFICANT CHANGE UP (ref 7–23)
CALCIUM SERPL-MCNC: 9.4 MG/DL — SIGNIFICANT CHANGE UP (ref 8.5–10.1)
CHLORIDE SERPL-SCNC: 89 MMOL/L — LOW (ref 96–108)
CO2 SERPL-SCNC: 44 MMOL/L — HIGH (ref 22–31)
CREAT SERPL-MCNC: 0.36 MG/DL — LOW (ref 0.5–1.3)
GLUCOSE SERPL-MCNC: 147 MG/DL — HIGH (ref 70–99)
HCT VFR BLD CALC: 35.5 % — LOW (ref 39–50)
HGB BLD-MCNC: 11.4 G/DL — LOW (ref 13–17)
MAGNESIUM SERPL-MCNC: 1.9 MG/DL — SIGNIFICANT CHANGE UP (ref 1.8–2.4)
MCHC RBC-ENTMCNC: 26.5 PG — LOW (ref 27–34)
MCHC RBC-ENTMCNC: 32.1 GM/DL — SIGNIFICANT CHANGE UP (ref 32–36)
MCV RBC AUTO: 82.7 FL — SIGNIFICANT CHANGE UP (ref 80–100)
PHOSPHATE SERPL-MCNC: 2.5 MG/DL — SIGNIFICANT CHANGE UP (ref 2.5–4.5)
PLATELET # BLD AUTO: 288 K/UL — SIGNIFICANT CHANGE UP (ref 150–400)
POTASSIUM SERPL-MCNC: 4.6 MMOL/L — SIGNIFICANT CHANGE UP (ref 3.5–5.3)
POTASSIUM SERPL-SCNC: 4.6 MMOL/L — SIGNIFICANT CHANGE UP (ref 3.5–5.3)
RBC # BLD: 4.3 M/UL — SIGNIFICANT CHANGE UP (ref 4.2–5.8)
RBC # FLD: 17.4 % — HIGH (ref 11–15)
SODIUM SERPL-SCNC: 136 MMOL/L — SIGNIFICANT CHANGE UP (ref 135–145)
WBC # BLD: 12.5 K/UL — HIGH (ref 3.8–10.5)
WBC # FLD AUTO: 12.5 K/UL — HIGH (ref 3.8–10.5)

## 2017-03-21 PROCEDURE — 99233 SBSQ HOSP IP/OBS HIGH 50: CPT

## 2017-03-21 RX ORDER — BACITRACIN ZINC 500 UNIT/G
1 OINTMENT IN PACKET (EA) TOPICAL THREE TIMES A DAY
Qty: 0 | Refills: 0 | Status: DISCONTINUED | OUTPATIENT
Start: 2017-03-21 | End: 2017-04-03

## 2017-03-21 RX ADMIN — Medication 25 MILLIGRAM(S): at 23:26

## 2017-03-21 RX ADMIN — BENZOCAINE AND MENTHOL 1 LOZENGE: 5; 1 LIQUID ORAL at 08:34

## 2017-03-21 RX ADMIN — ZINC OXIDE 1 APPLICATION(S): 200 OINTMENT TOPICAL at 17:32

## 2017-03-21 RX ADMIN — Medication 3 MILLILITER(S): at 17:03

## 2017-03-21 RX ADMIN — Medication 3 MILLILITER(S): at 00:16

## 2017-03-21 RX ADMIN — Medication 0.25 MILLIGRAM(S): at 23:26

## 2017-03-21 RX ADMIN — Medication 25 MILLIGRAM(S): at 11:40

## 2017-03-21 RX ADMIN — Medication 2: at 11:40

## 2017-03-21 RX ADMIN — Medication 2: at 08:14

## 2017-03-21 RX ADMIN — HEPARIN SODIUM 5000 UNIT(S): 5000 INJECTION INTRAVENOUS; SUBCUTANEOUS at 05:19

## 2017-03-21 RX ADMIN — Medication 3 MILLILITER(S): at 05:39

## 2017-03-21 RX ADMIN — Medication 10 MILLIGRAM(S): at 05:19

## 2017-03-21 RX ADMIN — Medication 0.25 MILLIGRAM(S): at 05:18

## 2017-03-21 RX ADMIN — Medication 1 APPLICATION(S): at 23:26

## 2017-03-21 RX ADMIN — ZINC OXIDE 1 APPLICATION(S): 200 OINTMENT TOPICAL at 06:51

## 2017-03-21 RX ADMIN — Medication 0.25 MILLIGRAM(S): at 13:46

## 2017-03-21 RX ADMIN — PANTOPRAZOLE SODIUM 40 MILLIGRAM(S): 20 TABLET, DELAYED RELEASE ORAL at 08:14

## 2017-03-21 RX ADMIN — Medication 1 TABLET(S): at 11:40

## 2017-03-21 RX ADMIN — Medication 3 MILLILITER(S): at 11:14

## 2017-03-21 RX ADMIN — HEPARIN SODIUM 5000 UNIT(S): 5000 INJECTION INTRAVENOUS; SUBCUTANEOUS at 17:30

## 2017-03-21 NOTE — PROGRESS NOTE ADULT - SUBJECTIVE AND OBJECTIVE BOX
Patient is a 61y old  Male who presents with a chief complaint of sob (08 Mar 2017 11:44)      INTERVAL HPI/OVERNIGHT EVENTS:  tolerating nasal canula during the day, c/o sore in corner of mouth, aide reports that patient appears to have pain and difficulty swallowing when she is assisting in feeds    MEDICATIONS  (STANDING):  heparin  Injectable 5000Unit(s) SubCutaneous every 12 hours  dextrose 5%. 1000milliLiter(s) IV Continuous <Continuous>  dextrose 50% Injectable 12.5Gram(s) IV Push once  dextrose 50% Injectable 25Gram(s) IV Push once  ALBUTerol/ipratropium for Nebulization 3milliLiter(s) Nebulizer every 6 hours  ALBUTerol    90 MICROgram(s) HFA Inhaler 1Puff(s) Inhalation every 4 hours  tiotropium 18 MICROgram(s) Capsule 1Capsule(s) Inhalation daily  pantoprazole    Tablet 40milliGRAM(s) Oral before breakfast  zinc oxide 40%/lanolin Ointment 1Application(s) Topical two times a day  insulin lispro (HumaLOG) corrective regimen sliding scale  SubCutaneous Before meals and at bedtime  predniSONE   Tablet 10milliGRAM(s) Oral daily  metoprolol 25milliGRAM(s) Oral two times a day  ALPRAZolam 0.25milliGRAM(s) Oral three times a day  multivitamin/minerals 1Tablet(s) Oral daily  diltiazem    Tablet 60milliGRAM(s) Oral every 8 hours  BACItracin   Ointment 1Application(s) Topical three times a day    MEDICATIONS  (PRN):  dextrose Gel 1Dose(s) Oral once PRN Blood Glucose LESS THAN 70 milliGRAM(s)/deciliter  glucagon  Injectable 1milliGRAM(s) IntraMuscular once PRN Glucose LESS THAN 70 milligrams/deciliter  acetaminophen   Tablet 650milliGRAM(s) Oral every 6 hours PRN For Temp greater than 38 C (100.4 F)  benzocaine 15 mG/menthol 3.6 mG Lozenge 1Lozenge Oral every 4 hours PRN Sore Throat  acetaminophen   Tablet. 650milliGRAM(s) Oral every 6 hours PRN Mild Pain (1 - 3)      Allergies    No Known Allergies    Intolerances        REVIEW OF SYSTEMS:  CONSTITUTIONAL: +fatigue, +weakness  EYES: No eye pain, visual disturbances, or discharge  ENMT:  No difficulty hearing, tinnitus, vertigo; possible throat pain  NECK: No pain or stiffness  RESPIRATORY: +sob, +cough  CARDIOVASCULAR: No chest pain, palpitations, dizziness, or leg swelling  GASTROINTESTINAL: No abdominal or epigastric pain. No nausea, vomiting, or hematemesis; No diarrhea or constipation. No melena or hematochezia.  GENITOURINARY: No dysuria, frequency, hematuria, or incontinence  NEUROLOGICAL: No headaches, memory loss, loss of strength, numbness, or tremors  SKIN: right corner mouth lesion         Vital Signs Last 24 Hrs  T(C): 37.7, Max: 37.7 (03-21 @ 11:38)  T(F): 99.8, Max: 99.8 (03-21 @ 11:38)  HR: 95 (56 - 95)  BP: 121/65 (103/64 - 127/59)  BP(mean): 72 (72 - 72)  RR: 17 (15 - 20)  SpO2: 100% (89% - 100%)    PHYSICAL EXAM:  GENERAL: cachetic, chronically ill appearing  HEAD:  Atraumatic, Normocephalic  EYES: EOMI, PERRLA, conjunctiva and sclera clear  ENMT: left corner mouth lesion  NECK: Supple, No JVD, Normal thyroid  NERVOUS SYSTEM:  Alert   CHEST/LUNG: decreased BS   HEART: Regular rate and rhythm; No murmurs, rubs, or gallops  ABDOMEN: Soft, Nontender, Nondistended; Bowel sounds present  EXTREMITIES:  2+ Peripheral Pulses, No clubbing, cyanosis, or edema  LYMPH: No lymphadenopathy noted  SKIN: No rashes or lesions    LABS:                        11.4   12.5  )-----------( 288      ( 21 Mar 2017 07:05 )             35.5     21 Mar 2017 07:05    136    |  89     |  18     ----------------------------<  147    4.6     |  44     |  0.36     Ca    9.4        21 Mar 2017 07:05  Phos  2.5       21 Mar 2017 07:05  Mg     1.9       21 Mar 2017 07:05          CAPILLARY BLOOD GLUCOSE  201 (21 Mar 2017 01:59)  167 (20 Mar 2017 22:02)  121 (20 Mar 2017 21:52)  202 (20 Mar 2017 16:48)      RADIOLOGY & ADDITIONAL TESTS:    Imaging Personally Reviewed:  [ x] YES  [ ] NO    Consultant(s) Notes Reviewed:  [ x] YES  [ ] NO    Care Discussed with Consultants/Other Providers [ x] YES  [ ] NO

## 2017-03-21 NOTE — PROGRESS NOTE ADULT - ASSESSMENT
Pt is a 60 yo WM with h/o COPD on home O2, nocturnal CPAP, CAD/stent, A fib, HTN, DM, HLD and prostate adenoma presented 2 to SOB. ABG showed worsening acute on chronic resp acidosis despite being BiPAP, s/p intubation. Admitting dx: Acute on chronic resp acidosis/ COPD exacerbation/ PNA/ Staph sepsis/ s/p extubation. Last 24 hrs only required BIPAP overnight

## 2017-03-22 PROCEDURE — 71010: CPT | Mod: 26

## 2017-03-22 PROCEDURE — 99233 SBSQ HOSP IP/OBS HIGH 50: CPT

## 2017-03-22 RX ORDER — LACTOBACILLUS ACIDOPHILUS 100MM CELL
1 CAPSULE ORAL ONCE
Qty: 0 | Refills: 0 | Status: DISCONTINUED | OUTPATIENT
Start: 2017-03-22 | End: 2017-03-28

## 2017-03-22 RX ORDER — PIPERACILLIN AND TAZOBACTAM 4; .5 G/20ML; G/20ML
3.38 INJECTION, POWDER, LYOPHILIZED, FOR SOLUTION INTRAVENOUS ONCE
Qty: 0 | Refills: 0 | Status: COMPLETED | OUTPATIENT
Start: 2017-03-22 | End: 2017-03-24

## 2017-03-22 RX ORDER — SODIUM CHLORIDE 9 MG/ML
1000 INJECTION, SOLUTION INTRAVENOUS
Qty: 0 | Refills: 0 | Status: DISCONTINUED | OUTPATIENT
Start: 2017-03-22 | End: 2017-03-29

## 2017-03-22 RX ORDER — VANCOMYCIN HCL 1 G
1000 VIAL (EA) INTRAVENOUS ONCE
Qty: 0 | Refills: 0 | Status: COMPLETED | OUTPATIENT
Start: 2017-03-22 | End: 2017-03-22

## 2017-03-22 RX ORDER — PIPERACILLIN AND TAZOBACTAM 4; .5 G/20ML; G/20ML
3.38 INJECTION, POWDER, LYOPHILIZED, FOR SOLUTION INTRAVENOUS EVERY 8 HOURS
Qty: 0 | Refills: 0 | Status: DISCONTINUED | OUTPATIENT
Start: 2017-03-22 | End: 2017-03-24

## 2017-03-22 RX ADMIN — Medication 1 TABLET(S): at 11:53

## 2017-03-22 RX ADMIN — HEPARIN SODIUM 5000 UNIT(S): 5000 INJECTION INTRAVENOUS; SUBCUTANEOUS at 17:30

## 2017-03-22 RX ADMIN — Medication 1: at 22:45

## 2017-03-22 RX ADMIN — Medication 3 MILLILITER(S): at 06:22

## 2017-03-22 RX ADMIN — ZINC OXIDE 1 APPLICATION(S): 200 OINTMENT TOPICAL at 06:09

## 2017-03-22 RX ADMIN — Medication 1: at 07:48

## 2017-03-22 RX ADMIN — Medication 0.25 MILLIGRAM(S): at 22:46

## 2017-03-22 RX ADMIN — Medication 0.25 MILLIGRAM(S): at 06:09

## 2017-03-22 RX ADMIN — Medication 1 APPLICATION(S): at 22:46

## 2017-03-22 RX ADMIN — PANTOPRAZOLE SODIUM 40 MILLIGRAM(S): 20 TABLET, DELAYED RELEASE ORAL at 07:46

## 2017-03-22 RX ADMIN — Medication 3 MILLILITER(S): at 23:44

## 2017-03-22 RX ADMIN — HEPARIN SODIUM 5000 UNIT(S): 5000 INJECTION INTRAVENOUS; SUBCUTANEOUS at 06:10

## 2017-03-22 RX ADMIN — Medication 3 MILLILITER(S): at 00:23

## 2017-03-22 RX ADMIN — PIPERACILLIN AND TAZOBACTAM 25 GRAM(S): 4; .5 INJECTION, POWDER, LYOPHILIZED, FOR SOLUTION INTRAVENOUS at 22:45

## 2017-03-22 RX ADMIN — Medication 3 MILLILITER(S): at 11:04

## 2017-03-22 RX ADMIN — Medication 3 MILLILITER(S): at 17:06

## 2017-03-22 RX ADMIN — ZINC OXIDE 1 APPLICATION(S): 200 OINTMENT TOPICAL at 17:32

## 2017-03-22 RX ADMIN — Medication 650 MILLIGRAM(S): at 17:33

## 2017-03-22 RX ADMIN — Medication 3: at 11:53

## 2017-03-22 RX ADMIN — Medication 1 APPLICATION(S): at 06:08

## 2017-03-22 RX ADMIN — Medication 1 APPLICATION(S): at 17:31

## 2017-03-22 RX ADMIN — Medication 25 MILLIGRAM(S): at 11:54

## 2017-03-22 RX ADMIN — Medication 10 MILLIGRAM(S): at 06:09

## 2017-03-22 RX ADMIN — Medication 250 MILLIGRAM(S): at 19:14

## 2017-03-22 NOTE — PROGRESS NOTE ADULT - ASSESSMENT
Pt has all chronic diseases consistant w premature aging   on bipap for dyspnia, cad w stent ,pt is still full code   sx management is in progress but pt seeking care which is not possible

## 2017-03-22 NOTE — GOALS OF CARE CONVERSATION - PERSONAL ADVANCE DIRECTIVE - CONVERSATION DETAILS
Met with eldest son Miguel phan 3/22/17.  He informed me that pts wife  wishes him to son Miguel to come emilie & he will relay information to mother & rest of family.  We discussed severity of pts health (been hospitalized 3xs over past 6 months. Pt had just been in ICU for respiratory distress & on a respirator. Miguel informed me that pts 2 dtrs one Met with eldest son Miguel phan 3/22/17.  He informed me that pts wife  wishes him to son Miguel to come emilie & he will relay information to mother & rest of family.  We discussed severity of pts health (been hospitalized 3xs over past 6 months. Pt had just been in ICU for respiratory distress & on a respirator. Miguel informed me that pts 2 dtrs that are [place] & he needs to speak with mother & family before making any decision. I explained MOLST to Miguel & educated him

## 2017-03-22 NOTE — PROGRESS NOTE ADULT - ASSESSMENT
Pt is a 60 yo WM with h/o COPD on home O2, nocturnal CPAP, CAD/stent, A fib, HTN, DM, HLD and prostate adenoma presented 2 to SOB. ABG showed worsening acute on chronic resp acidosis despite being BiPAP, s/p intubation. Admitting dx: Acute on chronic resp acidosis/ COPD exacerbation/ PNA/ Staph sepsis/ s/p extubation. now with fever, palliative care following, prognosis poor but pt. continues to want aggressive measures

## 2017-03-22 NOTE — PROGRESS NOTE ADULT - SUBJECTIVE AND OBJECTIVE BOX
Initial Consultaion Note  Requested by Name:  Date/ Time:  Reason for referral/ Consultation:    HPI:  62 Y/O male w/ PMHx of COPD on home O2, cpap at night, afib, htn, hld, dm, pw c/o SOB. ABG worsening in ED showing 7.26/102/133/44 on Bipap. Pt intubated for hypercapnic respiratory failure. 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 Name/ Phone#:  Significant other/partner:                     Children:                         Tenriism/Spirituality:    Baseline ADLs (Prior to admission)    ADVANCE DIRECTIVES:  [ ] YES [ ] NO   DNR [ ] YES [ ] NO  Completed on:                     MOLST  [ ] YES [ ] NO   Completed on:  Living Will  [ ] YES [ ] NO   Completed on:    Allergies    No Known Allergies    Intolerances        Review of Systems:     PAIN : (Y/N) (0-10)  PAIN SITE :  QUALITY/QUANTITY OF PAIN :  PAIN RADIATING :( Y/N)  SEVERITY :  FREQUENCY :  IMPACT ON ADLs :      DYSPNEA: (Y/N) Mild [ ] Moderate [ ] Severe [ ]  NAUSEA/VOMITING: (Y/N)  DEPRESSION: (Y/N) Mild [ ]Moderate [ ] Severe [ ]  ANXIETY: (Y/N)  AGITATION: (Y/N):  SECRETIONS:(Y/N)  CONTIPATION : (Y/N)  DIARRHEA : (Y/N)  FRAILTY SYNDROM (Y/N):  FAILURE TO THRIVE (Y/N):  DIBILITY (Y/N);  OTHER SYMPTOMS :  UNABLE TO OBTAIN DUE TO POOR MENTATION (   )    PHYSICAL EXAM:  T(F): 99, Max: 99 (03-21 @ 17:20)  HR: 103 (65 - 105)  BP: 114/54 (114/54 - 137/62)  RR: 18 (17 - 26)  SpO2: 100% (93% - 100%)  Wt(kg): --   WEIGHT :    [id number]                  BMI:  I&O's Summary  I & Os for 24h ending 22 Mar 2017 07:00  =============================================  IN: 740 ml / OUT: 0 ml / NET: 740 ml    I & Os for current day (as of 22 Mar 2017 16:25)  =============================================  IN: 100 ml / OUT: 0 ml / NET: 100 ml    CAPILLARY BLOOD GLUCOSE  102 (22 Mar 2017 16:08)  255 (22 Mar 2017 11:24)  123 (21 Mar 2017 22:20)  144 (21 Mar 2017 16:37)      GENERAL: alert [ ] oriented x ______[ ] lethargic [ ] agitated [ ] cachexia [ ] nonverbal [ ] coma [ ]  HEENT: normal [ ] dry mouth [ ] ET tube/trach [ ]   LUNGS: ]  CV :  normal [ ]  GI : normal [ ]  PEG /NG tube [ ]   : normal [ ] incontinent [ ] oliguria/anuria [ ] lawler [ ]  MSK : normal [ ] weakness [ ] edema [ ]              ambulatory [ ] bebbound/wheelchair bound [ ]   SKIN : normal [ ] pressure ulcer (Y/N) Stage ______________ rash (Y/N)    Functional Assessment:Karnofsky Performance Score :  Palliative Performance Status Version 2:         %    LABS:                        11.4   12.5  )-----------( 288      ( 21 Mar 2017 07:05 )             35.5     21 Mar 2017 07:05    136    |  89     |  18     ----------------------------<  147    4.6     |  44     |  0.36     Ca    9.4        21 Mar 2017 07:05  Phos  2.5       21 Mar 2017 07:05  Mg     1.9       21 Mar 2017 07:05            I&O's Detail  I & Os for 24h ending 22 Mar 2017 07:00  =============================================  IN:    Oral Fluid: 740 ml    Total IN: 740 ml  ---------------------------------------------  OUT:    Total OUT: 0 ml  ---------------------------------------------  Total NET: 740 ml    I & Os for current day (as of 22 Mar 2017 16:25)  =============================================  IN:    Oral Fluid: 100 ml    Total IN: 100 ml  ---------------------------------------------  OUT:    Total OUT: 0 ml  ---------------------------------------------  Total NET: 100 ml      Assessment:   61y Male admitted with MULTIFOCAL PNEUMONIA COPD EXACERBATION  SHORTNESS OF BREATH      PROBLEM LIST :  PROBLEM/RECOMMENDATION: 1  Problem : Goals of care, counseling/ discussion.  Recommendation: met with patient/ family and discussed GOC & Advanced care planning                                    Palliative care info/counseling provided (Y/N)                                      Family meeting                                   Advanced Directives addressed (Y/N)  PROBLEM/ RECOMMENDATION:2  Problem :Resuscitation/ DNR/ DNI,   Recommendation: DNR/ DNI    PROBLEM/ RECOMMENDATION :3  Problem : Medical order for life sustaning treatment  Recommendation : MOLST Form ( initiated/ completed)    PROBLEM/RECOMMENDATION :4  Problem : Advanced care planning  Recommendation : Family meeting.(Y/N)     PLAN:  REFFERALS:                           Unit SW/Case Mgmt (Y/N)                          (Y/N)                         Speech/Swallow (Y/N)                           nutrition                                              Ethics (Y/N)                         PT/OT (Y/N)

## 2017-03-22 NOTE — PROGRESS NOTE ADULT - SUBJECTIVE AND OBJECTIVE BOX
INTERVAL HPI/OVERNIGHT EVENTS:  60 Y/O male w/ PMHx of COPD on home O2, CPAP at night, A FIB,, HTN, HLD, DM, presented with SOB. Intubated for hypercapnic respiratory failure in ED. CT chest and CXR showed multifocal pneumonia.  NO more details of history are available as intubated and not able to provide information.   Was recently admitted with similar presentation, was in ICU with BIPAP support.  Failed weaning for few days but did well on 03/13/17 and got extubated.  Now out of ICU, sleeping but arouse able. Fever spike to >101 noted. CXR done today.    Vital Signs Last 24 Hrs  T(C): 38.4, Max: 38.4 (03-22 @ 17:21)  T(F): 101.2, Max: 101.2 (03-22 @ 17:21)  HR: 62 (62 - 105)  BP: 114/54 (114/54 - 137/62)  BP(mean): --  RR: 18 (17 - 26)  SpO2: 99% (93% - 100%)    PHYSICAL EXAM:  GEN:        responsive and comfortable.  HEENT:    Normal.    RESP:      decreased air entry.  CVS:          Regular rate and rhythm.   ABD:         Soft, non-tender, non-distended;   :             No costovertebral angle tenderness  EXTR:            No clubbing, cyanosis or edema  CNS:              Intact sensory and motor function.    MEDICATIONS  (STANDING):  heparin  Injectable 5000Unit(s) SubCutaneous every 12 hours  dextrose 5%. 1000milliLiter(s) IV Continuous <Continuous>  dextrose 50% Injectable 12.5Gram(s) IV Push once  dextrose 50% Injectable 25Gram(s) IV Push once  ALBUTerol/ipratropium for Nebulization 3milliLiter(s) Nebulizer every 6 hours  ALBUTerol    90 MICROgram(s) HFA Inhaler 1Puff(s) Inhalation every 4 hours  tiotropium 18 MICROgram(s) Capsule 1Capsule(s) Inhalation daily  pantoprazole    Tablet 40milliGRAM(s) Oral before breakfast  zinc oxide 40%/lanolin Ointment 1Application(s) Topical two times a day  insulin lispro (HumaLOG) corrective regimen sliding scale  SubCutaneous Before meals and at bedtime  metoprolol 25milliGRAM(s) Oral two times a day  ALPRAZolam 0.25milliGRAM(s) Oral three times a day  multivitamin/minerals 1Tablet(s) Oral daily  diltiazem    Tablet 60milliGRAM(s) Oral every 8 hours  BACItracin   Ointment 1Application(s) Topical three times a day  piperacillin/tazobactam IVPB. 3.375Gram(s) IV Intermittent once  piperacillin/tazobactam IVPB. 3.375Gram(s) IV Intermittent every 8 hours  lactobacillus acidophilus 1Tablet(s) Oral once  dextrose 5% + sodium chloride 0.9%. 1000milliLiter(s) IV Continuous <Continuous>    MEDICATIONS  (PRN):  dextrose Gel 1Dose(s) Oral once PRN Blood Glucose LESS THAN 70 milliGRAM(s)/deciliter  glucagon  Injectable 1milliGRAM(s) IntraMuscular once PRN Glucose LESS THAN 70 milligrams/deciliter  acetaminophen   Tablet 650milliGRAM(s) Oral every 6 hours PRN For Temp greater than 38 C (100.4 F)  benzocaine 15 mG/menthol 3.6 mG Lozenge 1Lozenge Oral every 4 hours PRN Sore Throat  acetaminophen   Tablet. 650milliGRAM(s) Oral every 6 hours PRN Mild Pain (1 - 3)    LABS:                        11.4   12.5  )-----------( 288      ( 21 Mar 2017 07:05 )             35.5     21 Mar 2017 07:05    136    |  89     |  18     ----------------------------<  147    4.6     |  44     |  0.36     Ca    9.4        21 Mar 2017 07:05  Phos  2.5       21 Mar 2017 07:05  Mg     1.9       21 Mar 2017 07:05    ASSESSMENT AND PLAN:  ·	S/P Acute Hypoxic/hypercarbic Respiratory failure.  ·	Multifocal pneumonia.  ·	Acute COPD exacerbation.  ·	Bilateral pleural effusion.  ·	Anemia.  ·	CAD with angioplasty.  ·	HTN.  ·	A Fib.  ·	HLD.  ·	Anxiety.      Continue antibiotics, nebulizer.  Supplemental O2 to keep SPO2 90-96%.  PRN BIPAP.  Procalcitonin level in am.

## 2017-03-22 NOTE — PROGRESS NOTE ADULT - SUBJECTIVE AND OBJECTIVE BOX
Patient is a 61y old  Male who presents with a chief complaint of sob (08 Mar 2017 11:44)      INTERVAL HPI/OVERNIGHT EVENTS:  pt. requiring bipap during day today, then tolerating off later in the day  had fever 101.2    MEDICATIONS  (STANDING):  heparin  Injectable 5000Unit(s) SubCutaneous every 12 hours  dextrose 5%. 1000milliLiter(s) IV Continuous <Continuous>  dextrose 50% Injectable 12.5Gram(s) IV Push once  dextrose 50% Injectable 25Gram(s) IV Push once  ALBUTerol/ipratropium for Nebulization 3milliLiter(s) Nebulizer every 6 hours  ALBUTerol    90 MICROgram(s) HFA Inhaler 1Puff(s) Inhalation every 4 hours  tiotropium 18 MICROgram(s) Capsule 1Capsule(s) Inhalation daily  pantoprazole    Tablet 40milliGRAM(s) Oral before breakfast  zinc oxide 40%/lanolin Ointment 1Application(s) Topical two times a day  insulin lispro (HumaLOG) corrective regimen sliding scale  SubCutaneous Before meals and at bedtime  metoprolol 25milliGRAM(s) Oral two times a day  ALPRAZolam 0.25milliGRAM(s) Oral three times a day  multivitamin/minerals 1Tablet(s) Oral daily  diltiazem    Tablet 60milliGRAM(s) Oral every 8 hours  BACItracin   Ointment 1Application(s) Topical three times a day  piperacillin/tazobactam IVPB. 3.375Gram(s) IV Intermittent once  piperacillin/tazobactam IVPB. 3.375Gram(s) IV Intermittent every 8 hours    MEDICATIONS  (PRN):  dextrose Gel 1Dose(s) Oral once PRN Blood Glucose LESS THAN 70 milliGRAM(s)/deciliter  glucagon  Injectable 1milliGRAM(s) IntraMuscular once PRN Glucose LESS THAN 70 milligrams/deciliter  acetaminophen   Tablet 650milliGRAM(s) Oral every 6 hours PRN For Temp greater than 38 C (100.4 F)  benzocaine 15 mG/menthol 3.6 mG Lozenge 1Lozenge Oral every 4 hours PRN Sore Throat  acetaminophen   Tablet. 650milliGRAM(s) Oral every 6 hours PRN Mild Pain (1 - 3)      Allergies    No Known Allergies    Intolerances        REVIEW OF SYSTEMS:  CONSTITUTIONAL: fever, weight loss  ENMT:  difficulty swallowing  NECK: No pain or stiffness  BREASTS: No pain, masses, or nipple discharge  RESPIRATORY: +cough, +sob  CARDIOVASCULAR: No chest pain, palpitations, dizziness, or leg swelling  GASTROINTESTINAL: No abdominal or epigastric pain. No nausea, vomiting, or hematemesis; No diarrhea or constipation. No melena or hematochezia.  GENITOURINARY: No dysuria, frequency, hematuria, or incontinence  NEUROLOGICAL: No headaches, memory loss, loss of strength, numbness, or tremors  SKIN: No itching, burning, rashes, or lesions   LYMPH NODES: No enlarged glands  ENDOCRINE: No heat or cold intolerance; No hair loss  MUSCULOSKELETAL: No joint pain or swelling; No muscle, back, or extremity pain  PSYCHIATRIC: No depression, anxiety, mood swings, or difficulty sleeping  HEME/LYMPH: No easy bruising, or bleeding gums      Vital Signs Last 24 Hrs  T(C): 38.4, Max: 38.4 (03-22 @ 17:21)  T(F): 101.2, Max: 101.2 (03-22 @ 17:21)  HR: 62 (62 - 105)  BP: 114/54 (114/54 - 137/62)  BP(mean): --  RR: 18 (17 - 26)  SpO2: 99% (93% - 100%)    PHYSICAL EXAM:  GENERAL: cachetic , ill appearing  HEAD:  Atraumatic, Normocephalic  EYES: EOMI, PERRLA, conjunctiva and sclera clear  ENMT: No tonsillar erythema, exudates, or enlargement; Moist mucous membranes,   NECK: Supple, No JVD, Normal thyroid  NERVOUS SYSTEM:  Alert & Oriented X3, Good concentration; Motor Strength 5/5 B/L upper and lower extremities; DTRs 2+ intact and symmetric  CHEST/LUNG: Clear to percussion bilaterally; No rales, rhonchi, wheezing, or rubs  HEART: Regular rate and rhythm; No murmurs, rubs, or gallops  ABDOMEN: Soft, Nontender, Nondistended; Bowel sounds present  EXTREMITIES:  2+ Peripheral Pulses, No clubbing, cyanosis, or edema  LYMPH: No lymphadenopathy noted  SKIN: No rashes or lesions    LABS:                        11.4   12.5  )-----------( 288      ( 21 Mar 2017 07:05 )             35.5     21 Mar 2017 07:05    136    |  89     |  18     ----------------------------<  147    4.6     |  44     |  0.36     Ca    9.4        21 Mar 2017 07:05  Phos  2.5       21 Mar 2017 07:05  Mg     1.9       21 Mar 2017 07:05          CAPILLARY BLOOD GLUCOSE  102 (22 Mar 2017 16:08)  255 (22 Mar 2017 11:24)  123 (21 Mar 2017 22:20)      RADIOLOGY & ADDITIONAL TESTS:    Imaging Personally Reviewed:  [ x] YES  [ ] NO    Consultant(s) Notes Reviewed:  [ x] YES  [ ] NO    Care Discussed with Consultants/Other Providers [ x] YES  [ ] NO

## 2017-03-23 DIAGNOSIS — Z71.89 OTHER SPECIFIED COUNSELING: ICD-10-CM

## 2017-03-23 LAB
ANION GAP SERPL CALC-SCNC: 3 MMOL/L — LOW (ref 5–17)
BASOPHILS # BLD AUTO: 0.3 K/UL — HIGH (ref 0–0.2)
BASOPHILS NFR BLD AUTO: 1.7 % — SIGNIFICANT CHANGE UP (ref 0–2)
BUN SERPL-MCNC: 20 MG/DL — SIGNIFICANT CHANGE UP (ref 7–23)
CALCIUM SERPL-MCNC: 10 MG/DL — SIGNIFICANT CHANGE UP (ref 8.5–10.1)
CHLORIDE SERPL-SCNC: 87 MMOL/L — LOW (ref 96–108)
CO2 SERPL-SCNC: 43 MMOL/L — HIGH (ref 22–31)
CREAT SERPL-MCNC: 0.5 MG/DL — SIGNIFICANT CHANGE UP (ref 0.5–1.3)
EOSINOPHIL # BLD AUTO: 0 K/UL — SIGNIFICANT CHANGE UP (ref 0–0.5)
EOSINOPHIL NFR BLD AUTO: 0.1 % — SIGNIFICANT CHANGE UP (ref 0–6)
GLUCOSE SERPL-MCNC: 264 MG/DL — HIGH (ref 70–99)
HCT VFR BLD CALC: 39.4 % — SIGNIFICANT CHANGE UP (ref 39–50)
HGB BLD-MCNC: 12.6 G/DL — LOW (ref 13–17)
LYMPHOCYTES # BLD AUTO: 1.7 K/UL — SIGNIFICANT CHANGE UP (ref 1–3.3)
LYMPHOCYTES # BLD AUTO: 11.8 % — LOW (ref 13–44)
MCHC RBC-ENTMCNC: 26.7 PG — LOW (ref 27–34)
MCHC RBC-ENTMCNC: 32 GM/DL — SIGNIFICANT CHANGE UP (ref 32–36)
MCV RBC AUTO: 83.6 FL — SIGNIFICANT CHANGE UP (ref 80–100)
MONOCYTES # BLD AUTO: 0.9 K/UL — SIGNIFICANT CHANGE UP (ref 0–0.9)
MONOCYTES NFR BLD AUTO: 5.9 % — SIGNIFICANT CHANGE UP (ref 2–14)
NEUTROPHILS # BLD AUTO: 11.7 K/UL — HIGH (ref 1.8–7.4)
NEUTROPHILS NFR BLD AUTO: 80.4 % — HIGH (ref 43–77)
PLATELET # BLD AUTO: 278 K/UL — SIGNIFICANT CHANGE UP (ref 150–400)
POTASSIUM SERPL-MCNC: 4.7 MMOL/L — SIGNIFICANT CHANGE UP (ref 3.5–5.3)
POTASSIUM SERPL-SCNC: 4.7 MMOL/L — SIGNIFICANT CHANGE UP (ref 3.5–5.3)
PROCALCITONIN SERPL-MCNC: 0.14 NG/ML — HIGH (ref 0–0.05)
RBC # BLD: 4.71 M/UL — SIGNIFICANT CHANGE UP (ref 4.2–5.8)
RBC # FLD: 17.5 % — HIGH (ref 11–15)
SODIUM SERPL-SCNC: 133 MMOL/L — LOW (ref 135–145)
WBC # BLD: 14.5 K/UL — HIGH (ref 3.8–10.5)
WBC # FLD AUTO: 14.5 K/UL — HIGH (ref 3.8–10.5)

## 2017-03-23 PROCEDURE — 99233 SBSQ HOSP IP/OBS HIGH 50: CPT

## 2017-03-23 RX ADMIN — SODIUM CHLORIDE 100 MILLILITER(S): 9 INJECTION, SOLUTION INTRAVENOUS at 16:30

## 2017-03-23 RX ADMIN — SODIUM CHLORIDE 100 MILLILITER(S): 9 INJECTION, SOLUTION INTRAVENOUS at 10:34

## 2017-03-23 RX ADMIN — Medication 0.25 MILLIGRAM(S): at 13:27

## 2017-03-23 RX ADMIN — PIPERACILLIN AND TAZOBACTAM 25 GRAM(S): 4; .5 INJECTION, POWDER, LYOPHILIZED, FOR SOLUTION INTRAVENOUS at 06:21

## 2017-03-23 RX ADMIN — Medication 3 MILLILITER(S): at 05:52

## 2017-03-23 RX ADMIN — BENZOCAINE AND MENTHOL 1 LOZENGE: 5; 1 LIQUID ORAL at 09:13

## 2017-03-23 RX ADMIN — Medication 3 MILLILITER(S): at 17:12

## 2017-03-23 RX ADMIN — PIPERACILLIN AND TAZOBACTAM 25 GRAM(S): 4; .5 INJECTION, POWDER, LYOPHILIZED, FOR SOLUTION INTRAVENOUS at 13:29

## 2017-03-23 RX ADMIN — HEPARIN SODIUM 5000 UNIT(S): 5000 INJECTION INTRAVENOUS; SUBCUTANEOUS at 17:48

## 2017-03-23 RX ADMIN — Medication 2: at 22:05

## 2017-03-23 RX ADMIN — Medication 1 APPLICATION(S): at 06:24

## 2017-03-23 RX ADMIN — Medication 25 MILLIGRAM(S): at 21:59

## 2017-03-23 RX ADMIN — Medication 0.25 MILLIGRAM(S): at 21:58

## 2017-03-23 RX ADMIN — Medication 3: at 08:21

## 2017-03-23 RX ADMIN — ZINC OXIDE 1 APPLICATION(S): 200 OINTMENT TOPICAL at 17:50

## 2017-03-23 RX ADMIN — Medication 0.25 MILLIGRAM(S): at 06:23

## 2017-03-23 RX ADMIN — Medication 1 APPLICATION(S): at 13:28

## 2017-03-23 RX ADMIN — PIPERACILLIN AND TAZOBACTAM 25 GRAM(S): 4; .5 INJECTION, POWDER, LYOPHILIZED, FOR SOLUTION INTRAVENOUS at 21:58

## 2017-03-23 RX ADMIN — ZINC OXIDE 1 APPLICATION(S): 200 OINTMENT TOPICAL at 06:32

## 2017-03-23 RX ADMIN — Medication 1 APPLICATION(S): at 21:58

## 2017-03-23 RX ADMIN — Medication 3 MILLILITER(S): at 12:53

## 2017-03-23 RX ADMIN — Medication 1: at 16:30

## 2017-03-23 RX ADMIN — Medication 1 TABLET(S): at 11:38

## 2017-03-23 RX ADMIN — Medication 3: at 10:34

## 2017-03-23 RX ADMIN — HEPARIN SODIUM 5000 UNIT(S): 5000 INJECTION INTRAVENOUS; SUBCUTANEOUS at 06:24

## 2017-03-23 RX ADMIN — PANTOPRAZOLE SODIUM 40 MILLIGRAM(S): 20 TABLET, DELAYED RELEASE ORAL at 08:21

## 2017-03-23 NOTE — PROGRESS NOTE ADULT - ASSESSMENT
Pt is a 62 yo WM with h/o COPD on home O2, nocturnal CPAP, CAD/stent, A fib, HTN, DM, HLD and prostate adenoma presented 2 to SOB. ABG showed worsening acute on chronic resp acidosis despite being BiPAP, s/p intubation. Admitting dx: Acute on chronic resp acidosis/ COPD exacerbation/ PNA/ Staph sepsis/ s/p extubation. now with fever, palliative care following, prognosis poor but pt/family. continues to want aggressive measures  spoke with son Miguel he is discussing father's condition with family but pt. continues to be full code

## 2017-03-23 NOTE — PROGRESS NOTE ADULT - SUBJECTIVE AND OBJECTIVE BOX
Patient is a 61y old  Male who presents with a chief complaint of sob (08 Mar 2017 11:44)      INTERVAL HPI/OVERNIGHT EVENTS:  afebrile overnight, increasing dependence on bi-pap, but d/w pulm, bi-pap settings high and transitioning to 2L nc which is too much of a dramatic change and pt. unable to compensate, pulm changed  bipap and nc orders    MEDICATIONS  (STANDING):  heparin  Injectable 5000Unit(s) SubCutaneous every 12 hours  dextrose 5%. 1000milliLiter(s) IV Continuous <Continuous>  dextrose 50% Injectable 12.5Gram(s) IV Push once  dextrose 50% Injectable 25Gram(s) IV Push once  ALBUTerol/ipratropium for Nebulization 3milliLiter(s) Nebulizer every 6 hours  ALBUTerol    90 MICROgram(s) HFA Inhaler 1Puff(s) Inhalation every 4 hours  tiotropium 18 MICROgram(s) Capsule 1Capsule(s) Inhalation daily  pantoprazole    Tablet 40milliGRAM(s) Oral before breakfast  zinc oxide 40%/lanolin Ointment 1Application(s) Topical two times a day  insulin lispro (HumaLOG) corrective regimen sliding scale  SubCutaneous Before meals and at bedtime  metoprolol 25milliGRAM(s) Oral two times a day  ALPRAZolam 0.25milliGRAM(s) Oral three times a day  multivitamin/minerals 1Tablet(s) Oral daily  diltiazem    Tablet 60milliGRAM(s) Oral every 8 hours  BACItracin   Ointment 1Application(s) Topical three times a day  piperacillin/tazobactam IVPB. 3.375Gram(s) IV Intermittent once  piperacillin/tazobactam IVPB. 3.375Gram(s) IV Intermittent every 8 hours  lactobacillus acidophilus 1Tablet(s) Oral once  dextrose 5% + sodium chloride 0.9%. 1000milliLiter(s) IV Continuous <Continuous>    MEDICATIONS  (PRN):  dextrose Gel 1Dose(s) Oral once PRN Blood Glucose LESS THAN 70 milliGRAM(s)/deciliter  glucagon  Injectable 1milliGRAM(s) IntraMuscular once PRN Glucose LESS THAN 70 milligrams/deciliter  acetaminophen   Tablet 650milliGRAM(s) Oral every 6 hours PRN For Temp greater than 38 C (100.4 F)  benzocaine 15 mG/menthol 3.6 mG Lozenge 1Lozenge Oral every 4 hours PRN Sore Throat  acetaminophen   Tablet. 650milliGRAM(s) Oral every 6 hours PRN Mild Pain (1 - 3)      Allergies    No Known Allergies    Intolerances        REVIEW OF SYSTEMS:  pt. on bipap and lethargic, states "talk to my family"    Vital Signs Last 24 Hrs  T(C): 37.2, Max: 38.4 (03-22 @ 17:21)  T(F): 99, Max: 101.2 (03-22 @ 17:21)  HR: 101 (62 - 115)  BP: 113/71 (100/66 - 124/68)  BP(mean): --  RR: 16 (14 - 18)  SpO2: 95% (93% - 100%)    PHYSICAL EXAM:  GENERAL: on bipap, more alert today, breathing appears more comfortable, however still cachetic and chronically ill appearing  HEAD:  Atraumatic, Normocephalic  EYES: EOMI, PERRLA, conjunctiva and sclera clear  ENMT:bipap in place  NECK: Supple, No JVD, Normal thyroid  NERVOUS SYSTEM:  Alert  Motor Strength 4/5 B/L upper and lower extremities; DTRs 2+ intact and symmetric  CHEST/LUNG: crackles at bases  HEART: Regular rate and rhythm; No murmurs, rubs, or gallops  ABDOMEN: Soft, Nontender, Nondistended; Bowel sounds present  EXTREMITIES:  2+ Peripheral Pulses, No clubbing, cyanosis, or edema  LYMPH: No lymphadenopathy noted  SKIN: No rashes or lesions    LABS:                        12.6   14.5  )-----------( 278      ( 23 Mar 2017 07:08 )             39.4     23 Mar 2017 07:08    133    |  87     |  20     ----------------------------<  264    4.7     |  43     |  0.50     Ca    10.0       23 Mar 2017 07:08          CAPILLARY BLOOD GLUCOSE  279 (23 Mar 2017 10:33)  299 (23 Mar 2017 07:43)  166 (22 Mar 2017 22:21)  102 (22 Mar 2017 16:08)      RADIOLOGY & ADDITIONAL TESTS:    Imaging Personally Reviewed:  [ x] YES  [ ] NO    Consultant(s) Notes Reviewed:  [ x] YES  [ ] NO    Care Discussed with Consultants/Other Providers [x ] YES  [ ] NO

## 2017-03-23 NOTE — PROGRESS NOTE ADULT - SUBJECTIVE AND OBJECTIVE BOX
INTERVAL HPI/OVERNIGHT EVENTS:  60 Y/O male w/ PMHx of COPD on home O2, CPAP at night, A FIB,, HTN, HLD, DM, presented with SOB. Intubated for hypercapnic respiratory failure in ED. CT chest and CXR showed multifocal pneumonia.  NO more details of history are available as intubated and not able to provide information.   Was recently admitted with similar presentation, was in ICU with BIPAP support.  Failed weaning for few days but did well on 03/13/17 and got extubated.  Now out of ICU, sleeping but arouse able. Fever spike to >101 noted. CXR done yesterday.    Vital Signs Last 24 Hrs  T(C): 36.5, Max: 38.4 (03-22 @ 17:21)  T(F): 97.7, Max: 101.2 (03-22 @ 17:21)  HR: 99 (62 - 115)  BP: 100/66 (100/66 - 125/81)  BP(mean): --  RR: 14 (14 - 26)  SpO2: 97% (93% - 100%)    PHYSICAL EXAM:  GEN:        Awake, responsive and comfortable.  HEENT:    Normal.    RESP:     no wheezing.  CVS:         Regular rate and rhythm.   ABD:         Soft, non-tender, non-distended;     MEDICATIONS  (STANDING):  heparin  Injectable 5000Unit(s) SubCutaneous every 12 hours  dextrose 5%. 1000milliLiter(s) IV Continuous <Continuous>  dextrose 50% Injectable 12.5Gram(s) IV Push once  dextrose 50% Injectable 25Gram(s) IV Push once  ALBUTerol/ipratropium for Nebulization 3milliLiter(s) Nebulizer every 6 hours  ALBUTerol    90 MICROgram(s) HFA Inhaler 1Puff(s) Inhalation every 4 hours  tiotropium 18 MICROgram(s) Capsule 1Capsule(s) Inhalation daily  pantoprazole    Tablet 40milliGRAM(s) Oral before breakfast  zinc oxide 40%/lanolin Ointment 1Application(s) Topical two times a day  insulin lispro (HumaLOG) corrective regimen sliding scale  SubCutaneous Before meals and at bedtime  metoprolol 25milliGRAM(s) Oral two times a day  ALPRAZolam 0.25milliGRAM(s) Oral three times a day  multivitamin/minerals 1Tablet(s) Oral daily  diltiazem    Tablet 60milliGRAM(s) Oral every 8 hours  BACItracin   Ointment 1Application(s) Topical three times a day  piperacillin/tazobactam IVPB. 3.375Gram(s) IV Intermittent once  piperacillin/tazobactam IVPB. 3.375Gram(s) IV Intermittent every 8 hours  lactobacillus acidophilus 1Tablet(s) Oral once  dextrose 5% + sodium chloride 0.9%. 1000milliLiter(s) IV Continuous <Continuous>    MEDICATIONS  (PRN):  dextrose Gel 1Dose(s) Oral once PRN Blood Glucose LESS THAN 70 milliGRAM(s)/deciliter  glucagon  Injectable 1milliGRAM(s) IntraMuscular once PRN Glucose LESS THAN 70 milligrams/deciliter  acetaminophen   Tablet 650milliGRAM(s) Oral every 6 hours PRN For Temp greater than 38 C (100.4 F)  benzocaine 15 mG/menthol 3.6 mG Lozenge 1Lozenge Oral every 4 hours PRN Sore Throat  acetaminophen   Tablet. 650milliGRAM(s) Oral every 6 hours PRN Mild Pain (1 - 3)    LABS:                        12.6   14.5  )-----------( 278      ( 23 Mar 2017 07:08 )             39.4     23 Mar 2017 07:08    133    |  87     |  20     ----------------------------<  264    4.7     |  43     |  0.50     Ca    10.0       23 Mar 2017 07:08    EXAM:  CHEST SINGLE VIEW                            PROCEDURE DATE:  03/22/2017        INTERPRETATION:  Single AP view of the chest.    CLINICAL INFORMATION: COPD, fever    FINDINGS:  There are bilateral diffuse patchy airspace opacities. Heart   size is within normal limits. The osseous structures are intact.     IMPRESSION: Bilateral patchy airspace opacities compatible with   multifocal pneumonia. Status post extubation since 5/13/2017.    ANJEL FRANCOIS M.D., ATTENDING RADIOLOGIST  This document has been electronically signed. Mar 23 2017  8:51AM      ASSESSMENT AND PLAN:  ·	S/P Acute Hypoxic/hypercarbic Respiratory failure.  ·	Multifocal pneumonia.  ·	Acute COPD exacerbation.  ·	Bilateral pleural effusion.  ·	Anemia.  ·	CAD with angioplasty.  ·	HTN.  ·	A Fib.  ·	HLD.  ·	Anxiety.      Procalcitonin is only 0.14, has been on antibiotics.  Over all is frail looking. will reduce BIPAP pressures to10/5. the try on nasal O2 2litres.  May dc antibiotics as has completed over 2 weeks.

## 2017-03-24 PROCEDURE — 93971 EXTREMITY STUDY: CPT | Mod: 26,LT

## 2017-03-24 PROCEDURE — 99233 SBSQ HOSP IP/OBS HIGH 50: CPT

## 2017-03-24 RX ADMIN — Medication 25 MILLIGRAM(S): at 10:03

## 2017-03-24 RX ADMIN — PIPERACILLIN AND TAZOBACTAM 200 GRAM(S): 4; .5 INJECTION, POWDER, LYOPHILIZED, FOR SOLUTION INTRAVENOUS at 14:31

## 2017-03-24 RX ADMIN — ZINC OXIDE 1 APPLICATION(S): 200 OINTMENT TOPICAL at 17:40

## 2017-03-24 RX ADMIN — Medication 2: at 22:36

## 2017-03-24 RX ADMIN — Medication 1 APPLICATION(S): at 06:18

## 2017-03-24 RX ADMIN — Medication 1 APPLICATION(S): at 14:35

## 2017-03-24 RX ADMIN — Medication 3: at 10:31

## 2017-03-24 RX ADMIN — SODIUM CHLORIDE 100 MILLILITER(S): 9 INJECTION, SOLUTION INTRAVENOUS at 17:41

## 2017-03-24 RX ADMIN — Medication 1 APPLICATION(S): at 22:34

## 2017-03-24 RX ADMIN — BENZOCAINE AND MENTHOL 1 LOZENGE: 5; 1 LIQUID ORAL at 14:31

## 2017-03-24 RX ADMIN — Medication 3 MILLILITER(S): at 11:35

## 2017-03-24 RX ADMIN — Medication 3 MILLILITER(S): at 23:56

## 2017-03-24 RX ADMIN — PANTOPRAZOLE SODIUM 40 MILLIGRAM(S): 20 TABLET, DELAYED RELEASE ORAL at 07:58

## 2017-03-24 RX ADMIN — Medication 1: at 07:58

## 2017-03-24 RX ADMIN — Medication 3 MILLILITER(S): at 17:14

## 2017-03-24 RX ADMIN — Medication 3 MILLILITER(S): at 01:14

## 2017-03-24 RX ADMIN — Medication 1 TABLET(S): at 11:14

## 2017-03-24 RX ADMIN — Medication 1: at 16:05

## 2017-03-24 RX ADMIN — HEPARIN SODIUM 5000 UNIT(S): 5000 INJECTION INTRAVENOUS; SUBCUTANEOUS at 06:19

## 2017-03-24 RX ADMIN — PIPERACILLIN AND TAZOBACTAM 25 GRAM(S): 4; .5 INJECTION, POWDER, LYOPHILIZED, FOR SOLUTION INTRAVENOUS at 06:15

## 2017-03-24 RX ADMIN — Medication 25 MILLIGRAM(S): at 23:40

## 2017-03-24 RX ADMIN — HEPARIN SODIUM 5000 UNIT(S): 5000 INJECTION INTRAVENOUS; SUBCUTANEOUS at 17:38

## 2017-03-24 RX ADMIN — ZINC OXIDE 1 APPLICATION(S): 200 OINTMENT TOPICAL at 06:21

## 2017-03-24 RX ADMIN — Medication 3 MILLILITER(S): at 07:01

## 2017-03-24 NOTE — PROGRESS NOTE ADULT - SUBJECTIVE AND OBJECTIVE BOX
CHIEF COMPLAINT/INTERVAL HISTORY:    Patient is a 61y old  Male who presents with a chief complaint of sob (08 Mar 2017 11:44)      HPI:  62 Y/O male w/ PMHx of COPD on home O2, cpap at night, afib, htn, hld, dm, pw c/o SOB. ABG worsening in ED showing 7.26/102/133/44 on Bipap. Pt intubated for hypercapnic respiratory failure. (08 Mar 2017 06:36)      SUBJECTIVE & OBJECTIVE: Pt seen and examined at bedside. SOB better, currently on NC , used BiPAP last night.  Denies fever/chills.  Denies chest pain, nausea/vomiting/diarrhea,  dizziness, HA or blurry vision, all other ROS negative.       Vital Signs Last 24 Hrs  T(C): 36.9, Max: 37.9 (03-23 @ 21:44)  T(F): 98.5, Max: 100.2 (03-23 @ 21:44)  HR: 87 (61 - 98)  BP: 122/62 (105/63 - 128/75)  BP(mean): --  ABP: --  ABP(mean): --  RR: 18 (17 - 19)  SpO2: 95% (92% - 100%)        MEDICATIONS  (STANDING):  heparin  Injectable 5000Unit(s) SubCutaneous every 12 hours  dextrose 5%. 1000milliLiter(s) IV Continuous <Continuous>  dextrose 50% Injectable 12.5Gram(s) IV Push once  dextrose 50% Injectable 25Gram(s) IV Push once  ALBUTerol/ipratropium for Nebulization 3milliLiter(s) Nebulizer every 6 hours  ALBUTerol    90 MICROgram(s) HFA Inhaler 1Puff(s) Inhalation every 4 hours  tiotropium 18 MICROgram(s) Capsule 1Capsule(s) Inhalation daily  pantoprazole    Tablet 40milliGRAM(s) Oral before breakfast  zinc oxide 40%/lanolin Ointment 1Application(s) Topical two times a day  insulin lispro (HumaLOG) corrective regimen sliding scale  SubCutaneous Before meals and at bedtime  metoprolol 25milliGRAM(s) Oral two times a day  multivitamin/minerals 1Tablet(s) Oral daily  diltiazem    Tablet 60milliGRAM(s) Oral every 8 hours  BACItracin   Ointment 1Application(s) Topical three times a day  piperacillin/tazobactam IVPB. 3.375Gram(s) IV Intermittent once  piperacillin/tazobactam IVPB. 3.375Gram(s) IV Intermittent every 8 hours  lactobacillus acidophilus 1Tablet(s) Oral once  dextrose 5% + sodium chloride 0.9%. 1000milliLiter(s) IV Continuous <Continuous>    MEDICATIONS  (PRN):  dextrose Gel 1Dose(s) Oral once PRN Blood Glucose LESS THAN 70 milliGRAM(s)/deciliter  glucagon  Injectable 1milliGRAM(s) IntraMuscular once PRN Glucose LESS THAN 70 milligrams/deciliter  acetaminophen   Tablet 650milliGRAM(s) Oral every 6 hours PRN For Temp greater than 38 C (100.4 F)  benzocaine 15 mG/menthol 3.6 mG Lozenge 1Lozenge Oral every 4 hours PRN Sore Throat  acetaminophen   Tablet. 650milliGRAM(s) Oral every 6 hours PRN Mild Pain (1 - 3)        PHYSICAL EXAM:    GENERAL: NAD,  well-developed  HEAD:  Atraumatic, Normocephalic  EYES: EOMI, PERRLA, conjunctiva and sclera clear, + pallor  ENMT: Moist mucous membranes  NECK: Supple, No JVD  NERVOUS SYSTEM:  Alert & Oriented X2, Motor Strength 4/5 B/L upper and lower extremities; CHEST/LUNG: generalized decreased BS b/l  HEART: Regular rate and rhythm;  ABDOMEN: Soft, Nontender, Nondistended; Bowel sounds present  EXTREMITIES: no cyanosis, or edema    LABS:                        12.6   14.5  )-----------( 278      ( 23 Mar 2017 07:08 )             39.4     23 Mar 2017 07:08    133    |  87     |  20     ----------------------------<  264    4.7     |  43     |  0.50     Ca    10.0       23 Mar 2017 07:08            CAPILLARY BLOOD GLUCOSE  259 (24 Mar 2017 10:26)  167 (24 Mar 2017 07:54)  219 (23 Mar 2017 21:57)  179 (23 Mar 2017 16:29)

## 2017-03-24 NOTE — PROGRESS NOTE ADULT - SUBJECTIVE AND OBJECTIVE BOX
INTERVAL HPI/OVERNIGHT EVENTS:  60 Y/O male w/ PMHx of COPD on home O2, CPAP at night, A FIB,, HTN, HLD, DM, presented with SOB. Intubated for hypercapnic respiratory failure in ED. CT chest and CXR showed multifocal pneumonia.  NO more details of history are available as intubated and not able to provide information.   Was recently admitted with similar presentation, was in ICU with BIPAP support.  Failed weaning for few days but did well on 03/13/17 and got extubated, then transferred to medical floor.  Awake, responsive, reports left arm swelling. has been on nasal O2 today.    Vital Signs Last 24 Hrs  T(C): 37.3, Max: 37.9 (03-23 @ 21:44)  T(F): 99.1, Max: 100.2 (03-23 @ 21:44)  HR: 83 (61 - 98)  BP: 99/61 (99/61 - 130/68)  BP(mean): --  RR: 17 (17 - 19)  SpO2: 95% (92% - 100%)    PHYSICAL EXAM:  GEN:        Awake, responsive and comfortable.  HEENT:    Normal.    RESP:      crackles.  CVS:          Regular rate and rhythm.   ABD:         Soft, non-tender, non-distended;   EXTR:        left arm edema    MEDICATIONS  (STANDING):  heparin  Injectable 5000Unit(s) SubCutaneous every 12 hours  dextrose 5%. 1000milliLiter(s) IV Continuous <Continuous>  dextrose 50% Injectable 12.5Gram(s) IV Push once  dextrose 50% Injectable 25Gram(s) IV Push once  ALBUTerol/ipratropium for Nebulization 3milliLiter(s) Nebulizer every 6 hours  ALBUTerol    90 MICROgram(s) HFA Inhaler 1Puff(s) Inhalation every 4 hours  tiotropium 18 MICROgram(s) Capsule 1Capsule(s) Inhalation daily  pantoprazole    Tablet 40milliGRAM(s) Oral before breakfast  zinc oxide 40%/lanolin Ointment 1Application(s) Topical two times a day  insulin lispro (HumaLOG) corrective regimen sliding scale  SubCutaneous Before meals and at bedtime  metoprolol 25milliGRAM(s) Oral two times a day  multivitamin/minerals 1Tablet(s) Oral daily  diltiazem    Tablet 60milliGRAM(s) Oral every 8 hours  BACItracin   Ointment 1Application(s) Topical three times a day  piperacillin/tazobactam IVPB. 3.375Gram(s) IV Intermittent every 8 hours  lactobacillus acidophilus 1Tablet(s) Oral once  dextrose 5% + sodium chloride 0.9%. 1000milliLiter(s) IV Continuous <Continuous>    MEDICATIONS  (PRN):  dextrose Gel 1Dose(s) Oral once PRN Blood Glucose LESS THAN 70 milliGRAM(s)/deciliter  glucagon  Injectable 1milliGRAM(s) IntraMuscular once PRN Glucose LESS THAN 70 milligrams/deciliter  acetaminophen   Tablet 650milliGRAM(s) Oral every 6 hours PRN For Temp greater than 38 C (100.4 F)  benzocaine 15 mG/menthol 3.6 mG Lozenge 1Lozenge Oral every 4 hours PRN Sore Throat  acetaminophen   Tablet. 650milliGRAM(s) Oral every 6 hours PRN Mild Pain (1 - 3)    LABS:                        12.6   14.5  )-----------( 278      ( 23 Mar 2017 07:08 )             39.4     23 Mar 2017 07:08    133    |  87     |  20     ----------------------------<  264    4.7     |  43     |  0.50     Ca    10.0       23 Mar 2017 07:08    ASSESSMENT AND PLAN:  ·	S/P Acute Hypoxic/hypercarbic Respiratory failure.  ·	Multifocal pneumonia.  ·	Acute COPD exacerbation.  ·	Bilateral pleural effusion.  ·	Anemia.  ·	CAD with angioplasty.  ·	HTN.  ·	A Fib.  ·	HLD.  ·	Anxiety.    Will DC antibiotics.  O2 with PRN BIPAP.  Keep left arm up, obtain duplex.  PT evaluation.

## 2017-03-24 NOTE — PROGRESS NOTE ADULT - ASSESSMENT
Pt is a 62 yo WM with h/o COPD on home O2, nocturnal CPAP, CAD/stent, A fib, HTN, DM, HLD and prostate adenoma presented sec to SOB. ABG showed worsening acute on chronic resp acidosis despite being on BiPAP, s/p intubation and icu admission for Acute on chronic resp acidosis/ COPD exacerbation/ PNA/ Staph sepsis/ was difficult to wean initially but then s/p successful extubation on 3/13. now again with fever , palliative care following, prognosis poor but pt/family wants to continue aggressive measures, PCT not that high and fever seem to be resolving, as per pulm may DC Abx soon,   spoke with son Miguel he will be discussing father's condition with family over the weekend but pt. continues to be full code, son made aware about poor prognosis of patient.

## 2017-03-24 NOTE — PROGRESS NOTE ADULT - PROBLEM SELECTOR PLAN 2
restarted abx, check fu blood cultures, cxr, ID consult restarted abx zosyn for likely gram negative pna coverage, check fu blood cultures, cxr, ID consult

## 2017-03-25 LAB
ANION GAP SERPL CALC-SCNC: 4 MMOL/L — LOW (ref 5–17)
BUN SERPL-MCNC: 6 MG/DL — LOW (ref 7–23)
CALCIUM SERPL-MCNC: 8.6 MG/DL — SIGNIFICANT CHANGE UP (ref 8.5–10.1)
CHLORIDE SERPL-SCNC: 101 MMOL/L — SIGNIFICANT CHANGE UP (ref 96–108)
CO2 SERPL-SCNC: 38 MMOL/L — HIGH (ref 22–31)
CREAT SERPL-MCNC: 0.19 MG/DL — LOW (ref 0.5–1.3)
GLUCOSE SERPL-MCNC: 206 MG/DL — HIGH (ref 70–99)
HCT VFR BLD CALC: 26.2 % — LOW (ref 39–50)
HGB BLD-MCNC: 8.5 G/DL — LOW (ref 13–17)
MCHC RBC-ENTMCNC: 27 PG — SIGNIFICANT CHANGE UP (ref 27–34)
MCHC RBC-ENTMCNC: 32.6 GM/DL — SIGNIFICANT CHANGE UP (ref 32–36)
MCV RBC AUTO: 82.8 FL — SIGNIFICANT CHANGE UP (ref 80–100)
PLATELET # BLD AUTO: 184 K/UL — SIGNIFICANT CHANGE UP (ref 150–400)
POTASSIUM SERPL-MCNC: 3.6 MMOL/L — SIGNIFICANT CHANGE UP (ref 3.5–5.3)
POTASSIUM SERPL-SCNC: 3.6 MMOL/L — SIGNIFICANT CHANGE UP (ref 3.5–5.3)
RBC # BLD: 3.16 M/UL — LOW (ref 4.2–5.8)
RBC # FLD: 19.4 % — HIGH (ref 11–15)
SODIUM SERPL-SCNC: 143 MMOL/L — SIGNIFICANT CHANGE UP (ref 135–145)
WBC # BLD: 8 K/UL — SIGNIFICANT CHANGE UP (ref 3.8–10.5)
WBC # FLD AUTO: 8 K/UL — SIGNIFICANT CHANGE UP (ref 3.8–10.5)

## 2017-03-25 PROCEDURE — 99233 SBSQ HOSP IP/OBS HIGH 50: CPT

## 2017-03-25 RX ADMIN — Medication 25 MILLIGRAM(S): at 22:55

## 2017-03-25 RX ADMIN — Medication 3 MILLILITER(S): at 05:38

## 2017-03-25 RX ADMIN — ZINC OXIDE 1 APPLICATION(S): 200 OINTMENT TOPICAL at 17:34

## 2017-03-25 RX ADMIN — Medication 1 TABLET(S): at 11:47

## 2017-03-25 RX ADMIN — Medication 3 MILLILITER(S): at 17:47

## 2017-03-25 RX ADMIN — ZINC OXIDE 1 APPLICATION(S): 200 OINTMENT TOPICAL at 07:00

## 2017-03-25 RX ADMIN — Medication 1: at 23:00

## 2017-03-25 RX ADMIN — Medication 3 MILLILITER(S): at 11:35

## 2017-03-25 RX ADMIN — Medication 1 APPLICATION(S): at 13:44

## 2017-03-25 RX ADMIN — Medication 1 APPLICATION(S): at 06:15

## 2017-03-25 RX ADMIN — Medication 2: at 07:45

## 2017-03-25 RX ADMIN — Medication 1: at 17:33

## 2017-03-25 RX ADMIN — SODIUM CHLORIDE 100 MILLILITER(S): 9 INJECTION, SOLUTION INTRAVENOUS at 11:53

## 2017-03-25 RX ADMIN — SODIUM CHLORIDE 100 MILLILITER(S): 9 INJECTION, SOLUTION INTRAVENOUS at 06:20

## 2017-03-25 RX ADMIN — Medication 1 APPLICATION(S): at 22:55

## 2017-03-25 RX ADMIN — Medication 2: at 11:47

## 2017-03-25 RX ADMIN — HEPARIN SODIUM 5000 UNIT(S): 5000 INJECTION INTRAVENOUS; SUBCUTANEOUS at 17:34

## 2017-03-25 RX ADMIN — PANTOPRAZOLE SODIUM 40 MILLIGRAM(S): 20 TABLET, DELAYED RELEASE ORAL at 07:46

## 2017-03-25 RX ADMIN — HEPARIN SODIUM 5000 UNIT(S): 5000 INJECTION INTRAVENOUS; SUBCUTANEOUS at 06:15

## 2017-03-25 NOTE — PROGRESS NOTE ADULT - ASSESSMENT
Pt is a 60 yo WM with h/o COPD on home O2, nocturnal CPAP, CAD/stent, A fib, HTN, DM, HLD and prostate adenoma presented sec to SOB. ABG showed worsening acute on chronic resp acidosis despite being on BiPAP, s/p intubation and icu admission for Acute on chronic resp acidosis/ COPD exacerbation/ PNA/ Staph sepsis/ was difficult to wean initially but then s/p successful extubation on 3/13. now again with fever , palliative care following, prognosis poor but pt/family wants to continue aggressive measures, PCT not that high and fever seem to be resolving, as per pulm Abx d/c'd.  spoke with son Miguel he will be discussing father's condition with family over the weekend but pt. continues to be full code, son made aware about poor prognosis of patient.

## 2017-03-25 NOTE — PROGRESS NOTE ADULT - SUBJECTIVE AND OBJECTIVE BOX
INTERVAL HPI/OVERNIGHT EVENTS:  62 Y/O male w/ PMHx of COPD on home O2, CPAP at night, A FIB,, HTN, HLD, DM, presented with SOB. Intubated for hypercapnic respiratory failure in ED. CT chest and CXR showed multifocal pneumonia.  NO more details of history are available as intubated and not able to provide information.   Was recently admitted with similar presentation, was in ICU with BIPAP support.  Failed weaning for few days but did well on 03/13/17 and got extubated, then transferred to medical floor.  Awake, responsive, reports left arm swelling. has been on nasal O2 today.    Vital Signs Last 24 Hrs  T(C): 37.2, Max: 37.7 (03-25 @ 11:29)  T(F): 99, Max: 99.8 (03-25 @ 11:29)  HR: 95 (66 - 99)  BP: 128/63 (109/53 - 134/45)  BP(mean): --  RR: 18 (17 - 18)  SpO2: 99% (95% - 100%)    PHYSICAL EXAM:  GEN:         Awake, responsive and comfortable.  HEENT:    Normal.    RESP:   CVS:             Regular rate and rhythm.   ABD:         Soft, non-tender, non-distended;   :             No costovertebral angle tenderness  EXTR:            No clubbing, cyanosis or edema  CNS:              Intact sensory and motor function.    MEDICATIONS  (STANDING):  heparin  Injectable 5000Unit(s) SubCutaneous every 12 hours  dextrose 5%. 1000milliLiter(s) IV Continuous <Continuous>  dextrose 50% Injectable 12.5Gram(s) IV Push once  dextrose 50% Injectable 25Gram(s) IV Push once  ALBUTerol/ipratropium for Nebulization 3milliLiter(s) Nebulizer every 6 hours  ALBUTerol    90 MICROgram(s) HFA Inhaler 1Puff(s) Inhalation every 4 hours  tiotropium 18 MICROgram(s) Capsule 1Capsule(s) Inhalation daily  pantoprazole    Tablet 40milliGRAM(s) Oral before breakfast  zinc oxide 40%/lanolin Ointment 1Application(s) Topical two times a day  insulin lispro (HumaLOG) corrective regimen sliding scale  SubCutaneous Before meals and at bedtime  metoprolol 25milliGRAM(s) Oral two times a day  multivitamin/minerals 1Tablet(s) Oral daily  diltiazem    Tablet 60milliGRAM(s) Oral every 8 hours  BACItracin   Ointment 1Application(s) Topical three times a day  lactobacillus acidophilus 1Tablet(s) Oral once  dextrose 5% + sodium chloride 0.9%. 1000milliLiter(s) IV Continuous <Continuous>    MEDICATIONS  (PRN):  dextrose Gel 1Dose(s) Oral once PRN Blood Glucose LESS THAN 70 milliGRAM(s)/deciliter  glucagon  Injectable 1milliGRAM(s) IntraMuscular once PRN Glucose LESS THAN 70 milligrams/deciliter  acetaminophen   Tablet 650milliGRAM(s) Oral every 6 hours PRN For Temp greater than 38 C (100.4 F)  benzocaine 15 mG/menthol 3.6 mG Lozenge 1Lozenge Oral every 4 hours PRN Sore Throat  acetaminophen   Tablet. 650milliGRAM(s) Oral every 6 hours PRN Mild Pain (1 - 3)    LABS:                        8.5    8.0   )-----------( 184      ( 25 Mar 2017 11:01 )             26.2     25 Mar 2017 11:01    143    |  101    |  6      ----------------------------<  206    3.6     |  38     |  0.19     Ca    8.6        25 Mar 2017 11:01    ASSESSMENT AND PLAN:  ·	S/P Acute Hypoxic/hypercarbic Respiratory failure.  ·	Multifocal pneumonia.  ·	Acute COPD exacerbation.  ·	Bilateral pleural effusion.  ·	Anemia.  ·	CAD with angioplasty.  ·	HTN.  ·	A Fib.  ·	HLD.  ·	Anxiety.    Continue supportive care.  Needs PT evaluation.  Discussed with family at bed side.

## 2017-03-25 NOTE — PROGRESS NOTE ADULT - SUBJECTIVE AND OBJECTIVE BOX
CHIEF COMPLAINT/INTERVAL HISTORY:    Patient is a 61y old  Male who presents with a chief complaint of sob (08 Mar 2017 11:44)      HPI:  60 Y/O male w/ PMHx of COPD on home O2, cpap at night, afib, htn, hld, dm, pw c/o SOB. ABG worsening in ED showing 7.26/102/133/44 on Bipap. Pt intubated for hypercapnic respiratory failure. (08 Mar 2017 06:36)      SUBJECTIVE & OBJECTIVE: Pt seen and examined at bedside.   Lethargic, but easily arousable on verbal stimuli.  Denies chest pain/SOB, nausea/vomiting/diarrhea, No cough, dizziness, HA or blurry vision, all other ROS negative.  s/p V. doppler LUE yesterday,. negative for DVT      Vital Signs Last 24 Hrs  T(C): 37.4, Max: 37.4 (03-25 @ 05:00)  T(F): 99.4, Max: 99.4 (03-25 @ 05:00)  HR: 70 (61 - 89)  BP: 125/57 (99/61 - 134/45)  BP(mean): --  ABP: --  ABP(mean): --  RR: 18 (17 - 19)  SpO2: 98% (95% - 100%)        MEDICATIONS  (STANDING):  heparin  Injectable 5000Unit(s) SubCutaneous every 12 hours  dextrose 5%. 1000milliLiter(s) IV Continuous <Continuous>  dextrose 50% Injectable 12.5Gram(s) IV Push once  dextrose 50% Injectable 25Gram(s) IV Push once  ALBUTerol/ipratropium for Nebulization 3milliLiter(s) Nebulizer every 6 hours  ALBUTerol    90 MICROgram(s) HFA Inhaler 1Puff(s) Inhalation every 4 hours  tiotropium 18 MICROgram(s) Capsule 1Capsule(s) Inhalation daily  pantoprazole    Tablet 40milliGRAM(s) Oral before breakfast  zinc oxide 40%/lanolin Ointment 1Application(s) Topical two times a day  insulin lispro (HumaLOG) corrective regimen sliding scale  SubCutaneous Before meals and at bedtime  metoprolol 25milliGRAM(s) Oral two times a day  multivitamin/minerals 1Tablet(s) Oral daily  diltiazem    Tablet 60milliGRAM(s) Oral every 8 hours  BACItracin   Ointment 1Application(s) Topical three times a day  lactobacillus acidophilus 1Tablet(s) Oral once  dextrose 5% + sodium chloride 0.9%. 1000milliLiter(s) IV Continuous <Continuous>    MEDICATIONS  (PRN):  dextrose Gel 1Dose(s) Oral once PRN Blood Glucose LESS THAN 70 milliGRAM(s)/deciliter  glucagon  Injectable 1milliGRAM(s) IntraMuscular once PRN Glucose LESS THAN 70 milligrams/deciliter  acetaminophen   Tablet 650milliGRAM(s) Oral every 6 hours PRN For Temp greater than 38 C (100.4 F)  benzocaine 15 mG/menthol 3.6 mG Lozenge 1Lozenge Oral every 4 hours PRN Sore Throat  acetaminophen   Tablet. 650milliGRAM(s) Oral every 6 hours PRN Mild Pain (1 - 3)        PHYSICAL EXAM:    GENERAL: NAD, malnourished  HEAD:  Atraumatic, Normocephalic  EYES: EOMI, PERRLA, + pallor  ENMT: Moist mucous membranes  NECK: Supple, No JVD  NERVOUS SYSTEM:  Alert & Oriented X 2 with forgetfulness, Motor Strength 4/5 all 4 extremities.  CHEST/LUNG: Generalized decreased air entry bilaterally  HEART: Regular rate and rhythm;   ABDOMEN: Soft, Nontender, Nondistended; Bowel sounds present  EXTREMITIES:  No cyanosis or pedal edema, LUE edematous likely due to fluid malnutrition and hypoalbuminemia        labs, Pending collection    CAPILLARY BLOOD GLUCOSE  218 (25 Mar 2017 07:34)  189 (24 Mar 2017 16:00)

## 2017-03-26 DIAGNOSIS — D64.9 ANEMIA, UNSPECIFIED: ICD-10-CM

## 2017-03-26 DIAGNOSIS — R53.1 WEAKNESS: ICD-10-CM

## 2017-03-26 LAB
FERRITIN SERPL-MCNC: 213 NG/ML — SIGNIFICANT CHANGE UP (ref 30–400)
IRON SATN MFR SERPL: 10 UG/DL — LOW (ref 45–165)
IRON SATN MFR SERPL: 7 % — LOW (ref 16–55)
TIBC SERPL-MCNC: 148 UG/DL — LOW (ref 220–430)
UIBC SERPL-MCNC: 138 UG/DL — SIGNIFICANT CHANGE UP (ref 110–370)
VIT B12 SERPL-MCNC: 1311 PG/ML — HIGH (ref 243–894)

## 2017-03-26 PROCEDURE — 99233 SBSQ HOSP IP/OBS HIGH 50: CPT

## 2017-03-26 RX ADMIN — Medication 3 MILLILITER(S): at 00:15

## 2017-03-26 RX ADMIN — Medication 1 APPLICATION(S): at 13:14

## 2017-03-26 RX ADMIN — Medication 1: at 07:52

## 2017-03-26 RX ADMIN — Medication 1 APPLICATION(S): at 22:24

## 2017-03-26 RX ADMIN — Medication 2: at 17:06

## 2017-03-26 RX ADMIN — BENZOCAINE AND MENTHOL 1 LOZENGE: 5; 1 LIQUID ORAL at 06:51

## 2017-03-26 RX ADMIN — Medication 25 MILLIGRAM(S): at 22:24

## 2017-03-26 RX ADMIN — ZINC OXIDE 1 APPLICATION(S): 200 OINTMENT TOPICAL at 07:02

## 2017-03-26 RX ADMIN — Medication 3 MILLILITER(S): at 06:26

## 2017-03-26 RX ADMIN — Medication 2: at 22:25

## 2017-03-26 RX ADMIN — Medication 1 TABLET(S): at 11:59

## 2017-03-26 RX ADMIN — HEPARIN SODIUM 5000 UNIT(S): 5000 INJECTION INTRAVENOUS; SUBCUTANEOUS at 17:06

## 2017-03-26 RX ADMIN — Medication 1 APPLICATION(S): at 06:51

## 2017-03-26 RX ADMIN — PANTOPRAZOLE SODIUM 40 MILLIGRAM(S): 20 TABLET, DELAYED RELEASE ORAL at 07:52

## 2017-03-26 RX ADMIN — Medication 2: at 11:58

## 2017-03-26 RX ADMIN — Medication 3 MILLILITER(S): at 17:01

## 2017-03-26 RX ADMIN — Medication 3 MILLILITER(S): at 11:03

## 2017-03-26 RX ADMIN — HEPARIN SODIUM 5000 UNIT(S): 5000 INJECTION INTRAVENOUS; SUBCUTANEOUS at 06:51

## 2017-03-26 RX ADMIN — ZINC OXIDE 1 APPLICATION(S): 200 OINTMENT TOPICAL at 17:06

## 2017-03-26 NOTE — PROGRESS NOTE ADULT - SUBJECTIVE AND OBJECTIVE BOX
INTERVAL HPI/OVERNIGHT EVENTS:  62 Y/O male w/ PMHx of COPD on home O2, CPAP at night, A FIB,, HTN, HLD, DM, presented with SOB. Intubated for hypercapnic respiratory failure in ED. CT chest and CXR showed multifocal pneumonia.  NO more details of history are available as intubated and not able to provide information.   Was recently admitted with similar presentation, was in ICU with BIPAP support.  Failed weaning for few days but did well on 03/13/17 and got extubated, then transferred to medical floor.  Awake, responsive, reports left arm swelling is better. has been on nasal O2 today    Vital Signs Last 24 Hrs  T(C): 37, Max: 37.1 (03-26 @ 00:31)  T(F): 98.6, Max: 98.8 (03-26 @ 00:31)  HR: 72 (63 - 104)  BP: 131/34 (115/33 - 131/34)  BP(mean): --  RR: 20 (17 - 20)  SpO2: 96% (94% - 100%)    PHYSICAL EXAM:  GEN:       Awake, responsive and comfortable.  HEENT:    Normal.    RESP:     no wheezing,  CVS:          Regular rate and rhythm.   ABD:         Soft, non-tender, non-distended;   :             No costovertebral angle tenderness  EXTR:            No clubbing, cyanosis or edema  CNS:              Intact sensory and motor function.    MEDICATIONS  (STANDING):  heparin  Injectable 5000Unit(s) SubCutaneous every 12 hours  dextrose 5%. 1000milliLiter(s) IV Continuous <Continuous>  dextrose 50% Injectable 12.5Gram(s) IV Push once  dextrose 50% Injectable 25Gram(s) IV Push once  ALBUTerol/ipratropium for Nebulization 3milliLiter(s) Nebulizer every 6 hours  ALBUTerol    90 MICROgram(s) HFA Inhaler 1Puff(s) Inhalation every 4 hours  tiotropium 18 MICROgram(s) Capsule 1Capsule(s) Inhalation daily  pantoprazole    Tablet 40milliGRAM(s) Oral before breakfast  zinc oxide 40%/lanolin Ointment 1Application(s) Topical two times a day  insulin lispro (HumaLOG) corrective regimen sliding scale  SubCutaneous Before meals and at bedtime  metoprolol 25milliGRAM(s) Oral two times a day  multivitamin/minerals 1Tablet(s) Oral daily  diltiazem    Tablet 60milliGRAM(s) Oral every 8 hours  BACItracin   Ointment 1Application(s) Topical three times a day  lactobacillus acidophilus 1Tablet(s) Oral once  dextrose 5% + sodium chloride 0.9%. 1000milliLiter(s) IV Continuous <Continuous>    MEDICATIONS  (PRN):  dextrose Gel 1Dose(s) Oral once PRN Blood Glucose LESS THAN 70 milliGRAM(s)/deciliter  glucagon  Injectable 1milliGRAM(s) IntraMuscular once PRN Glucose LESS THAN 70 milligrams/deciliter  acetaminophen   Tablet 650milliGRAM(s) Oral every 6 hours PRN For Temp greater than 38 C (100.4 F)  benzocaine 15 mG/menthol 3.6 mG Lozenge 1Lozenge Oral every 4 hours PRN Sore Throat  acetaminophen   Tablet. 650milliGRAM(s) Oral every 6 hours PRN Mild Pain (1 - 3)    LABS:                        8.5    8.0   )-----------( 184      ( 25 Mar 2017 11:01 )             26.2     25 Mar 2017 11:01    143    |  101    |  6      ----------------------------<  206    3.6     |  38     |  0.19     Ca    8.6        25 Mar 2017 11:01    ASSESSMENT AND PLAN:  ·	S/P Acute Hypoxic/hypercarbic Respiratory failure.  ·	Multifocal pneumonia.  ·	Acute COPD exacerbation.  ·	Bilateral pleural effusion.  ·	Anemia.  ·	CAD with angioplasty.  ·	HTN.  ·	A Fib.  ·	HLD.  ·	Anxiety.    Pulmonary status is close to base line.  May benefit from sub acute Rehab.  Discussed with wife at bed side,

## 2017-03-26 NOTE — PHYSICAL THERAPY INITIAL EVALUATION ADULT - IMPAIRMENTS FOUND, PT EVAL
muscle strength/ergonomics and body mechanics/posture/gait, locomotion, and balance/aerobic capacity/endurance

## 2017-03-26 NOTE — PHYSICAL THERAPY INITIAL EVALUATION ADULT - PHYSICAL ASSIST/NONPHYSICAL ASSIST: SCOOT/BRIDGE, REHAB EVAL
verbal cues/1 person assist/nonverbal cues (demo/gestures)
nonverbal cues (demo/gestures)/1 person assist/verbal cues

## 2017-03-26 NOTE — PROGRESS NOTE ADULT - ASSESSMENT
Pt is a 60 yo WM with h/o COPD on home O2, nocturnal CPAP, CAD/stent, A fib, HTN, DM, HLD and prostate adenoma presented sec to SOB. ABG showed worsening acute on chronic resp acidosis despite being on BiPAP, s/p intubation and icu admission for Acute on chronic resp acidosis/ COPD exacerbation/ PNA/ Staph sepsis/ was difficult to wean initially but then s/p successful extubation on 3/13. now again with fever , palliative care following, prognosis poor but pt/family wants to continue aggressive measures, PCT not that high and fever resolved, as per pulm Abx d/c'd.  spoke with son Miguel and his wife beatrice , discussed pt. condition in detail ,continues to be full code,as they are waiting on one of the patient daughter to come and visit him from back home, son and wife made aware about poor prognosis of patient and recurrent risk of acute resp. failure.

## 2017-03-26 NOTE — PROGRESS NOTE ADULT - SUBJECTIVE AND OBJECTIVE BOX
CHIEF COMPLAINT/INTERVAL HISTORY:    Patient is a 61y old  Male who presents with a chief complaint of sob (08 Mar 2017 11:44)      HPI:  60 Y/O male w/ PMHx of COPD on home O2, cpap at night, afib, htn, hld, dm, pw c/o SOB. ABG worsening in ED showing 7.26/102/133/44 on Bipap. Pt intubated for hypercapnic respiratory failure. (08 Mar 2017 06:36)      SUBJECTIVE & OBJECTIVE: Pt seen and examined at bedside. As per RN refused to use BiPAP last night.  Denies chest pain, nausea/vomiting/diarrhea, No dizziness, HA or blurry vision, all other ROS negative.      Vital Signs Last 24 Hrs  T(C): 37, Max: 37.2 (03-25 @ 17:05)  T(F): 98.6, Max: 99 (03-25 @ 17:05)  HR: 73 (63 - 104)  BP: 131/34 (110/49 - 131/34)  BP(mean): --  ABP: --  ABP(mean): --  RR: 20 (17 - 20)  SpO2: 94% (94% - 100%)        MEDICATIONS  (STANDING):  heparin  Injectable 5000Unit(s) SubCutaneous every 12 hours  dextrose 5%. 1000milliLiter(s) IV Continuous <Continuous>  dextrose 50% Injectable 12.5Gram(s) IV Push once  dextrose 50% Injectable 25Gram(s) IV Push once  ALBUTerol/ipratropium for Nebulization 3milliLiter(s) Nebulizer every 6 hours  ALBUTerol    90 MICROgram(s) HFA Inhaler 1Puff(s) Inhalation every 4 hours  tiotropium 18 MICROgram(s) Capsule 1Capsule(s) Inhalation daily  pantoprazole    Tablet 40milliGRAM(s) Oral before breakfast  zinc oxide 40%/lanolin Ointment 1Application(s) Topical two times a day  insulin lispro (HumaLOG) corrective regimen sliding scale  SubCutaneous Before meals and at bedtime  metoprolol 25milliGRAM(s) Oral two times a day  multivitamin/minerals 1Tablet(s) Oral daily  diltiazem    Tablet 60milliGRAM(s) Oral every 8 hours  BACItracin   Ointment 1Application(s) Topical three times a day  lactobacillus acidophilus 1Tablet(s) Oral once  dextrose 5% + sodium chloride 0.9%. 1000milliLiter(s) IV Continuous <Continuous>    MEDICATIONS  (PRN):  dextrose Gel 1Dose(s) Oral once PRN Blood Glucose LESS THAN 70 milliGRAM(s)/deciliter  glucagon  Injectable 1milliGRAM(s) IntraMuscular once PRN Glucose LESS THAN 70 milligrams/deciliter  acetaminophen   Tablet 650milliGRAM(s) Oral every 6 hours PRN For Temp greater than 38 C (100.4 F)  benzocaine 15 mG/menthol 3.6 mG Lozenge 1Lozenge Oral every 4 hours PRN Sore Throat  acetaminophen   Tablet. 650milliGRAM(s) Oral every 6 hours PRN Mild Pain (1 - 3)        PHYSICAL EXAM:    GENERAL: NAD, malnourished, pursed breathing  HEAD:  Atraumatic, Normocephalic  EYES: EOMI, PERRLA, + pallor  ENMT: Moist mucous membranes  NECK: Supple, No JVD  NERVOUS SYSTEM:  Alert & Oriented X2 , Motor Strength4/5 B/L upper and lower extremities; CHEST/LUNG: generalized decreased BS, occasional wheezing  HEART: Irregular Rhythm . rate controlled,  ABDOMEN: Soft, Nontender, Nondistended; Bowel sounds present  EXTREMITIES:  no cyanosis, or pedal edema, LUE 1+ edema    LABS:                        8.5    8.0   )-----------( 184      ( 25 Mar 2017 11:01 )             26.2     25 Mar 2017 11:01    143    |  101    |  6      ----------------------------<  206    3.6     |  38     |  0.19     Ca    8.6        25 Mar 2017 11:01            CAPILLARY BLOOD GLUCOSE  165 (25 Mar 2017 21:55)  191 (25 Mar 2017 16:26)

## 2017-03-27 DIAGNOSIS — E46 UNSPECIFIED PROTEIN-CALORIE MALNUTRITION: ICD-10-CM

## 2017-03-27 DIAGNOSIS — I10 ESSENTIAL (PRIMARY) HYPERTENSION: ICD-10-CM

## 2017-03-27 DIAGNOSIS — J96.11 CHRONIC RESPIRATORY FAILURE WITH HYPOXIA: ICD-10-CM

## 2017-03-27 LAB
BASE EXCESS BLDA CALC-SCNC: 16.4 MMOL/L — HIGH (ref -2–2)
BASE EXCESS BLDA CALC-SCNC: 17.7 MMOL/L — HIGH (ref -2–2)
BASE EXCESS BLDA CALC-SCNC: 18.3 MMOL/L — HIGH (ref -2–2)
BLOOD GAS COMMENTS: SIGNIFICANT CHANGE UP
BLOOD GAS SOURCE: SIGNIFICANT CHANGE UP
HCO3 BLDA-SCNC: 45 MMOL/L — HIGH (ref 21–29)
HCO3 BLDA-SCNC: 47 MMOL/L — HIGH (ref 21–29)
HCO3 BLDA-SCNC: 47 MMOL/L — HIGH (ref 21–29)
HCT VFR BLD CALC: 31.1 % — LOW (ref 39–50)
HGB BLD-MCNC: 10 G/DL — LOW (ref 13–17)
HOROWITZ INDEX BLDA+IHG-RTO: 30 — SIGNIFICANT CHANGE UP
HOROWITZ INDEX BLDA+IHG-RTO: 35 — SIGNIFICANT CHANGE UP
HOROWITZ INDEX BLDA+IHG-RTO: 35 — SIGNIFICANT CHANGE UP
MCHC RBC-ENTMCNC: 27.3 PG — SIGNIFICANT CHANGE UP (ref 27–34)
MCHC RBC-ENTMCNC: 32.3 GM/DL — SIGNIFICANT CHANGE UP (ref 32–36)
MCV RBC AUTO: 84.7 FL — SIGNIFICANT CHANGE UP (ref 80–100)
PCO2 BLDA: 100 MMHG — CRITICAL HIGH (ref 32–46)
PCO2 BLDA: 94 MMHG — CRITICAL HIGH (ref 32–46)
PCO2 BLDA: 96 MMHG — CRITICAL HIGH (ref 32–46)
PH BLD: 7.29 — LOW (ref 7.35–7.45)
PH BLD: 7.29 — LOW (ref 7.35–7.45)
PH BLD: 7.32 — LOW (ref 7.35–7.45)
PLATELET # BLD AUTO: 214 K/UL — SIGNIFICANT CHANGE UP (ref 150–400)
PO2 BLDA: 54 MMHG — LOW (ref 74–108)
PO2 BLDA: 65 MMHG — LOW (ref 74–108)
PO2 BLDA: 70 MMHG — LOW (ref 74–108)
RBC # BLD: 3.68 M/UL — LOW (ref 4.2–5.8)
RBC # FLD: 17.5 % — HIGH (ref 11–15)
SAO2 % BLDA: 86 % — LOW (ref 92–96)
SAO2 % BLDA: 92 % — SIGNIFICANT CHANGE UP (ref 92–96)
SAO2 % BLDA: 94 % — SIGNIFICANT CHANGE UP (ref 92–96)
WBC # BLD: 10 K/UL — SIGNIFICANT CHANGE UP (ref 3.8–10.5)
WBC # FLD AUTO: 10 K/UL — SIGNIFICANT CHANGE UP (ref 3.8–10.5)

## 2017-03-27 PROCEDURE — 99233 SBSQ HOSP IP/OBS HIGH 50: CPT

## 2017-03-27 PROCEDURE — 99291 CRITICAL CARE FIRST HOUR: CPT

## 2017-03-27 RX ADMIN — Medication 1 TABLET(S): at 11:49

## 2017-03-27 RX ADMIN — Medication 3 MILLILITER(S): at 06:32

## 2017-03-27 RX ADMIN — HEPARIN SODIUM 5000 UNIT(S): 5000 INJECTION INTRAVENOUS; SUBCUTANEOUS at 18:01

## 2017-03-27 RX ADMIN — SODIUM CHLORIDE 100 MILLILITER(S): 9 INJECTION, SOLUTION INTRAVENOUS at 22:07

## 2017-03-27 RX ADMIN — Medication 3 MILLILITER(S): at 17:25

## 2017-03-27 RX ADMIN — Medication 1 APPLICATION(S): at 13:48

## 2017-03-27 RX ADMIN — HEPARIN SODIUM 5000 UNIT(S): 5000 INJECTION INTRAVENOUS; SUBCUTANEOUS at 06:06

## 2017-03-27 RX ADMIN — Medication 25 MILLIGRAM(S): at 11:49

## 2017-03-27 RX ADMIN — Medication 3 MILLILITER(S): at 11:44

## 2017-03-27 RX ADMIN — Medication 25 MILLIGRAM(S): at 22:16

## 2017-03-27 RX ADMIN — Medication 3 MILLILITER(S): at 00:05

## 2017-03-27 RX ADMIN — Medication 3 MILLILITER(S): at 23:47

## 2017-03-27 RX ADMIN — Medication 1 APPLICATION(S): at 22:23

## 2017-03-27 RX ADMIN — Medication 1 APPLICATION(S): at 06:07

## 2017-03-27 RX ADMIN — ZINC OXIDE 1 APPLICATION(S): 200 OINTMENT TOPICAL at 06:15

## 2017-03-27 RX ADMIN — SODIUM CHLORIDE 100 MILLILITER(S): 9 INJECTION, SOLUTION INTRAVENOUS at 11:51

## 2017-03-27 RX ADMIN — Medication 1: at 11:50

## 2017-03-27 RX ADMIN — PANTOPRAZOLE SODIUM 40 MILLIGRAM(S): 20 TABLET, DELAYED RELEASE ORAL at 07:53

## 2017-03-27 RX ADMIN — Medication 2: at 07:53

## 2017-03-27 NOTE — CONSULT NOTE ADULT - PROBLEM SELECTOR RECOMMENDATION 9
- Likely due to COPD.   - Failing BiPAP, worsening hypercapnia.   - Will transfer patient to ICU for close observation with continuous neb, BiPAP. Intubate if necessary.   - Continue GOC discussion. Very poor prognosis.

## 2017-03-27 NOTE — PROGRESS NOTE ADULT - SUBJECTIVE AND OBJECTIVE BOX
pt continues to do poorly  rersp failure   no change in prognosis 'waiting for other fami;y member  no adv dir   prognosis poor

## 2017-03-27 NOTE — PROGRESS NOTE ADULT - SUBJECTIVE AND OBJECTIVE BOX
INTERVAL HPI/OVERNIGHT EVENTS:  60 Y/O male w/ PMHx of COPD on home O2, CPAP at night, A FIB,, HTN, HLD, DM, presented with SOB. Intubated for hypercapnic respiratory failure in ED. CT chest and CXR showed multifocal pneumonia.  NO more details of history are available as intubated and not able to provide information.   Was recently admitted with similar presentation, was in ICU with BIPAP support.  Failed weaning for few days but did well on 03/13/17 and got extubated, then transferred to medical floor.  Events noted, transferred to 2D due to hypercarbic Respiratory failure. Awake and Responsive on BIPAP.    Vital Signs Last 24 Hrs  T(C): 37, Max: 37.6 (03-27 @ 00:13)  T(F): 98.6, Max: 99.6 (03-27 @ 00:13)  HR: 73 (72 - 102)  BP: 134/62 (132/88 - 186/67)  BP(mean): --  RR: 18 (16 - 20)  SpO2: 100% (67% - 100%)    PHYSICAL EXAM:  GEN:        Awake, responsive and comfortable.  HEENT:    Normal.    RESP:      decreased air entry.  CVS:         Regular rate and rhythm.   ABD:         Soft, non-tender, non-distended;   :             No costovertebral angle tenderness  EXTR:           ecchymosis and edema  CNS:              Intact sensory and motor function.    MEDICATIONS  (STANDING):  heparin  Injectable 5000Unit(s) SubCutaneous every 12 hours  dextrose 5%. 1000milliLiter(s) IV Continuous <Continuous>  dextrose 50% Injectable 12.5Gram(s) IV Push once  dextrose 50% Injectable 25Gram(s) IV Push once  ALBUTerol/ipratropium for Nebulization 3milliLiter(s) Nebulizer every 6 hours  ALBUTerol    90 MICROgram(s) HFA Inhaler 1Puff(s) Inhalation every 4 hours  tiotropium 18 MICROgram(s) Capsule 1Capsule(s) Inhalation daily  pantoprazole    Tablet 40milliGRAM(s) Oral before breakfast  zinc oxide 40%/lanolin Ointment 1Application(s) Topical two times a day  insulin lispro (HumaLOG) corrective regimen sliding scale  SubCutaneous Before meals and at bedtime  metoprolol 25milliGRAM(s) Oral two times a day  multivitamin/minerals 1Tablet(s) Oral daily  diltiazem    Tablet 60milliGRAM(s) Oral every 8 hours  BACItracin   Ointment 1Application(s) Topical three times a day  lactobacillus acidophilus 1Tablet(s) Oral once  dextrose 5% + sodium chloride 0.9%. 1000milliLiter(s) IV Continuous <Continuous>    MEDICATIONS  (PRN):  dextrose Gel 1Dose(s) Oral once PRN Blood Glucose LESS THAN 70 milliGRAM(s)/deciliter  glucagon  Injectable 1milliGRAM(s) IntraMuscular once PRN Glucose LESS THAN 70 milligrams/deciliter  acetaminophen   Tablet 650milliGRAM(s) Oral every 6 hours PRN For Temp greater than 38 C (100.4 F)  benzocaine 15 mG/menthol 3.6 mG Lozenge 1Lozenge Oral every 4 hours PRN Sore Throat  acetaminophen   Tablet. 650milliGRAM(s) Oral every 6 hours PRN Mild Pain (1 - 3)    LABS:                        10.0   10.0  )-----------( 214      ( 27 Mar 2017 06:20 )             31.1   ABG - 03-27 @ 16:02  pH: 7.29 / pcO2: 100 / pO2: 65 on BIPAP and 35% FIO2.    ASSESSMENT AND PLAN:  ·	Acute Hypoxic/hypercarbic Respiratory failure.  ·	S/P Multifocal pneumonia.  ·	Acute COPD exacerbation.  ·	Functional deconditioning.  ·	Bilateral pleural effusion.  ·	Anemia.  ·	CAD with angioplasty.  ·	HTN.  ·	A Fib.  ·	HLD.  ·	Anxiety.    Continue BIPAP support, follow ABG.  Continue bronchodilators.  Discussed with son at bed side.

## 2017-03-27 NOTE — PROGRESS NOTE ADULT - ASSESSMENT
Pt is a 62 yo WM with h/o COPD on home O2, nocturnal CPAP, CAD/stent, A fib, HTN, DM, HLD and prostate adenoma presented sec to SOB. ABG showed worsening acute on chronic resp acidosis despite being on BiPAP, s/p intubation and icu admission for Acute on chronic resp acidosis/ COPD exacerbation/ PNA/ Staph sepsis/ was difficult to wean initially but then s/p successful extubation on 3/13. palliative care following, prognosis poor but pt/family wants to continue aggressive measures, PCT not that high and fever resolved, as per pulm Abx d/c'd. Pt again with worsening s/s, acute on chronic resp. failure , O2 desat while on BiPAP, ABG done, c/w Resp. acidosis, hypercarbia/hypoxemia.  spoke with son Miguel and his wife beatrice , discussed pt. condition in detail ,continues to be full code,as they are waiting on one of the patient daughter to come and visit him from back home, son and wife made aware about poor prognosis of patient and recurrent risk of acute resp. failure and worsened condition today.

## 2017-03-27 NOTE — PROGRESS NOTE ADULT - SUBJECTIVE AND OBJECTIVE BOX
CHIEF COMPLAINT/INTERVAL HISTORY:    Patient is a 61y old  Male who presents with a chief complaint of sob (08 Mar 2017 11:44)      HPI:  60 Y/O male w/ PMHx of COPD on home O2, cpap at night, afib, htn, hld, dm, pw c/o SOB. ABG worsening in ED showing 7.26/102/133/44 on Bipap. Pt intubated for hypercapnic respiratory failure. (08 Mar 2017 06:36)      SUBJECTIVE & OBJECTIVE: Pt seen and examined at bedside.     ICU Vital Signs Last 24 Hrs  T(C): 36.7, Max: 37.6 (03-27 @ 00:13)  T(F): 98, Max: 99.6 (03-27 @ 00:13)  HR: 83 (72 - 102)  BP: 137/64 (132/88 - 155/83)  BP(mean): --  ABP: --  ABP(mean): --  RR: 16 (16 - 20)  SpO2: 98% (67% - 98%)        MEDICATIONS  (STANDING):  heparin  Injectable 5000Unit(s) SubCutaneous every 12 hours  dextrose 5%. 1000milliLiter(s) IV Continuous <Continuous>  dextrose 50% Injectable 12.5Gram(s) IV Push once  dextrose 50% Injectable 25Gram(s) IV Push once  ALBUTerol/ipratropium for Nebulization 3milliLiter(s) Nebulizer every 6 hours  ALBUTerol    90 MICROgram(s) HFA Inhaler 1Puff(s) Inhalation every 4 hours  tiotropium 18 MICROgram(s) Capsule 1Capsule(s) Inhalation daily  pantoprazole    Tablet 40milliGRAM(s) Oral before breakfast  zinc oxide 40%/lanolin Ointment 1Application(s) Topical two times a day  insulin lispro (HumaLOG) corrective regimen sliding scale  SubCutaneous Before meals and at bedtime  metoprolol 25milliGRAM(s) Oral two times a day  multivitamin/minerals 1Tablet(s) Oral daily  diltiazem    Tablet 60milliGRAM(s) Oral every 8 hours  BACItracin   Ointment 1Application(s) Topical three times a day  lactobacillus acidophilus 1Tablet(s) Oral once  dextrose 5% + sodium chloride 0.9%. 1000milliLiter(s) IV Continuous <Continuous>    MEDICATIONS  (PRN):  dextrose Gel 1Dose(s) Oral once PRN Blood Glucose LESS THAN 70 milliGRAM(s)/deciliter  glucagon  Injectable 1milliGRAM(s) IntraMuscular once PRN Glucose LESS THAN 70 milligrams/deciliter  acetaminophen   Tablet 650milliGRAM(s) Oral every 6 hours PRN For Temp greater than 38 C (100.4 F)  benzocaine 15 mG/menthol 3.6 mG Lozenge 1Lozenge Oral every 4 hours PRN Sore Throat  acetaminophen   Tablet. 650milliGRAM(s) Oral every 6 hours PRN Mild Pain (1 - 3)        PHYSICAL EXAM:    GENERAL: NAD, well-groomed, well-developed  HEAD:  Atraumatic, Normocephalic  EYES: EOMI, PERRLA, conjunctiva and sclera clear  ENMT: Moist mucous membranes  NECK: Supple, No JVD  NERVOUS SYSTEM:  Alert & Oriented X3, Motor Strength 5/5 B/L upper and lower extremities; DTRs 2+ intact and symmetric  CHEST/LUNG: Clear to auscultation bilaterally; No rales, rhonchi, wheezing, or rubs  HEART: Regular rate and rhythm; No murmurs, rubs, or gallops  ABDOMEN: Soft, Nontender, Nondistended; Bowel sounds present  EXTREMITIES:  2+ Peripheral Pulses, No clubbing, cyanosis, or edema    LABS:                        10.0   10.0  )-----------( 214      ( 27 Mar 2017 06:20 )             31.1     Blood Gas Profile - Arterial (03.27.17 @ 11:52)    Blood Gas Comments: noted camille test performed and positive    Blood Gas Source: Arterial    pH, Blood: 7.29    pCO2, Arterial: 96: TYPE:(C=Critical, N=Notification, A=Abnormal) c_  TESTS: gas_  DATE/TIME CALLED: 03/27/17 11:54_  CALLED Kayla RN: _  READ BACK (2 Patient Identifiers)(Y/N): y_  READ BACK VALUES (Y/N):y _  CALLED BY: _sjohnrrt mmHg    pO2, Arterial: 54 mmHg    HCO3, Arterial: 45 mmol/L    Base Excess, Arterial: 16.4 mmol/L    Oxygen Saturation, Arterial: 86 %    FIO2, Arterial: 30.0                CAPILLARY BLOOD GLUCOSE  159 (27 Mar 2017 11:49)  203 (27 Mar 2017 07:53)  207 (26 Mar 2017 21:43)  206 (26 Mar 2017 16:54)      RECENT CULTURES:      RADIOLOGY & ADDITIONAL TESTS:    Health Issues:  MULTIFOCAL PNEUMONIA COPD EXACERBATION  SHORTNESS OF BREATH  Multifocal pneumonia  Anemia, unspecified type  Generalized weakness  Goals of care, counseling/discussion  Oropharyngeal dysphagia  Severe protein-calorie malnutrition  Acute on chronic respiratory failure with hypoxia and hypercapnia  Diabetes Mellitus  Atrial fibrillation  Multifocal pneumonia  COPD exacerbation      DVT/GI ppx  Discussed with pt @ bedside CHIEF COMPLAINT/INTERVAL HISTORY:    Patient is a 61y old  Male who presents with a chief complaint of sob (08 Mar 2017 11:44)      HPI:  62 Y/O male w/ PMHx of COPD on home O2, cpap at night, afib, htn, hld, dm, pw c/o SOB. ABG worsening in ED showing 7.26/102/133/44 on Bipap. Pt intubated for hypercapnic respiratory failure on admission. (08 Mar 2017 06:36)      SUBJECTIVE & OBJECTIVE: Pt seen and examined at bedside.  lethargic, laboured breathing on BiPAP since last night. Resp. distress  Unable to get ROS   as per RN did not do good all night and pulse ox this am was in 60s and 70s, now up to low 90s with off and on fluctuation down to 80s.    Vital Signs Last 24 Hrs  T(C): 36.7, Max: 37.6 (03-27 @ 00:13)  T(F): 98, Max: 99.6 (03-27 @ 00:13)  HR: 83 (72 - 102)  BP: 137/64 (132/88 - 155/83)  BP(mean): --  ABP: --  ABP(mean): --  RR: 16 (16 - 20)  SpO2: 98% (67% - 98%)        MEDICATIONS  (STANDING):  heparin  Injectable 5000Unit(s) SubCutaneous every 12 hours  dextrose 5%. 1000milliLiter(s) IV Continuous <Continuous>  dextrose 50% Injectable 12.5Gram(s) IV Push once  dextrose 50% Injectable 25Gram(s) IV Push once  ALBUTerol/ipratropium for Nebulization 3milliLiter(s) Nebulizer every 6 hours  ALBUTerol    90 MICROgram(s) HFA Inhaler 1Puff(s) Inhalation every 4 hours  tiotropium 18 MICROgram(s) Capsule 1Capsule(s) Inhalation daily  pantoprazole    Tablet 40milliGRAM(s) Oral before breakfast  zinc oxide 40%/lanolin Ointment 1Application(s) Topical two times a day  insulin lispro (HumaLOG) corrective regimen sliding scale  SubCutaneous Before meals and at bedtime  metoprolol 25milliGRAM(s) Oral two times a day  multivitamin/minerals 1Tablet(s) Oral daily  diltiazem    Tablet 60milliGRAM(s) Oral every 8 hours  BACItracin   Ointment 1Application(s) Topical three times a day  lactobacillus acidophilus 1Tablet(s) Oral once  dextrose 5% + sodium chloride 0.9%. 1000milliLiter(s) IV Continuous <Continuous>    MEDICATIONS  (PRN):  dextrose Gel 1Dose(s) Oral once PRN Blood Glucose LESS THAN 70 milliGRAM(s)/deciliter  glucagon  Injectable 1milliGRAM(s) IntraMuscular once PRN Glucose LESS THAN 70 milligrams/deciliter  acetaminophen   Tablet 650milliGRAM(s) Oral every 6 hours PRN For Temp greater than 38 C (100.4 F)  benzocaine 15 mG/menthol 3.6 mG Lozenge 1Lozenge Oral every 4 hours PRN Sore Throat  acetaminophen   Tablet. 650milliGRAM(s) Oral every 6 hours PRN Mild Pain (1 - 3)        PHYSICAL EXAM:    GENERAL: lethargic, malnourished. cachectic, moderate resp, distress, on BiPAP, shallow, rapid breaths  HEAD:  Atraumatic, Normocephalic  EYES: EOMI, PERRLA,+ pallor  ENMT: dry mucous membranes  NECK: Supple,  NERVOUS SYSTEM:  lethargic, easily arousable to verbal stimuli.  CHEST/LUNG: b/l generalized decreased BS with occasional wheezing.  HEART: irregular rhythm, tachycardia.  ABDOMEN: Soft, Nontender, Nondistended; Bowel sounds present  EXTREMITIES: no cyanosis/edema    LABS:                        10.0   10.0  )-----------( 214      ( 27 Mar 2017 06:20 )             31.1     Blood Gas Profile - Arterial (03.27.17 @ 11:52)    Blood Gas Comments: noted camille test performed and positive    Blood Gas Source: Arterial    pH, Blood: 7.29    pCO2, Arterial: 96: TYPE:(C=Critical, N=Notification, A=Abnormal) c_  TESTS: gas_  DATE/TIME CALLED: 03/27/17 11:54_  CALLED Kayla RN: _  READ BACK (2 Patient Identifiers)(Y/N): y_  READ BACK VALUES (Y/N):y _  CALLED BY: _sjohnrrt mmHg    pO2, Arterial: 54 mmHg    HCO3, Arterial: 45 mmol/L    Base Excess, Arterial: 16.4 mmol/L    Oxygen Saturation, Arterial: 86 %    FIO2, Arterial: 30.0        CAPILLARY BLOOD GLUCOSE  159 (27 Mar 2017 11:49)  203 (27 Mar 2017 07:53)  207 (26 Mar 2017 21:43)  206 (26 Mar 2017 16:54)    labs reviewed, care discussed with consultants , RN and SIOBHAN.

## 2017-03-27 NOTE — CONSULT NOTE ADULT - ATTENDING COMMENTS
Agree with above note. Pt seen with fellow.    61M with COPD, chronic hypoxia, oxygen dependent, chronic hypercarbia with recent admission for COPD exacerbation, acute on chronic hypercarbic respiratory failure requiring only BiPAP. He was readmitted to ICU 3/8 for staph bacteremia and again with acute on chronic hypercarbic respiratory failure failing BiPAP and requiring intubation. Pt extubated but required BiPAP as needed throughout the day and bipap at night. Transferred to medical floor. Return with worsening acute on chronic respriratory acidosis/hypercarbic respiratory failure    - cont with BiPAP  - setting changes made: IPAP increased  - repeat ABG, if no worsening will need to have pt re-intubated  - at present time family wants pt full code

## 2017-03-27 NOTE — CONSULT NOTE ADULT - ASSESSMENT
Pt is a 60 yo WM with h/o COPD on home O2, nocturnal CPAP, CAD/stent, A fib, HTN, DM, HLD and prostate adenoma presented with SOB, admitted for AECOPD, failed BiPAP, intubated in ICU, treated for staph sepsis, successfully extubated and transferred to floor on nocturnal BiPAP. On the floor he has been more lethargic, laboured breathing on BiPAP since last night. Pulse ox this morning was in 60s and 70s on BiPAP, ABG showed pH 7.29/96/86%. ICU re-consulted for respiratory failure.     Adjusted BiPAP setting to 18/6/35%, repeated ABG 7.29/100, although more awake.

## 2017-03-27 NOTE — PROGRESS NOTE ADULT - NSHPATTENDINGPLANDISCUSS_GEN_ALL_CORE
patient, and son Miguel over phone
ramya Toure
octaviano Rivera over phone.
Patient' son and wife in detail.

## 2017-03-28 LAB
ANION GAP SERPL CALC-SCNC: 6 MMOL/L — SIGNIFICANT CHANGE UP (ref 5–17)
BASE EXCESS BLDA CALC-SCNC: 17.9 MMOL/L — HIGH (ref -2–2)
BASE EXCESS BLDA CALC-SCNC: 17.9 MMOL/L — HIGH (ref -2–2)
BLOOD GAS COMMENTS: SIGNIFICANT CHANGE UP
BLOOD GAS SOURCE: SIGNIFICANT CHANGE UP
BLOOD GAS SOURCE: SIGNIFICANT CHANGE UP
BUN SERPL-MCNC: 4 MG/DL — LOW (ref 7–23)
CALCIUM SERPL-MCNC: 9 MG/DL — SIGNIFICANT CHANGE UP (ref 8.5–10.1)
CHLORIDE SERPL-SCNC: 95 MMOL/L — LOW (ref 96–108)
CO2 SERPL-SCNC: 44 MMOL/L — HIGH (ref 22–31)
CREAT SERPL-MCNC: 0.27 MG/DL — LOW (ref 0.5–1.3)
CULTURE RESULTS: SIGNIFICANT CHANGE UP
CULTURE RESULTS: SIGNIFICANT CHANGE UP
GLUCOSE SERPL-MCNC: 178 MG/DL — HIGH (ref 70–99)
HCO3 BLDA-SCNC: 46 MMOL/L — HIGH (ref 21–29)
HCO3 BLDA-SCNC: 46 MMOL/L — HIGH (ref 21–29)
HCT VFR BLD CALC: 29.3 % — LOW (ref 39–50)
HGB BLD-MCNC: 9.6 G/DL — LOW (ref 13–17)
HOROWITZ INDEX BLDA+IHG-RTO: 30 — SIGNIFICANT CHANGE UP
HOROWITZ INDEX BLDA+IHG-RTO: 35 — SIGNIFICANT CHANGE UP
MAGNESIUM SERPL-MCNC: 1.6 MG/DL — LOW (ref 1.8–2.4)
MCHC RBC-ENTMCNC: 27.8 PG — SIGNIFICANT CHANGE UP (ref 27–34)
MCHC RBC-ENTMCNC: 32.8 GM/DL — SIGNIFICANT CHANGE UP (ref 32–36)
MCV RBC AUTO: 84.7 FL — SIGNIFICANT CHANGE UP (ref 80–100)
PCO2 BLDA: 81 MMHG — CRITICAL HIGH (ref 32–46)
PCO2 BLDA: 89 MMHG — CRITICAL HIGH (ref 32–46)
PH BLD: 7.33 — LOW (ref 7.35–7.45)
PH BLD: 7.37 — SIGNIFICANT CHANGE UP (ref 7.35–7.45)
PHOSPHATE SERPL-MCNC: 2.2 MG/DL — LOW (ref 2.5–4.5)
PLATELET # BLD AUTO: 210 K/UL — SIGNIFICANT CHANGE UP (ref 150–400)
PO2 BLDA: 69 MMHG — LOW (ref 74–108)
PO2 BLDA: 77 MMHG — SIGNIFICANT CHANGE UP (ref 74–108)
POTASSIUM SERPL-MCNC: 3.6 MMOL/L — SIGNIFICANT CHANGE UP (ref 3.5–5.3)
POTASSIUM SERPL-SCNC: 3.6 MMOL/L — SIGNIFICANT CHANGE UP (ref 3.5–5.3)
RBC # BLD: 3.46 M/UL — LOW (ref 4.2–5.8)
RBC # FLD: 17.5 % — HIGH (ref 11–15)
SAO2 % BLDA: 94 % — SIGNIFICANT CHANGE UP (ref 92–96)
SAO2 % BLDA: 96 % — SIGNIFICANT CHANGE UP (ref 92–96)
SODIUM SERPL-SCNC: 145 MMOL/L — SIGNIFICANT CHANGE UP (ref 135–145)
SPECIMEN SOURCE: SIGNIFICANT CHANGE UP
SPECIMEN SOURCE: SIGNIFICANT CHANGE UP
WBC # BLD: 8.1 K/UL — SIGNIFICANT CHANGE UP (ref 3.8–10.5)
WBC # FLD AUTO: 8.1 K/UL — SIGNIFICANT CHANGE UP (ref 3.8–10.5)

## 2017-03-28 PROCEDURE — 71010: CPT | Mod: 26

## 2017-03-28 PROCEDURE — 99291 CRITICAL CARE FIRST HOUR: CPT

## 2017-03-28 RX ORDER — MAGNESIUM SULFATE 500 MG/ML
2 VIAL (ML) INJECTION ONCE
Qty: 0 | Refills: 0 | Status: COMPLETED | OUTPATIENT
Start: 2017-03-28 | End: 2017-03-28

## 2017-03-28 RX ORDER — ALPRAZOLAM 0.25 MG
0.25 TABLET ORAL THREE TIMES A DAY
Qty: 0 | Refills: 0 | Status: DISCONTINUED | OUTPATIENT
Start: 2017-03-28 | End: 2017-04-03

## 2017-03-28 RX ORDER — POTASSIUM PHOSPHATE, MONOBASIC POTASSIUM PHOSPHATE, DIBASIC 236; 224 MG/ML; MG/ML
15 INJECTION, SOLUTION INTRAVENOUS ONCE
Qty: 0 | Refills: 0 | Status: COMPLETED | OUTPATIENT
Start: 2017-03-28 | End: 2017-03-28

## 2017-03-28 RX ADMIN — Medication 1: at 08:13

## 2017-03-28 RX ADMIN — Medication 3 MILLILITER(S): at 17:52

## 2017-03-28 RX ADMIN — POTASSIUM PHOSPHATE, MONOBASIC POTASSIUM PHOSPHATE, DIBASIC 63.75 MILLIMOLE(S): 236; 224 INJECTION, SOLUTION INTRAVENOUS at 05:30

## 2017-03-28 RX ADMIN — Medication 1 APPLICATION(S): at 06:15

## 2017-03-28 RX ADMIN — Medication 1: at 21:10

## 2017-03-28 RX ADMIN — Medication 1 APPLICATION(S): at 15:47

## 2017-03-28 RX ADMIN — ZINC OXIDE 1 APPLICATION(S): 200 OINTMENT TOPICAL at 18:21

## 2017-03-28 RX ADMIN — HEPARIN SODIUM 5000 UNIT(S): 5000 INJECTION INTRAVENOUS; SUBCUTANEOUS at 18:20

## 2017-03-28 RX ADMIN — Medication 50 GRAM(S): at 04:33

## 2017-03-28 RX ADMIN — Medication 3 MILLILITER(S): at 12:01

## 2017-03-28 RX ADMIN — Medication 25 MILLIGRAM(S): at 10:14

## 2017-03-28 RX ADMIN — Medication 1 TABLET(S): at 12:38

## 2017-03-28 RX ADMIN — Medication 1: at 18:19

## 2017-03-28 RX ADMIN — SODIUM CHLORIDE 100 MILLILITER(S): 9 INJECTION, SOLUTION INTRAVENOUS at 06:15

## 2017-03-28 RX ADMIN — HEPARIN SODIUM 5000 UNIT(S): 5000 INJECTION INTRAVENOUS; SUBCUTANEOUS at 06:15

## 2017-03-28 RX ADMIN — Medication 25 MILLIGRAM(S): at 21:10

## 2017-03-28 RX ADMIN — Medication 0.25 MILLIGRAM(S): at 10:14

## 2017-03-28 RX ADMIN — Medication 0.25 MILLIGRAM(S): at 15:47

## 2017-03-28 RX ADMIN — Medication 0.25 MILLIGRAM(S): at 21:10

## 2017-03-28 RX ADMIN — Medication 3 MILLILITER(S): at 05:38

## 2017-03-28 RX ADMIN — PANTOPRAZOLE SODIUM 40 MILLIGRAM(S): 20 TABLET, DELAYED RELEASE ORAL at 06:16

## 2017-03-28 RX ADMIN — Medication 1 APPLICATION(S): at 21:10

## 2017-03-28 NOTE — PROGRESS NOTE ADULT - SUBJECTIVE AND OBJECTIVE BOX
INTERVAL HPI/OVERNIGHT EVENTS:  62 Y/O male w/ PMHx of COPD on home O2, CPAP at night, A FIB,, HTN, HLD, DM, presented with SOB. Intubated for hypercapnic respiratory failure in ED. CT chest and CXR showed multifocal pneumonia.  NO more details of history are available as intubated and not able to provide information.   Was recently admitted with similar presentation, was in ICU with BIPAP support.  Failed weaning for few days but did well on 03/13/17 and got extubated, then transferred to medical floor.  03/27/17: Events noted, transferred to 2D due to hypercarbic Respiratory failure. Awake and Responsive on BIPAP.  03/28/17: now in ICU, resting on BIPAP. RN reports did not do well on nasal O2.    Vital Signs Last 24 Hrs  T(C): 36.7, Max: 37.2 (03-28 @ 07:30)  T(F): 98, Max: 99 (03-28 @ 07:30)  HR: 67 (7 - 98)  BP: 135/63 (112/91 - 163/63)  BP(mean): 82 (69 - 132)  RR: 17 (13 - 25)  SpO2: 92% (83% - 100%)    PHYSICAL EXAM:  GEN:       comfortable.  HEENT:    Normal.    RESP:      no wheezing.  CVS:         Regular rate and rhythm.   ABD:         Soft, non-tender, non-distended;     MEDICATIONS  (STANDING):  heparin  Injectable 5000Unit(s) SubCutaneous every 12 hours  dextrose 5%. 1000milliLiter(s) IV Continuous <Continuous>  dextrose 50% Injectable 12.5Gram(s) IV Push once  dextrose 50% Injectable 25Gram(s) IV Push once  ALBUTerol/ipratropium for Nebulization 3milliLiter(s) Nebulizer every 6 hours  ALBUTerol    90 MICROgram(s) HFA Inhaler 1Puff(s) Inhalation every 4 hours  tiotropium 18 MICROgram(s) Capsule 1Capsule(s) Inhalation daily  pantoprazole    Tablet 40milliGRAM(s) Oral before breakfast  zinc oxide 40%/lanolin Ointment 1Application(s) Topical two times a day  insulin lispro (HumaLOG) corrective regimen sliding scale  SubCutaneous Before meals and at bedtime  metoprolol 25milliGRAM(s) Oral two times a day  multivitamin/minerals 1Tablet(s) Oral daily  diltiazem    Tablet 60milliGRAM(s) Oral every 8 hours  BACItracin   Ointment 1Application(s) Topical three times a day  dextrose 5% + sodium chloride 0.9%. 1000milliLiter(s) IV Continuous <Continuous>  ALPRAZolam 0.25milliGRAM(s) Oral three times a day    MEDICATIONS  (PRN):  dextrose Gel 1Dose(s) Oral once PRN Blood Glucose LESS THAN 70 milliGRAM(s)/deciliter  glucagon  Injectable 1milliGRAM(s) IntraMuscular once PRN Glucose LESS THAN 70 milligrams/deciliter  acetaminophen   Tablet 650milliGRAM(s) Oral every 6 hours PRN For Temp greater than 38 C (100.4 F)  benzocaine 15 mG/menthol 3.6 mG Lozenge 1Lozenge Oral every 4 hours PRN Sore Throat  acetaminophen   Tablet. 650milliGRAM(s) Oral every 6 hours PRN Mild Pain (1 - 3)    LABS:                        9.6    8.1   )-----------( 210      ( 28 Mar 2017 03:19 )             29.3     28 Mar 2017 03:19    145    |  95     |  4      ----------------------------<  178    3.6     |  44     |  0.27     Ca    9.0        28 Mar 2017 03:19  Phos  2.2       28 Mar 2017 03:19  Mg     1.6       28 Mar 2017 03:19    ABG - 03-28 @ 09:17  pH: 7.37 / pcO2: 81 /pO2: 77 on BIPAP with 30% FIO2.     RADIOLOGY & ADDITIONAL STUDIES:    EXAM:  CHEST SINGLE VIEW                            PROCEDURE DATE:  03/28/2017        INTERPRETATION:  Single AP view of the chest.    CLINICAL INFORMATION: Hypercapnia, COPD    FINDINGS:  The lungs are hyperinflated and there are bilateral patchy  airspace opacities, left greater than right. Heart size is within normal   limits. The aorta is calcified.    IMPRESSION: Bilateral patchy airspace opacities, left greater than right   which may represent multifocal pneumonia. No significant changefrom   3/22/2017.    ANJEL FRANCOIS M.D., ATTENDING RADIOLOGIST  This document has been electronically signed. Mar 28 2017  9:50AM    ASSESSMENT AND PLAN:  ·	Acute Hypoxic/hypercarbic Respiratory failure.  ·	S/P Multifocal pneumonia.  ·	Acute COPD exacerbation.  ·	Functional deconditioning.  ·	Anemia.  ·	CAD with angioplasty.  ·	HTN.  ·	A Fib.  ·	HLD.  ·	Anxiety.    Continue O2, BIPAP and nebulizer.  Discussed with DR. BALBUENA, will try short course of steroids and obtain echo.  Discussed with son at bed side that prognosis is poor and he likely will be re intubated and in that case will require Trach and Peg. They are not able to make any decision at this point.

## 2017-03-28 NOTE — PROGRESS NOTE ADULT - ASSESSMENT
61M with COPD, chronic hypoxia, oxygen dependent, chronic hypercarbia with recent admission for COPD exacerbation, acute on chronic hypercarbic respiratory failure requiring only BiPAP. He was readmitted to ICU 3/8 for staph bacteremia and again with acute on chronic hypercarbic respiratory failure failing BiPAP and requiring intubation. Pt extubated but required BiPAP as needed throughout the day and bipap at night. Transferred to medical floor. Returns with worsening acute on chronic respiratory acidosis/hypercarbic respiratory failure      - pt with end stage COPD  - pCO2 improved on high iPAP settings  - pt with baseline underlying chronic hypercarbia  - s/p course of antibiotics for PNA  - s/p course of steroids  - low threshold for re-intubation as family still wish for aggressive care  - physical therapy

## 2017-03-28 NOTE — PROGRESS NOTE ADULT - SUBJECTIVE AND OBJECTIVE BOX
HPI:  61M with COPD, chronic hypoxia, oxygen dependent, chronic hypercarbia with recent admission for COPD exacerbation, acute on chronic hypercarbic respiratory failure requiring only BiPAP. He was readmitted to ICU 3/8 for staph bacteremia and again with acute on chronic hypercarbic respiratory failure failing BiPAP and requiring intubation. Pt extubated but required BiPAP as needed throughout the day and bipap at night. Transferred to medical floor. Return with worsening acute on chronic respiratory acidosis/hypercarbic respiratory failure    24 hr events:  pt this morning with respiratory distress, unable to tolerate time off biPAP  became hypoxic with accessory muscle use  requires nasotracheal suctioning at times: pt unable to bring up secretions  required xanax this morning with underlying anxiety    ## ROS:  [x] unable to obtain due to bipap and underlying respiratory distress    ## Labs:  CBC:                        9.6    8.1   )-----------( 210      ( 28 Mar 2017 03:19 )             29.3     Chem:  28 Mar 2017 03:19    145    |  95     |  4      ----------------------------<  178    3.6     |  44     |  0.27     Ca    9.0        28 Mar 2017 03:19  Phos  2.2       28 Mar 2017 03:19  Mg     1.6       28 Mar 2017 03:19    ## Imaging:  CXR Bilateral patchy airspace opacities, left greater than right which may represent multifocal pneumonia. No significant change      ## Medications:    metoprolol 25milliGRAM(s) Oral two times a day  diltiazem    Tablet 60milliGRAM(s) Oral every 8 hours    ALBUTerol/ipratropium for Nebulization 3milliLiter(s) Nebulizer every 6 hours  ALBUTerol    90 MICROgram(s) HFA Inhaler 1Puff(s) Inhalation every 4 hours  tiotropium 18 MICROgram(s) Capsule 1Capsule(s) Inhalation daily    dextrose Gel 1Dose(s) Oral once PRN  dextrose 50% Injectable 12.5Gram(s) IV Push once  dextrose 50% Injectable 25Gram(s) IV Push once  glucagon  Injectable 1milliGRAM(s) IntraMuscular once PRN  insulin lispro (HumaLOG) corrective regimen sliding scale  SubCutaneous Before meals and at bedtime    heparin  Injectable 5000Unit(s) SubCutaneous every 12 hours    pantoprazole    Tablet 40milliGRAM(s) Oral before breakfast    acetaminophen   Tablet 650milliGRAM(s) Oral every 6 hours PRN  acetaminophen   Tablet. 650milliGRAM(s) Oral every 6 hours PRN  ALPRAZolam 0.25milliGRAM(s) Oral three times a day      ## Vitals:  T(C): 35.9, Max: 37.2 (03-28 @ 07:30)  HR: 58 (7 - 98)  BP: 108/56 (108/56 - 163/63)  BP(mean): 70 (70 - 132)  RR: 26 (13 - 26)  SpO2: 98% (83% - 100%)  Vent: biPAP 20/6  ABG: ABG - ( 28 Mar 2017 09:17 )  pH: 7.37    /  pCO2: 81    /  pO2: 77    / HCO3: 46    / Base Excess: 17.9  /  SaO2: 96          ## P/E:  Gen: lying in bed, mildly anxious, + accessory muscle use  HEENT: PERRL, EOMI  Resp: CTA B/L no wheeze  CVS: S1S2 RRR  Abd: soft NT/ND +BS  Ext: no edema.cyanosis  Neuro: Awake, following commands    CENTRAL LINE: [ ] YES [x] NO    MARTINEZ: [ ] YES [x] NO      A-LINE:  [ ] YES [x] NO      GLOBAL ISSUE/BEST PRACTICE:  Analgesia: n/a  Sedation: n/a  HOB elevation: yes  Stress ulcer prophylaxis: n/a  VTE prophylaxis: heparin sc  Oral Care: n/a  Glycemic control: n/a  Nutrition: dysphagia I puree    CODE STATUS: [x] full code  [ ] DNR  [ ] DNI  [ ] MOLST  Goals of care discussion: [x] yes

## 2017-03-29 LAB
ANION GAP SERPL CALC-SCNC: 8 MMOL/L — SIGNIFICANT CHANGE UP (ref 5–17)
APPEARANCE UR: CLEAR — SIGNIFICANT CHANGE UP
BASE EXCESS BLDA CALC-SCNC: 12.8 MMOL/L — HIGH (ref -2–2)
BASE EXCESS BLDA CALC-SCNC: 8.6 MMOL/L — HIGH (ref -2–2)
BILIRUB UR-MCNC: NEGATIVE — SIGNIFICANT CHANGE UP
BLOOD GAS COMMENTS: SIGNIFICANT CHANGE UP
BLOOD GAS SOURCE: SIGNIFICANT CHANGE UP
BUN SERPL-MCNC: 3 MG/DL — LOW (ref 7–23)
CALCIUM SERPL-MCNC: 8.5 MG/DL — SIGNIFICANT CHANGE UP (ref 8.5–10.1)
CHLORIDE SERPL-SCNC: 96 MMOL/L — SIGNIFICANT CHANGE UP (ref 96–108)
CO2 SERPL-SCNC: 42 MMOL/L — HIGH (ref 22–31)
COLOR SPEC: YELLOW — SIGNIFICANT CHANGE UP
CREAT SERPL-MCNC: 0.23 MG/DL — LOW (ref 0.5–1.3)
DIFF PNL FLD: ABNORMAL
GLUCOSE SERPL-MCNC: 209 MG/DL — HIGH (ref 70–99)
GLUCOSE UR QL: 100 MG/DL
GRAM STN FLD: SIGNIFICANT CHANGE UP
HCO3 BLDA-SCNC: 37 MMOL/L — HIGH (ref 21–29)
HCO3 BLDA-SCNC: 37 MMOL/L — HIGH (ref 21–29)
HCO3 BLDA-SCNC: 9 MMOL/L — LOW (ref 21–29)
HCT VFR BLD CALC: 29.7 % — LOW (ref 39–50)
HGB BLD-MCNC: 9.1 G/DL — LOW (ref 13–17)
HOROWITZ INDEX BLDA+IHG-RTO: 35 — SIGNIFICANT CHANGE UP
HOROWITZ INDEX BLDA+IHG-RTO: 40 — SIGNIFICANT CHANGE UP
HOROWITZ INDEX BLDA+IHG-RTO: SIGNIFICANT CHANGE UP
HYALINE CASTS # UR AUTO: SIGNIFICANT CHANGE UP /LPF (ref 0–7)
KETONES UR-MCNC: ABNORMAL
LEUKOCYTE ESTERASE UR-ACNC: NEGATIVE — SIGNIFICANT CHANGE UP
MAGNESIUM SERPL-MCNC: 1.9 MG/DL — SIGNIFICANT CHANGE UP (ref 1.8–2.4)
MCHC RBC-ENTMCNC: 26.4 PG — LOW (ref 27–34)
MCHC RBC-ENTMCNC: 30.6 GM/DL — LOW (ref 32–36)
MCV RBC AUTO: 86.2 FL — SIGNIFICANT CHANGE UP (ref 80–100)
NITRITE UR-MCNC: NEGATIVE — SIGNIFICANT CHANGE UP
PCO2 BLDA: 44 MMHG — SIGNIFICANT CHANGE UP (ref 32–46)
PCO2 BLDA: 79 MMHG — CRITICAL HIGH (ref 32–46)
PCO2 BLDA: >101 MMHG — CRITICAL HIGH (ref 32–46)
PH BLD: 7.05 — CRITICAL LOW (ref 7.35–7.45)
PH BLD: 7.28 — LOW (ref 7.35–7.45)
PH BLD: 7.53 — HIGH (ref 7.35–7.45)
PH UR: 8 — SIGNIFICANT CHANGE UP (ref 4.8–8)
PHOSPHATE SERPL-MCNC: 2.7 MG/DL — SIGNIFICANT CHANGE UP (ref 2.5–4.5)
PLATELET # BLD AUTO: 239 K/UL — SIGNIFICANT CHANGE UP (ref 150–400)
PO2 BLDA: 74 MMHG — SIGNIFICANT CHANGE UP (ref 74–108)
PO2 BLDA: 76 MMHG — SIGNIFICANT CHANGE UP (ref 74–108)
PO2 BLDA: 85 MMHG — SIGNIFICANT CHANGE UP (ref 74–108)
POTASSIUM SERPL-MCNC: 3.4 MMOL/L — LOW (ref 3.5–5.3)
POTASSIUM SERPL-SCNC: 3.4 MMOL/L — LOW (ref 3.5–5.3)
PROT UR-MCNC: NEGATIVE MG/DL — SIGNIFICANT CHANGE UP
RBC # BLD: 3.45 M/UL — LOW (ref 4.2–5.8)
RBC # FLD: 17.8 % — HIGH (ref 11–15)
RBC CASTS # UR COMP ASSIST: SIGNIFICANT CHANGE UP /HPF (ref 0–4)
SAO2 % BLDA: 90 % — LOW (ref 92–96)
SAO2 % BLDA: 96 % — SIGNIFICANT CHANGE UP (ref 92–96)
SAO2 % BLDA: 98 % — HIGH (ref 92–96)
SODIUM SERPL-SCNC: 146 MMOL/L — HIGH (ref 135–145)
SP GR SPEC: 1.01 — SIGNIFICANT CHANGE UP (ref 1.01–1.02)
SPECIMEN SOURCE: SIGNIFICANT CHANGE UP
UROBILINOGEN FLD QL: NEGATIVE MG/DL — SIGNIFICANT CHANGE UP
WBC # BLD: 6.8 K/UL — SIGNIFICANT CHANGE UP (ref 3.8–10.5)
WBC # FLD AUTO: 6.8 K/UL — SIGNIFICANT CHANGE UP (ref 3.8–10.5)
WBC UR QL: SIGNIFICANT CHANGE UP /HPF (ref 0–5)

## 2017-03-29 PROCEDURE — 36556 INSERT NON-TUNNEL CV CATH: CPT

## 2017-03-29 PROCEDURE — 99291 CRITICAL CARE FIRST HOUR: CPT | Mod: 25

## 2017-03-29 PROCEDURE — 71010: CPT | Mod: 26,76

## 2017-03-29 RX ORDER — PANTOPRAZOLE SODIUM 20 MG/1
40 TABLET, DELAYED RELEASE ORAL DAILY
Qty: 0 | Refills: 0 | Status: DISCONTINUED | OUTPATIENT
Start: 2017-03-29 | End: 2017-04-03

## 2017-03-29 RX ORDER — PROPOFOL 10 MG/ML
10 INJECTION, EMULSION INTRAVENOUS
Qty: 1000 | Refills: 0 | Status: DISCONTINUED | OUTPATIENT
Start: 2017-03-29 | End: 2017-03-29

## 2017-03-29 RX ORDER — CHLORHEXIDINE GLUCONATE 213 G/1000ML
15 SOLUTION TOPICAL
Qty: 0 | Refills: 0 | Status: DISCONTINUED | OUTPATIENT
Start: 2017-03-29 | End: 2017-04-03

## 2017-03-29 RX ORDER — CEFEPIME 1 G/1
INJECTION, POWDER, FOR SOLUTION INTRAMUSCULAR; INTRAVENOUS
Qty: 0 | Refills: 0 | Status: DISCONTINUED | OUTPATIENT
Start: 2017-03-29 | End: 2017-03-30

## 2017-03-29 RX ORDER — MIDAZOLAM HYDROCHLORIDE 1 MG/ML
2 INJECTION, SOLUTION INTRAMUSCULAR; INTRAVENOUS ONCE
Qty: 0 | Refills: 0 | Status: DISCONTINUED | OUTPATIENT
Start: 2017-03-29 | End: 2017-03-29

## 2017-03-29 RX ORDER — PHENYLEPHRINE HYDROCHLORIDE 10 MG/ML
0.1 INJECTION INTRAVENOUS
Qty: 80 | Refills: 0 | Status: DISCONTINUED | OUTPATIENT
Start: 2017-03-29 | End: 2017-03-29

## 2017-03-29 RX ORDER — POTASSIUM CHLORIDE 20 MEQ
10 PACKET (EA) ORAL
Qty: 0 | Refills: 0 | Status: COMPLETED | OUTPATIENT
Start: 2017-03-29 | End: 2017-03-29

## 2017-03-29 RX ORDER — FENTANYL CITRATE 50 UG/ML
100 INJECTION INTRAVENOUS ONCE
Qty: 0 | Refills: 0 | Status: DISCONTINUED | OUTPATIENT
Start: 2017-03-29 | End: 2017-03-29

## 2017-03-29 RX ORDER — IPRATROPIUM/ALBUTEROL SULFATE 18-103MCG
3 AEROSOL WITH ADAPTER (GRAM) INHALATION ONCE
Qty: 0 | Refills: 0 | Status: COMPLETED | OUTPATIENT
Start: 2017-03-29 | End: 2017-03-29

## 2017-03-29 RX ORDER — VANCOMYCIN HCL 1 G
1000 VIAL (EA) INTRAVENOUS ONCE
Qty: 0 | Refills: 0 | Status: COMPLETED | OUTPATIENT
Start: 2017-03-29 | End: 2017-03-29

## 2017-03-29 RX ORDER — CEFEPIME 1 G/1
1000 INJECTION, POWDER, FOR SOLUTION INTRAMUSCULAR; INTRAVENOUS EVERY 8 HOURS
Qty: 0 | Refills: 0 | Status: DISCONTINUED | OUTPATIENT
Start: 2017-03-29 | End: 2017-03-30

## 2017-03-29 RX ORDER — CEFEPIME 1 G/1
1000 INJECTION, POWDER, FOR SOLUTION INTRAMUSCULAR; INTRAVENOUS ONCE
Qty: 0 | Refills: 0 | Status: COMPLETED | OUTPATIENT
Start: 2017-03-29 | End: 2017-03-29

## 2017-03-29 RX ORDER — SODIUM CHLORIDE 9 MG/ML
1000 INJECTION INTRAMUSCULAR; INTRAVENOUS; SUBCUTANEOUS
Qty: 0 | Refills: 0 | Status: DISCONTINUED | OUTPATIENT
Start: 2017-03-29 | End: 2017-03-30

## 2017-03-29 RX ORDER — POTASSIUM CHLORIDE 20 MEQ
40 PACKET (EA) ORAL ONCE
Qty: 0 | Refills: 0 | Status: DISCONTINUED | OUTPATIENT
Start: 2017-03-29 | End: 2017-03-29

## 2017-03-29 RX ORDER — CHLORHEXIDINE GLUCONATE 213 G/1000ML
1 SOLUTION TOPICAL DAILY
Qty: 0 | Refills: 0 | Status: DISCONTINUED | OUTPATIENT
Start: 2017-03-29 | End: 2017-04-03

## 2017-03-29 RX ORDER — SODIUM CHLORIDE 9 MG/ML
1000 INJECTION INTRAMUSCULAR; INTRAVENOUS; SUBCUTANEOUS ONCE
Qty: 0 | Refills: 0 | Status: COMPLETED | OUTPATIENT
Start: 2017-03-29 | End: 2017-03-29

## 2017-03-29 RX ORDER — BACITRACIN ZINC 500 UNIT/G
1 OINTMENT IN PACKET (EA) TOPICAL
Qty: 0 | Refills: 0 | Status: DISCONTINUED | OUTPATIENT
Start: 2017-03-29 | End: 2017-04-03

## 2017-03-29 RX ORDER — INSULIN LISPRO 100/ML
VIAL (ML) SUBCUTANEOUS EVERY 6 HOURS
Qty: 0 | Refills: 0 | Status: DISCONTINUED | OUTPATIENT
Start: 2017-03-29 | End: 2017-04-03

## 2017-03-29 RX ORDER — PHENYLEPHRINE HYDROCHLORIDE 10 MG/ML
0.1 INJECTION INTRAVENOUS
Qty: 160 | Refills: 0 | Status: DISCONTINUED | OUTPATIENT
Start: 2017-03-29 | End: 2017-04-01

## 2017-03-29 RX ADMIN — Medication 1: at 11:50

## 2017-03-29 RX ADMIN — Medication 100 MILLIEQUIVALENT(S): at 05:28

## 2017-03-29 RX ADMIN — SODIUM CHLORIDE 2000 MILLILITER(S): 9 INJECTION INTRAMUSCULAR; INTRAVENOUS; SUBCUTANEOUS at 08:36

## 2017-03-29 RX ADMIN — Medication 1 APPLICATION(S): at 14:59

## 2017-03-29 RX ADMIN — Medication 100 MILLIEQUIVALENT(S): at 06:46

## 2017-03-29 RX ADMIN — Medication 3 MILLILITER(S): at 09:22

## 2017-03-29 RX ADMIN — CHLORHEXIDINE GLUCONATE 1 APPLICATION(S): 213 SOLUTION TOPICAL at 11:51

## 2017-03-29 RX ADMIN — SODIUM CHLORIDE 100 MILLILITER(S): 9 INJECTION INTRAMUSCULAR; INTRAVENOUS; SUBCUTANEOUS at 18:00

## 2017-03-29 RX ADMIN — CHLORHEXIDINE GLUCONATE 15 MILLILITER(S): 213 SOLUTION TOPICAL at 18:24

## 2017-03-29 RX ADMIN — MIDAZOLAM HYDROCHLORIDE 2 MILLIGRAM(S): 1 INJECTION, SOLUTION INTRAMUSCULAR; INTRAVENOUS at 09:31

## 2017-03-29 RX ADMIN — Medication 1 TABLET(S): at 11:51

## 2017-03-29 RX ADMIN — Medication 3 MILLILITER(S): at 17:10

## 2017-03-29 RX ADMIN — FENTANYL CITRATE 100 MICROGRAM(S): 50 INJECTION INTRAVENOUS at 09:15

## 2017-03-29 RX ADMIN — Medication 20 MILLIGRAM(S): at 11:48

## 2017-03-29 RX ADMIN — HEPARIN SODIUM 5000 UNIT(S): 5000 INJECTION INTRAVENOUS; SUBCUTANEOUS at 05:25

## 2017-03-29 RX ADMIN — HEPARIN SODIUM 5000 UNIT(S): 5000 INJECTION INTRAVENOUS; SUBCUTANEOUS at 18:25

## 2017-03-29 RX ADMIN — PHENYLEPHRINE HYDROCHLORIDE 2.21 MICROGRAM(S)/KG/MIN: 10 INJECTION INTRAVENOUS at 09:17

## 2017-03-29 RX ADMIN — PANTOPRAZOLE SODIUM 40 MILLIGRAM(S): 20 TABLET, DELAYED RELEASE ORAL at 11:51

## 2017-03-29 RX ADMIN — Medication 250 MILLIGRAM(S): at 18:50

## 2017-03-29 RX ADMIN — FENTANYL CITRATE 100 MICROGRAM(S): 50 INJECTION INTRAVENOUS at 09:00

## 2017-03-29 RX ADMIN — Medication 650 MILLIGRAM(S): at 15:30

## 2017-03-29 RX ADMIN — Medication 3 MILLILITER(S): at 23:30

## 2017-03-29 RX ADMIN — Medication 3 MILLILITER(S): at 11:35

## 2017-03-29 RX ADMIN — Medication 100 MILLIEQUIVALENT(S): at 08:13

## 2017-03-29 RX ADMIN — Medication 3 MILLILITER(S): at 00:09

## 2017-03-29 RX ADMIN — Medication 0.25 MILLIGRAM(S): at 14:59

## 2017-03-29 RX ADMIN — ZINC OXIDE 1 APPLICATION(S): 200 OINTMENT TOPICAL at 05:26

## 2017-03-29 RX ADMIN — Medication 3 MILLILITER(S): at 05:47

## 2017-03-29 RX ADMIN — Medication 1 APPLICATION(S): at 18:24

## 2017-03-29 RX ADMIN — Medication 1 APPLICATION(S): at 21:03

## 2017-03-29 RX ADMIN — Medication 2: at 06:48

## 2017-03-29 RX ADMIN — CEFEPIME 100 MILLIGRAM(S): 1 INJECTION, POWDER, FOR SOLUTION INTRAMUSCULAR; INTRAVENOUS at 21:03

## 2017-03-29 RX ADMIN — Medication 650 MILLIGRAM(S): at 21:45

## 2017-03-29 RX ADMIN — SODIUM CHLORIDE 100 MILLILITER(S): 9 INJECTION, SOLUTION INTRAVENOUS at 11:16

## 2017-03-29 RX ADMIN — ZINC OXIDE 1 APPLICATION(S): 200 OINTMENT TOPICAL at 18:35

## 2017-03-29 RX ADMIN — CEFEPIME 100 MILLIGRAM(S): 1 INJECTION, POWDER, FOR SOLUTION INTRAMUSCULAR; INTRAVENOUS at 18:24

## 2017-03-29 RX ADMIN — Medication 0.25 MILLIGRAM(S): at 21:03

## 2017-03-29 RX ADMIN — Medication 1 APPLICATION(S): at 05:25

## 2017-03-29 NOTE — GOALS OF CARE CONVERSATION - PERSONAL ADVANCE DIRECTIVE - WHAT MATTERS MOST
about Palliative Care.  He returned good understanding but wishes family meeting to be set up so we can go over any questions or concerns they might have. He will call us back when he wishes it to be.
Pts son Miguel informed me that over past 6 months pt has become weaker & sicker and frequent hospitalizations.
at present time family states there are other families trying to come from [place] to see patient. they understand pt will require trach and vent if they decide to continue all care. at present time family not able to make any decisions. pt remains full code

## 2017-03-29 NOTE — AIRWAY PLACEMENT NOTE ADULT - POST AIRWAY PLACEMENT ASSESSMENT:
checked as per [facility] PA/chest excursion noted/breath sounds bilateral/positive end tidal CO2 noted

## 2017-03-29 NOTE — GOALS OF CARE CONVERSATION - PERSONAL ADVANCE DIRECTIVE - CONVERSATION DETAILS
discussed with family at length regarding events leading to re-intubation. pt has been full code per family wishes. explained to family pt with end stage COPD and respiratory failure as a result. informed family pt will need a trach if they want to cont full aggressive measures. with a trach pt will need PEG and nursing facility. discussed palliative/hospice with family as well.

## 2017-03-29 NOTE — PROGRESS NOTE ADULT - SUBJECTIVE AND OBJECTIVE BOX
INTERVAL HPI/OVERNIGHT EVENTS:  with SOB. Intubated for hypercapnic respiratory failure in ED. CT chest and CXR showed multifocal pneumonia.  NO more details of history are available as intubated and not able to provide information.   Was recently admitted with similar presentation, was in ICU with BIPAP support.  Failed weaning for few days but did well on 03/13/17 and got extubated, then transferred to medical floor.  03/27/17: Events noted, transferred to 2D due to hypercarbic Respiratory failure. Awake and Responsive on BIPAP.  03/28/17: now in ICU, resting on BIPAP. RN reports did not do well on nasal O2.  03/29/17: Required intubation for severe hypercarbic Respiratory failure. Fever spike to more than 101 noted.    Vital Signs Last 24 Hrs  T(C): 38.8, Max: 38.8 (03-29 @ 15:30)  T(F): 101.8, Max: 101.8 (03-29 @ 15:30)  HR: 94 (53 - 101)  BP: 189/170 (68/51 - 189/170)  BP(mean): 179 (59 - 179)  RR: 18 (13 - 51)  SpO2: 100% (60% - 100%)    Mode: AC/ CMV (Assist Control/ Continuous Mandatory Ventilation)  RR (machine): 20  TV (machine): 400  FiO2: 35  PEEP: 5  ITime: 0.9  MAP: 15  PIP: 37    PHYSICAL EXAM:  GEN:       Resting, comfortable.  HEENT:    Normal.    RESP:      crackles.  CVS:          Regular rate and rhythm.   ABD:         Soft, non-tender, non-distended;   :             No costovertebral angle tenderness  EXTR:            No clubbing, cyanosis or edema  CNS:              Intact sensory and motor function.    MEDICATIONS  (STANDING):  heparin  Injectable 5000Unit(s) SubCutaneous every 12 hours  dextrose 5%. 1000milliLiter(s) IV Continuous <Continuous>  dextrose 50% Injectable 12.5Gram(s) IV Push once  dextrose 50% Injectable 25Gram(s) IV Push once  ALBUTerol/ipratropium for Nebulization 3milliLiter(s) Nebulizer every 6 hours  ALBUTerol    90 MICROgram(s) HFA Inhaler 1Puff(s) Inhalation every 4 hours  tiotropium 18 MICROgram(s) Capsule 1Capsule(s) Inhalation daily  zinc oxide 40%/lanolin Ointment 1Application(s) Topical two times a day  insulin lispro (HumaLOG) corrective regimen sliding scale  SubCutaneous Before meals and at bedtime  multivitamin/minerals 1Tablet(s) Oral daily  BACItracin   Ointment 1Application(s) Topical three times a day  ALPRAZolam 0.25milliGRAM(s) Oral three times a day  pantoprazole  Injectable 40milliGRAM(s) IV Push daily  chlorhexidine 0.12% Liquid 15milliLiter(s) Swish and Spit two times a day  chlorhexidine 4% Liquid 1Application(s) Topical daily  predniSONE   Tablet 20milliGRAM(s) Oral daily  BACItracin   Ointment 1Application(s) Topical two times a day  phenylephrine    Infusion 0.1MICROgram(s)/kG/Min IV Continuous <Continuous>  sodium chloride 0.9%. 1000milliLiter(s) IV Continuous <Continuous>  cefepime  IVPB  IV Intermittent   cefepime  IVPB 1000milliGRAM(s) IV Intermittent once  vancomycin  IVPB 1000milliGRAM(s) IV Intermittent once  cefepime  IVPB 1000milliGRAM(s) IV Intermittent every 8 hours    MEDICATIONS  (PRN):  dextrose Gel 1Dose(s) Oral once PRN Blood Glucose LESS THAN 70 milliGRAM(s)/deciliter  glucagon  Injectable 1milliGRAM(s) IntraMuscular once PRN Glucose LESS THAN 70 milligrams/deciliter  acetaminophen   Tablet 650milliGRAM(s) Oral every 6 hours PRN For Temp greater than 38 C (100.4 F)  benzocaine 15 mG/menthol 3.6 mG Lozenge 1Lozenge Oral every 4 hours PRN Sore Throat  acetaminophen   Tablet. 650milliGRAM(s) Oral every 6 hours PRN Mild Pain (1 - 3)    LABS:                        9.1    6.8   )-----------( 239      ( 29 Mar 2017 03:42 )             29.7     29 Mar 2017 03:42    146    |  96     |  3      ----------------------------<  209    3.4     |  42     |  0.23     Ca    8.5        29 Mar 2017 03:42  Phos  2.7       29 Mar 2017 03:42  Mg     1.9       29 Mar 2017 03:42    ASSESSMENT AND PLAN:  ·	Acute Hypoxic/hypercarbic Respiratory failure.  ·	Multifocal pneumonia.  ·	Acute COPD exacerbation.  ·	Functional deconditioning.  ·	Anemia.  ·	CAD with angioplasty.  ·	HTN.  ·	A Fib.  ·	HLD.  ·	Anxiety    Continue full Vent  support.  Continue broad spectrum antibiotics.  Follow echo.  On Prednisone.  Again discussed with Son at bed side.  Likely will need Trach and peg.

## 2017-03-29 NOTE — GOALS OF CARE CONVERSATION - PERSONAL ADVANCE DIRECTIVE - CONVERSATION/DISCUSSION
Palliative Care Referral/ordered 3/18/17  for end stage COPD
Prognosis/Treatment Options/MOLST Discussed/ordered 3/18/17/Palliative Care Referral
Diagnosis/Prognosis

## 2017-03-29 NOTE — PROGRESS NOTE ADULT - SUBJECTIVE AND OBJECTIVE BOX
HPI:  61M with COPD, chronic hypoxia, oxygen dependent, chronic hypercarbia with recent admission for COPD exacerbation, acute on chronic hypercarbic respiratory failure requiring only BiPAP. He was readmitted to ICU 3/8 for staph bacteremia and again with acute on chronic hypercarbic respiratory failure failing BiPAP and requiring intubation. Pt extubated but required BiPAP as needed throughout the day and bipap at night. Transferred to medical floor. Return with worsening acute on chronic respiratory acidosis/hypercarbic respiratory failure    24 hr events:  pt with undetectably high CO2 on ABG this morning, obtunded despite being on BiPAP (blood gas done on BiPAP)  pt emergently intubated after family informed and agrees for reintubation  hypotensive during intubation  emergent central line placed for access and pressor infusion    ## ROS:  [x] unable to obtain due to intubation/obtundation    ## Labs:  CBC:                        9.1    6.8   )-----------( 239      ( 29 Mar 2017 03:42 )             29.7     Chem:  29 Mar 2017 03:42    146    |  96     |  3      ----------------------------<  209    3.4     |  42     |  0.23     Ca    8.5        29 Mar 2017 03:42  Phos  2.7       29 Mar 2017 03:42  Mg     1.9       29 Mar 2017 03:42      ## Imaging:  CXR endotracheal tube has been repositioned with tip approximately 5 cm above the isa. A enteric tube is present with tip likely in the stomach. A right internal jugular central venous catheter is noted with tip in the superior vena cava. There are bilateral lung opacities and small left pleural effusion. bilateral hyperinflated lungs    ## Medications:  cefepime  IVPB 1000milliGRAM(s) IV Intermittent every 8 hours    phenylephrine    Infusion 0.1MICROgram(s)/kG/Min IV Continuous <Continuous>    ALBUTerol/ipratropium for Nebulization 3milliLiter(s) Nebulizer every 6 hours  ALBUTerol    90 MICROgram(s) HFA Inhaler 1Puff(s) Inhalation every 4 hours  tiotropium 18 MICROgram(s) Capsule 1Capsule(s) Inhalation daily    dextrose Gel 1Dose(s) Oral once PRN  dextrose 50% Injectable 12.5Gram(s) IV Push once  dextrose 50% Injectable 25Gram(s) IV Push once  glucagon  Injectable 1milliGRAM(s) IntraMuscular once PRN  predniSONE   Tablet 20milliGRAM(s) Oral daily  insulin lispro (HumaLOG) corrective regimen sliding scale  SubCutaneous every 6 hours    heparin  Injectable 5000Unit(s) SubCutaneous every 12 hours    pantoprazole  Injectable 40milliGRAM(s) IV Push daily    acetaminophen   Tablet 650milliGRAM(s) Oral every 6 hours PRN  acetaminophen   Tablet. 650milliGRAM(s) Oral every 6 hours PRN  ALPRAZolam 0.25milliGRAM(s) Oral three times a day      ## Vitals:  T(C): 38.3, Max: 38.8 (03-29 @ 15:30)  HR: 78 (53 - 127)  BP: 122/60 (68/51 - 189/170)  BP(mean): 80 (59 - 179)  RR: 13 (13 - 51)  SpO2: 100% (60% - 100%)  Wt(kg): 42.3  Vent: Mode: AC/ CMV (Assist Control/ Continuous Mandatory Ventilation), RR (machine): 16, RR (patient): 16, TV (machine): 400, FiO2: 35, PEEP: 5, PIP: 40  ABG: ABG - ( 29 Mar 2017 18:29 )  pH: 7.53    /  pCO2: 44    /  pO2: 85    / HCO3: 37    / Base Excess: 12.8  /  SaO2: 98        Blood Gas Profile - Arterial in AM (03.29.17 @ 08:04)    pH, Blood: 7.05    pCO2, Arterial: >101 mmHg    pO2, Arterial: 74 mmHg    HCO3, Arterial: 9 mmol/L    Oxygen Saturation, Arterial: 90 %    FIO2, Arterial: 60%      I & Os for 24h ending 03-29 @ 07:00  =============================================  IN: 2300 ml / OUT: 500 ml / NET: 1800 ml      ## P/E:  Gen: lying in bed, orally intubated  HEENT: PERRL, EOMI, breakdown over bridge of nose from continuous BiPAP use  Resp: scattered wheeze and rhonchi bilaterally  CVS: S1S2 RRR  Abd: soft NT/ND +BS  Ext: no edema/cyanosis, + muscle atropy  Neuro: sedated     CENTRAL LINE: [x] YES [ ] NO  INSERTED: R IJ 3/29  MARTINEZ: [x] YES [ ] NO      A-LINE:  [ ] YES [x] NO      GLOBAL ISSUE/BEST PRACTICE:  Analgesia: n/a  Sedation: fentnayl push, versed push  HOB elevation: yes  Stress ulcer prophylaxis: n/a  VTE prophylaxis: heparin sc  Oral Care: chlorhexidine   Glycemic control: sliding scale  Nutrition: NGT feeds    CODE STATUS: [x] full code  [ ] DNR  [ ] DNI  [ ] MOLST  Goals of care discussion: [x] yes

## 2017-03-29 NOTE — PROGRESS NOTE ADULT - ASSESSMENT
61M with COPD, chronic hypoxia, oxygen dependent, chronic hypercarbia with recent admission for COPD exacerbation, acute on chronic hypercarbic respiratory failure requiring only BiPAP. He was readmitted to ICU 3/8 for staph bacteremia and again with acute on chronic hypercarbic respiratory failure failing BiPAP and requiring intubation. Pt extubated but required BiPAP as needed throughout the day and bipap at night. Transferred to medical floor. Returns with worsening acute on chronic respiratory acidosis/hypercarbic respiratory failure. Reintubated 3/29      - pt with end stage COPD  - pCO2 improved on highed iPAP settings initially but now worsening again requiring intubation  - pt with baseline underlying chronic hypercarbia  - s/p course of antibiotics for PNA, sputum culture sent today, start cefepime as pt hypotensive, now reintubated for acute on chronic respiratory failure, CXR with infiltrates and thick sputum secretions from ETT  - start prednisone 40mg for COPD exacerbation  - physical therapy

## 2017-03-30 LAB
ANION GAP SERPL CALC-SCNC: 6 MMOL/L — SIGNIFICANT CHANGE UP (ref 5–17)
BASE EXCESS BLDA CALC-SCNC: 11.2 MMOL/L — HIGH (ref -2–2)
BLD GP AB SCN SERPL QL: SIGNIFICANT CHANGE UP
BLOOD GAS COMMENTS: SIGNIFICANT CHANGE UP
BLOOD GAS SOURCE: SIGNIFICANT CHANGE UP
BUN SERPL-MCNC: 7 MG/DL — SIGNIFICANT CHANGE UP (ref 7–23)
CALCIUM SERPL-MCNC: 7.8 MG/DL — LOW (ref 8.5–10.1)
CHLORIDE SERPL-SCNC: 106 MMOL/L — SIGNIFICANT CHANGE UP (ref 96–108)
CO2 SERPL-SCNC: 35 MMOL/L — HIGH (ref 22–31)
CREAT SERPL-MCNC: 0.53 MG/DL — SIGNIFICANT CHANGE UP (ref 0.5–1.3)
CULTURE RESULTS: NO GROWTH — SIGNIFICANT CHANGE UP
GLUCOSE SERPL-MCNC: 123 MG/DL — HIGH (ref 70–99)
GRAM STN FLD: SIGNIFICANT CHANGE UP
HCO3 BLDA-SCNC: 36 MMOL/L — HIGH (ref 21–29)
HCT VFR BLD CALC: 21.8 % — LOW (ref 39–50)
HCT VFR BLD CALC: 23.8 % — LOW (ref 39–50)
HGB BLD-MCNC: 7.1 G/DL — LOW (ref 13–17)
HGB BLD-MCNC: 7.7 G/DL — LOW (ref 13–17)
HOROWITZ INDEX BLDA+IHG-RTO: 35 — SIGNIFICANT CHANGE UP
MAGNESIUM SERPL-MCNC: 1.5 MG/DL — LOW (ref 1.8–2.4)
MCHC RBC-ENTMCNC: 27 PG — SIGNIFICANT CHANGE UP (ref 27–34)
MCHC RBC-ENTMCNC: 27.3 PG — SIGNIFICANT CHANGE UP (ref 27–34)
MCHC RBC-ENTMCNC: 32.4 GM/DL — SIGNIFICANT CHANGE UP (ref 32–36)
MCHC RBC-ENTMCNC: 32.7 GM/DL — SIGNIFICANT CHANGE UP (ref 32–36)
MCV RBC AUTO: 82.5 FL — SIGNIFICANT CHANGE UP (ref 80–100)
MCV RBC AUTO: 84.2 FL — SIGNIFICANT CHANGE UP (ref 80–100)
PCO2 BLDA: 48 MMHG — HIGH (ref 32–46)
PH BLD: 7.48 — HIGH (ref 7.35–7.45)
PHOSPHATE SERPL-MCNC: 0.5 MG/DL — CRITICAL LOW (ref 2.5–4.5)
PLATELET # BLD AUTO: 228 K/UL — SIGNIFICANT CHANGE UP (ref 150–400)
PLATELET # BLD AUTO: 231 K/UL — SIGNIFICANT CHANGE UP (ref 150–400)
PO2 BLDA: 105 MMHG — SIGNIFICANT CHANGE UP (ref 74–108)
POTASSIUM SERPL-MCNC: 3.5 MMOL/L — SIGNIFICANT CHANGE UP (ref 3.5–5.3)
POTASSIUM SERPL-SCNC: 3.5 MMOL/L — SIGNIFICANT CHANGE UP (ref 3.5–5.3)
RBC # BLD: 2.64 M/UL — LOW (ref 4.2–5.8)
RBC # BLD: 2.82 M/UL — LOW (ref 4.2–5.8)
RBC # FLD: 17.4 % — HIGH (ref 11–15)
RBC # FLD: 17.7 % — HIGH (ref 11–15)
SAO2 % BLDA: 98 % — HIGH (ref 92–96)
SODIUM SERPL-SCNC: 147 MMOL/L — HIGH (ref 135–145)
SPECIMEN SOURCE: SIGNIFICANT CHANGE UP
SPECIMEN SOURCE: SIGNIFICANT CHANGE UP
WBC # BLD: 6.4 K/UL — SIGNIFICANT CHANGE UP (ref 3.8–10.5)
WBC # BLD: 7.6 K/UL — SIGNIFICANT CHANGE UP (ref 3.8–10.5)
WBC # FLD AUTO: 6.4 K/UL — SIGNIFICANT CHANGE UP (ref 3.8–10.5)
WBC # FLD AUTO: 7.6 K/UL — SIGNIFICANT CHANGE UP (ref 3.8–10.5)

## 2017-03-30 PROCEDURE — 99291 CRITICAL CARE FIRST HOUR: CPT

## 2017-03-30 PROCEDURE — 71010: CPT | Mod: 26

## 2017-03-30 RX ORDER — MAGNESIUM SULFATE 500 MG/ML
1 VIAL (ML) INJECTION ONCE
Qty: 0 | Refills: 0 | Status: COMPLETED | OUTPATIENT
Start: 2017-03-30 | End: 2017-03-30

## 2017-03-30 RX ORDER — VANCOMYCIN HCL 1 G
1000 VIAL (EA) INTRAVENOUS EVERY 12 HOURS
Qty: 0 | Refills: 0 | Status: DISCONTINUED | OUTPATIENT
Start: 2017-03-30 | End: 2017-03-31

## 2017-03-30 RX ORDER — VANCOMYCIN HCL 1 G
1000 VIAL (EA) INTRAVENOUS ONCE
Qty: 0 | Refills: 0 | Status: COMPLETED | OUTPATIENT
Start: 2017-03-30 | End: 2017-03-30

## 2017-03-30 RX ORDER — CEFEPIME 1 G/1
2000 INJECTION, POWDER, FOR SOLUTION INTRAMUSCULAR; INTRAVENOUS EVERY 8 HOURS
Qty: 0 | Refills: 0 | Status: DISCONTINUED | OUTPATIENT
Start: 2017-03-30 | End: 2017-03-31

## 2017-03-30 RX ORDER — POTASSIUM PHOSPHATE, MONOBASIC POTASSIUM PHOSPHATE, DIBASIC 236; 224 MG/ML; MG/ML
15 INJECTION, SOLUTION INTRAVENOUS ONCE
Qty: 0 | Refills: 0 | Status: COMPLETED | OUTPATIENT
Start: 2017-03-30 | End: 2017-03-30

## 2017-03-30 RX ORDER — VANCOMYCIN HCL 1 G
VIAL (EA) INTRAVENOUS
Qty: 0 | Refills: 0 | Status: DISCONTINUED | OUTPATIENT
Start: 2017-03-30 | End: 2017-03-31

## 2017-03-30 RX ORDER — SODIUM,POTASSIUM PHOSPHATES 278-250MG
1 POWDER IN PACKET (EA) ORAL EVERY 6 HOURS
Qty: 0 | Refills: 0 | Status: COMPLETED | OUTPATIENT
Start: 2017-03-30 | End: 2017-03-30

## 2017-03-30 RX ADMIN — Medication 1: at 00:15

## 2017-03-30 RX ADMIN — CEFEPIME 100 MILLIGRAM(S): 1 INJECTION, POWDER, FOR SOLUTION INTRAMUSCULAR; INTRAVENOUS at 21:33

## 2017-03-30 RX ADMIN — CHLORHEXIDINE GLUCONATE 15 MILLILITER(S): 213 SOLUTION TOPICAL at 05:19

## 2017-03-30 RX ADMIN — Medication 250 MILLIGRAM(S): at 23:26

## 2017-03-30 RX ADMIN — HEPARIN SODIUM 5000 UNIT(S): 5000 INJECTION INTRAVENOUS; SUBCUTANEOUS at 05:03

## 2017-03-30 RX ADMIN — CEFEPIME 100 MILLIGRAM(S): 1 INJECTION, POWDER, FOR SOLUTION INTRAMUSCULAR; INTRAVENOUS at 05:03

## 2017-03-30 RX ADMIN — PANTOPRAZOLE SODIUM 40 MILLIGRAM(S): 20 TABLET, DELAYED RELEASE ORAL at 13:14

## 2017-03-30 RX ADMIN — Medication 3 MILLILITER(S): at 06:10

## 2017-03-30 RX ADMIN — ZINC OXIDE 1 APPLICATION(S): 200 OINTMENT TOPICAL at 05:06

## 2017-03-30 RX ADMIN — ZINC OXIDE 1 APPLICATION(S): 200 OINTMENT TOPICAL at 17:10

## 2017-03-30 RX ADMIN — Medication 100 GRAM(S): at 05:19

## 2017-03-30 RX ADMIN — CEFEPIME 100 MILLIGRAM(S): 1 INJECTION, POWDER, FOR SOLUTION INTRAMUSCULAR; INTRAVENOUS at 14:04

## 2017-03-30 RX ADMIN — Medication 1 APPLICATION(S): at 05:03

## 2017-03-30 RX ADMIN — POTASSIUM PHOSPHATE, MONOBASIC POTASSIUM PHOSPHATE, DIBASIC 63.75 MILLIMOLE(S): 236; 224 INJECTION, SOLUTION INTRAVENOUS at 05:39

## 2017-03-30 RX ADMIN — Medication 1: at 18:20

## 2017-03-30 RX ADMIN — Medication 0.25 MILLIGRAM(S): at 13:14

## 2017-03-30 RX ADMIN — PHENYLEPHRINE HYDROCHLORIDE 1.11 MICROGRAM(S)/KG/MIN: 10 INJECTION INTRAVENOUS at 03:56

## 2017-03-30 RX ADMIN — Medication 1 TABLET(S): at 13:00

## 2017-03-30 RX ADMIN — Medication 250 MILLIGRAM(S): at 13:00

## 2017-03-30 RX ADMIN — CHLORHEXIDINE GLUCONATE 15 MILLILITER(S): 213 SOLUTION TOPICAL at 17:08

## 2017-03-30 RX ADMIN — Medication 1 APPLICATION(S): at 05:04

## 2017-03-30 RX ADMIN — CHLORHEXIDINE GLUCONATE 1 APPLICATION(S): 213 SOLUTION TOPICAL at 13:00

## 2017-03-30 RX ADMIN — Medication 3 MILLILITER(S): at 11:08

## 2017-03-30 RX ADMIN — SODIUM CHLORIDE 100 MILLILITER(S): 9 INJECTION INTRAMUSCULAR; INTRAVENOUS; SUBCUTANEOUS at 03:56

## 2017-03-30 RX ADMIN — HEPARIN SODIUM 5000 UNIT(S): 5000 INJECTION INTRAVENOUS; SUBCUTANEOUS at 17:08

## 2017-03-30 RX ADMIN — Medication 1 APPLICATION(S): at 17:08

## 2017-03-30 RX ADMIN — Medication 3 MILLILITER(S): at 17:04

## 2017-03-30 RX ADMIN — Medication 0.25 MILLIGRAM(S): at 21:33

## 2017-03-30 RX ADMIN — Medication 1 TABLET(S): at 05:40

## 2017-03-30 RX ADMIN — Medication 1 APPLICATION(S): at 14:04

## 2017-03-30 RX ADMIN — Medication 3: at 13:09

## 2017-03-30 RX ADMIN — Medication 0.25 MILLIGRAM(S): at 05:03

## 2017-03-30 RX ADMIN — Medication 1 APPLICATION(S): at 21:33

## 2017-03-30 RX ADMIN — Medication 20 MILLIGRAM(S): at 05:03

## 2017-03-30 RX ADMIN — Medication 1 TABLET(S): at 17:08

## 2017-03-30 NOTE — PHYSICAL THERAPY INITIAL EVALUATION ADULT - IMPAIRMENTS FOUND, PT EVAL
gait, locomotion, and balance/aerobic capacity/endurance/muscle strength/ventilation and respiration/gas exchange/arousal, attention, and cognition/posture/ergonomics and body mechanics aerobic capacity/endurance/gait, locomotion, and balance/cognitive impairment/ergonomics and body mechanics/ventilation and respiration/gas exchange/posture/muscle strength/arousal, attention, and cognition

## 2017-03-30 NOTE — PHYSICAL THERAPY INITIAL EVALUATION ADULT - LEVEL OF INDEPENDENCE: SCOOT/BRIDGE, REHAB EVAL
maximum assist (25% patients effort)
minimum assist (75% patients effort)
dependent (less than 25% patients effort)

## 2017-03-30 NOTE — PROGRESS NOTE ADULT - ASSESSMENT
61M with COPD, chronic hypoxia, oxygen dependent, chronic hypercarbia with recent admission for COPD exacerbation, acute on chronic hypercarbic respiratory failure requiring only BiPAP. Radmitted for acute on chronic hypercarbic respiratory failure this time requiring intubation, extubated but mostly BiPAP dependent, found to have staph bacteremia. Course with recurrent hypercarbic hypoxic respiratory failure reintubated 3/29. CXR with PNA.     DX: recurrent acute on chronic hypercarbic hypoxic respiratory failure, complex PNA, staph bacteremia, septic shock    - pt with end stage COPD  - pCO2 improved on high iPAP settings initially but worsened again requiring intubation  - s/p course of antibiotics for PNA and bacteremia on adnmission with cultures clearing  - follow up sputum culture, cont cefepime to cover possible gram negative bacteria, HCAP  - cont vancomycin  - cont prednisone for COPD exacerbation  - follow up blood cultures, initial cultures cleared but in setting of shock state repeat blood cultures sent  - physical therapy    Total Critical Care Time: 60min

## 2017-03-30 NOTE — PHYSICAL THERAPY INITIAL EVALUATION ADULT - PHYSICAL ASSIST/NONPHYSICAL ASSIST, REHAB EVAL
verbal cues/1 person assist/nonverbal cues (demo/gestures)
nonverbal cues (demo/gestures)/verbal cues/2 person assist
verbal cues/1 person assist/nonverbal cues (demo/gestures)

## 2017-03-30 NOTE — PHYSICAL THERAPY INITIAL EVALUATION ADULT - PASSIVE RANGE OF MOTION EXAMINATION, REHAB EVAL
bilateral lower extremity Passive ROM was WFL (within functional limits)/bilateral upper extremity Passive ROM was WFL (within functional limits)
no Passive ROM deficits were identified

## 2017-03-30 NOTE — PHYSICAL THERAPY INITIAL EVALUATION ADULT - GROSSLY INTACT, SENSORY
Unable to assess due to decreased responsiveness/ability to follow commands
Grossly Intact
Grossly Intact

## 2017-03-30 NOTE — PHYSICAL THERAPY INITIAL EVALUATION ADULT - MODIFIED CLINICAL TEST OF SENSORY INTEGRATION IN BALANCE TEST
Barthel Index: 0/100
Barthel Index: 0/100
Barthel Index: Feeding Score _0__, Bathing Score _0__, Grooming Score _0__, Dressing Score _0__, Bowels Score _0__, Bladder Score _0__, Toilet Score _0__, Transfers Score _0__, Mobility Score _0__, Stairs Score _0__,     Total Score _0__

## 2017-03-30 NOTE — PHYSICAL THERAPY INITIAL EVALUATION ADULT - PLANNED THERAPY INTERVENTIONS, PT EVAL
gait training/transfer training/strengthening/bed mobility training/balance training/postural re-education
postural re-education/balance training/bed mobility training/transfer training/strengthening/gait training
strengthening/transfer training/balance training/bed mobility training/gait training

## 2017-03-30 NOTE — PHYSICAL THERAPY INITIAL EVALUATION ADULT - LEVEL OF INDEPENDENCE, REHAB EVAL
maximum assist (25% patients effort)
maximum assist (25% patients effort)
minimum assist (75% patients effort)

## 2017-03-30 NOTE — PHYSICAL THERAPY INITIAL EVALUATION ADULT - DISCHARGE DISPOSITION, PT EVAL
home c 24 hour care or skilled nursing facility
rehabilitation facility
Subacute Rehab/rehabilitation facility

## 2017-03-30 NOTE — PROGRESS NOTE ADULT - SUBJECTIVE AND OBJECTIVE BOX
Initial Consultaion Note  Requested by Name:  Date/ Time:  Reason for referral/ Consultation:    HPI:  60 Y/O male w/ PMHx of COPD on home O2, cpap at night, afib, htn, hld, dm, pw c/o SOB. ABG worsening in ED showing 7.26/102/133/44 on Bipap. Pt intubated for hypercapnic respiratory failure. 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 Name/ Phone#:  Significant other/partner:                     Children:                         Adventist/Spirituality:    Baseline ADLs (Prior to admission)    ADVANCE DIRECTIVES:  [ ] YES [ ] NO   DNR [ ] YES [ ] NO  Completed on:                     MOLST  [ ] YES [ ] NO   Completed on:  Living Will  [ ] YES [ ] NO   Completed on:    Allergies    No Known Allergies    Intolerances        Review of Systems:     PAIN : (Y/N) (0-10)  PAIN SITE :  QUALITY/QUANTITY OF PAIN :  PAIN RADIATING :( Y/N)  SEVERITY :  FREQUENCY :  IMPACT ON ADLs :      DYSPNEA: (Y/N) Mild [ ] Moderate [ ] Severe [ ]  NAUSEA/VOMITING: (Y/N)  DEPRESSION: (Y/N) Mild [ ]Moderate [ ] Severe [ ]  ANXIETY: (Y/N)  AGITATION: (Y/N):  SECRETIONS:(Y/N)  CONTIPATION : (Y/N)  DIARRHEA : (Y/N)  FRAILTY SYNDROM (Y/N):  FAILURE TO THRIVE (Y/N):  DIBILITY (Y/N);  OTHER SYMPTOMS :  UNABLE TO OBTAIN DUE TO POOR MENTATION (   )    PHYSICAL EXAM:  T(F): 98.5, Max: 101.8 (03-29 @ 15:30)  HR: 92 (76 - 128)  BP: 122/74 (84/61 - 189/170)  RR: 22 (12 - 29)  SpO2: 99% (98% - 100%)  Wt(kg): --   WEIGHT :    [id number]                  BMI:  I&O's Summary  I & Os for 24h ending 30 Mar 2017 07:00  =============================================  IN: 6308.3 ml / OUT: 1295 ml / NET: 5013.3 ml    I & Os for current day (as of 30 Mar 2017 13:52)  =============================================  IN: 850 ml / OUT: 430 ml / NET: 420 ml    CAPILLARY BLOOD GLUCOSE  267 (30 Mar 2017 13:09)  133 (30 Mar 2017 05:38)  199 (29 Mar 2017 23:33)  129 (29 Mar 2017 16:46)      GENERAL: alert [ ] oriented x ______[ ] lethargic [ ] agitated [ ] cachexia [ ] nonverbal [ ] coma [ ]  HEENT: normal [ ] dry mouth [ ] ET tube/trach [ ]   LUNGS: ]  CV :  normal [ ]  GI : normal [ ]  PEG /NG tube [ ]   : normal [ ] incontinent [ ] oliguria/anuria [ ] lawler [ ]  MSK : normal [ ] weakness [ ] edema [ ]              ambulatory [ ] bebbound/wheelchair bound [ ]   SKIN : normal [ ] pressure ulcer (Y/N) Stage ______________ rash (Y/N)    Functional Assessment:Karnofsky Performance Score :  Palliative Performance Status Version 2:         %    LABS:                        7.1    6.4   )-----------( 231      ( 30 Mar 2017 12:09 )             21.8     30 Mar 2017 03:51    147    |  106    |  7      ----------------------------<  123    3.5     |  35     |  0.53     Ca    7.8        30 Mar 2017 03:51  Phos  0.5       30 Mar 2017 03:51  Mg     1.5       30 Mar 2017 03:51            I&O's Detail  I & Os for 24h ending 30 Mar 2017 07:00  =============================================  IN:    Sodium Chloride 0.9% IV Bolus: 2000 ml    sodium chloride 0.9%: 1200 ml    dextrose 5% + sodium chloride 0.9%: 1000 ml    Glucerna: 620 ml    phenylephrine   Infusion: 571.1 ml    Solution: 250 ml    Solution: 250 ml    Solution: 200 ml    Solution: 150 ml    phenylephrine   Infusion: 67.2 ml    Total IN: 6308.3 ml  ---------------------------------------------  OUT:    Indwelling Catheter - Urethral: 1295 ml    Total OUT: 1295 ml  ---------------------------------------------  Total NET: 5013.3 ml    I & Os for current day (as of 30 Mar 2017 13:52)  =============================================  IN:    Glucerna: 300 ml    Free Water: 250 ml    Solution: 250 ml    Solution: 50 ml    Total IN: 850 ml  ---------------------------------------------  OUT:    Indwelling Catheter - Urethral: 430 ml    Total OUT: 430 ml  ---------------------------------------------  Total NET: 420 ml      Assessment:   61y Male admitted with MULTIFOCAL PNEUMONIA COPD EXACERBATION  SHORTNESS OF BREATH      PROBLEM LIST :  PROBLEM/RECOMMENDATION: 1  Problem : Goals of care, counseling/ discussion.  Recommendation: met with patient/ family and discussed GOC & Advanced care planning                                    Palliative care info/counseling provided (Y/N)                                      Family meeting                                   Advanced Directives addressed (Y/N)  PROBLEM/ RECOMMENDATION:2  Problem :Resuscitation/ DNR/ DNI,   Recommendation: DNR/ DNI    PROBLEM/ RECOMMENDATION :3  Problem : Medical order for life sustaning treatment  Recommendation : MOLST Form ( initiated/ completed)    PROBLEM/RECOMMENDATION :4  Problem : Advanced care planning  Recommendation : Family meeting.(Y/N)     PLAN:  REFFERALS:                           Unit SW/Case Mgmt (Y/N)                          (Y/N)                         Speech/Swallow (Y/N)                           nutrition                                              Ethics (Y/N)                         PT/OT (Y/N)

## 2017-03-30 NOTE — PHYSICAL THERAPY INITIAL EVALUATION ADULT - BED MOBILITY LIMITATIONS, REHAB EVAL
decreased ability to use arms for pushing/pulling/impaired ability to control trunk for mobility/decreased ability to use legs for bridging/pushing
decreased ability to use arms for pushing/pulling/impaired ability to control trunk for mobility/decreased ability to use legs for bridging/pushing
impaired ability to control trunk for mobility/decreased ability to use arms for pushing/pulling/decreased ability to use legs for bridging/pushing

## 2017-03-30 NOTE — PHYSICAL THERAPY INITIAL EVALUATION ADULT - IMPAIRMENTS CONTRIBUTING IMPAIRED BED MOBILITY, REHAB EVAL
pain/impaired postural control/decreased flexibility/decreased strength decreased flexibility/decreased strength/cognition/impaired postural control/pain

## 2017-03-30 NOTE — PHYSICAL THERAPY INITIAL EVALUATION ADULT - RISK REDUCTION/PREVENTION, PT EVAL
of immobility/risk factors/secondary impairments
risk factors/immobility complications/secondary impairments
risk factors

## 2017-03-30 NOTE — PHYSICAL THERAPY INITIAL EVALUATION ADULT - GENERAL OBSERVATIONS, REHAB EVAL
Pt supine, intubated c NG tube, lawler catheter, cardiac monitor, and LE pneumatic compression sleeves in place, drowsy and able to follow 25% simple commands, NAD.

## 2017-03-30 NOTE — PHYSICAL THERAPY INITIAL EVALUATION ADULT - ADDITIONAL COMMENTS
As per P.T evaluation on 3/9/17 prior to admission pt was bed bound and unable to ambulate, requiring 2 person assist for OOB transfers to wheelchair from hospital bed. As per P.T. consultation on 2/15/17, patient was able to ambulate approximately 12 feet with rolling walker with maximum assistance of 1 person upon discharge of prior admission.
Pt admitted to ICU, transferred to floor, and then admitted to ICU again. As per P.T evaluation on 3/9/17 prior to admission pt was bed bound and unable to ambulate, requiring 2 person assist for OOB transfers to wheelchair from hospital bed. As per P.T. consultation on 2/15/17, patient was able to ambulate approximately 12 feet with rolling walker with maximum assistance of 1 person upon discharge of prior admission.
Rolling walker and wheelchair

## 2017-03-30 NOTE — PHYSICAL THERAPY INITIAL EVALUATION ADULT - PERSONAL SAFETY AND JUDGMENT, REHAB EVAL
intact
RNAyala reports pt has been trying to pull NG tube and intubation tube out./at risk behaviors demonstrated
intact

## 2017-03-30 NOTE — PROGRESS NOTE ADULT - SUBJECTIVE AND OBJECTIVE BOX
HPI:  61M with COPD, chronic hypoxia, oxygen dependent, chronic hypercarbia with recent admission for COPD exacerbation, acute on chronic hypercarbic respiratory failure requiring only BiPAP. He was readmitted to ICU 3/8 for staph bacteremia and again with acute on chronic hypercarbic respiratory failure failing BiPAP and requiring intubation. Pt extubated but required BiPAP as needed throughout the day and bipap at night. Transferred to medical floor. Return with worsening acute on chronic respiratory acidosis/hypercarbic respiratory failure. Reintubated for worsening hypercarbia and hypoxia on BiPAP with shock state requiring pressors    24 hr events:  remains intubated  weaned off pressors  febrile    ## ROS:  [x] unable to obtain due to intubation    ## Labs:  CBC:                        7.1    6.4   )-----------( 231                   21.8     Chem:      147    |  106   |  7     ----------------------------<  123    3.5    |  35     |  0.53     Ca    7.8          Phos  0.5         Mg     1.5           Culture - Sputum . (03.29.17 @ 12:49)    Gram Stain:     Moderate WBC's    Few squamous epithelial cells    Moderate Gram Positive Cocci in Clusters    Moderate Gram Negative Rods    Culture - Blood (03.22.17 @ 23:39)    Specimen Source: .Blood Blood-Venous    Culture Results: No growth at 5 days.    Culture - Blood (03.15.17 @ 08:18)    Specimen Source: .Blood Blood    Culture Results: No growth at 5 days.    Culture - Blood (03.08.17 @ 09:52)     Culture Results: Growth in aerobic bottle: Staphylococcus aureus       ## Imaging:  CXR endotracheal tube 3 vertebral bodies above the isa. There is an NG tube seen coursing below the diaphragm with the tip off of the film. There is a right internal jugular central line with   the tip in the region of the SVC. There is a trace left pleural effusion as well as bilateral patchy airspace opacities    ## Medications:  cefepime  IVPB 1000milliGRAM(s) IV Intermittent every 8 hours  vancomycin IVPB 1Gram IV intermittent every 12 hours    phenylephrine    Infusion 0.1MICROgram(s)/kG/Min IV Continuous <Continuous>    ALBUTerol/ipratropium for Nebulization 3milliLiter(s) Nebulizer every 6 hours  ALBUTerol    90 MICROgram(s) HFA Inhaler 1Puff(s) Inhalation every 4 hours  tiotropium 18 MICROgram(s) Capsule 1Capsule(s) Inhalation daily    dextrose Gel 1Dose(s) Oral once PRN  dextrose 50% Injectable 12.5Gram(s) IV Push once  dextrose 50% Injectable 25Gram(s) IV Push once  glucagon  Injectable 1milliGRAM(s) IntraMuscular once PRN  predniSONE   Tablet 20milliGRAM(s) Oral daily  insulin lispro (HumaLOG) corrective regimen sliding scale  SubCutaneous every 6 hours    heparin  Injectable 5000Unit(s) SubCutaneous every 12 hours    pantoprazole  Injectable 40milliGRAM(s) IV Push daily    acetaminophen   Tablet 650milliGRAM(s) Oral every 6 hours PRN  acetaminophen   Tablet. 650milliGRAM(s) Oral every 6 hours PRN  ALPRAZolam 0.25milliGRAM(s) Oral three times a day        ## Vitals:  T(C): 37.1, Max: 38.8  HR: 90 (67 - 102)  BP: 146/79 (95/53 - 149/73)  BP(mean): 98 (68 - 106)  RR: 21 (12 - 26)  SpO2: 95% (95% - 100%)  Vent: Mode: , RR 14, PEEP 5, FIO2: 30  ABG:  pH:7.48     /  pCO2: 48    /  pO2: 105    / HCO3: 36    / Base Excess: 11.2  /  SaO2: 98          ## P/E:  Gen: lying in bed, orally intubated  HEENT: PERRL, EOMI, breakdown over bridge of nose from continuous BiPAP use  Resp: scattered wheeze and rhonchi bilaterally  CVS: S1S2 RRR  Abd: soft NT/ND +BS  Ext: no edema/cyanosis, + muscle atropy  Neuro: sedated     CENTRAL LINE: [x] YES [ ] NO  INSERTED: R IJ 3/29  MARTINEZ: [x] YES [ ] NO      A-LINE:  [ ] YES [x] NO      GLOBAL ISSUE/BEST PRACTICE:  Analgesia: n/a  Sedation: fentnayl push, versed push  HOB elevation: yes  Stress ulcer prophylaxis: n/a  VTE prophylaxis: heparin sc  Oral Care: chlorhexidine   Glycemic control: sliding scale  Nutrition: NGT feeds    CODE STATUS: [x] full code  [ ] DNR  [ ] DNI  [ ] MOLST  Goals of care discussion: [x] yes - spoke with family (son) today again regarding advanced directives, family thinking to make DNR but still wants everything else to be done at present time. at present still full code

## 2017-03-30 NOTE — PHYSICAL THERAPY INITIAL EVALUATION ADULT - CRITERIA FOR SKILLED THERAPEUTIC INTERVENTIONS
functional limitations in following categories/impairments found/rehab potential/risk reduction/prevention rehab potential/predicted duration of therapy intervention/impairments found/functional limitations in following categories/risk reduction/prevention

## 2017-03-30 NOTE — PROGRESS NOTE ADULT - ASSESSMENT
Pt has reminal end stage copd, Pt is vent dependent   all efforts have failed to provide end of life care and DNR/DNI  needs peg and NH placement

## 2017-03-31 LAB
-  AMPICILLIN/SULBACTAM: SIGNIFICANT CHANGE UP
-  AMPICILLIN: SIGNIFICANT CHANGE UP
-  CEFAZOLIN: SIGNIFICANT CHANGE UP
-  CIPROFLOXACIN: SIGNIFICANT CHANGE UP
-  CLINDAMYCIN: SIGNIFICANT CHANGE UP
-  ERYTHROMYCIN: SIGNIFICANT CHANGE UP
-  GENTAMICIN: SIGNIFICANT CHANGE UP
-  LEVOFLOXACIN: SIGNIFICANT CHANGE UP
-  LINEZOLID: SIGNIFICANT CHANGE UP
-  MOXIFLOXACIN(AEROBIC): SIGNIFICANT CHANGE UP
-  OXACILLIN: SIGNIFICANT CHANGE UP
-  PENICILLIN: SIGNIFICANT CHANGE UP
-  RIFAMPIN: SIGNIFICANT CHANGE UP
-  TETRACYCLINE: SIGNIFICANT CHANGE UP
-  TRIMETHOPRIM/SULFAMETHOXAZOLE: SIGNIFICANT CHANGE UP
-  VANCOMYCIN: SIGNIFICANT CHANGE UP
ALBUMIN SERPL ELPH-MCNC: 1.4 G/DL — LOW (ref 3.3–5)
ALP SERPL-CCNC: 144 U/L — HIGH (ref 40–120)
ALT FLD-CCNC: 47 U/L — SIGNIFICANT CHANGE UP (ref 12–78)
ANION GAP SERPL CALC-SCNC: 6 MMOL/L — SIGNIFICANT CHANGE UP (ref 5–17)
ANION GAP SERPL CALC-SCNC: 7 MMOL/L — SIGNIFICANT CHANGE UP (ref 5–17)
ANION GAP SERPL CALC-SCNC: 9 MMOL/L — SIGNIFICANT CHANGE UP (ref 5–17)
APPEARANCE UR: CLEAR — SIGNIFICANT CHANGE UP
AST SERPL-CCNC: 40 U/L — HIGH (ref 15–37)
BASE EXCESS BLDA CALC-SCNC: 12.4 MMOL/L — HIGH (ref -2–2)
BILIRUB SERPL-MCNC: 0.4 MG/DL — SIGNIFICANT CHANGE UP (ref 0.2–1.2)
BILIRUB UR-MCNC: NEGATIVE — SIGNIFICANT CHANGE UP
BLOOD GAS COMMENTS: SIGNIFICANT CHANGE UP
BLOOD GAS SOURCE: SIGNIFICANT CHANGE UP
BUN SERPL-MCNC: 12 MG/DL — SIGNIFICANT CHANGE UP (ref 7–23)
BUN SERPL-MCNC: 14 MG/DL — SIGNIFICANT CHANGE UP (ref 7–23)
BUN SERPL-MCNC: 15 MG/DL — SIGNIFICANT CHANGE UP (ref 7–23)
CALCIUM SERPL-MCNC: 7.5 MG/DL — LOW (ref 8.5–10.1)
CALCIUM SERPL-MCNC: 7.9 MG/DL — LOW (ref 8.5–10.1)
CALCIUM SERPL-MCNC: 8.2 MG/DL — LOW (ref 8.5–10.1)
CHLORIDE SERPL-SCNC: 95 MMOL/L — LOW (ref 96–108)
CHLORIDE SERPL-SCNC: 95 MMOL/L — LOW (ref 96–108)
CHLORIDE SERPL-SCNC: 97 MMOL/L — SIGNIFICANT CHANGE UP (ref 96–108)
CHLORIDE UR-SCNC: 40 MMOL/L — SIGNIFICANT CHANGE UP
CO2 SERPL-SCNC: 35 MMOL/L — HIGH (ref 22–31)
CO2 SERPL-SCNC: 35 MMOL/L — HIGH (ref 22–31)
CO2 SERPL-SCNC: 39 MMOL/L — HIGH (ref 22–31)
COLOR SPEC: YELLOW — SIGNIFICANT CHANGE UP
CREAT ?TM UR-MCNC: 4 MG/DL — SIGNIFICANT CHANGE UP
CREAT SERPL-MCNC: 0.4 MG/DL — LOW (ref 0.5–1.3)
CREAT SERPL-MCNC: 0.4 MG/DL — LOW (ref 0.5–1.3)
CREAT SERPL-MCNC: 0.5 MG/DL — SIGNIFICANT CHANGE UP (ref 0.5–1.3)
CULTURE RESULTS: SIGNIFICANT CHANGE UP
CULTURE RESULTS: SIGNIFICANT CHANGE UP
DIFF PNL FLD: NEGATIVE — SIGNIFICANT CHANGE UP
EPI CELLS # UR: SIGNIFICANT CHANGE UP
GLUCOSE SERPL-MCNC: 167 MG/DL — HIGH (ref 70–99)
GLUCOSE SERPL-MCNC: 175 MG/DL — HIGH (ref 70–99)
GLUCOSE SERPL-MCNC: 266 MG/DL — HIGH (ref 70–99)
GLUCOSE UR QL: 250 MG/DL
GRAM STN FLD: SIGNIFICANT CHANGE UP
HCO3 BLDA-SCNC: 37 MMOL/L — HIGH (ref 21–29)
HCT VFR BLD CALC: 26.4 % — LOW (ref 39–50)
HGB BLD-MCNC: 8.8 G/DL — LOW (ref 13–17)
HOROWITZ INDEX BLDA+IHG-RTO: 30 — SIGNIFICANT CHANGE UP
KETONES UR-MCNC: NEGATIVE — SIGNIFICANT CHANGE UP
LEUKOCYTE ESTERASE UR-ACNC: NEGATIVE — SIGNIFICANT CHANGE UP
MAGNESIUM SERPL-MCNC: 1.6 MG/DL — LOW (ref 1.8–2.4)
MAGNESIUM SERPL-MCNC: 2.1 MG/DL — SIGNIFICANT CHANGE UP (ref 1.8–2.4)
MCHC RBC-ENTMCNC: 27 PG — SIGNIFICANT CHANGE UP (ref 27–34)
MCHC RBC-ENTMCNC: 33.4 GM/DL — SIGNIFICANT CHANGE UP (ref 32–36)
MCV RBC AUTO: 80.8 FL — SIGNIFICANT CHANGE UP (ref 80–100)
METHOD TYPE: SIGNIFICANT CHANGE UP
NITRITE UR-MCNC: NEGATIVE — SIGNIFICANT CHANGE UP
ORGANISM # SPEC MICROSCOPIC CNT: SIGNIFICANT CHANGE UP
ORGANISM # SPEC MICROSCOPIC CNT: SIGNIFICANT CHANGE UP
OSMOLALITY UR: 174 MOS/KG — SIGNIFICANT CHANGE UP (ref 50–1200)
PCO2 BLDA: 51 MMHG — HIGH (ref 32–46)
PH BLD: 7.48 — HIGH (ref 7.35–7.45)
PH UR: 7 — SIGNIFICANT CHANGE UP (ref 4.8–8)
PHOSPHATE SERPL-MCNC: 2.1 MG/DL — LOW (ref 2.5–4.5)
PHOSPHATE SERPL-MCNC: 4.1 MG/DL — SIGNIFICANT CHANGE UP (ref 2.5–4.5)
PLATELET # BLD AUTO: 263 K/UL — SIGNIFICANT CHANGE UP (ref 150–400)
PO2 BLDA: 84 MMHG — SIGNIFICANT CHANGE UP (ref 74–108)
POTASSIUM SERPL-MCNC: 3.2 MMOL/L — LOW (ref 3.5–5.3)
POTASSIUM SERPL-MCNC: 3.5 MMOL/L — SIGNIFICANT CHANGE UP (ref 3.5–5.3)
POTASSIUM SERPL-MCNC: 3.7 MMOL/L — SIGNIFICANT CHANGE UP (ref 3.5–5.3)
POTASSIUM SERPL-SCNC: 3.2 MMOL/L — LOW (ref 3.5–5.3)
POTASSIUM SERPL-SCNC: 3.5 MMOL/L — SIGNIFICANT CHANGE UP (ref 3.5–5.3)
POTASSIUM SERPL-SCNC: 3.7 MMOL/L — SIGNIFICANT CHANGE UP (ref 3.5–5.3)
POTASSIUM UR-SCNC: 6 MMOL/L — SIGNIFICANT CHANGE UP
PROT SERPL-MCNC: 5.2 GM/DL — LOW (ref 6–8.3)
PROT UR-MCNC: NEGATIVE MG/DL — SIGNIFICANT CHANGE UP
RBC # BLD: 3.27 M/UL — LOW (ref 4.2–5.8)
RBC # FLD: 16.2 % — HIGH (ref 11–15)
SAO2 % BLDA: 96 % — SIGNIFICANT CHANGE UP (ref 92–96)
SODIUM SERPL-SCNC: 139 MMOL/L — SIGNIFICANT CHANGE UP (ref 135–145)
SODIUM SERPL-SCNC: 139 MMOL/L — SIGNIFICANT CHANGE UP (ref 135–145)
SODIUM SERPL-SCNC: 140 MMOL/L — SIGNIFICANT CHANGE UP (ref 135–145)
SODIUM UR-SCNC: 42 MMOL/L — SIGNIFICANT CHANGE UP
SP GR SPEC: 1 — LOW (ref 1.01–1.02)
SPECIMEN SOURCE: SIGNIFICANT CHANGE UP
UROBILINOGEN FLD QL: NEGATIVE MG/DL — SIGNIFICANT CHANGE UP
WBC # BLD: 7.6 K/UL — SIGNIFICANT CHANGE UP (ref 3.8–10.5)
WBC # FLD AUTO: 7.6 K/UL — SIGNIFICANT CHANGE UP (ref 3.8–10.5)

## 2017-03-31 PROCEDURE — 99223 1ST HOSP IP/OBS HIGH 75: CPT

## 2017-03-31 PROCEDURE — 31500 INSERT EMERGENCY AIRWAY: CPT

## 2017-03-31 PROCEDURE — 99291 CRITICAL CARE FIRST HOUR: CPT | Mod: 25

## 2017-03-31 PROCEDURE — 71010: CPT | Mod: 26

## 2017-03-31 RX ORDER — GENTAMICIN SULFATE 40 MG/ML
VIAL (ML) INJECTION
Qty: 0 | Refills: 0 | Status: DISCONTINUED | OUTPATIENT
Start: 2017-03-31 | End: 2017-04-03

## 2017-03-31 RX ORDER — MIDAZOLAM HYDROCHLORIDE 1 MG/ML
2 INJECTION, SOLUTION INTRAMUSCULAR; INTRAVENOUS ONCE
Qty: 0 | Refills: 0 | Status: DISCONTINUED | OUTPATIENT
Start: 2017-03-31 | End: 2017-03-31

## 2017-03-31 RX ORDER — GENTAMICIN SULFATE 40 MG/ML
210 VIAL (ML) INJECTION ONCE
Qty: 0 | Refills: 0 | Status: COMPLETED | OUTPATIENT
Start: 2017-03-31 | End: 2017-03-31

## 2017-03-31 RX ORDER — FENTANYL CITRATE 50 UG/ML
1 INJECTION INTRAVENOUS
Qty: 2500 | Refills: 0 | Status: DISCONTINUED | OUTPATIENT
Start: 2017-03-31 | End: 2017-04-01

## 2017-03-31 RX ORDER — GENTAMICIN SULFATE 40 MG/ML
210 VIAL (ML) INJECTION EVERY 24 HOURS
Qty: 0 | Refills: 0 | Status: DISCONTINUED | OUTPATIENT
Start: 2017-04-01 | End: 2017-04-03

## 2017-03-31 RX ORDER — NAFCILLIN 10 G/100ML
INJECTION, POWDER, FOR SOLUTION INTRAVENOUS
Qty: 0 | Refills: 0 | Status: DISCONTINUED | OUTPATIENT
Start: 2017-03-31 | End: 2017-04-03

## 2017-03-31 RX ORDER — NAFCILLIN 10 G/100ML
2 INJECTION, POWDER, FOR SOLUTION INTRAVENOUS ONCE
Qty: 0 | Refills: 0 | Status: COMPLETED | OUTPATIENT
Start: 2017-03-31 | End: 2017-03-31

## 2017-03-31 RX ORDER — FENTANYL CITRATE 50 UG/ML
1 INJECTION INTRAVENOUS
Qty: 2500 | Refills: 0 | Status: DISCONTINUED | OUTPATIENT
Start: 2017-03-31 | End: 2017-03-31

## 2017-03-31 RX ORDER — PROPOFOL 10 MG/ML
20 INJECTION, EMULSION INTRAVENOUS ONCE
Qty: 0 | Refills: 0 | Status: COMPLETED | OUTPATIENT
Start: 2017-03-31 | End: 2017-03-31

## 2017-03-31 RX ORDER — POTASSIUM PHOSPHATE, MONOBASIC POTASSIUM PHOSPHATE, DIBASIC 236; 224 MG/ML; MG/ML
15 INJECTION, SOLUTION INTRAVENOUS ONCE
Qty: 0 | Refills: 0 | Status: COMPLETED | OUTPATIENT
Start: 2017-03-31 | End: 2017-03-31

## 2017-03-31 RX ORDER — POTASSIUM CHLORIDE 20 MEQ
20 PACKET (EA) ORAL ONCE
Qty: 0 | Refills: 0 | Status: COMPLETED | OUTPATIENT
Start: 2017-03-31 | End: 2017-03-31

## 2017-03-31 RX ORDER — NAFCILLIN 10 G/100ML
2 INJECTION, POWDER, FOR SOLUTION INTRAVENOUS EVERY 4 HOURS
Qty: 0 | Refills: 0 | Status: DISCONTINUED | OUTPATIENT
Start: 2017-03-31 | End: 2017-04-03

## 2017-03-31 RX ORDER — MAGNESIUM SULFATE 500 MG/ML
2 VIAL (ML) INJECTION ONCE
Qty: 0 | Refills: 0 | Status: COMPLETED | OUTPATIENT
Start: 2017-03-31 | End: 2017-03-31

## 2017-03-31 RX ADMIN — Medication 3 MILLILITER(S): at 23:32

## 2017-03-31 RX ADMIN — Medication 1: at 23:46

## 2017-03-31 RX ADMIN — POTASSIUM PHOSPHATE, MONOBASIC POTASSIUM PHOSPHATE, DIBASIC 63.75 MILLIMOLE(S): 236; 224 INJECTION, SOLUTION INTRAVENOUS at 06:33

## 2017-03-31 RX ADMIN — Medication 1 APPLICATION(S): at 05:31

## 2017-03-31 RX ADMIN — CHLORHEXIDINE GLUCONATE 1 APPLICATION(S): 213 SOLUTION TOPICAL at 11:22

## 2017-03-31 RX ADMIN — Medication 1 TABLET(S): at 11:21

## 2017-03-31 RX ADMIN — Medication 3 MILLILITER(S): at 06:07

## 2017-03-31 RX ADMIN — PANTOPRAZOLE SODIUM 40 MILLIGRAM(S): 20 TABLET, DELAYED RELEASE ORAL at 11:21

## 2017-03-31 RX ADMIN — Medication 3: at 11:35

## 2017-03-31 RX ADMIN — CHLORHEXIDINE GLUCONATE 15 MILLILITER(S): 213 SOLUTION TOPICAL at 17:49

## 2017-03-31 RX ADMIN — Medication 0.25 MILLIGRAM(S): at 05:30

## 2017-03-31 RX ADMIN — NAFCILLIN 200 GRAM(S): 10 INJECTION, POWDER, FOR SOLUTION INTRAVENOUS at 14:44

## 2017-03-31 RX ADMIN — NAFCILLIN 200 GRAM(S): 10 INJECTION, POWDER, FOR SOLUTION INTRAVENOUS at 17:49

## 2017-03-31 RX ADMIN — Medication 1: at 05:30

## 2017-03-31 RX ADMIN — Medication 3 MILLILITER(S): at 11:33

## 2017-03-31 RX ADMIN — ZINC OXIDE 1 APPLICATION(S): 200 OINTMENT TOPICAL at 17:53

## 2017-03-31 RX ADMIN — Medication 1 APPLICATION(S): at 22:09

## 2017-03-31 RX ADMIN — MIDAZOLAM HYDROCHLORIDE 2 MILLIGRAM(S): 1 INJECTION, SOLUTION INTRAMUSCULAR; INTRAVENOUS at 20:45

## 2017-03-31 RX ADMIN — Medication 20 MILLIGRAM(S): at 05:30

## 2017-03-31 RX ADMIN — NAFCILLIN 200 GRAM(S): 10 INJECTION, POWDER, FOR SOLUTION INTRAVENOUS at 22:09

## 2017-03-31 RX ADMIN — Medication 1 APPLICATION(S): at 13:49

## 2017-03-31 RX ADMIN — Medication 3 MILLILITER(S): at 00:06

## 2017-03-31 RX ADMIN — HEPARIN SODIUM 5000 UNIT(S): 5000 INJECTION INTRAVENOUS; SUBCUTANEOUS at 05:30

## 2017-03-31 RX ADMIN — Medication 1 APPLICATION(S): at 17:53

## 2017-03-31 RX ADMIN — PROPOFOL 20 MILLIGRAM(S): 10 INJECTION, EMULSION INTRAVENOUS at 08:50

## 2017-03-31 RX ADMIN — Medication 50 GRAM(S): at 06:24

## 2017-03-31 RX ADMIN — Medication 0.25 MILLIGRAM(S): at 13:49

## 2017-03-31 RX ADMIN — HEPARIN SODIUM 5000 UNIT(S): 5000 INJECTION INTRAVENOUS; SUBCUTANEOUS at 17:49

## 2017-03-31 RX ADMIN — Medication 3 MILLILITER(S): at 17:06

## 2017-03-31 RX ADMIN — ZINC OXIDE 1 APPLICATION(S): 200 OINTMENT TOPICAL at 05:36

## 2017-03-31 RX ADMIN — CEFEPIME 100 MILLIGRAM(S): 1 INJECTION, POWDER, FOR SOLUTION INTRAMUSCULAR; INTRAVENOUS at 05:30

## 2017-03-31 RX ADMIN — FENTANYL CITRATE 4.59 MICROGRAM(S)/KG/HR: 50 INJECTION INTRAVENOUS at 23:46

## 2017-03-31 RX ADMIN — CHLORHEXIDINE GLUCONATE 15 MILLILITER(S): 213 SOLUTION TOPICAL at 05:30

## 2017-03-31 RX ADMIN — Medication 1 MILLIGRAM(S): at 03:25

## 2017-03-31 RX ADMIN — Medication 250 MILLIGRAM(S): at 11:21

## 2017-03-31 RX ADMIN — Medication 0.25 MILLIGRAM(S): at 22:09

## 2017-03-31 RX ADMIN — Medication 200 MILLIGRAM(S): at 14:45

## 2017-03-31 RX ADMIN — Medication 2: at 17:50

## 2017-03-31 RX ADMIN — Medication 100 MILLIEQUIVALENT(S): at 06:24

## 2017-03-31 NOTE — PROGRESS NOTE ADULT - SUBJECTIVE AND OBJECTIVE BOX
HPI:  61M with COPD, chronic hypoxia, oxygen dependent, chronic hypercarbia with recent admission for COPD exacerbation, acute on chronic hypercarbic respiratory failure requiring only BiPAP. He was readmitted to ICU 3/8 for staph bacteremia and again with acute on chronic hypercarbic respiratory failure failing BiPAP and requiring intubation. Pt extubated but required BiPAP as needed throughout the day and bipap at night. Transferred to medical floor. Returns with worsening acute on chronic respiratory acidosis/hypercarbic respiratory failure. Reintubated for worsening hypercarbia and hypoxia on BiPAP on 3/29 with shock state requiring pressors    24 hr events:  ETT exchanged over bougie due to air leak from malfunctioning cuff this morning  failing wean due to tachypnea  off pressors  bl culture and sputum cx with staph aureus  vanco d/manolo and started on nafcillin for MSSA    ## ROS:  [x] unable to obtain due to intubation    ## Labs:  Complete Blood Count (03.31.17 @ 03:49)    WBC Count: 7.6 K/uL    RBC Count: 3.27 M/uL    Hemoglobin: 8.8 g/dL    Hematocrit: 26.4 %    Mean Cell Volume: 80.8 fl    Mean Cell Hemoglobin: 27.0 pg    Mean Cell Hemoglobin Conc: 33.4 gm/dL    Red Cell Distrib Width: 16.2 %    Platelet Count - Automated: 263 K/uL    Basic Metabolic Panel - STAT (03.31.17 @ 13:50)    Sodium, Serum: 139 mmol/L    Potassium, Serum: 3.7 mmol/L    Chloride, Serum: 95 mmol/L    Carbon Dioxide, Serum: 35 mmol/L    Anion Gap, Serum: 9 mmol/L    Blood Urea Nitrogen, Serum: 12 mg/dL    Creatinine, Serum: 0.50 mg/dL    Glucose, Serum: 266 mg/dL    Calcium, Total Serum: 7.9 mg/dL    Phosphorus Level, Serum: 2.1 mg/dL    Magnesium, Serum: 1.6 mg/dL    Culture - Blood (03.30.17 @ 00:20)    Specimen Source: .Blood Blood/only aerobic received    Culture Results:     Growth in aerobic bottle: Staphylococcus aureus    Growth in aerobic bottle: Alpha hemolytic strep (not Strep. pneumoniae or Enterococcus)    Culture - Blood (03.30.17 @ 00:17)    Specimen Source: .Blood Blood    Culture Results: Growth in aerobic bottle: Gram Positive Cocci in Clusters    Culture - Sputum . (03.29.17 @ 12:49)    Specimen Source: .Sputum Sputum    Culture Results: Moderate Staphylococcus aureus      ## Imaging:  CXR endotracheal tube 3 vertebral bodies above the isa. There is an NG tube seen coursing below the diaphragm with the tip off of the film. There is a right internal jugular central line with   the tip in the region of the SVC. There is a trace left pleural effusion as well as bilateral patchy airspace opacities      ## Medications:  nafcillin  IVPB 2Gram(s) IV Intermittent every 4 hours  gentamicin   IVPB 210milliGRAM(s) IV Intermittent every 24 hours      ALBUTerol/ipratropium for Nebulization 3milliLiter(s) Nebulizer every 6 hours  ALBUTerol    90 MICROgram(s) HFA Inhaler 1Puff(s) Inhalation every 4 hours  tiotropium 18 MICROgram(s) Capsule 1Capsule(s) Inhalation daily    dextrose Gel 1Dose(s) Oral once PRN  dextrose 50% Injectable 12.5Gram(s) IV Push once  dextrose 50% Injectable 25Gram(s) IV Push once  glucagon  Injectable 1milliGRAM(s) IntraMuscular once PRN  predniSONE   Tablet 20milliGRAM(s) Oral daily  insulin lispro (HumaLOG) corrective regimen sliding scale  SubCutaneous every 6 hours    heparin  Injectable 5000Unit(s) SubCutaneous every 12 hours    pantoprazole  Injectable 40milliGRAM(s) IV Push daily    acetaminophen   Tablet 650milliGRAM(s) Oral every 6 hours PRN  acetaminophen   Tablet. 650milliGRAM(s) Oral every 6 hours PRN  ALPRAZolam 0.25milliGRAM(s) Oral three times a day  dexmedetomidine Infusion 0.7MICROgram(s)/kG/Hr IV Continuous <Continuous>      ## Vitals:  T(F): 98.9  HR:  98  BP: 130/68  RR: 19 (14 - 23)  SpO2: 98% (95% - 100%)  Wt(kg): 47.7  Vent: Mode: AC/ CMV (Assist Control/ Continuous Mandatory Ventilation), RR (machine): 14, RR (patient): 21, TV (machine): 400, FiO2: 30, PEEP: 5, PIP: 29  ABG: ABG - ( 31 Mar 2017 13:52 )  pH: 7.48    /  pCO2: 51    /  pO2: 84    / HCO3: 37    / Base Excess: 12.4  /  SaO2: 96          ## P/E:  Gen: thin cachectic male, orally intubated  HEENT: PERRL, EOMI, bridge of nose break down from BiPAP use  Resp: CTA B/L no wheeze  CVS: S1S2, RRR, no murmur  Abd: soft NT/ND + BS  Ext: no c/c/e  Neuro: sedated    CENTRAL LINE: [x] YES [ ] NO  LOCATION: R IJ   DATE INSERTED: 3/29    MARTINEZ: [x] YES [ ] NO        A-LINE:  [ ] YES [x] NO      GLOBAL ISSUE/BEST PRACTICE:  Analgesia: n/a  Sedation: precedex  HOB elevation: yes  Stress ulcer prophylaxis: protonix  VTE prophylaxis: heparin sc  Oral Care: chlorhexidine  Glycemic control: finger stick sliding scale coverage  Nutrition: NGT feeds    CODE STATUS: [ ] full code  [x] DNR  [ ] DNI  [ ] MOLST  Goals of care discussion: [x] yes

## 2017-03-31 NOTE — PROGRESS NOTE ADULT - ASSESSMENT
61M with COPD, chronic hypoxia, oxygen dependent, chronic hypercarbia with recent admission for COPD exacerbation, acute on chronic hypercarbic respiratory failure requiring only BiPAP. Radmitted for acute on chronic hypercarbic respiratory failure this time requiring intubation, extubated but mostly BiPAP dependent, found to have staph bacteremia. Course with recurrent hypercarbic hypoxic respiratory failure reintubated 3/29. CXR with PNA    DX: recurrent acute on chronic hypercarbic hypoxic respiratory failure, complex PNA- staph PNA, staph bacteremia, septic shock    - pt with end stage COPD  - reintubated and will likely need trach if family wishes are to continue mechanical ventilation  - s/p course of antibiotics for PNA and bacteremia on adnmission with cultures clearing  - cultures now with MSSA will d/c vanco and cefepime and start nafcillin  - ID eval  - cont prednisone for COPD exacerbation  - out of shock state, off pressors with stable BP  - on xanax for underling anxiety  - physical therapy    Total Critical Care Time: 45min

## 2017-03-31 NOTE — PROCEDURE NOTE - NSTRACHPOSTINTU_RESP_A_CORE
Breath sounds equal/Chest excursion noted/Chest X-Ray/Positive end tidal Co2 noted/Breath sounds bilateral

## 2017-03-31 NOTE — PROCEDURE NOTE - NSPROCDETAILS_GEN_ALL_CORE
connected to ventilator/patient pre-oxygenated, tube inserted, placement confirmed
sterile dressing applied/sterile technique, catheter placed/ultrasound guidance/lumen(s) aspirated and flushed/guidewire recovered

## 2017-04-01 DIAGNOSIS — J96.01 ACUTE RESPIRATORY FAILURE WITH HYPOXIA: ICD-10-CM

## 2017-04-01 DIAGNOSIS — J15.9 UNSPECIFIED BACTERIAL PNEUMONIA: ICD-10-CM

## 2017-04-01 DIAGNOSIS — R35.8 OTHER POLYURIA: ICD-10-CM

## 2017-04-01 DIAGNOSIS — A49.01 METHICILLIN SUSCEPTIBLE STAPHYLOCOCCUS AUREUS INFECTION, UNSPECIFIED SITE: ICD-10-CM

## 2017-04-01 DIAGNOSIS — R78.81 BACTEREMIA: ICD-10-CM

## 2017-04-01 LAB
-  AMPICILLIN/SULBACTAM: SIGNIFICANT CHANGE UP
-  CEFAZOLIN: SIGNIFICANT CHANGE UP
-  CIPROFLOXACIN: SIGNIFICANT CHANGE UP
-  CLINDAMYCIN: SIGNIFICANT CHANGE UP
-  ERYTHROMYCIN: SIGNIFICANT CHANGE UP
-  GENTAMICIN: SIGNIFICANT CHANGE UP
-  LEVOFLOXACIN: SIGNIFICANT CHANGE UP
-  MOXIFLOXACIN(AEROBIC): SIGNIFICANT CHANGE UP
-  OXACILLIN: SIGNIFICANT CHANGE UP
-  PENICILLIN: SIGNIFICANT CHANGE UP
-  RIFAMPIN: SIGNIFICANT CHANGE UP
-  TETRACYCLINE: SIGNIFICANT CHANGE UP
-  TRIMETHOPRIM/SULFAMETHOXAZOLE: SIGNIFICANT CHANGE UP
-  VANCOMYCIN: SIGNIFICANT CHANGE UP
ANION GAP SERPL CALC-SCNC: 9 MMOL/L — SIGNIFICANT CHANGE UP (ref 5–17)
BUN SERPL-MCNC: 14 MG/DL — SIGNIFICANT CHANGE UP (ref 7–23)
CALCIUM SERPL-MCNC: 7.7 MG/DL — LOW (ref 8.5–10.1)
CHLORIDE SERPL-SCNC: 94 MMOL/L — LOW (ref 96–108)
CO2 SERPL-SCNC: 37 MMOL/L — HIGH (ref 22–31)
CREAT SERPL-MCNC: 0.48 MG/DL — LOW (ref 0.5–1.3)
CULTURE RESULTS: SIGNIFICANT CHANGE UP
GLUCOSE SERPL-MCNC: 205 MG/DL — HIGH (ref 70–99)
GRAM STN FLD: SIGNIFICANT CHANGE UP
HCT VFR BLD CALC: 28.1 % — LOW (ref 39–50)
HGB BLD-MCNC: 9.5 G/DL — LOW (ref 13–17)
MAGNESIUM SERPL-MCNC: 1.9 MG/DL — SIGNIFICANT CHANGE UP (ref 1.8–2.4)
MCHC RBC-ENTMCNC: 27.2 PG — SIGNIFICANT CHANGE UP (ref 27–34)
MCHC RBC-ENTMCNC: 33.8 GM/DL — SIGNIFICANT CHANGE UP (ref 32–36)
MCV RBC AUTO: 80.4 FL — SIGNIFICANT CHANGE UP (ref 80–100)
METHOD TYPE: SIGNIFICANT CHANGE UP
ORGANISM # SPEC MICROSCOPIC CNT: SIGNIFICANT CHANGE UP
ORGANISM # SPEC MICROSCOPIC CNT: SIGNIFICANT CHANGE UP
PHOSPHATE SERPL-MCNC: 2.6 MG/DL — SIGNIFICANT CHANGE UP (ref 2.5–4.5)
PLATELET # BLD AUTO: 272 K/UL — SIGNIFICANT CHANGE UP (ref 150–400)
POTASSIUM SERPL-MCNC: 3.2 MMOL/L — LOW (ref 3.5–5.3)
POTASSIUM SERPL-SCNC: 3.2 MMOL/L — LOW (ref 3.5–5.3)
RBC # BLD: 3.49 M/UL — LOW (ref 4.2–5.8)
RBC # FLD: 16.3 % — HIGH (ref 11–15)
SODIUM SERPL-SCNC: 140 MMOL/L — SIGNIFICANT CHANGE UP (ref 135–145)
SPECIMEN SOURCE: SIGNIFICANT CHANGE UP
WBC # BLD: 6.1 K/UL — SIGNIFICANT CHANGE UP (ref 3.8–10.5)
WBC # FLD AUTO: 6.1 K/UL — SIGNIFICANT CHANGE UP (ref 3.8–10.5)

## 2017-04-01 PROCEDURE — 99291 CRITICAL CARE FIRST HOUR: CPT

## 2017-04-01 PROCEDURE — 71010: CPT | Mod: 26

## 2017-04-01 RX ORDER — DEXMEDETOMIDINE HYDROCHLORIDE IN 0.9% SODIUM CHLORIDE 4 UG/ML
0.7 INJECTION INTRAVENOUS
Qty: 200 | Refills: 0 | Status: DISCONTINUED | OUTPATIENT
Start: 2017-04-01 | End: 2017-04-03

## 2017-04-01 RX ORDER — POTASSIUM CHLORIDE 20 MEQ
40 PACKET (EA) ORAL EVERY 4 HOURS
Qty: 0 | Refills: 0 | Status: COMPLETED | OUTPATIENT
Start: 2017-04-01 | End: 2017-04-01

## 2017-04-01 RX ORDER — ZINC OXIDE 200 MG/G
1 OINTMENT TOPICAL THREE TIMES A DAY
Qty: 0 | Refills: 0 | Status: DISCONTINUED | OUTPATIENT
Start: 2017-04-02 | End: 2017-04-03

## 2017-04-01 RX ADMIN — HEPARIN SODIUM 5000 UNIT(S): 5000 INJECTION INTRAVENOUS; SUBCUTANEOUS at 17:27

## 2017-04-01 RX ADMIN — Medication 1 APPLICATION(S): at 17:25

## 2017-04-01 RX ADMIN — Medication 40 MILLIEQUIVALENT(S): at 05:11

## 2017-04-01 RX ADMIN — Medication 0.25 MILLIGRAM(S): at 05:11

## 2017-04-01 RX ADMIN — Medication 1: at 05:12

## 2017-04-01 RX ADMIN — CHLORHEXIDINE GLUCONATE 15 MILLILITER(S): 213 SOLUTION TOPICAL at 17:27

## 2017-04-01 RX ADMIN — Medication 20 MILLIGRAM(S): at 05:11

## 2017-04-01 RX ADMIN — NAFCILLIN 200 GRAM(S): 10 INJECTION, POWDER, FOR SOLUTION INTRAVENOUS at 05:11

## 2017-04-01 RX ADMIN — PANTOPRAZOLE SODIUM 40 MILLIGRAM(S): 20 TABLET, DELAYED RELEASE ORAL at 12:08

## 2017-04-01 RX ADMIN — DEXMEDETOMIDINE HYDROCHLORIDE IN 0.9% SODIUM CHLORIDE 8.03 MICROGRAM(S)/KG/HR: 4 INJECTION INTRAVENOUS at 22:31

## 2017-04-01 RX ADMIN — HEPARIN SODIUM 5000 UNIT(S): 5000 INJECTION INTRAVENOUS; SUBCUTANEOUS at 05:10

## 2017-04-01 RX ADMIN — Medication 200 MILLIGRAM(S): at 13:33

## 2017-04-01 RX ADMIN — Medication 1: at 17:27

## 2017-04-01 RX ADMIN — Medication 0.25 MILLIGRAM(S): at 22:30

## 2017-04-01 RX ADMIN — Medication 1 APPLICATION(S): at 22:30

## 2017-04-01 RX ADMIN — Medication 3 MILLILITER(S): at 05:38

## 2017-04-01 RX ADMIN — NAFCILLIN 200 GRAM(S): 10 INJECTION, POWDER, FOR SOLUTION INTRAVENOUS at 17:28

## 2017-04-01 RX ADMIN — NAFCILLIN 200 GRAM(S): 10 INJECTION, POWDER, FOR SOLUTION INTRAVENOUS at 22:31

## 2017-04-01 RX ADMIN — Medication 0.25 MILLIGRAM(S): at 17:23

## 2017-04-01 RX ADMIN — Medication 1 APPLICATION(S): at 05:27

## 2017-04-01 RX ADMIN — CHLORHEXIDINE GLUCONATE 15 MILLILITER(S): 213 SOLUTION TOPICAL at 05:11

## 2017-04-01 RX ADMIN — Medication 40 MILLIEQUIVALENT(S): at 12:08

## 2017-04-01 RX ADMIN — ZINC OXIDE 1 APPLICATION(S): 200 OINTMENT TOPICAL at 05:27

## 2017-04-01 RX ADMIN — DEXMEDETOMIDINE HYDROCHLORIDE IN 0.9% SODIUM CHLORIDE 8.03 MICROGRAM(S)/KG/HR: 4 INJECTION INTRAVENOUS at 10:32

## 2017-04-01 RX ADMIN — Medication 3 MILLILITER(S): at 17:04

## 2017-04-01 RX ADMIN — Medication 1 TABLET(S): at 12:07

## 2017-04-01 RX ADMIN — Medication 3 MILLILITER(S): at 11:00

## 2017-04-01 RX ADMIN — NAFCILLIN 200 GRAM(S): 10 INJECTION, POWDER, FOR SOLUTION INTRAVENOUS at 01:16

## 2017-04-01 RX ADMIN — NAFCILLIN 200 GRAM(S): 10 INJECTION, POWDER, FOR SOLUTION INTRAVENOUS at 10:33

## 2017-04-01 RX ADMIN — NAFCILLIN 200 GRAM(S): 10 INJECTION, POWDER, FOR SOLUTION INTRAVENOUS at 13:33

## 2017-04-01 RX ADMIN — CHLORHEXIDINE GLUCONATE 1 APPLICATION(S): 213 SOLUTION TOPICAL at 12:08

## 2017-04-01 RX ADMIN — Medication 3: at 12:11

## 2017-04-01 RX ADMIN — Medication 1 APPLICATION(S): at 14:00

## 2017-04-01 NOTE — PROGRESS NOTE ADULT - SUBJECTIVE AND OBJECTIVE BOX
Patient seen in follow up for polyuria. On Vent support. Family at bedside.     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 1000milliLiter(s) IV Continuous <Continuous>  dextrose 50% Injectable 12.5Gram(s) IV Push once  dextrose 50% Injectable 25Gram(s) IV Push once  ALBUTerol/ipratropium for Nebulization 3milliLiter(s) Nebulizer every 6 hours  ALBUTerol    90 MICROgram(s) HFA Inhaler 1Puff(s) Inhalation every 4 hours  tiotropium 18 MICROgram(s) Capsule 1Capsule(s) Inhalation daily  multivitamin/minerals 1Tablet(s) Oral daily  BACItracin   Ointment 1Application(s) Topical three times a day  ALPRAZolam 0.25milliGRAM(s) Oral three times a day  pantoprazole  Injectable 40milliGRAM(s) IV Push daily  chlorhexidine 0.12% Liquid 15milliLiter(s) Swish and Spit two times a day  chlorhexidine 4% Liquid 1Application(s) Topical daily  predniSONE   Tablet 20milliGRAM(s) Oral daily  BACItracin   Ointment 1Application(s) Topical two times a day  insulin lispro (HumaLOG) corrective regimen sliding scale  SubCutaneous every 6 hours  nafcillin  IVPB  IV Intermittent   nafcillin  IVPB 2Gram(s) IV Intermittent every 4 hours  gentamicin   IVPB  IV Intermittent   gentamicin   IVPB 210milliGRAM(s) IV Intermittent every 24 hours  dexmedetomidine Infusion 0.7MICROgram(s)/kG/Hr IV Continuous <Continuous>    MEDICATIONS  (PRN):  dextrose Gel 1Dose(s) Oral once PRN Blood Glucose LESS THAN 70 milliGRAM(s)/deciliter  glucagon  Injectable 1milliGRAM(s) IntraMuscular once PRN Glucose LESS THAN 70 milligrams/deciliter  acetaminophen   Tablet 650milliGRAM(s) Oral every 6 hours PRN For Temp greater than 38 C (100.4 F)  benzocaine 15 mG/menthol 3.6 mG Lozenge 1Lozenge Oral every 4 hours PRN Sore Throat  acetaminophen   Tablet. 650milliGRAM(s) Oral every 6 hours PRN Mild Pain (1 - 3)    T(C): 36.6, Max: 37.4 (03-31 @ 08:00)  HR: 86 (67 - 102)  BP: 144/78 (95/53 - 157/80)  RR: 23 (11 - 26)  SpO2: 96% (95% - 100%)  Wt(kg): --  I&O's Detail  I & Os for 24h ending 01 Apr 2017 07:00  =============================================  IN:    Glucerna: 1320 ml    Solution: 500 ml    Free Water: 250 ml    Solution: 250 ml    Solution: 100 ml    fentaNYL  Infusion: 33.6 ml    Total IN: 2453.6 ml  ---------------------------------------------  OUT:    Indwelling Catheter - Urethral: 5210 ml    Total OUT: 5210 ml  ---------------------------------------------  Total NET: -2756.4 ml    I & Os for current day (as of 01 Apr 2017 17:02)  =============================================  IN:    Glucerna: 495 ml    Solution: 200 ml    Enteral Tube Flush: 100 ml    Solution: 100 ml    dexmedetomidine Infusion: 25 ml    Total IN: 920 ml  ---------------------------------------------  OUT:    Indwelling Catheter - Urethral: 1865 ml    Total OUT: 1865 ml  ---------------------------------------------  Total NET: -945 ml      PHYSICAL EXAM:  General: On vent  Respiratory: b/l air entry  Cardiovascular: S1 S2  Gastrointestinal: soft  Extremities:  + edema    CBC Full  -  ( 01 Apr 2017 03:25 )  WBC Count : 6.1 K/uL  Hemoglobin : 9.5 g/dL  Hematocrit : 28.1 %  Platelet Count - Automated : 272 K/uL  Mean Cell Volume : 80.4 fl  Mean Cell Hemoglobin : 27.2 pg  Mean Cell Hemoglobin Concentration : 33.8 gm/dL  Auto Neutrophil # : x  Auto Lymphocyte # : x  Auto Monocyte # : x  Auto Eosinophil # : x  Auto Basophil # : x  Auto Neutrophil % : x  Auto Lymphocyte % : x  Auto Monocyte % : x  Auto Eosinophil % : x  Auto Basophil % : x    01 Apr 2017 03:25    140    |  94     |  14     ----------------------------<  205    3.2     |  37     |  0.48     Ca    7.7        01 Apr 2017 03:25  Phos  2.6       01 Apr 2017 03:25  Mg     1.9       01 Apr 2017 03:25    TPro  5.2    /  Alb  1.4    /  TBili  0.4    /  DBili  x      /  AST  40     /  ALT  47     /  AlkPhos  144    31 Mar 2017 03:49    ABG - ( 31 Mar 2017 13:52 )  pH: x     /  pCO2: 51    /  pO2: 84    / HCO3: 37    / Base Excess: 12.4  /  SaO2: 96                  Sodium, Serum: 140 (04-01 @ 03:25)  Sodium, Serum: 140 (03-31 @ 20:24)  Sodium, Serum: 139 (03-31 @ 13:50)  Sodium, Serum: 139 (03-31 @ 03:49)  Sodium, Serum: 147 (03-30 @ 03:51)  Sodium, Serum: 146 (03-29 @ 03:42)    Creatinine, Serum: 0.48 (04-01 @ 03:25)  Creatinine, Serum: 0.40 (03-31 @ 20:24)  Creatinine, Serum: 0.50 (03-31 @ 13:50)  Creatinine, Serum: 0.40 (03-31 @ 03:49)  Creatinine, Serum: 0.53 (03-30 @ 03:51)  Creatinine, Serum: 0.23 (03-29 @ 03:42)    Potassium, Serum: 3.2 (04-01 @ 03:25)  Potassium, Serum: 3.5 (03-31 @ 20:24)  Potassium, Serum: 3.7 (03-31 @ 13:50)  Potassium, Serum: 3.2 (03-31 @ 03:49)  Potassium, Serum: 3.5 (03-30 @ 03:51)  Potassium, Serum: 3.4 (03-29 @ 03:42)    Hemoglobin: 9.5 (04-01 @ 03:25)  Hemoglobin: 8.8 (03-31 @ 03:49)  Hemoglobin: 7.1 (03-30 @ 12:09)  Hemoglobin: 7.7 (03-30 @ 03:51)  Hemoglobin: 9.1 (03-29 @ 03:42)

## 2017-04-01 NOTE — PROGRESS NOTE ADULT - SUBJECTIVE AND OBJECTIVE BOX
Initial Consultaion Note  Requested by Name:  Date/ Time:  Reason for referral/ Consultation:    HPI:  60 Y/O male w/ PMHx of COPD on home O2, cpap at night, afib, htn, hld, dm, pw c/o SOB. ABG worsening in ED showing 7.26/102/133/44 on Bipap. Pt intubated for hypercapnic respiratory failure. 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 Name/ Phone#:  Significant other/partner:                     Children:                         Faith/Spirituality:    Baseline ADLs (Prior to admission)    ADVANCE DIRECTIVES:  [ ] YES [ ] NO   DNR [ ] YES [ ] NO  Completed on:                     MOLST  [ ] YES [ ] NO   Completed on:  Living Will  [ ] YES [ ] NO   Completed on:    Allergies    No Known Allergies    Intolerances        Review of Systems:     PAIN : (Y/N) (0-10)  PAIN SITE :  QUALITY/QUANTITY OF PAIN :  PAIN RADIATING :( Y/N)  SEVERITY :  FREQUENCY :  IMPACT ON ADLs :      DYSPNEA: (Y/N) Mild [ ] Moderate [ ] Severe [ ]  NAUSEA/VOMITING: (Y/N)  DEPRESSION: (Y/N) Mild [ ]Moderate [ ] Severe [ ]  ANXIETY: (Y/N)  AGITATION: (Y/N):  SECRETIONS:(Y/N)  CONTIPATION : (Y/N)  DIARRHEA : (Y/N)  FRAILTY SYNDROM (Y/N):  FAILURE TO THRIVE (Y/N):  DIBILITY (Y/N);  OTHER SYMPTOMS :  UNABLE TO OBTAIN DUE TO POOR MENTATION (   )    PHYSICAL EXAM:  T(F): 97.9, Max: 99.1 (04-01 @ 04:00)  HR: 74 (67 - 101)  BP: 97/52 (91/50 - 146/79)  RR: 19 (14 - 23)  SpO2: 99% (95% - 100%)  Wt(kg): --   WEIGHT :    [id number]                  BMI:  I&O's Summary  I & Os for 24h ending 01 Apr 2017 07:00  =============================================  IN: 2453.6 ml / OUT: 5210 ml / NET: -2756.4 ml    I & Os for current day (as of 01 Apr 2017 19:36)  =============================================  IN: 1236.8 ml / OUT: 2115 ml / NET: -878.2 ml    CAPILLARY BLOOD GLUCOSE  154 (01 Apr 2017 17:03)  267 (01 Apr 2017 12:05)  163 (01 Apr 2017 05:09)  162 (31 Mar 2017 23:44)      GENERAL: alert [ ] oriented x ______[ ] lethargic [ ] agitated [ ] cachexia [ ] nonverbal [ ] coma [ ]  HEENT: normal [ ] dry mouth [ ] ET tube/trach [ ]   LUNGS: ]  CV :  normal [ ]  GI : normal [ ]  PEG /NG tube [ ]   : normal [ ] incontinent [ ] oliguria/anuria [ ] lawler [ ]  MSK : normal [ ] weakness [ ] edema [ ]              ambulatory [ ] bebbound/wheelchair bound [ ]   SKIN : normal [ ] pressure ulcer (Y/N) Stage ______________ rash (Y/N)    Functional Assessment:Karnofsky Performance Score :  Palliative Performance Status Version 2:         %    LABS:                        9.5    6.1   )-----------( 272      ( 01 Apr 2017 03:25 )             28.1     01 Apr 2017 03:25    140    |  94     |  14     ----------------------------<  205    3.2     |  37     |  0.48     Ca    7.7        01 Apr 2017 03:25  Phos  2.6       01 Apr 2017 03:25  Mg     1.9       01 Apr 2017 03:25    TPro  5.2    /  Alb  1.4    /  TBili  0.4    /  DBili  x      /  AST  40     /  ALT  47     /  AlkPhos  144    31 Mar 2017 03:49          I&O's Detail  I & Os for 24h ending 01 Apr 2017 07:00  =============================================  IN:    Glucerna: 1320 ml    Solution: 500 ml    Free Water: 250 ml    Solution: 250 ml    Solution: 100 ml    fentaNYL  Infusion: 33.6 ml    Total IN: 2453.6 ml  ---------------------------------------------  OUT:    Indwelling Catheter - Urethral: 5210 ml    Total OUT: 5210 ml  ---------------------------------------------  Total NET: -2756.4 ml    I & Os for current day (as of 01 Apr 2017 19:36)  =============================================  IN:    Glucerna: 605 ml    Solution: 300 ml    Enteral Tube Flush: 200 ml    Solution: 100 ml    dexmedetomidine Infusion: 31.8 ml    Total IN: 1236.8 ml  ---------------------------------------------  OUT:    Indwelling Catheter - Urethral: 2115 ml    Total OUT: 2115 ml  ---------------------------------------------  Total NET: -878.2 ml      Assessment:   61y Male admitted with MULTIFOCAL PNEUMONIA COPD EXACERBATION  SHORTNESS OF BREATH      PROBLEM LIST :  PROBLEM/RECOMMENDATION: 1  Problem : Goals of care, counseling/ discussion.  Recommendation: met with patient/ family and discussed GOC & Advanced care planning                                    Palliative care info/counseling provided (Y/N)                                      Family meeting                                   Advanced Directives addressed (Y/N)  PROBLEM/ RECOMMENDATION:2  Problem :Resuscitation/ DNR/ DNI,   Recommendation: DNR/ DNI    PROBLEM/ RECOMMENDATION :3  Problem : Medical order for life sustaning treatment  Recommendation : MOLST Form ( initiated/ completed)    PROBLEM/RECOMMENDATION :4  Problem : Advanced care planning  Recommendation : Family meeting.(Y/N)     PLAN:  REFFERALS:                           Unit SW/Case Mgmt (Y/N)                          (Y/N)                         Speech/Swallow (Y/N)                           nutrition                                              Ethics (Y/N)                         PT/OT (Y/N)

## 2017-04-01 NOTE — PROGRESS NOTE ADULT - ASSESSMENT
61M with COPD, chronic hypoxia, oxygen dependent, chronic hypercarbia with recent admission for COPD exacerbation, acute on chronic hypercarbic respiratory failure requiring only BiPAP. Radmitted for acute on chronic hypercarbic respiratory failure this time requiring intubation, extubated but mostly BiPAP dependent, found to have staph bacteremia. Course with recurrent hypercarbic hypoxic respiratory failure reintubated 3/29. CXR with PNA.     DX: recurrent acute on chronic hypercarbic hypoxic respiratory failure, complex PNA, staph bacteremia, septic shock    - pt with end stage COPD  - repeated hypercapnic hypoxic resp faliure requiring multiple intubations  - s/p course of antibiotics for PNA and bacteremia on admission with cultures clearing  - follow up blood and sputum culture, now again with GPCs, fup culture report  - cont with nafcillin and gentamicin as per ID for MSSA  - will need RANJIT to r/o infective endocarditis, cardiology aware, plan to do it coming week  - off pressors now  - cont prednisone for COPD at baseline  - physical therapy  condition d/w family in detail at the bedside. family wants to pursue for tracheostomy. Await final decision from elder son. Dr. Gale aware.    DVT/GI ppx

## 2017-04-01 NOTE — PROVIDER CONTACT NOTE (CRITICAL VALUE NOTIFICATION) - TEST AND RESULT REPORTED:
Phosphorus 0.5
Blood Culture from 3/29 growth in aerobic bottle gram positive cocci in clusters
blood c/s from 3/8/17 priliminary  report positive for growth in aerobic bottle gram positive cocci in clusters
blood culture collected on 3/8, growth in the aerobic bottle gram +ve cocci in clusters.
carbon dioxide 45
pos prelim blood culture aerobic gram pos cocci in clustsers, prs and chains

## 2017-04-01 NOTE — PROGRESS NOTE ADULT - SUBJECTIVE AND OBJECTIVE BOX
HPI:  60 Y/O male w/ PMHx of COPD on home O2, cpap at night, afib, htn, hld, dm, pw c/o SOB. ABG worsening in ED showing 7.26/102/133/44 on Bipap. Pt intubated for hypercapnic respiratory failure. (08 Mar 2017 06:36)    Over 24 Hrs: pt remains intubated, failed weaning trial this am.    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  Accelerated Essential Hypertension: x3 years  S/P appendectomy      ## ROS: Unable to obtain      ## O/E:  Vitals: T(C): 37.3, Max: 37.3 (04-01 @ 04:00)  HR: 89 (67 - 102)  BP: 103/64 (95/53 - 146/79)  BP(mean): 76 (68 - 106)  RR: 19 (12 - 23)  SpO2: 97% (95% - 100%)  Wt(kg): --  Gen: lying comfortably in bed intubated  HEENT: PERRL, EOMI  Resp: CTA B/L no c/r/w  CVS: S1S2 no m/r/g  Abd: soft NT/ND +BS  Ext: no c/c/e  Neuro: Awake and alert, responds to verbal stimuli    I & Os for 24h ending 04-01 @ 07:00  =============================================  IN: 2453.6 ml / OUT: 5210 ml / NET: -2756.4 ml    I & Os for current day (as of 04-01 @ 13:49)  =============================================  IN: 544.8 ml / OUT: 1390 ml / NET: -845.2 ml    Mode: CPAP with PS, FiO2: 30, PEEP: 5, PS: 5, MAP: 9, PIP: 11    ## Labs:                        9.5    6.1   )-----------( 272      ( 01 Apr 2017 03:25 )             28.1     01 Apr 2017 03:25    140    |  94     |  14     ----------------------------<  205    3.2     |  37     |  0.48     Ca    7.7        01 Apr 2017 03:25  Phos  2.6       01 Apr 2017 03:25  Mg     1.9       01 Apr 2017 03:25    TPro  5.2    /  Alb  1.4    /  TBili  0.4    /  DBili  x      /  AST  40     /  ALT  47     /  AlkPhos  144    31 Mar 2017 03:49      ABG - ( 31 Mar 2017 13:52 )  pH: x     /  pCO2: 51    /  pO2: 84    / HCO3: 37    / Base Excess: 12.4  /  SaO2: 96            ## Imaging: reviewed    MEDICATIONS  (STANDING):  heparin  Injectable 5000Unit(s) SubCutaneous every 12 hours  dextrose 5%. 1000milliLiter(s) IV Continuous <Continuous>  dextrose 50% Injectable 12.5Gram(s) IV Push once  dextrose 50% Injectable 25Gram(s) IV Push once  ALBUTerol/ipratropium for Nebulization 3milliLiter(s) Nebulizer every 6 hours  ALBUTerol    90 MICROgram(s) HFA Inhaler 1Puff(s) Inhalation every 4 hours  tiotropium 18 MICROgram(s) Capsule 1Capsule(s) Inhalation daily  zinc oxide 40%/lanolin Ointment 1Application(s) Topical two times a day  multivitamin/minerals 1Tablet(s) Oral daily  BACItracin   Ointment 1Application(s) Topical three times a day  ALPRAZolam 0.25milliGRAM(s) Oral three times a day  pantoprazole  Injectable 40milliGRAM(s) IV Push daily  chlorhexidine 0.12% Liquid 15milliLiter(s) Swish and Spit two times a day  chlorhexidine 4% Liquid 1Application(s) Topical daily  predniSONE   Tablet 20milliGRAM(s) Oral daily  BACItracin   Ointment 1Application(s) Topical two times a day  insulin lispro (HumaLOG) corrective regimen sliding scale  SubCutaneous every 6 hours  nafcillin  IVPB  IV Intermittent   nafcillin  IVPB 2Gram(s) IV Intermittent every 4 hours  gentamicin   IVPB  IV Intermittent   gentamicin   IVPB 210milliGRAM(s) IV Intermittent every 24 hours  dexmedetomidine Infusion 0.7MICROgram(s)/kG/Hr IV Continuous <Continuous>    MEDICATIONS  (PRN):  dextrose Gel 1Dose(s) Oral once PRN Blood Glucose LESS THAN 70 milliGRAM(s)/deciliter  glucagon  Injectable 1milliGRAM(s) IntraMuscular once PRN Glucose LESS THAN 70 milligrams/deciliter  acetaminophen   Tablet 650milliGRAM(s) Oral every 6 hours PRN For Temp greater than 38 C (100.4 F)  benzocaine 15 mG/menthol 3.6 mG Lozenge 1Lozenge Oral every 4 hours PRN Sore Throat  acetaminophen   Tablet. 650milliGRAM(s) Oral every 6 hours PRN Mild Pain (1 - 3)    ## Code status:  Goals of care discussion: [x] yes [ ] no  [x] full code  [ ] DNR  [ ] DNI  [ ] LUC HPI:  60 Y/O male w/ PMHx of COPD on home O2, cpap at night, afib, htn, hld, dm, pw c/o SOB. ABG worsening in ED showing 7.26/102/133/44 on Bipap. Pt intubated for hypercapnic respiratory failure. (08 Mar 2017 06:36)    Over 24 Hrs: pt remains intubated, failed weaning trial this am.    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  Accelerated Essential Hypertension: x3 years  S/P appendectomy      ## ROS: Unable to obtain      ## O/E:  Vitals: T(C): 37.3, Max: 37.3 (04-01 @ 04:00)  HR: 89 (67 - 102)  BP: 103/64 (95/53 - 146/79)  BP(mean): 76 (68 - 106)  RR: 19 (12 - 23)  SpO2: 97% (95% - 100%)  Wt(kg): --  Gen: lying comfortably in bed intubated  HEENT: PERRL, EOMI  Resp: CTA B/L no c/r/w  CVS: S1S2 no m/r/g  Abd: soft NT/ND +BS  Ext: no c/c/e  Neuro: Awake and alert, responds to verbal stimuli    I & Os for 24h ending 04-01 @ 07:00  =============================================  IN: 2453.6 ml / OUT: 5210 ml / NET: -2756.4 ml    I & Os for current day (as of 04-01 @ 13:49)  =============================================  IN: 544.8 ml / OUT: 1390 ml / NET: -845.2 ml    Mode: CPAP with PS, FiO2: 30, PEEP: 5, PS: 5, MAP: 9, PIP: 11    ## Labs:                        9.5    6.1   )-----------( 272      ( 01 Apr 2017 03:25 )             28.1     01 Apr 2017 03:25    140    |  94     |  14     ----------------------------<  205    3.2     |  37     |  0.48     Ca    7.7        01 Apr 2017 03:25  Phos  2.6       01 Apr 2017 03:25  Mg     1.9       01 Apr 2017 03:25    TPro  5.2    /  Alb  1.4    /  TBili  0.4    /  DBili  x      /  AST  40     /  ALT  47     /  AlkPhos  144    31 Mar 2017 03:49      ABG - ( 31 Mar 2017 13:52 )  pH: x     /  pCO2: 51    /  pO2: 84    / HCO3: 37    / Base Excess: 12.4  /  SaO2: 96            ## Imaging: reviewed    MEDICATIONS  (STANDING):  heparin  Injectable 5000Unit(s) SubCutaneous every 12 hours  dextrose 5%. 1000milliLiter(s) IV Continuous <Continuous>  dextrose 50% Injectable 12.5Gram(s) IV Push once  dextrose 50% Injectable 25Gram(s) IV Push once  ALBUTerol/ipratropium for Nebulization 3milliLiter(s) Nebulizer every 6 hours  ALBUTerol    90 MICROgram(s) HFA Inhaler 1Puff(s) Inhalation every 4 hours  tiotropium 18 MICROgram(s) Capsule 1Capsule(s) Inhalation daily  zinc oxide 40%/lanolin Ointment 1Application(s) Topical two times a day  multivitamin/minerals 1Tablet(s) Oral daily  BACItracin   Ointment 1Application(s) Topical three times a day  ALPRAZolam 0.25milliGRAM(s) Oral three times a day  pantoprazole  Injectable 40milliGRAM(s) IV Push daily  chlorhexidine 0.12% Liquid 15milliLiter(s) Swish and Spit two times a day  chlorhexidine 4% Liquid 1Application(s) Topical daily  predniSONE   Tablet 20milliGRAM(s) Oral daily  BACItracin   Ointment 1Application(s) Topical two times a day  insulin lispro (HumaLOG) corrective regimen sliding scale  SubCutaneous every 6 hours  nafcillin  IVPB  IV Intermittent   nafcillin  IVPB 2Gram(s) IV Intermittent every 4 hours  gentamicin   IVPB  IV Intermittent   gentamicin   IVPB 210milliGRAM(s) IV Intermittent every 24 hours  dexmedetomidine Infusion 0.7MICROgram(s)/kG/Hr IV Continuous <Continuous>    MEDICATIONS  (PRN):  dextrose Gel 1Dose(s) Oral once PRN Blood Glucose LESS THAN 70 milliGRAM(s)/deciliter  glucagon  Injectable 1milliGRAM(s) IntraMuscular once PRN Glucose LESS THAN 70 milligrams/deciliter  acetaminophen   Tablet 650milliGRAM(s) Oral every 6 hours PRN For Temp greater than 38 C (100.4 F)  benzocaine 15 mG/menthol 3.6 mG Lozenge 1Lozenge Oral every 4 hours PRN Sore Throat  acetaminophen   Tablet. 650milliGRAM(s) Oral every 6 hours PRN Mild Pain (1 - 3)    ## Code status:  Goals of care discussion: [x] yes [ ] no  [] full code  [x ] DNR  [ ] DNI  [ ] LUC

## 2017-04-02 LAB
ANION GAP SERPL CALC-SCNC: 7 MMOL/L — SIGNIFICANT CHANGE UP (ref 5–17)
BUN SERPL-MCNC: 17 MG/DL — SIGNIFICANT CHANGE UP (ref 7–23)
CALCIUM SERPL-MCNC: 8.2 MG/DL — LOW (ref 8.5–10.1)
CHLORIDE SERPL-SCNC: 94 MMOL/L — LOW (ref 96–108)
CO2 SERPL-SCNC: 38 MMOL/L — HIGH (ref 22–31)
CREAT SERPL-MCNC: 0.33 MG/DL — LOW (ref 0.5–1.3)
CULTURE RESULTS: SIGNIFICANT CHANGE UP
GLUCOSE SERPL-MCNC: 137 MG/DL — HIGH (ref 70–99)
GRAM STN FLD: SIGNIFICANT CHANGE UP
HCT VFR BLD CALC: 27.8 % — LOW (ref 39–50)
HGB BLD-MCNC: 9.4 G/DL — LOW (ref 13–17)
MAGNESIUM SERPL-MCNC: 1.8 MG/DL — SIGNIFICANT CHANGE UP (ref 1.8–2.4)
MCHC RBC-ENTMCNC: 27.7 PG — SIGNIFICANT CHANGE UP (ref 27–34)
MCHC RBC-ENTMCNC: 33.8 GM/DL — SIGNIFICANT CHANGE UP (ref 32–36)
MCV RBC AUTO: 82.1 FL — SIGNIFICANT CHANGE UP (ref 80–100)
OB PNL STL: NEGATIVE — SIGNIFICANT CHANGE UP
PHOSPHATE SERPL-MCNC: 3 MG/DL — SIGNIFICANT CHANGE UP (ref 2.5–4.5)
PLATELET # BLD AUTO: 292 K/UL — SIGNIFICANT CHANGE UP (ref 150–400)
POTASSIUM SERPL-MCNC: 4.1 MMOL/L — SIGNIFICANT CHANGE UP (ref 3.5–5.3)
POTASSIUM SERPL-SCNC: 4.1 MMOL/L — SIGNIFICANT CHANGE UP (ref 3.5–5.3)
RBC # BLD: 3.39 M/UL — LOW (ref 4.2–5.8)
RBC # FLD: 16.9 % — HIGH (ref 11–15)
SODIUM SERPL-SCNC: 139 MMOL/L — SIGNIFICANT CHANGE UP (ref 135–145)
SPECIMEN SOURCE: SIGNIFICANT CHANGE UP
WBC # BLD: 6.4 K/UL — SIGNIFICANT CHANGE UP (ref 3.8–10.5)
WBC # FLD AUTO: 6.4 K/UL — SIGNIFICANT CHANGE UP (ref 3.8–10.5)

## 2017-04-02 PROCEDURE — 99223 1ST HOSP IP/OBS HIGH 75: CPT

## 2017-04-02 PROCEDURE — 71010: CPT | Mod: 26

## 2017-04-02 PROCEDURE — 99291 CRITICAL CARE FIRST HOUR: CPT

## 2017-04-02 PROCEDURE — 99233 SBSQ HOSP IP/OBS HIGH 50: CPT

## 2017-04-02 RX ORDER — MAGNESIUM SULFATE 500 MG/ML
1 VIAL (ML) INJECTION ONCE
Qty: 0 | Refills: 0 | Status: COMPLETED | OUTPATIENT
Start: 2017-04-02 | End: 2017-04-02

## 2017-04-02 RX ADMIN — Medication 1: at 06:03

## 2017-04-02 RX ADMIN — Medication 1 TABLET(S): at 11:48

## 2017-04-02 RX ADMIN — HEPARIN SODIUM 5000 UNIT(S): 5000 INJECTION INTRAVENOUS; SUBCUTANEOUS at 18:23

## 2017-04-02 RX ADMIN — Medication 100 GRAM(S): at 06:00

## 2017-04-02 RX ADMIN — Medication 3 MILLILITER(S): at 11:30

## 2017-04-02 RX ADMIN — Medication 1 APPLICATION(S): at 14:32

## 2017-04-02 RX ADMIN — Medication 3 MILLILITER(S): at 00:51

## 2017-04-02 RX ADMIN — Medication 1 APPLICATION(S): at 06:00

## 2017-04-02 RX ADMIN — Medication 3 MILLILITER(S): at 17:03

## 2017-04-02 RX ADMIN — CHLORHEXIDINE GLUCONATE 1 APPLICATION(S): 213 SOLUTION TOPICAL at 11:48

## 2017-04-02 RX ADMIN — CHLORHEXIDINE GLUCONATE 15 MILLILITER(S): 213 SOLUTION TOPICAL at 18:23

## 2017-04-02 RX ADMIN — ZINC OXIDE 1 APPLICATION(S): 200 OINTMENT TOPICAL at 14:34

## 2017-04-02 RX ADMIN — Medication 0.5 MILLIGRAM(S): at 09:48

## 2017-04-02 RX ADMIN — Medication 0.25 MILLIGRAM(S): at 14:32

## 2017-04-02 RX ADMIN — ZINC OXIDE 1 APPLICATION(S): 200 OINTMENT TOPICAL at 06:03

## 2017-04-02 RX ADMIN — DEXMEDETOMIDINE HYDROCHLORIDE IN 0.9% SODIUM CHLORIDE 8.03 MICROGRAM(S)/KG/HR: 4 INJECTION INTRAVENOUS at 18:31

## 2017-04-02 RX ADMIN — CHLORHEXIDINE GLUCONATE 15 MILLILITER(S): 213 SOLUTION TOPICAL at 06:00

## 2017-04-02 RX ADMIN — Medication 1: at 11:47

## 2017-04-02 RX ADMIN — NAFCILLIN 200 GRAM(S): 10 INJECTION, POWDER, FOR SOLUTION INTRAVENOUS at 10:44

## 2017-04-02 RX ADMIN — Medication 20 MILLIGRAM(S): at 06:01

## 2017-04-02 RX ADMIN — ZINC OXIDE 1 APPLICATION(S): 200 OINTMENT TOPICAL at 23:04

## 2017-04-02 RX ADMIN — NAFCILLIN 200 GRAM(S): 10 INJECTION, POWDER, FOR SOLUTION INTRAVENOUS at 22:58

## 2017-04-02 RX ADMIN — Medication 0.25 MILLIGRAM(S): at 06:00

## 2017-04-02 RX ADMIN — PANTOPRAZOLE SODIUM 40 MILLIGRAM(S): 20 TABLET, DELAYED RELEASE ORAL at 11:48

## 2017-04-02 RX ADMIN — NAFCILLIN 200 GRAM(S): 10 INJECTION, POWDER, FOR SOLUTION INTRAVENOUS at 03:00

## 2017-04-02 RX ADMIN — Medication 200 MILLIGRAM(S): at 13:11

## 2017-04-02 RX ADMIN — Medication 1 APPLICATION(S): at 18:23

## 2017-04-02 RX ADMIN — Medication 1 APPLICATION(S): at 22:58

## 2017-04-02 RX ADMIN — HEPARIN SODIUM 5000 UNIT(S): 5000 INJECTION INTRAVENOUS; SUBCUTANEOUS at 06:01

## 2017-04-02 RX ADMIN — Medication 3 MILLILITER(S): at 23:56

## 2017-04-02 RX ADMIN — Medication 3 MILLILITER(S): at 05:21

## 2017-04-02 RX ADMIN — NAFCILLIN 200 GRAM(S): 10 INJECTION, POWDER, FOR SOLUTION INTRAVENOUS at 06:01

## 2017-04-02 RX ADMIN — NAFCILLIN 200 GRAM(S): 10 INJECTION, POWDER, FOR SOLUTION INTRAVENOUS at 18:23

## 2017-04-02 RX ADMIN — NAFCILLIN 200 GRAM(S): 10 INJECTION, POWDER, FOR SOLUTION INTRAVENOUS at 14:32

## 2017-04-02 RX ADMIN — DEXMEDETOMIDINE HYDROCHLORIDE IN 0.9% SODIUM CHLORIDE 8.03 MICROGRAM(S)/KG/HR: 4 INJECTION INTRAVENOUS at 09:48

## 2017-04-02 NOTE — PROGRESS NOTE ADULT - ASSESSMENT
61 yr old male [facility] advanced cope and multiple admissions recently seen with

## 2017-04-02 NOTE — PROGRESS NOTE ADULT - SUBJECTIVE AND OBJECTIVE BOX
HPI:  62 Y/O male w/ PMHx of COPD on home O2, cpap at night, afib, htn, hld, dm, pw c/o SOB. ABG worsening in ED showing 7.26/102/133/44 on Bipap. Pt intubated for hypercapnic respiratory failure. (08 Mar 2017 06:36)    Over 24 Hrs: pt remains intubated, failed weaning trial this am. became very tachycardic and tachypnic.    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  Accelerated Essential Hypertension: x3 years  S/P appendectomy      ## ROS: Unable to obtain      ## O/E:  Vitals: T(C): 37.6, Max: 37.6 (04-02 @ 07:51)  HR: 108 (59 - 126)  BP: 102/67 (74/50 - 167/79)  BP(mean): 79 (59 - 117)  RR: 19 (12 - 25)  SpO2: 98% (93% - 100%)  Wt(kg): --  Gen: lying comfortably in bed intubated  HEENT: PERRL, EOMI  Resp: CTA B/L no c/r/w  CVS: S1S2 no m/r/g  Abd: soft NT/ND +BS  Ext: no c/c/e  Neuro: Awake and alert, responds to verbal stimuli    I & Os for 24h ending 04-02 @ 07:00  =============================================  IN: 2372.2 ml / OUT: 3215 ml / NET: -842.8 ml    I & Os for current day (as of 04-02 @ 11:54)  =============================================  IN: 324.6 ml / OUT: 1075 ml / NET: -750.4 ml    Mode: AC/ CMV (Assist Control/ Continuous Mandatory Ventilation), RR (machine): 14, TV (machine): 400, FiO2: 30, PEEP: 5, ITime: 0.91, MAP: 14, PIP: 30    ## Labs:                        9.4    6.4   )-----------( 292      ( 02 Apr 2017 03:33 )             27.8     02 Apr 2017 03:33    139    |  94     |  17     ----------------------------<  137    4.1     |  38     |  0.33     Ca    8.2        02 Apr 2017 03:33  Phos  3.0       02 Apr 2017 03:33  Mg     1.8       02 Apr 2017 03:33        ABG - ( 31 Mar 2017 13:52 )  pH: x     /  pCO2: 51    /  pO2: 84    / HCO3: 37    / Base Excess: 12.4  /  SaO2: 96          ## Imaging: reviewed    MEDICATIONS  (STANDING):  heparin  Injectable 5000Unit(s) SubCutaneous every 12 hours  dextrose 5%. 1000milliLiter(s) IV Continuous <Continuous>  dextrose 50% Injectable 12.5Gram(s) IV Push once  dextrose 50% Injectable 25Gram(s) IV Push once  ALBUTerol/ipratropium for Nebulization 3milliLiter(s) Nebulizer every 6 hours  ALBUTerol    90 MICROgram(s) HFA Inhaler 1Puff(s) Inhalation every 4 hours  tiotropium 18 MICROgram(s) Capsule 1Capsule(s) Inhalation daily  multivitamin/minerals 1Tablet(s) Oral daily  BACItracin   Ointment 1Application(s) Topical three times a day  ALPRAZolam 0.25milliGRAM(s) Oral three times a day  pantoprazole  Injectable 40milliGRAM(s) IV Push daily  chlorhexidine 0.12% Liquid 15milliLiter(s) Swish and Spit two times a day  chlorhexidine 4% Liquid 1Application(s) Topical daily  predniSONE   Tablet 20milliGRAM(s) Oral daily  BACItracin   Ointment 1Application(s) Topical two times a day  insulin lispro (HumaLOG) corrective regimen sliding scale  SubCutaneous every 6 hours  nafcillin  IVPB  IV Intermittent   nafcillin  IVPB 2Gram(s) IV Intermittent every 4 hours  gentamicin   IVPB  IV Intermittent   gentamicin   IVPB 210milliGRAM(s) IV Intermittent every 24 hours  dexmedetomidine Infusion 0.7MICROgram(s)/kG/Hr IV Continuous <Continuous>  zinc oxide 40%/lanolin Ointment 1Application(s) Topical three times a day    MEDICATIONS  (PRN):  dextrose Gel 1Dose(s) Oral once PRN Blood Glucose LESS THAN 70 milliGRAM(s)/deciliter  glucagon  Injectable 1milliGRAM(s) IntraMuscular once PRN Glucose LESS THAN 70 milligrams/deciliter  acetaminophen   Tablet 650milliGRAM(s) Oral every 6 hours PRN For Temp greater than 38 C (100.4 F)  benzocaine 15 mG/menthol 3.6 mG Lozenge 1Lozenge Oral every 4 hours PRN Sore Throat  acetaminophen   Tablet. 650milliGRAM(s) Oral every 6 hours PRN Mild Pain (1 - 3)        ## Code status:  Goals of care discussion: [x] yes [ ] no  [] full code  [x ] DNR  [ ] DNI  [ ] LUC

## 2017-04-02 NOTE — PROGRESS NOTE ADULT - SUBJECTIVE AND OBJECTIVE BOX
Patient is a 61y old  Male who presents with a chief complaint of sob (08 Mar 2017 11:44)      INTERVAL HPI / OVERNIGHT EVENTS:vent dependent,no new event,no fever    MEDICATIONS  (STANDING):  heparin  Injectable 5000Unit(s) SubCutaneous every 12 hours  dextrose 5%. 1000milliLiter(s) IV Continuous <Continuous>  dextrose 50% Injectable 12.5Gram(s) IV Push once  dextrose 50% Injectable 25Gram(s) IV Push once  ALBUTerol/ipratropium for Nebulization 3milliLiter(s) Nebulizer every 6 hours  ALBUTerol    90 MICROgram(s) HFA Inhaler 1Puff(s) Inhalation every 4 hours  tiotropium 18 MICROgram(s) Capsule 1Capsule(s) Inhalation daily  multivitamin/minerals 1Tablet(s) Oral daily  BACItracin   Ointment 1Application(s) Topical three times a day  ALPRAZolam 0.25milliGRAM(s) Oral three times a day  pantoprazole  Injectable 40milliGRAM(s) IV Push daily  chlorhexidine 0.12% Liquid 15milliLiter(s) Swish and Spit two times a day  chlorhexidine 4% Liquid 1Application(s) Topical daily  predniSONE   Tablet 20milliGRAM(s) Oral daily  BACItracin   Ointment 1Application(s) Topical two times a day  insulin lispro (HumaLOG) corrective regimen sliding scale  SubCutaneous every 6 hours  nafcillin  IVPB  IV Intermittent   nafcillin  IVPB 2Gram(s) IV Intermittent every 4 hours  gentamicin   IVPB  IV Intermittent   gentamicin   IVPB 210milliGRAM(s) IV Intermittent every 24 hours  dexmedetomidine Infusion 0.7MICROgram(s)/kG/Hr IV Continuous <Continuous>  zinc oxide 40%/lanolin Ointment 1Application(s) Topical three times a day    MEDICATIONS  (PRN):  dextrose Gel 1Dose(s) Oral once PRN Blood Glucose LESS THAN 70 milliGRAM(s)/deciliter  glucagon  Injectable 1milliGRAM(s) IntraMuscular once PRN Glucose LESS THAN 70 milligrams/deciliter  acetaminophen   Tablet 650milliGRAM(s) Oral every 6 hours PRN For Temp greater than 38 C (100.4 F)  benzocaine 15 mG/menthol 3.6 mG Lozenge 1Lozenge Oral every 4 hours PRN Sore Throat  acetaminophen   Tablet. 650milliGRAM(s) Oral every 6 hours PRN Mild Pain (1 - 3)      Vital Signs Last 24 Hrs  T(C): 37.6, Max: 37.6 (04-02 @ 07:51)  T(F): 99.7, Max: 99.7 (04-02 @ 13:30)  HR: 78 (59 - 126)  BP: 89/50 (74/50 - 167/79)  BP(mean): 63 (59 - 117)  RR: 22 (12 - 26)  SpO2: 98% (93% - 100%)    PHYSICAL EXAM:    Constitutional: NADcachectic  Respiratory: CTAB/L, vent dependent  Cardiovascular: S1 and S2, RRR, no M/G/R  Gastrointestinal: BS+, soft, NT/ND  Extremities: No peripheral edema  Vascular: 2+ peripheral pulses  Skin: No rashes      LABS:                        9.4    6.4   )-----------( 292      ( 02 Apr 2017 03:33 )             27.8     02 Apr 2017 03:33    139    |  94     |  17     ----------------------------<  137    4.1     |  38     |  0.33     Ca    8.2        02 Apr 2017 03:33  Phos  3.0       02 Apr 2017 03:33  Mg     1.8       02 Apr 2017 03:33              MICROBIOLOGY:  RECENT CULTURES:  03-30 .Blood Blood/only aerobic received Staphylococcus aureus   Growth in aerobic bottle: Gram Positive Cocci in Clusters  and  Gram Positive Cocci in Pairs and Chains   Growth in aerobic bottle: Staphylococcus aureus  Growth in aerobic bottle: Alpha hemolytic strep  (not Strep. pneumoniae or Enterococcus)  Single set isolate, possible contaminant. Contact  Microbiology if susceptibility testing clinically  indicate    03-30 .Blood Blood XXXX   Growth in aerobic bottle: Gram Positive Cocci in Clusters   Growth in aerobic bottle: Staphylococcus aureus  See previous culture [id number]    03-29 .Urine Catheterized XXXX XXXX   No growth    03-29 .Sputum Sputum Staphylococcus aureus   Moderate WBC's  Few squamous epithelial cells  Moderate Gram Positive Cocci in Clusters  Moderate Gram Negative Rods   Moderate Staphylococcus aureus  Normal Respiratory Cinthia present          RADIOLOGY & ADDITIONAL STUDIES:

## 2017-04-02 NOTE — PROGRESS NOTE ADULT - SUBJECTIVE AND OBJECTIVE BOX
Pt is repeatedly failing weaning   causing tachypnea and tachycardia  other sx controlled  on vent   pt is DNR

## 2017-04-02 NOTE — PROGRESS NOTE ADULT - ASSESSMENT
61M with COPD, chronic hypoxia, oxygen dependent, chronic hypercarbia with recent admission for COPD exacerbation, acute on chronic hypercarbic respiratory failure requiring only BiPAP. Radmitted for acute on chronic hypercarbic respiratory failure this time requiring intubation, extubated but mostly BiPAP dependent, found to have staph bacteremia. Course with recurrent hypercarbic hypoxic respiratory failure reintubated 3/29. CXR with PNA.     DX: recurrent acute on chronic hypercarbic hypoxic respiratory failure, complex PNA, staph bacteremia, septic shock    - pt with end stage COPD  - repeated hypercapnic hypoxic resp faliure requiring multiple intubations  - s/p course of antibiotics for PNA and bacteremia on admission with cultures clearing  - follow up blood and sputum culture, now again with MSSA  - cont with nafcillin and gentamicin as per ID for MSSA  - will need RANJIT to r/o infective endocarditis, cardiology aware, plan to do it coming week  - off pressors now  - cont prednisone for COPD at baseline  - physical therapy  condition d/w family in detail at the bedside. family wants to pursue for tracheostomy. Dr. Gale aware. scheduled for tomorrow.   await GI input for PEG.    DVT/GI ppx

## 2017-04-03 DIAGNOSIS — R13.10 DYSPHAGIA, UNSPECIFIED: ICD-10-CM

## 2017-04-03 LAB
ANION GAP SERPL CALC-SCNC: 8 MMOL/L — SIGNIFICANT CHANGE UP (ref 5–17)
BUN SERPL-MCNC: 13 MG/DL — SIGNIFICANT CHANGE UP (ref 7–23)
CALCIUM SERPL-MCNC: 7.9 MG/DL — LOW (ref 8.5–10.1)
CHLORIDE SERPL-SCNC: 95 MMOL/L — LOW (ref 96–108)
CO2 SERPL-SCNC: 35 MMOL/L — HIGH (ref 22–31)
CREAT SERPL-MCNC: 0.24 MG/DL — LOW (ref 0.5–1.3)
GLUCOSE SERPL-MCNC: 153 MG/DL — HIGH (ref 70–99)
HCT VFR BLD CALC: 26.3 % — LOW (ref 39–50)
HGB BLD-MCNC: 8.7 G/DL — LOW (ref 13–17)
INR BLD: 1.09 RATIO — SIGNIFICANT CHANGE UP (ref 0.88–1.16)
MAGNESIUM SERPL-MCNC: 1.9 MG/DL — SIGNIFICANT CHANGE UP (ref 1.8–2.4)
MCHC RBC-ENTMCNC: 27.1 PG — SIGNIFICANT CHANGE UP (ref 27–34)
MCHC RBC-ENTMCNC: 33 GM/DL — SIGNIFICANT CHANGE UP (ref 32–36)
MCV RBC AUTO: 82 FL — SIGNIFICANT CHANGE UP (ref 80–100)
PHOSPHATE SERPL-MCNC: 3.4 MG/DL — SIGNIFICANT CHANGE UP (ref 2.5–4.5)
PLATELET # BLD AUTO: 325 K/UL — SIGNIFICANT CHANGE UP (ref 150–400)
POTASSIUM SERPL-MCNC: 3.7 MMOL/L — SIGNIFICANT CHANGE UP (ref 3.5–5.3)
POTASSIUM SERPL-SCNC: 3.7 MMOL/L — SIGNIFICANT CHANGE UP (ref 3.5–5.3)
PROTHROM AB SERPL-ACNC: 11.9 SEC — SIGNIFICANT CHANGE UP (ref 9.8–12.7)
RBC # BLD: 3.2 M/UL — LOW (ref 4.2–5.8)
RBC # FLD: 16.6 % — HIGH (ref 11–15)
SODIUM SERPL-SCNC: 138 MMOL/L — SIGNIFICANT CHANGE UP (ref 135–145)
WBC # BLD: 8.7 K/UL — SIGNIFICANT CHANGE UP (ref 3.8–10.5)
WBC # FLD AUTO: 8.7 K/UL — SIGNIFICANT CHANGE UP (ref 3.8–10.5)

## 2017-04-03 PROCEDURE — 71010: CPT | Mod: 26

## 2017-04-03 PROCEDURE — 99233 SBSQ HOSP IP/OBS HIGH 50: CPT

## 2017-04-03 PROCEDURE — 99291 CRITICAL CARE FIRST HOUR: CPT

## 2017-04-03 RX ORDER — SODIUM CHLORIDE 9 MG/ML
1000 INJECTION INTRAMUSCULAR; INTRAVENOUS; SUBCUTANEOUS ONCE
Qty: 0 | Refills: 0 | Status: COMPLETED | OUTPATIENT
Start: 2017-04-03 | End: 2017-04-03

## 2017-04-03 RX ORDER — HEPARIN SODIUM 5000 [USP'U]/ML
5000 INJECTION INTRAVENOUS; SUBCUTANEOUS EVERY 12 HOURS
Qty: 0 | Refills: 0 | Status: DISCONTINUED | OUTPATIENT
Start: 2017-04-03 | End: 2017-04-14

## 2017-04-03 RX ORDER — ZINC OXIDE 200 MG/G
1 OINTMENT TOPICAL THREE TIMES A DAY
Qty: 0 | Refills: 0 | Status: DISCONTINUED | OUTPATIENT
Start: 2017-04-03 | End: 2017-04-03

## 2017-04-03 RX ORDER — IPRATROPIUM/ALBUTEROL SULFATE 18-103MCG
3 AEROSOL WITH ADAPTER (GRAM) INHALATION EVERY 6 HOURS
Qty: 0 | Refills: 0 | Status: DISCONTINUED | OUTPATIENT
Start: 2017-04-03 | End: 2017-04-14

## 2017-04-03 RX ORDER — ALPRAZOLAM 0.25 MG
0.25 TABLET ORAL THREE TIMES A DAY
Qty: 0 | Refills: 0 | Status: DISCONTINUED | OUTPATIENT
Start: 2017-04-03 | End: 2017-04-10

## 2017-04-03 RX ORDER — ZINC OXIDE 200 MG/G
1 OINTMENT TOPICAL THREE TIMES A DAY
Qty: 0 | Refills: 0 | Status: DISCONTINUED | OUTPATIENT
Start: 2017-04-03 | End: 2017-04-14

## 2017-04-03 RX ORDER — GENTAMICIN SULFATE 40 MG/ML
210 VIAL (ML) INJECTION EVERY 24 HOURS
Qty: 0 | Refills: 0 | Status: DISCONTINUED | OUTPATIENT
Start: 2017-04-03 | End: 2017-04-12

## 2017-04-03 RX ORDER — NAFCILLIN 10 G/100ML
2 INJECTION, POWDER, FOR SOLUTION INTRAVENOUS EVERY 4 HOURS
Qty: 0 | Refills: 0 | Status: DISCONTINUED | OUTPATIENT
Start: 2017-04-03 | End: 2017-04-14

## 2017-04-03 RX ORDER — INSULIN LISPRO 100/ML
VIAL (ML) SUBCUTANEOUS EVERY 6 HOURS
Qty: 0 | Refills: 0 | Status: DISCONTINUED | OUTPATIENT
Start: 2017-04-03 | End: 2017-04-14

## 2017-04-03 RX ORDER — BACITRACIN ZINC 500 UNIT/G
1 OINTMENT IN PACKET (EA) TOPICAL THREE TIMES A DAY
Qty: 0 | Refills: 0 | Status: DISCONTINUED | OUTPATIENT
Start: 2017-04-03 | End: 2017-04-14

## 2017-04-03 RX ORDER — TIOTROPIUM BROMIDE 18 UG/1
1 CAPSULE ORAL; RESPIRATORY (INHALATION) DAILY
Qty: 0 | Refills: 0 | Status: DISCONTINUED | OUTPATIENT
Start: 2017-04-03 | End: 2017-04-14

## 2017-04-03 RX ORDER — CHLORHEXIDINE GLUCONATE 213 G/1000ML
1 SOLUTION TOPICAL DAILY
Qty: 0 | Refills: 0 | Status: DISCONTINUED | OUTPATIENT
Start: 2017-04-03 | End: 2017-04-06

## 2017-04-03 RX ORDER — MULTIVIT-MIN/FERROUS GLUCONATE 9 MG/15 ML
1 LIQUID (ML) ORAL DAILY
Qty: 0 | Refills: 0 | Status: DISCONTINUED | OUTPATIENT
Start: 2017-04-03 | End: 2017-04-14

## 2017-04-03 RX ORDER — ACETAMINOPHEN 500 MG
650 TABLET ORAL EVERY 6 HOURS
Qty: 0 | Refills: 0 | Status: DISCONTINUED | OUTPATIENT
Start: 2017-04-03 | End: 2017-04-14

## 2017-04-03 RX ADMIN — NAFCILLIN 200 GRAM(S): 10 INJECTION, POWDER, FOR SOLUTION INTRAVENOUS at 07:00

## 2017-04-03 RX ADMIN — Medication 0.25 MILLIGRAM(S): at 07:15

## 2017-04-03 RX ADMIN — Medication 15 MILLIGRAM(S): at 22:45

## 2017-04-03 RX ADMIN — NAFCILLIN 200 GRAM(S): 10 INJECTION, POWDER, FOR SOLUTION INTRAVENOUS at 11:33

## 2017-04-03 RX ADMIN — Medication 3 MILLILITER(S): at 11:05

## 2017-04-03 RX ADMIN — NAFCILLIN 200 GRAM(S): 10 INJECTION, POWDER, FOR SOLUTION INTRAVENOUS at 03:00

## 2017-04-03 RX ADMIN — Medication 3 MILLILITER(S): at 18:35

## 2017-04-03 RX ADMIN — Medication 1 APPLICATION(S): at 06:00

## 2017-04-03 RX ADMIN — Medication 1 TABLET(S): at 11:38

## 2017-04-03 RX ADMIN — SODIUM CHLORIDE 1000 MILLILITER(S): 9 INJECTION INTRAMUSCULAR; INTRAVENOUS; SUBCUTANEOUS at 00:20

## 2017-04-03 RX ADMIN — ZINC OXIDE 1 APPLICATION(S): 200 OINTMENT TOPICAL at 21:30

## 2017-04-03 RX ADMIN — Medication 200 MILLIGRAM(S): at 13:25

## 2017-04-03 RX ADMIN — NAFCILLIN 200 GRAM(S): 10 INJECTION, POWDER, FOR SOLUTION INTRAVENOUS at 14:32

## 2017-04-03 RX ADMIN — CHLORHEXIDINE GLUCONATE 1 APPLICATION(S): 213 SOLUTION TOPICAL at 11:38

## 2017-04-03 RX ADMIN — Medication 20 MILLIGRAM(S): at 07:17

## 2017-04-03 RX ADMIN — ZINC OXIDE 1 APPLICATION(S): 200 OINTMENT TOPICAL at 07:18

## 2017-04-03 RX ADMIN — Medication 1 APPLICATION(S): at 21:29

## 2017-04-03 RX ADMIN — PANTOPRAZOLE SODIUM 40 MILLIGRAM(S): 20 TABLET, DELAYED RELEASE ORAL at 11:37

## 2017-04-03 RX ADMIN — Medication 0.25 MILLIGRAM(S): at 13:25

## 2017-04-03 RX ADMIN — NAFCILLIN 200 GRAM(S): 10 INJECTION, POWDER, FOR SOLUTION INTRAVENOUS at 21:30

## 2017-04-03 RX ADMIN — Medication 3 MILLILITER(S): at 23:09

## 2017-04-03 RX ADMIN — Medication 1 APPLICATION(S): at 07:16

## 2017-04-03 RX ADMIN — Medication 1 APPLICATION(S): at 13:25

## 2017-04-03 RX ADMIN — HEPARIN SODIUM 5000 UNIT(S): 5000 INJECTION INTRAVENOUS; SUBCUTANEOUS at 21:29

## 2017-04-03 RX ADMIN — CHLORHEXIDINE GLUCONATE 15 MILLILITER(S): 213 SOLUTION TOPICAL at 07:17

## 2017-04-03 RX ADMIN — Medication 3 MILLILITER(S): at 06:43

## 2017-04-03 RX ADMIN — ZINC OXIDE 1 APPLICATION(S): 200 OINTMENT TOPICAL at 14:33

## 2017-04-03 NOTE — CONSULT NOTE ADULT - SUBJECTIVE AND OBJECTIVE BOX
Chief Complaint:  Patient is a 61y old  Male who presents with a chief complaint of sob (08 Mar 2017 11:44)      HPI:  62 Y/O male w/ PMHx of COPD on home O2, cpap at night, paroxysmal afib, htn, hld, dm, pw c/o SOB. ABG worsening in ED showing 7.26/102/133/44 on Bipap. Pt intubated for hypercapnic respiratory failure. (08 Mar 2017 06:36)    Allergies:        Allergies:  	No Known Allergies:     Home Medications:   · 	predniSONE 10 mg oral tablet: 4 tab(s) orally once a day for 3 days, then 3 tabs daily x 3 days, then 2 tabs daily x 3 days, then 1 tab daily for 3 days  · 	lactobacillus acidophilus oral capsule: 1 cap(s) orally 3 times a day (with meals)  · 	diltiaZEM 30 mg oral tablet: Last Dose Taken:  , 1 tab(s) orally every 6 hours  · 	aspirin 81 mg oral tablet, chewable: Last Dose Taken:  , 1 tab(s) orally once a day  · 	tiotropium 18 mcg inhalation capsule: 1 cap(s) inhaled once a day  · 	FeroSul 325 mg (65 mg elemental iron) oral tablet: Last Dose Taken:  , 1 tab(s) orally 2 times a day  · 	fluticasone-salmeterol: Last Dose Taken:  , 1 puff(s) inhaled 2 times a day  · 	sucralfate 1 g oral tablet: 1 tab(s) orally 3 times a day  · 	albuterol-ipratropium 2.5 mg-0.5 mg/3 mL inhalation solution: Last Dose Taken:  , 3 milliliter(s) inhaled every 6 hours, As Needed for shortness of breath or wheezing  · 	metFORMIN 1000 mg oral tablet: 1 tab(s) orally 2 times a day (with meals)  · 	montelukast 10 mg oral tablet: 1 tab(s) orally once a day  · 	tamsulosin 0.4 mg oral capsule: 1 cap(s) orally once a day  · 	simvastatin 20 mg oral tablet: 1 tab(s) orally once a day (at bedtime)  · 	ProAir HFA: 2 puff(s) inhaled 4 times a day, As Needed  · 	Cozaar 50 mg oral tablet: Last Dose Taken:  , 1 tab(s) orally once a day  · 	omeprazole 40 mg oral delayed release capsule: 1 cap(s) orally once a day      Past Medical History:  Accelerated Essential Hypertension  x3 years  Acute Bronchitis with COPD    Adenoma of Prostate  dxed , radiation, x45 days, on lepron every 4 months  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    Past Surgical History:  S/P appendectomy.    Family History:  No pertinent family history in first degree relatives.  Review of Systems:  Intubated on vent. Awake. Unable to assess ROS.    Discussed with: Family    Physical Exam:  Vital Signs:  Vital Signs Last 24 Hrs  T(C): 37.6, Max: 37.6 ( @ 07:51)  T(F): 99.7, Max: 99.7 ( @ 13:30)  HR: 80 (59 - 126)  BP: 92/52 (74/50 - 167/79)  BP(mean): 66 (59 - 117)  RR: 19 (12 - 26)  SpO2: 97% (93% - 100%)    Daily Weight in k (2017 02:30)  I & Os for 24h ending 2017 07:00  =============================================  IN: 2372.2 ml / OUT: 3215 ml / NET: -842.8 ml    I & Os for current day (as of 2017 19:21)  =============================================  IN: 1320.7 ml / OUT: 2115 ml / NET: -794.3 ml      General:  Appears stated age, well-groomed, well-nourished, no distress  HEENT:  NC/AT, patent nares w/ pink mucosa, OP clear w/o lesions, EOMI, conjunctivae clear, no thyromegaly, no JVD  Chest:  Full & symmetric excursion, no increased effort, breath sounds clear  Cardiovascular:  Regular rhythm, S1, S2, no murmur/rub/S3/S4, radial/pedal pulses 2+  Abdomen:  Soft, non-tender, non-distended, normoactive bowel sounds  Extremities: No edema  Skin:  No rash/erythema. Skin is warm/dry  Musculoskeletal:  Full ROM in all joints w/o swelling/tenderness/effusion  Neuro/Psych:  Awake.      Laboratory:                            9.4    6.4   )-----------( 292      ( 2017 03:33 )             27.8     2017 03:33    139    |  94     |  17     ----------------------------<  137    4.1     |  38     |  0.33     Ca    8.2        2017 03:33  Phos  3.0       2017 03:33  Mg     1.8       2017 03:33      CAPILLARY BLOOD GLUCOSE  120 (2017 18:20)  192 (2017 11:38)  173 (2017 05:46)      Imaging:  EXAM:  CHEST SINGLE VIEW                        PROCEDURE DATE:  2017      ET and enteric tubes and right IJ catheter remain in place. No   pneumothorax.    There are bilateral interstitial opacities and thickening, partially due   to interstitial fibrosis, similar to the prior study. The lungs are   hyperinflated. Blunting of the left costophrenic angle is unchanged.    The cardiac silhouette is within normal limits in size. Aortic   calcifications.    Assessment: 62 Y/O male w/ PMHx of COPD on home O2, cpap at night, afib, htn, hld, dm, pw c/o SOB. ABG worsening in ED showing 7.26/102/133/44 on Bipap. Pt intubated for hypercapnic respiratory failure. Sepsis concerns. RANJIT ordered. For Trach.    Plan:     MEDICATIONS  (STANDING):  heparin  Injectable 5000Unit(s) SubCutaneous every 12 hours  ALBUTerol/ipratropium for Nebulization 3milliLiter(s) Nebulizer every 6 hours  ALBUTerol    90 MICROgram(s) HFA Inhaler 1Puff(s) Inhalation every 4 hours  tiotropium 18 MICROgram(s) Capsule 1Capsule(s) Inhalation daily  multivitamin/minerals 1Tablet(s) Oral daily  BACItracin   Ointment 1Application(s) Topical three times a day  ALPRAZolam 0.25milliGRAM(s) Oral three times a day  pantoprazole  Injectable 40milliGRAM(s) IV Push daily  chlorhexidine 0.12% Liquid 15milliLiter(s) Swish and Spit two times a day  chlorhexidine 4% Liquid 1Application(s) Topical daily  predniSONE   Tablet 20milliGRAM(s) Oral daily  BACItracin   Ointment 1Application(s) Topical two times a day  insulin lispro (HumaLOG) corrective regimen sliding scale  SubCutaneous every 6 hours  nafcillin  IVPB  IV Intermittent   nafcillin  IVPB 2Gram(s) IV Intermittent every 4 hours  gentamicin   IVPB  IV Intermittent   gentamicin   IVPB 210milliGRAM(s) IV Intermittent every 24 hours  dexmedetomidine Infusion 0.7MICROgram(s)/kG/Hr IV Continuous <Continuous>  zinc oxide 40%/lanolin Ointment 1Application(s) Topical three times a day    D/w pt's family and son (decision maker) yesterday.    Xander Bowen MD, FACC, FASYOSHI, FASNC, FACP  Director, Heart Failure Services  [facility]  , Department of Cardiology  [facility]
Infectious Diseases - Attending at Dr. Brown    HPI:  61M with COPD, chronic hypoxia, oxygen dependent, chronic hypercarbia with recent admission for COPD exacerbation, acute on chronic hypercarbic respiratory failure requiring only BiPAP. He was readmitted to ICU 3/8 for staph bacteremia and again with acute on chronic hypercarbic respiratory failure failing BiPAP and requiring intubation. Pt extubated but required BiPAP as needed throughout the day and bipap at night. Transferred to medical floor. Return with worsening acute on chronic respiratory acidosis/hypercarbic respiratory failure.He was on vanco and zosyn till 3/22,his blood culture had become neg .when he was reintubated reculturesfrom blood  showed MSSA. He is on vanco and cefepime from 3/29.    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 1000milliLiter(s) IV Continuous <Continuous>  dextrose 50% Injectable 12.5Gram(s) IV Push once  dextrose 50% Injectable 25Gram(s) IV Push once  ALBUTerol/ipratropium for Nebulization 3milliLiter(s) Nebulizer every 6 hours  ALBUTerol    90 MICROgram(s) HFA Inhaler 1Puff(s) Inhalation every 4 hours  tiotropium 18 MICROgram(s) Capsule 1Capsule(s) Inhalation daily  zinc oxide 40%/lanolin Ointment 1Application(s) Topical two times a day  multivitamin/minerals 1Tablet(s) Oral daily  BACItracin   Ointment 1Application(s) Topical three times a day  ALPRAZolam 0.25milliGRAM(s) Oral three times a day  pantoprazole  Injectable 40milliGRAM(s) IV Push daily  chlorhexidine 0.12% Liquid 15milliLiter(s) Swish and Spit two times a day  chlorhexidine 4% Liquid 1Application(s) Topical daily  predniSONE   Tablet 20milliGRAM(s) Oral daily  BACItracin   Ointment 1Application(s) Topical two times a day  insulin lispro (HumaLOG) corrective regimen sliding scale  SubCutaneous every 6 hours  nafcillin  IVPB  IV Intermittent   nafcillin  IVPB 2Gram(s) IV Intermittent every 4 hours  gentamicin   IVPB  IV Intermittent   gentamicin   IVPB 210milliGRAM(s) IV Intermittent every 24 hours  potassium chloride   Powder 40milliEquivalent(s) Oral every 4 hours  dexmedetomidine Infusion 0.7MICROgram(s)/kG/Hr IV Continuous <Continuous>    MEDICATIONS  (PRN):  dextrose Gel 1Dose(s) Oral once PRN Blood Glucose LESS THAN 70 milliGRAM(s)/deciliter  glucagon  Injectable 1milliGRAM(s) IntraMuscular once PRN Glucose LESS THAN 70 milligrams/deciliter  acetaminophen   Tablet 650milliGRAM(s) Oral every 6 hours PRN For Temp greater than 38 C (100.4 F)  benzocaine 15 mG/menthol 3.6 mG Lozenge 1Lozenge Oral every 4 hours PRN Sore Throat  acetaminophen   Tablet. 650milliGRAM(s) Oral every 6 hours PRN Mild Pain (1 - 3)      Vital Signs Last 24 Hrs  T(C): 37.3, Max: 37.3 (- @ 04:00)  T(F): 99.1, Max: 99.1 (04- @ 04:00)  HR: 90 (67 - 102)  BP: 146/79 (95/53 - 149/73)  BP(mean): 98 (68 - 106)  RR: 21 (12 - 26)  SpO2: 95% (95% - 100%)    PHYSICAL EXAM:    Constitutional: intubated,awake,cachectic  HEENT: PERRLA, EOMI, Normal Hearing, MMM  Neck :supple  Respiratory: rhonchi + ,on vent   Cardiovascular: S1 and S2, RRR, no M/G/R  Gastrointestinal: BS+, soft, NT/ND  Extremities: No peripheral edema  Vascular: 2+ peripheral pulses  Neurological: intubated ,moving all extremities  Skin: No rashes      LABS:              WBC _WNL  UA    Color: Yellow / Appearance: Clear / S.005 / pH: x  Gluc: x / Ketone: Negative  / Bili: Negative / Urobili: Negative mg/dL   Blood: x / Protein: Negative mg/dL / Nitrite: Negative   Leuk Esterase: Negative / RBC: x / WBC x   Sq Epi: x / Non Sq Epi: Occasional / Bacteria: x        MICROBIOLOGY:  RECENT CULTURES:   .Blood Blood/only aerobic received Staphylococcus aureus   Growth in aerobic bottle: Gram Positive Cocci in Clusters  and  Gram Positive Cocci in Pairs and Chains   Growth in aerobic bottle: Staphylococcus aureus  Growth in aerobic bottle: Alpha hemolytic strep  (not Strep. pneumoniae or Enterococcus)  Single set isolate, possible contaminant. Contact  Microbiology if susceptibility testing clinically  indicate     .Blood Blood XXXX   Growth in aerobic bottle: Gram Positive Cocci in Clusters   Growth in aerobic bottle: Gram Positive Cocci in Clusters     .Urine Catheterized XXXX XXXX   No growth     .Sputum Sputum Staphylococcus aureus   Moderate WBC's  Few squamous epithelial cells  Moderate Gram Positive Cocci in Clusters  Moderate Gram Negative Rods   Moderate Staphylococcus aureus  Normal Respiratory Cinthia present          RADIOLOGY & ADDITIONAL STUDIES:
Chief Complaint:  Patient is a 61y old  Male who presents with a chief complaint of sob intubated for hypercapnic resp. failure and sepsis (08 Mar 2017 11:44)      HPI:2 Y/O male w/ PMHx of COPD on home O2, cpap at night, afib, htn, hld, dm, pw c/o SOB. ABG worsening in ED showing 7.26/102/133/44 on Bipap. Pt intubated for hypercapnic respiratory failure. now vent dependent and being fed via NGT.  Asked to evaluate patient for possible PEG     Allergies:  No Known Allergies      Medications:  heparin  Injectable 5000Unit(s) SubCutaneous every 12 hours  dextrose 5%. 1000milliLiter(s) IV Continuous <Continuous>  dextrose Gel 1Dose(s) Oral once PRN  dextrose 50% Injectable 12.5Gram(s) IV Push once  dextrose 50% Injectable 25Gram(s) IV Push once  glucagon  Injectable 1milliGRAM(s) IntraMuscular once PRN  ALBUTerol/ipratropium for Nebulization 3milliLiter(s) Nebulizer every 6 hours  ALBUTerol    90 MICROgram(s) HFA Inhaler 1Puff(s) Inhalation every 4 hours  tiotropium 18 MICROgram(s) Capsule 1Capsule(s) Inhalation daily  acetaminophen   Tablet 650milliGRAM(s) Oral every 6 hours PRN  benzocaine 15 mG/menthol 3.6 mG Lozenge 1Lozenge Oral every 4 hours PRN  multivitamin/minerals 1Tablet(s) Oral daily  acetaminophen   Tablet. 650milliGRAM(s) Oral every 6 hours PRN  BACItracin   Ointment 1Application(s) Topical three times a day  ALPRAZolam 0.25milliGRAM(s) Oral three times a day  pantoprazole  Injectable 40milliGRAM(s) IV Push daily  chlorhexidine 0.12% Liquid 15milliLiter(s) Swish and Spit two times a day  chlorhexidine 4% Liquid 1Application(s) Topical daily  BACItracin   Ointment 1Application(s) Topical two times a day  insulin lispro (HumaLOG) corrective regimen sliding scale  SubCutaneous every 6 hours  nafcillin  IVPB  IV Intermittent   nafcillin  IVPB 2Gram(s) IV Intermittent every 4 hours  gentamicin   IVPB  IV Intermittent   gentamicin   IVPB 210milliGRAM(s) IV Intermittent every 24 hours  dexmedetomidine Infusion 0.7MICROgram(s)/kG/Hr IV Continuous <Continuous>  zinc oxide 40%/lanolin Ointment 1Application(s) Topical three times a day  predniSONE   Tablet 15milliGRAM(s) Oral daily      PMHX/PSHX:  Iron deficiency anemia  CAD (coronary artery disease)  Atrial fibrillation  Stented coronary artery  History of Appendectomy  Adenoma of Prostate  Acute Bronchitis with COPD  Dyslipidemia  Diabetes Mellitus  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  Accelerated Essential Hypertension  S/P appendectomy      Family history:  No pertinent family history in first degree relatives      Social History: unknown     ROS:     LIMITED PATIENT IS INTUBATED       PHYSICAL EXAM:   Vital Signs:  Vital Signs Last 24 Hrs  T(C): 36.5, Max: 37.6 (04-02 @ 13:30)  T(F): 97.7, Max: 99.7 (04-02 @ 13:30)  HR: 83 (54 - 120)  BP: 113/94 (79/47 - 145/82)  BP(mean): 100 (58 - 100)  RR: 17 (14 - 26)  SpO2: 100% (76% - 100%)     GENERAL:  Appears stated age, well-groomed, well-nourished,  HEENT:  NC/AT,  conjunctivae clear and pink, no thyromegaly, nodules, adenopathy, no JVD, sclera -anicteric  CHEST:  Full & symmetric excursion, no increased effort, breath sounds clear  HEART:  Regular rhythm, S1, S2, no murmur/rub/S3/S4, no abdominal bruit, no edema  ABDOMEN:  Soft, non-tender, non-distended, normoactive bowel sounds,  no masses ,no hepato-splenomegaly, no signs of chronic liver disease  EXTREMITIES  no cyanosis, clubbing or edema  SKIN:  No rash/erythema/ecchymoses/petechiae/wounds/abscess/warm/dry  NEURO: on ventilator responds to noxious stimuli    LABS:                        8.7    8.7   )-----------( 325      ( 03 Apr 2017 03:54 )             26.3     03 Apr 2017 03:54    138    |  95     |  13     ----------------------------<  153    3.7     |  35     |  0.24     Ca    7.9        03 Apr 2017 03:54  Phos  3.4       03 Apr 2017 03:54  Mg     1.9       03 Apr 2017 03:54        PT/INR - ( 03 Apr 2017 03:54 )   PT: 11.9 sec;   INR: 1.09 ratio
Dr. Gale contact information: Office: [phone] Cell: [phone]    GENERAL SURGERY CONSULT NOTE    Patient is a 61y old  Male who presents with a chief complaint of sob (08 Mar 2017 11:44)      HPI:  60 Y/O male w/ PMHx of COPD on home O2, cpap at night, afib, htn, hld, dm, pw c/o SOB. ABG worsening in ED showing 7.26/102/133/44 on Bipap. Pt intubated for hypercapnic respiratory failure. 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 1000milliLiter(s) IV Continuous <Continuous>  dextrose 50% Injectable 12.5Gram(s) IV Push once  dextrose 50% Injectable 25Gram(s) IV Push once  ALBUTerol/ipratropium for Nebulization 3milliLiter(s) Nebulizer every 6 hours  ALBUTerol    90 MICROgram(s) HFA Inhaler 1Puff(s) Inhalation every 4 hours  tiotropium 18 MICROgram(s) Capsule 1Capsule(s) Inhalation daily  zinc oxide 40%/lanolin Ointment 1Application(s) Topical two times a day  multivitamin/minerals 1Tablet(s) Oral daily  BACItracin   Ointment 1Application(s) Topical three times a day  ALPRAZolam 0.25milliGRAM(s) Oral three times a day  pantoprazole  Injectable 40milliGRAM(s) IV Push daily  chlorhexidine 0.12% Liquid 15milliLiter(s) Swish and Spit two times a day  chlorhexidine 4% Liquid 1Application(s) Topical daily  predniSONE   Tablet 20milliGRAM(s) Oral daily  BACItracin   Ointment 1Application(s) Topical two times a day  insulin lispro (HumaLOG) corrective regimen sliding scale  SubCutaneous every 6 hours  nafcillin  IVPB  IV Intermittent   nafcillin  IVPB 2Gram(s) IV Intermittent every 4 hours  gentamicin   IVPB  IV Intermittent   gentamicin   IVPB 210milliGRAM(s) IV Intermittent every 24 hours  dexmedetomidine Infusion 0.7MICROgram(s)/kG/Hr IV Continuous <Continuous>    MEDICATIONS  (PRN):  dextrose Gel 1Dose(s) Oral once PRN Blood Glucose LESS THAN 70 milliGRAM(s)/deciliter  glucagon  Injectable 1milliGRAM(s) IntraMuscular once PRN Glucose LESS THAN 70 milligrams/deciliter  acetaminophen   Tablet 650milliGRAM(s) Oral every 6 hours PRN For Temp greater than 38 C (100.4 F)  benzocaine 15 mG/menthol 3.6 mG Lozenge 1Lozenge Oral every 4 hours PRN Sore Throat  acetaminophen   Tablet. 650milliGRAM(s) Oral every 6 hours PRN Mild Pain (1 - 3)      Allergies    No Known Allergies    Intolerances        SOCIAL HISTORY          Smoking: Yes [ ]  No [ ]   ______pk yrs          ETOH  Yes [ ]  No [ ]  Social [ ]          DRUGS:  Yes [ ]  No [ ]  if so what______________    FAMILY HISTORY:  No pertinent family history in first degree relatives      Vital Signs Last 24 Hrs  T(C): 37.3, Max: 37.3 ( @ 04:00)  T(F): 99.1, Max: 99.1 ( @ 04:00)  HR: 101 (67 - 102)  BP: 95/59 (95/53 - 146/79)  BP(mean): 71 (68 - 106)  RR: 19 (12 - 26)  SpO2: 95% (95% - 100%)    Physical Exam:    General:  Appears stated age, well-groomed, well-nourished, no distress  Eyes : YESI  HENT:  WNL, no JVD  Chest:  clear breath sounds  Cardiovascular:  Regular rate & rhythm  Abdomen:    Extremities:    Skin:  No rash  Musculoskeletal:  normal strength  Neuro/Psych:  Alert, oriented tp time, place and person       LABS:                        9.5    6.1   )-----------( 272      ( 2017 03:25 )             28.1     2017 03:25    140    |  94     |  14     ----------------------------<  205    3.2     |  37     |  0.48     Ca    7.7        2017 03:25  Phos  2.6       2017 03:25  Mg     1.9       2017 03:25    TPro  5.2    /  Alb  1.4    /  TBili  0.4    /  DBili  x      /  AST  40     /  ALT  47     /  AlkPhos  144    31 Mar 2017 03:49      Urinalysis Basic - ( 31 Mar 2017 13:51 )    Color: Yellow / Appearance: Clear / S.005 / pH: x  Gluc: x / Ketone: Negative  / Bili: Negative / Urobili: Negative mg/dL   Blood: x / Protein: Negative mg/dL / Nitrite: Negative   Leuk Esterase: Negative / RBC: x / WBC x   Sq Epi: x / Non Sq Epi: Occasional / Bacteria: x        RADIOLOGY & ADDITIONAL STUDIES:    Risks, benefits, and alternatives to treatment discussed. All questions answered with understanding.
HPI:  Pt is a 60 yo WM with h/o COPD on home O2, nocturnal CPAP, CAD/stent, A fib, HTN, DM, HLD and prostate adenoma presented with SOB, admitted for AECOPD, failed BiPAP, intubated in ICU, treated for staph sepsis, successfully extubated and transferred to floor on nocturnal BiPAP.     Has been more lethargic, laboured breathing on BiPAP since last night. Pulse ox this morning was in 60s and 70s on BiPAP, ABG showed pH 7.29/96/86%. ICU re-consulted for respiratory failure.     Patient seen/examined at bedside. On BiPAP 18/6/50% with sat >97%. Much more awake as per nurse compared to this morning. Confused AAO x1.     GOC discussed with the family by the medicine team- family wants to wait for a daughter who is currently in [place] applying for a visa to come to the [place]; at this moment they want "everything done if necessary".     Allergies    No Known Allergies      MEDICATIONS  (STANDING):  heparin  Injectable 5000Unit(s) SubCutaneous every 12 hours  dextrose 5%. 1000milliLiter(s) IV Continuous <Continuous>  dextrose 50% Injectable 12.5Gram(s) IV Push once  dextrose 50% Injectable 25Gram(s) IV Push once  ALBUTerol/ipratropium for Nebulization 3milliLiter(s) Nebulizer every 6 hours  ALBUTerol    90 MICROgram(s) HFA Inhaler 1Puff(s) Inhalation every 4 hours  tiotropium 18 MICROgram(s) Capsule 1Capsule(s) Inhalation daily  pantoprazole    Tablet 40milliGRAM(s) Oral before breakfast  zinc oxide 40%/lanolin Ointment 1Application(s) Topical two times a day  insulin lispro (HumaLOG) corrective regimen sliding scale  SubCutaneous Before meals and at bedtime  metoprolol 25milliGRAM(s) Oral two times a day  multivitamin/minerals 1Tablet(s) Oral daily  diltiazem    Tablet 60milliGRAM(s) Oral every 8 hours  BACItracin   Ointment 1Application(s) Topical three times a day  lactobacillus acidophilus 1Tablet(s) Oral once  dextrose 5% + sodium chloride 0.9%. 1000milliLiter(s) IV Continuous <Continuous>    MEDICATIONS  (PRN):  dextrose Gel 1Dose(s) Oral once PRN Blood Glucose LESS THAN 70 milliGRAM(s)/deciliter  glucagon  Injectable 1milliGRAM(s) IntraMuscular once PRN Glucose LESS THAN 70 milligrams/deciliter  acetaminophen   Tablet 650milliGRAM(s) Oral every 6 hours PRN For Temp greater than 38 C (100.4 F)  benzocaine 15 mG/menthol 3.6 mG Lozenge 1Lozenge Oral every 4 hours PRN Sore Throat  acetaminophen   Tablet. 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       ICU Vital Signs Last 24 Hrs  T(C): 37, Max: 37.6 ( @ 00:13)  T(F): 98.6, Max: 99.6 ( @ 00:13)  HR: 73 (72 - 102)  BP: 134/62 (132/88 - 186/67)  BP(mean): --  ABP: --  ABP(mean): --  RR: 18 (16 - 20)  SpO2: 100% (67% - 100%)      ABG - ( 27 Mar 2017 11:52 )  pH: x     /  pCO2: 96    /  pO2: 54    / HCO3: 45    / Base Excess: 16.4  /  SaO2: 86                  I&O's Detail    I & Os for current day (as of 27 Mar 2017 15:50)  =============================================  IN:    dextrose 5% + sodium chloride 0.9%.: 1200 ml    Oral Fluid: 840 ml    Total IN: 2040 ml  ---------------------------------------------  OUT:    Total OUT: 0 ml  ---------------------------------------------  Total NET: 2040 ml      PHYSICAL EXAM:    GENERAL: on BiPAP, arousable easily  HEAD:  Atraumatic, Normocephalic  EYES: EOMI, PERRLA, conjunctiva and sclera clear  ENMT: No tonsillar erythema, exudates, or enlargement; Moist mucous membranes, Good dentition, No lesions  NECK: Supple, No JVD, Normal thyroid  NERVOUS SYSTEM:  Alert & Oriented X1, DTRs 2+ intact and symmetric  CHEST/LUNG: Clear to percussion bilaterally; No rales, rhonchi, wheezing, or rubs  HEART: Regular rate and rhythm; No murmurs, rubs, or gallops  ABDOMEN: Soft, Nontender, Nondistended; Bowel sounds present  EXTREMITIES:  2+ Peripheral Pulses, No clubbing, cyanosis, or edema  LYMPH: No lymphadenopathy noted  SKIN: No rashes or lesions    LABS:  CBC Full  -  ( 27 Mar 2017 06:20 )  WBC Count : 10.0 K/uL  Hemoglobin : 10.0 g/dL  Hematocrit : 31.1 %  Platelet Count - Automated : 214 K/uL  Mean Cell Volume : 84.7 fl  Mean Cell Hemoglobin : 27.3 pg  Mean Cell Hemoglobin Concentration : 32.3 gm/dL  Auto Neutrophil # : x  Auto Lymphocyte # : x  Auto Monocyte # : x  Auto Eosinophil # : x  Auto Basophil # : x  Auto Neutrophil % : x  Auto Lymphocyte % : x  Auto Monocyte % : x  Auto Eosinophil % : x  Auto Basophil % : x          CAPILLARY BLOOD GLUCOSE  159 (27 Mar 2017 11:49)        CRITICAL CARE TIME SPENT: 45min
Patient is a 61y old  Male who presents with a chief complaint of sob       HPI:  62 Y/O male w/ PMHx of COPD on home O2, CPAP at night, A FIB,, HTN, HLD, DM, presented with SOB. Intubated for hypercapnic respiratory failure in ED. CT chest and CXR showed multifocal pneumonia.  NO more details of history are available as intubated and not able to provide information.   Was recently admitted with similar presentation, was in ICU with BIPAP support.    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 total duration not known.    Allergies  No Known Allergies    MEDICATIONS  (STANDING):  heparin  Injectable 5000Unit(s) SubCutaneous every 12 hours  pantoprazole  Injectable 40milliGRAM(s) IV Push daily  chlorhexidine 0.12% Liquid 15milliLiter(s) Swish and Spit two times a day  chlorhexidine 4% Liquid 1Application(s) Topical daily  sodium chloride 0.9%. 1000milliLiter(s) IV Continuous <Continuous>  insulin lispro (HumaLOG) corrective regimen sliding scale  SubCutaneous every 6 hours  dextrose 5%. 1000milliLiter(s) IV Continuous <Continuous>  dextrose 50% Injectable 12.5Gram(s) IV Push once  dextrose 50% Injectable 25Gram(s) IV Push once  propofol Infusion 40MICROgram(s)/kG/Min IV Continuous <Continuous>  piperacillin/tazobactam IVPB. 3.375Gram(s) IV Intermittent every 8 hours  ALBUTerol/ipratropium for Nebulization 3milliLiter(s) Nebulizer every 6 hours  ALBUTerol    90 MICROgram(s) HFA Inhaler 1Puff(s) Inhalation every 4 hours  tiotropium 18 MICROgram(s) Capsule 1Capsule(s) Inhalation daily  methylPREDNISolone sodium succinate Injectable 40milliGRAM(s) IV Push every 8 hours  vancomycin  IVPB 1000milliGRAM(s) IV Intermittent every 8 hours  ALPRAZolam 0.25milliGRAM(s) Oral two times a day    MEDICATIONS  (PRN):  dextrose Gel 1Dose(s) Oral once PRN Blood Glucose LESS THAN 70 milliGRAM(s)/deciliter  glucagon  Injectable 1milliGRAM(s) IntraMuscular once PRN Glucose LESS THAN 70 milligrams/deciliter  acetaminophen   Tablet 650milliGRAM(s) Oral every 6 hours PRN For Temp greater than 38 C (100.4 F)    REVIEW OF SYSTEMS:  not available.    Vital Signs Last 24 Hrs  T(C): 37.1, Max: 37.6 (03-10 @ 05:00)  T(F): 98.8, Max: 99.6 (03-10 @ 05:00)  HR: 114 (62 - 176)  BP: 164/85 (109/71 - 168/67)  BP(mean): 103 (74 - 103)  RR: 27 (13 - 27)  SpO2: 100% (88% - 100%)    PHYSICAL EXAM:  GEN:        Awake, responsive and comfortable.  HEENT:    Normal.  ETT in place.  RESP:      decreased air entry.  CVS:         Regular rate and rhythm.   ABD:         Soft, non-tender, non-distended;   :             No costovertebral angle tenderness  SKIN:           Warm and dry.  EXTR:            No clubbing, cyanosis or edema  CNS:              Intact sensory and motor function.  PSYCH:        cooperative, no anxiety or depression    LABS:                        7.6    8.0   )-----------( 409      ( 10 Mar 2017 03:22 )             22.3     10 Mar 2017 03:22    137    |  97     |  15     ----------------------------<  267    3.7     |  33     |  0.33     Ca    7.9        10 Mar 2017 03:22  Phos  1.5       10 Mar 2017 03:22  Mg     1.9       10 Mar 2017 03:22    EKG: sinus rhythm    RADIOLOGY & ADDITIONAL STUDIES:    EXAM:  CT ANGIO CHEST (W)AW IC                            PROCEDURE DATE:  03/08/2017        INTERPRETATION:  HISTORY: Shortness of breath.    TECHNIQUE:  CT pulmonary angiography was performed of the chest according   to standard institutional protocol. Coronal, sagittal and transaxial 3-D   MIP reformatted images are provided from the transaxial source data.     70mL of Omnipaque 350 was administered without complication and 30 mL was   discarded.      COMPARISON: CTPA dated 2/6/2015.    FINDINGS:   There is good opacification of pulmonary arterial tree. No filling defect   is present to suggest acute pulmonary embolus.    The thyroid gland is unremarkable.     There is a small left pleural effusion with associated partial   compressive atelectasis of left lower lobe. There is mucus puddling   superimposed in the left lower lobe as well as few patchy additional left   lower lobe airspace opacities. There is new airspace consolidation in the   posterior upper lobes. There is a somewhatspiculated nodule in the right   middle lobe on series 3 image 1:15 measuring 1.4 cm. Findings are   superimposed on severe centrilobular emphysema. The trachea and right   bronchi are clear.    There is no axillary, mediastinal or hilar adenopathy.    The heart size is normal. There is no pericardial effusion. The thoracic   aorta is normal in caliber without dissection.     The osseous structures are stable in appearance without acute finding.    Limited images of the upper abdomen demonstrate no acute abnormality.    IMPRESSION:  No pulmonary embolism.    Airspace opacities and airspace consolidation superimposed on   centrilobular emphysema. Left lower lobe mucous plugging. Findings are   likely on an infectious basis, however, pulmonary edema or neoplastic   process cannot be entirely excluded. Follow-up after the appropriate   clinical treatment is recommended.    Small left pleural effusion.    CHANTALE ARAIZA M.D., RADIOLOGIST  This document has been electronically signed. Mar  8 2017  4:17AM      ASSESSMENT AND PLAN:  ·	Acute Hypoxic/hypercarbic Respiratory failure.  ·	Multifocal pneumonia.  ·	Acute COPD exacerbation.  ·	Bilateral pleural effusion.  ·	Anemia.  ·	CAD with angioplasty.  ·	HTN.  ·	A Fib.  ·	HLD.  ·	DM    Full Vent support with daily weaning trial.  Continue antibiotics, nebulizer and steroids.
Patient is a 61y old  Male who presents with a chief complaint of sob (08 Mar 2017 11:44)    HPI:  60 Y/O male w/ PMHx of COPD on home O2, cpap at night, afib, htn, hld, dm, pw c/o SOB. ABG worsening in ED showing 7.26/102/133/44 on Bipap. Pt intubated for hypercapnic respiratory failure. (08 Mar 2017 06:36)    Patient with relatively acute onset of polyuria at 5am 100-350 ml hr; last 2 hours 200 ml hr. No diuretic rx; no hyperglycemia; MAPs improved over the past 24 hrs. Fluid resuscitation 1-2 liters last 48 hrs and on admission 4-5 liters. Serum Na 139 at 3 am. Patient s/p PRBC.    PAST MEDICAL & SURGICAL HISTORY:  Iron deficiency anemia    Patient is a 61y old  Male who presents with a chief complaint of sob (08 Mar 2017 11:44)    HPI:  60 Y/O male w/ PMHx of COPD on home O2, cpap at night, afib, htn, hld, dm, pw c/o SOB. ABG worsening in ED showing 7.26/102/133/44 on Bipap. Pt intubated for hypercapnic respiratory failure. 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 1000milliLiter(s) IV Continuous <Continuous>  dextrose 50% Injectable 12.5Gram(s) IV Push once  dextrose 50% Injectable 25Gram(s) IV Push once  ALBUTerol/ipratropium for Nebulization 3milliLiter(s) Nebulizer every 6 hours  ALBUTerol    90 MICROgram(s) HFA Inhaler 1Puff(s) Inhalation every 4 hours  tiotropium 18 MICROgram(s) Capsule 1Capsule(s) Inhalation daily  zinc oxide 40%/lanolin Ointment 1Application(s) Topical two times a day  multivitamin/minerals 1Tablet(s) Oral daily  BACItracin   Ointment 1Application(s) Topical three times a day  ALPRAZolam 0.25milliGRAM(s) Oral three times a day  pantoprazole  Injectable 40milliGRAM(s) IV Push daily  chlorhexidine 0.12% Liquid 15milliLiter(s) Swish and Spit two times a day  chlorhexidine 4% Liquid 1Application(s) Topical daily  predniSONE   Tablet 20milliGRAM(s) Oral daily  BACItracin   Ointment 1Application(s) Topical two times a day  phenylephrine    Infusion 0.1MICROgram(s)/kG/Min IV Continuous <Continuous>  insulin lispro (HumaLOG) corrective regimen sliding scale  SubCutaneous every 6 hours  cefepime  IVPB 2000milliGRAM(s) IV Intermittent every 8 hours  vancomycin  IVPB  IV Intermittent   vancomycin  IVPB 1000milliGRAM(s) IV Intermittent every 12 hours    MEDICATIONS  (PRN):  dextrose Gel 1Dose(s) Oral once PRN Blood Glucose LESS THAN 70 milliGRAM(s)/deciliter  glucagon  Injectable 1milliGRAM(s) IntraMuscular once PRN Glucose LESS THAN 70 milligrams/deciliter  acetaminophen   Tablet 650milliGRAM(s) Oral every 6 hours PRN For Temp greater than 38 C (100.4 F)  benzocaine 15 mG/menthol 3.6 mG Lozenge 1Lozenge Oral every 4 hours PRN Sore Throat  acetaminophen   Tablet. 650milliGRAM(s) Oral every 6 hours PRN Mild Pain (1 - 3)    Vital Signs Last 24 Hrs  T(C): 37.2, Max: 37.4 (- @ 08:00)  T(F): 98.9, Max: 99.3 (- @ 08:00)  HR: 96 (69 - 102)  BP: 149/73 (87/66 - 157/80)  BP(mean): 96 (73 - 119)  RR: 18 (11 - 22)  SpO2: 99% (91% - 100%)  Mode: AC/ CMV (Assist Control/ Continuous Mandatory Ventilation)  RR (machine): 14  TV (machine): 400  FiO2: 30  PEEP: 5  ITime: 0.9  MAP: 13  PIP: 42    CAPILLARY BLOOD GLUCOSE  256 (31 Mar 2017 11:28)  170 (31 Mar 2017 05:22)  134 (30 Mar 2017 23:25)  176 (30 Mar 2017 18:10)  267 (30 Mar 2017 13:09)    PHYSICAL EXAM:      T(C): 37.2, Max: 37.4 ( @ 08:00)  HR: 96 (69 - 102)  BP: 149/73 (87/66 - 157/80)  RR: 18 (11 - 22)  SpO2: 99% (91% - 100%)  Wt(kg): --  Respiratory: clear anteriorly, decreased BS at bases  Cardiovascular: S1 S2  Gastrointestinal: soft NT ND +BS  Extremities:    no edema              31 Mar 2017 03:49    139    |  97     |  14     ----------------------------<  175    3.2     |  35     |  0.40     Ca    7.5        31 Mar 2017 03:49  Phos  2.1       31 Mar 2017 03:49  Mg     1.6       31 Mar 2017 03:49    TPro  5.2    /  Alb  1.4    /  TBili  0.4    /  DBili  x      /  AST  40     /  ALT  47     /  AlkPhos  144    31 Mar 2017 03:49                          8.8    7.6   )-----------( 263      ( 31 Mar 2017 03:49 )             26.4     Urinalysis Basic - ( 29 Mar 2017 19:15 )    Color: Yellow / Appearance: Clear / S.010 / pH: x  Gluc: x / Ketone: Trace  / Bili: Negative / Urobili: Negative mg/dL   Blood: x / Protein: Negative mg/dL / Nitrite: Negative   Leuk Esterase: Negative / RBC: Occasional /HPF / WBC Occasional /HPF   Sq Epi: x / Non Sq Epi: x / Bacteria: x      ABG - ( 30 Mar 2017 08:05 )  pH: x     /  pCO2: 48    /  pO2: 105   / HCO3: 36    / Base Excess: 11.2  /  SaO2: 98                    Assessment and Plan  Polyuria differential includes appropriate pressure diuresis post crystalloid and colloid resuscitation versus acquired partial central or nephrogenic DI; no overt evidence of osmotic diuresis.  Follow Serum Na stat with urine osm, UA, micro and  electrolytes.  Continue maintenance IV as warranted; no need to replace urine output unless hypotension occurs or if serum Na >145 with continued dilute urine.  Will follow closely.

## 2017-04-03 NOTE — PROGRESS NOTE ADULT - SUBJECTIVE AND OBJECTIVE BOX
HPI:  62 Y/O male w/ PMHx of COPD on home O2, cpap at night, afib, htn, hld, dm, pw c/o SOB. ABG worsening in ED showing 7.26/102/133/44 on Bipap. Pt intubated for hypercapnic respiratory failure. (08 Mar 2017 06:36)    Over 24 Hrs: remains intubated, trach today.    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  Accelerated Essential Hypertension: x3 years  S/P appendectomy      ## ROS: Unable to obtain      ## O/E:  Vitals: T(C): 37.6, Max: 37.6 (04-02 @ 13:30)  HR: 112 (54 - 112)  BP: 111/68 (79/47 - 136/54)  BP(mean): 81 (58 - 100)  RR: 19 (14 - 24)  SpO2: 100% (76% - 100%)  Wt(kg): --  Gen: lying comfortably in bed intubated  HEENT: PERRL, EOMI  Resp: CTA B/L no c/r/w  CVS: S1S2 no m/r/g  Abd: soft NT/ND +BS  Ext: no c/c/e  Neuro: Awake and alert, responds to verbal stimuli    I & Os for 24h ending 04-03 @ 07:00  =============================================  IN: 1966 ml / OUT: 3555 ml / NET: -1589 ml    I & Os for current day (as of 04-03 @ 13:29)  =============================================  IN: 2.3 ml / OUT: 70 ml / NET: -67.7 ml    Mode: AC/ CMV (Assist Control/ Continuous Mandatory Ventilation), RR (machine): 14, TV (machine): 400, FiO2: 30, PEEP: 5, ITime: 0.9, MAP: 10, PIP: 23    ## Labs:                        8.7    8.7   )-----------( 325      ( 03 Apr 2017 03:54 )             26.3     03 Apr 2017 03:54    138    |  95     |  13     ----------------------------<  153    3.7     |  35     |  0.24     Ca    7.9        03 Apr 2017 03:54  Phos  3.4       03 Apr 2017 03:54  Mg     1.9       03 Apr 2017 03:54      PT/INR - ( 03 Apr 2017 03:54 )   PT: 11.9 sec;   INR: 1.09 ratio         ## Imaging: reviewed    MEDICATIONS  (STANDING):  heparin  Injectable 5000Unit(s) SubCutaneous every 12 hours  dextrose 5%. 1000milliLiter(s) IV Continuous <Continuous>  dextrose 50% Injectable 12.5Gram(s) IV Push once  dextrose 50% Injectable 25Gram(s) IV Push once  ALBUTerol/ipratropium for Nebulization 3milliLiter(s) Nebulizer every 6 hours  ALBUTerol    90 MICROgram(s) HFA Inhaler 1Puff(s) Inhalation every 4 hours  tiotropium 18 MICROgram(s) Capsule 1Capsule(s) Inhalation daily  multivitamin/minerals 1Tablet(s) Oral daily  BACItracin   Ointment 1Application(s) Topical three times a day  ALPRAZolam 0.25milliGRAM(s) Oral three times a day  pantoprazole  Injectable 40milliGRAM(s) IV Push daily  chlorhexidine 0.12% Liquid 15milliLiter(s) Swish and Spit two times a day  chlorhexidine 4% Liquid 1Application(s) Topical daily  BACItracin   Ointment 1Application(s) Topical two times a day  insulin lispro (HumaLOG) corrective regimen sliding scale  SubCutaneous every 6 hours  nafcillin  IVPB  IV Intermittent   nafcillin  IVPB 2Gram(s) IV Intermittent every 4 hours  gentamicin   IVPB  IV Intermittent   gentamicin   IVPB 210milliGRAM(s) IV Intermittent every 24 hours  dexmedetomidine Infusion 0.7MICROgram(s)/kG/Hr IV Continuous <Continuous>  zinc oxide 40%/lanolin Ointment 1Application(s) Topical three times a day  predniSONE   Tablet 15milliGRAM(s) Oral daily    MEDICATIONS  (PRN):  dextrose Gel 1Dose(s) Oral once PRN Blood Glucose LESS THAN 70 milliGRAM(s)/deciliter  glucagon  Injectable 1milliGRAM(s) IntraMuscular once PRN Glucose LESS THAN 70 milligrams/deciliter  acetaminophen   Tablet 650milliGRAM(s) Oral every 6 hours PRN For Temp greater than 38 C (100.4 F)  benzocaine 15 mG/menthol 3.6 mG Lozenge 1Lozenge Oral every 4 hours PRN Sore Throat  acetaminophen   Tablet. 650milliGRAM(s) Oral every 6 hours PRN Mild Pain (1 - 3)      ## Code status:  Goals of care discussion: [x] yes [ ] no  [] full code  [ x] DNR  [ ] DNI  [ ] LUC

## 2017-04-03 NOTE — CONSULT NOTE ADULT - CONSULT REQUESTED DATE/TIME
02-Apr-2017 19:21
01-Apr-2017
03-Apr-2017 09:24
10-Mar-2017 16:53
27-Mar-2017 15:49
31-Mar-2017 12:54
31-Mar-2017 13:30

## 2017-04-03 NOTE — CONSULT NOTE ADULT - CONSULT REASON
RANJIT request.  septic shock
DYSPHAGIA
Desaturation, lethargy
NEED FOR TRACHEOSTOMY
Polyuria
Respiratory failure
Persistent MSSA BSI

## 2017-04-03 NOTE — PROGRESS NOTE ADULT - SUBJECTIVE AND OBJECTIVE BOX
Patient seen in follow up for polyuria; appears comfortable on vent.    MEDICATIONS  (STANDING):  heparin  Injectable 5000Unit(s) SubCutaneous every 12 hours  dextrose 5%. 1000milliLiter(s) IV Continuous <Continuous>  dextrose 50% Injectable 12.5Gram(s) IV Push once  dextrose 50% Injectable 25Gram(s) IV Push once  ALBUTerol/ipratropium for Nebulization 3milliLiter(s) Nebulizer every 6 hours  ALBUTerol    90 MICROgram(s) HFA Inhaler 1Puff(s) Inhalation every 4 hours  tiotropium 18 MICROgram(s) Capsule 1Capsule(s) Inhalation daily  multivitamin/minerals 1Tablet(s) Oral daily  BACItracin   Ointment 1Application(s) Topical three times a day  ALPRAZolam 0.25milliGRAM(s) Oral three times a day  pantoprazole  Injectable 40milliGRAM(s) IV Push daily  chlorhexidine 0.12% Liquid 15milliLiter(s) Swish and Spit two times a day  chlorhexidine 4% Liquid 1Application(s) Topical daily  BACItracin   Ointment 1Application(s) Topical two times a day  insulin lispro (HumaLOG) corrective regimen sliding scale  SubCutaneous every 6 hours  nafcillin  IVPB  IV Intermittent   nafcillin  IVPB 2Gram(s) IV Intermittent every 4 hours  gentamicin   IVPB  IV Intermittent   gentamicin   IVPB 210milliGRAM(s) IV Intermittent every 24 hours  dexmedetomidine Infusion 0.7MICROgram(s)/kG/Hr IV Continuous <Continuous>  zinc oxide 40%/lanolin Ointment 1Application(s) Topical three times a day  predniSONE   Tablet 15milliGRAM(s) Oral daily    MEDICATIONS  (PRN):  dextrose Gel 1Dose(s) Oral once PRN Blood Glucose LESS THAN 70 milliGRAM(s)/deciliter  glucagon  Injectable 1milliGRAM(s) IntraMuscular once PRN Glucose LESS THAN 70 milligrams/deciliter  acetaminophen   Tablet 650milliGRAM(s) Oral every 6 hours PRN For Temp greater than 38 C (100.4 F)  benzocaine 15 mG/menthol 3.6 mG Lozenge 1Lozenge Oral every 4 hours PRN Sore Throat  acetaminophen   Tablet. 650milliGRAM(s) Oral every 6 hours PRN Mild Pain (1 - 3)    PHYSICAL EXAM:      T(C): 37.6, Max: 37.6 (04-03 @ 11:17)  HR: 60 (54 - 112)  BP: 108/52 (79/47 - 136/54)  RR: 14 (14 - 23)  SpO2: 100% (76% - 100%)  Wt(kg): --  Respiratory: clear anteriorly, decreased BS at bases  Cardiovascular: S1 S2  Gastrointestinal: soft NT ND +BS  Extremities:   no edema                                    8.7    8.7   )-----------( 325      ( 03 Apr 2017 03:54 )             26.3     03 Apr 2017 03:54    138    |  95     |  13     ----------------------------<  153    3.7     |  35     |  0.24     Ca    7.9        03 Apr 2017 03:54  Phos  3.4       03 Apr 2017 03:54  Mg     1.9       03 Apr 2017 03:54            Assessment and Plan:  Appropriate diuresis with obligate intake of IVF,  normal SNa.  PRN fluid NS bolus for intermittent low MAP.  Will follow.

## 2017-04-03 NOTE — PROGRESS NOTE ADULT - ASSESSMENT
61M with COPD, chronic hypoxia, oxygen dependent, chronic hypercarbia with recent admission for COPD exacerbation, acute on chronic hypercarbic respiratory failure requiring only BiPAP. Radmitted for acute on chronic hypercarbic respiratory failure this time requiring intubation, extubated but mostly BiPAP dependent, found to have staph bacteremia. Course with recurrent hypercarbic hypoxic respiratory failure reintubated 3/29.     DX: recurrent acute on chronic hypercarbic hypoxic respiratory failure, complex PNA, staph bacteremia, septic shock    - pt with end stage COPD  - repeated hypercapnic hypoxic resp failure requiring multiple intubations  - going for trach today.  - s/p course of antibiotics for PNA and bacteremia on admission with cultures clearing  - follow up blood and sputum culture, now again with MSSA and GNR in sputum  - cont with nafcillin and gentamicin as per ID for MSSA  - will need RANJIT to r/o infective endocarditis, cardiology aware, plan to do it this week  - off pressors now  - cont prednisone for COPD, taper slowly  - physical therapy  Condition d/w family in detail at the bedside.     DVT/GI ppx

## 2017-04-04 LAB
ANION GAP SERPL CALC-SCNC: 14 MMOL/L — SIGNIFICANT CHANGE UP (ref 5–17)
BASE EXCESS BLDA CALC-SCNC: 9 MMOL/L — HIGH (ref -2–2)
BLOOD GAS COMMENTS: SIGNIFICANT CHANGE UP
BLOOD GAS SOURCE: SIGNIFICANT CHANGE UP
BUN SERPL-MCNC: 7 MG/DL — SIGNIFICANT CHANGE UP (ref 7–23)
CALCIUM SERPL-MCNC: 8.3 MG/DL — LOW (ref 8.5–10.1)
CHLORIDE SERPL-SCNC: 92 MMOL/L — LOW (ref 96–108)
CO2 SERPL-SCNC: 31 MMOL/L — SIGNIFICANT CHANGE UP (ref 22–31)
CREAT SERPL-MCNC: 0.29 MG/DL — LOW (ref 0.5–1.3)
GLUCOSE SERPL-MCNC: 116 MG/DL — HIGH (ref 70–99)
HCO3 BLDA-SCNC: 33 MMOL/L — HIGH (ref 21–29)
HCT VFR BLD CALC: 28 % — LOW (ref 39–50)
HGB BLD-MCNC: 9.5 G/DL — LOW (ref 13–17)
HOROWITZ INDEX BLDA+IHG-RTO: 30 — SIGNIFICANT CHANGE UP
MAGNESIUM SERPL-MCNC: 1.8 MG/DL — SIGNIFICANT CHANGE UP (ref 1.8–2.4)
MCHC RBC-ENTMCNC: 27.9 PG — SIGNIFICANT CHANGE UP (ref 27–34)
MCHC RBC-ENTMCNC: 33.7 GM/DL — SIGNIFICANT CHANGE UP (ref 32–36)
MCV RBC AUTO: 82.7 FL — SIGNIFICANT CHANGE UP (ref 80–100)
PCO2 BLDA: 41 MMHG — SIGNIFICANT CHANGE UP (ref 32–46)
PH BLD: 7.51 — HIGH (ref 7.35–7.45)
PHOSPHATE SERPL-MCNC: 3.6 MG/DL — SIGNIFICANT CHANGE UP (ref 2.5–4.5)
PLATELET # BLD AUTO: 370 K/UL — SIGNIFICANT CHANGE UP (ref 150–400)
PO2 BLDA: 96 MMHG — SIGNIFICANT CHANGE UP (ref 74–108)
POTASSIUM SERPL-MCNC: 3.4 MMOL/L — LOW (ref 3.5–5.3)
POTASSIUM SERPL-SCNC: 3.4 MMOL/L — LOW (ref 3.5–5.3)
RBC # BLD: 3.39 M/UL — LOW (ref 4.2–5.8)
RBC # FLD: 17 % — HIGH (ref 11–15)
SAO2 % BLDA: 98 % — HIGH (ref 92–96)
SODIUM SERPL-SCNC: 137 MMOL/L — SIGNIFICANT CHANGE UP (ref 135–145)
WBC # BLD: 10.7 K/UL — HIGH (ref 3.8–10.5)
WBC # FLD AUTO: 10.7 K/UL — HIGH (ref 3.8–10.5)

## 2017-04-04 PROCEDURE — 99223 1ST HOSP IP/OBS HIGH 75: CPT

## 2017-04-04 PROCEDURE — 99233 SBSQ HOSP IP/OBS HIGH 50: CPT

## 2017-04-04 PROCEDURE — 74000: CPT | Mod: 26

## 2017-04-04 RX ORDER — POTASSIUM CHLORIDE 20 MEQ
20 PACKET (EA) ORAL ONCE
Qty: 0 | Refills: 0 | Status: COMPLETED | OUTPATIENT
Start: 2017-04-04 | End: 2017-04-04

## 2017-04-04 RX ORDER — MAGNESIUM SULFATE 500 MG/ML
1 VIAL (ML) INJECTION ONCE
Qty: 0 | Refills: 0 | Status: COMPLETED | OUTPATIENT
Start: 2017-04-04 | End: 2017-04-04

## 2017-04-04 RX ADMIN — ZINC OXIDE 1 APPLICATION(S): 200 OINTMENT TOPICAL at 05:31

## 2017-04-04 RX ADMIN — Medication 0.25 MILLIGRAM(S): at 14:42

## 2017-04-04 RX ADMIN — Medication 3 MILLILITER(S): at 06:06

## 2017-04-04 RX ADMIN — NAFCILLIN 200 GRAM(S): 10 INJECTION, POWDER, FOR SOLUTION INTRAVENOUS at 14:42

## 2017-04-04 RX ADMIN — Medication 100 GRAM(S): at 05:06

## 2017-04-04 RX ADMIN — ZINC OXIDE 1 APPLICATION(S): 200 OINTMENT TOPICAL at 22:17

## 2017-04-04 RX ADMIN — Medication 20 MILLIEQUIVALENT(S): at 05:05

## 2017-04-04 RX ADMIN — NAFCILLIN 200 GRAM(S): 10 INJECTION, POWDER, FOR SOLUTION INTRAVENOUS at 10:56

## 2017-04-04 RX ADMIN — Medication 0.25 MILLIGRAM(S): at 03:54

## 2017-04-04 RX ADMIN — Medication 3 MILLILITER(S): at 17:44

## 2017-04-04 RX ADMIN — NAFCILLIN 200 GRAM(S): 10 INJECTION, POWDER, FOR SOLUTION INTRAVENOUS at 05:08

## 2017-04-04 RX ADMIN — Medication 200 MILLIGRAM(S): at 15:54

## 2017-04-04 RX ADMIN — Medication 1: at 23:19

## 2017-04-04 RX ADMIN — Medication 1 APPLICATION(S): at 05:05

## 2017-04-04 RX ADMIN — NAFCILLIN 200 GRAM(S): 10 INJECTION, POWDER, FOR SOLUTION INTRAVENOUS at 22:16

## 2017-04-04 RX ADMIN — Medication 1 APPLICATION(S): at 22:16

## 2017-04-04 RX ADMIN — HEPARIN SODIUM 5000 UNIT(S): 5000 INJECTION INTRAVENOUS; SUBCUTANEOUS at 05:06

## 2017-04-04 RX ADMIN — ZINC OXIDE 1 APPLICATION(S): 200 OINTMENT TOPICAL at 14:42

## 2017-04-04 RX ADMIN — Medication 1 TABLET(S): at 12:19

## 2017-04-04 RX ADMIN — NAFCILLIN 200 GRAM(S): 10 INJECTION, POWDER, FOR SOLUTION INTRAVENOUS at 18:03

## 2017-04-04 RX ADMIN — NAFCILLIN 200 GRAM(S): 10 INJECTION, POWDER, FOR SOLUTION INTRAVENOUS at 01:42

## 2017-04-04 RX ADMIN — Medication 3 MILLILITER(S): at 11:49

## 2017-04-04 RX ADMIN — Medication 1 APPLICATION(S): at 14:42

## 2017-04-04 RX ADMIN — CHLORHEXIDINE GLUCONATE 1 APPLICATION(S): 213 SOLUTION TOPICAL at 14:43

## 2017-04-04 RX ADMIN — Medication 0.25 MILLIGRAM(S): at 22:16

## 2017-04-04 RX ADMIN — HEPARIN SODIUM 5000 UNIT(S): 5000 INJECTION INTRAVENOUS; SUBCUTANEOUS at 18:03

## 2017-04-04 RX ADMIN — Medication 15 MILLIGRAM(S): at 05:06

## 2017-04-04 NOTE — PROGRESS NOTE ADULT - SUBJECTIVE AND OBJECTIVE BOX
Patient is a 61y old  Male who presents with a chief complaint of sob (08 Mar 2017 11:44)      INTERVAL HPI / OVERNIGHT EVENTS:vent dependent,no new event,no fever    MEDICATIONS  (STANDING):  heparin  Injectable 5000Unit(s) SubCutaneous every 12 hours  dextrose 5%. 1000milliLiter(s) IV Continuous <Continuous>  dextrose 50% Injectable 12.5Gram(s) IV Push once  dextrose 50% Injectable 25Gram(s) IV Push once  ALBUTerol/ipratropium for Nebulization 3milliLiter(s) Nebulizer every 6 hours  ALBUTerol    90 MICROgram(s) HFA Inhaler 1Puff(s) Inhalation every 4 hours  tiotropium 18 MICROgram(s) Capsule 1Capsule(s) Inhalation daily  multivitamin/minerals 1Tablet(s) Oral daily  BACItracin   Ointment 1Application(s) Topical three times a day  ALPRAZolam 0.25milliGRAM(s) Oral three times a day  pantoprazole  Injectable 40milliGRAM(s) IV Push daily  chlorhexidine 0.12% Liquid 15milliLiter(s) Swish and Spit two times a day  chlorhexidine 4% Liquid 1Application(s) Topical daily  predniSONE   Tablet 20milliGRAM(s) Oral daily  BACItracin   Ointment 1Application(s) Topical two times a day  insulin lispro (HumaLOG) corrective regimen sliding scale  SubCutaneous every 6 hours  nafcillin  IVPB  IV Intermittent   nafcillin  IVPB 2Gram(s) IV Intermittent every 4 hours  gentamicin   IVPB  IV Intermittent   gentamicin   IVPB 210milliGRAM(s) IV Intermittent every 24 hours  dexmedetomidine Infusion 0.7MICROgram(s)/kG/Hr IV Continuous <Continuous>  zinc oxide 40%/lanolin Ointment 1Application(s) Topical three times a day    MEDICATIONS  (PRN):  dextrose Gel 1Dose(s) Oral once PRN Blood Glucose LESS THAN 70 milliGRAM(s)/deciliter  glucagon  Injectable 1milliGRAM(s) IntraMuscular once PRN Glucose LESS THAN 70 milligrams/deciliter  acetaminophen   Tablet 650milliGRAM(s) Oral every 6 hours PRN For Temp greater than 38 C (100.4 F)  benzocaine 15 mG/menthol 3.6 mG Lozenge 1Lozenge Oral every 4 hours PRN Sore Throat  acetaminophen   Tablet. 650milliGRAM(s) Oral every 6 hours PRN Mild Pain (1 - 3)      Vital Signs Last 24 Hrs  Tmax: afebrile  HR: 78 (59 - 126)  BP: 89/50 (74/50 - 167/79)  BP(mean): 63 (59 - 117)  RR: 22 (12 - 26)  SpO2: 98% (93% - 100%)    PHYSICAL EXAM:    Constitutional: NAD, cachectic  Respiratory: CTAB/L, vent dependent ,s/p trach  Cardiovascular: S1 and S2, RRR, no M/G/R  Gastrointestinal: BS+, soft, NT/ND  Extremities: No peripheral edema  Vascular: 2+ peripheral pulses  Skin: No rashes  Lines : TLC +      LABS:          WBC -WNL              MICROBIOLOGY:  RECENT CULTURES:  03-30 .Blood Blood/only aerobic received Staphylococcus aureus   Growth in aerobic bottle: Gram Positive Cocci in Clusters  and  Gram Positive Cocci in Pairs and Chains   Growth in aerobic bottle: Staphylococcus aureus  Growth in aerobic bottle: Alpha hemolytic strep  (not Strep. pneumoniae or Enterococcus)  Single set isolate, possible contaminant. Contact  Microbiology if susceptibility testing clinically  indicate    03-30 .Blood Blood XXXX   Growth in aerobic bottle: Gram Positive Cocci in Clusters   Growth in aerobic bottle: Staphylococcus aureus  See previous culture [id number]    03-29 .Urine Catheterized XXXX XXXX   No growth    03-29 .Sputum Sputum Staphylococcus aureus   Moderate WBC's  Few squamous epithelial cells  Moderate Gram Positive Cocci in Clusters  Moderate Gram Negative Rods   Moderate Staphylococcus aureus  Normal Respiratory Cinthia present          RADIOLOGY & ADDITIONAL STUDIES:

## 2017-04-04 NOTE — PROGRESS NOTE ADULT - SUBJECTIVE AND OBJECTIVE BOX
Initial Consultaion Note  Requested by Name:  Date/ Time:  Reason for referral/ Consultation:    HPI:  62 Y/O male w/ PMHx of COPD on home O2, cpap at night, afib, htn, hld, dm, pw c/o SOB. ABG worsening in ED showing 7.26/102/133/44 on Bipap. Pt intubated for hypercapnic respiratory failure. 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 Name/ Phone#:  Significant other/partner:                     Children:                         Mormon/Spirituality:    Baseline ADLs (Prior to admission)    ADVANCE DIRECTIVES:  [ ] YES [ ] NO   DNR [ ] YES [ ] NO  Completed on:                     MOLST  [ ] YES [ ] NO   Completed on:  Living Will  [ ] YES [ ] NO   Completed on:    Allergies    No Known Allergies    Intolerances        Review of Systems:     PAIN : (Y/N) (0-10)  PAIN SITE :  QUALITY/QUANTITY OF PAIN :  PAIN RADIATING :( Y/N)  SEVERITY :  FREQUENCY :  IMPACT ON ADLs :      DYSPNEA: (Y/N) Mild [ ] Moderate [ ] Severe [ ]  NAUSEA/VOMITING: (Y/N)  DEPRESSION: (Y/N) Mild [ ]Moderate [ ] Severe [ ]  ANXIETY: (Y/N)  AGITATION: (Y/N):  SECRETIONS:(Y/N)  CONTIPATION : (Y/N)  DIARRHEA : (Y/N)  FRAILTY SYNDROM (Y/N):  FAILURE TO THRIVE (Y/N):  DIBILITY (Y/N);  OTHER SYMPTOMS :  UNABLE TO OBTAIN DUE TO POOR MENTATION (   )    PHYSICAL EXAM:  T(F): 98.8, Max: 99.6 (04-03 @ 23:00)  HR: 92 (64 - 108)  BP: 127/66 (108/51 - 147/80)  RR: 21 (14 - 25)  SpO2: 100% (100% - 100%)  Wt(kg): --   WEIGHT :    [id number]                  BMI:  I&O's Summary    I & Os for current day (as of 04 Apr 2017 18:02)  =============================================  IN: 511.5 ml / OUT: 2145 ml / NET: -1633.5 ml    CAPILLARY BLOOD GLUCOSE  77 (04 Apr 2017 17:56)  97 (04 Apr 2017 12:14)  92 (04 Apr 2017 05:04)  88 (03 Apr 2017 23:28)  79 (03 Apr 2017 18:03)      GENERAL: alert [ ] oriented x ______[ ] lethargic [ ] agitated [ ] cachexia [ ] nonverbal [ ] coma [ ]  HEENT: normal [ ] dry mouth [ ] ET tube/trach [ ]   LUNGS: ]  CV :  normal [ ]  GI : normal [ ]  PEG /NG tube [ ]   : normal [ ] incontinent [ ] oliguria/anuria [ ] lawler [ ]  MSK : normal [ ] weakness [ ] edema [ ]              ambulatory [ ] bebbound/wheelchair bound [ ]   SKIN : normal [ ] pressure ulcer (Y/N) Stage ______________ rash (Y/N)    Functional Assessment:Karnofsky Performance Score :  Palliative Performance Status Version 2:         %    LABS:                        9.5    10.7  )-----------( 370      ( 04 Apr 2017 03:30 )             28.0     04 Apr 2017 03:30    137    |  92     |  7      ----------------------------<  116    3.4     |  31     |  0.29     Ca    8.3        04 Apr 2017 03:30  Phos  3.6       04 Apr 2017 03:30  Mg     1.8       04 Apr 2017 03:30      PT/INR - ( 03 Apr 2017 03:54 )   PT: 11.9 sec;   INR: 1.09 ratio               I&O's Detail    I & Os for current day (as of 04 Apr 2017 18:02)  =============================================  IN:    Solution: 400 ml    Solution: 100 ml    dexmedetomidine Infusion: 11.5 ml    Total IN: 511.5 ml  ---------------------------------------------  OUT:    Indwelling Catheter - Urethral: 2045 ml    Rectal Tube: 100 ml    Total OUT: 2145 ml  ---------------------------------------------  Total NET: -1633.5 ml      Assessment:   61y Male admitted with MULTIFOCAL PNEUMONIA COPD EXACERBATION  SHORTNESS OF BREATH      PROBLEM LIST :  PROBLEM/RECOMMENDATION: 1  Problem : Goals of care, counseling/ discussion.  Recommendation: met with patient/ family and discussed GOC & Advanced care planning                                    Palliative care info/counseling provided (Y/N)                                      Family meeting                                   Advanced Directives addressed (Y/N)  PROBLEM/ RECOMMENDATION:2  Problem :Resuscitation/ DNR/ DNI,   Recommendation: DNR/ DNI    PROBLEM/ RECOMMENDATION :3  Problem : Medical order for life sustaning treatment  Recommendation : MOLST Form ( initiated/ completed)    PROBLEM/RECOMMENDATION :4  Problem : Advanced care planning  Recommendation : Family meeting.(Y/N)     PLAN:  REFFERALS:                           Unit SW/Case Mgmt (Y/N)                          (Y/N)                         Speech/Swallow (Y/N)                           nutrition                                              Ethics (Y/N)                         PT/OT (Y/N)

## 2017-04-04 NOTE — PROGRESS NOTE ADULT - SUBJECTIVE AND OBJECTIVE BOX
HPI:  60 Y/O male w/ PMHx of COPD on home O2, cpap at night, afib, htn, hld, dm, pw c/o SOB. ABG worsening in ED showing 7.26/102/133/44 on Bipap. Pt intubated for hypercapnic respiratory failure. 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  Accelerated Essential Hypertension: x3 years  S/P appendectomy      ## ROS: Unable to obtain    ## O/E:  Vitals: T(C): 37, Max: 37.7 (04-03 @ 16:39)  HR: 104 (60 - 112)  BP: 129/65 (92/51 - 159/86)  BP(mean): 81 (63 - 107)  RR: 21 (14 - 24)  SpO2: 100% (99% - 100%)  Wt(kg): --  Gen: lying comfortably in bed in no apparent distress  HEENT: PERRL, EOMI, trach in place  Resp: CTA B/L no c/r/w  CVS: S1S2 no m/r/g  Abd: soft NT/ND +BS  Ext: no c/c/e  Neuro: A&Ox3      I & Os for current day (as of 04-04 @ 12:25)  =============================================  IN: 511.5 ml / OUT: 2145 ml / NET: -1633.5 ml    Mode: AC/ CMV (Assist Control/ Continuous Mandatory Ventilation), RR (machine): 14, TV (machine): 400, FiO2: 30, PEEP: 5, ITime: 0.9, MAP: 10, PIP: 25    ## Labs:                        9.5    10.7  )-----------( 370      ( 04 Apr 2017 03:30 )             28.0     04 Apr 2017 03:30    137    |  92     |  7      ----------------------------<  116    3.4     |  31     |  0.29     Ca    8.3        04 Apr 2017 03:30  Phos  3.6       04 Apr 2017 03:30  Mg     1.8       04 Apr 2017 03:30      PT/INR - ( 03 Apr 2017 03:54 )   PT: 11.9 sec;   INR: 1.09 ratio           ABG - ( 04 Apr 2017 09:44 )  pH: x     /  pCO2: 41    /  pO2: 96    / HCO3: 33    / Base Excess: 9.0   /  SaO2: 98            ## Imaging: reviewed    MEDICATIONS  (STANDING):  zinc oxide 40% Ointment 1Application(s) Topical three times a day  heparin  Injectable 5000Unit(s) SubCutaneous every 12 hours  tiotropium 18 MICROgram(s) Capsule 1Capsule(s) Inhalation daily  multivitamin/minerals 1Tablet(s) Oral daily  BACItracin   Ointment 1Application(s) Topical three times a day  ALPRAZolam 0.25milliGRAM(s) Oral three times a day  chlorhexidine 4% Liquid 1Application(s) Topical daily  insulin lispro (HumaLOG) corrective regimen sliding scale  SubCutaneous every 6 hours  nafcillin  IVPB 2Gram(s) IV Intermittent every 4 hours  gentamicin   IVPB 210milliGRAM(s) IV Intermittent every 24 hours  predniSONE   Tablet 15milliGRAM(s) Oral daily  ALBUTerol/ipratropium for Nebulization 3milliLiter(s) Nebulizer every 6 hours    MEDICATIONS  (PRN):  acetaminophen   Tablet 650milliGRAM(s) Oral every 6 hours PRN For Temp greater than 38 C (100.4 F)  acetaminophen   Tablet. 650milliGRAM(s) Oral every 6 hours PRN Mild Pain (1 - 3)      ## Code status:  Goals of care discussion: [x] yes [ ] no  [] full code  [x ] DNR  [ ] DNI  [ ] LUC

## 2017-04-04 NOTE — PROGRESS NOTE ADULT - SUBJECTIVE AND OBJECTIVE BOX
GASTROENTEROLOGY  Patient seen and examined bedside resting comfortably.  No complaints offered. on Trach inICU on ventilator     . Tolerating NGT feedings       T(F): 98.8, Max: 99.6 (04-03 @ 23:00)  HR: 92 (64 - 108)  BP: 127/66 (108/51 - 147/80)  RR: 21 (14 - 25)  SpO2: 100% (100% - 100%)  Wt(kg): --  CAPILLARY BLOOD GLUCOSE  77 (04 Apr 2017 17:56)  97 (04 Apr 2017 12:14)  92 (04 Apr 2017 05:04)  88 (03 Apr 2017 23:28)      PHYSICAL EXAM:  General: NAD, WDWN.   Neuro:  Alert & oriented x 3  HEENT: NCAT, EOMI, conjunctiva clear  CV: +S1+S2 regular rate and rhythm  Lung: clear to ausculation bilaterally, respirations nonlabored, ventilated  + coarse BS  Abdomen: soft, NTND. Normactive BS  Extremities: no pedal edema or calf tenderness noted     LABS:                        9.5    10.7  )-----------( 370      ( 04 Apr 2017 03:30 )             28.0     04 Apr 2017 03:30    137    |  92     |  7      ----------------------------<  116    3.4     |  31     |  0.29     Ca    8.3        04 Apr 2017 03:30  Phos  3.6       04 Apr 2017 03:30  Mg     1.8       04 Apr 2017 03:30        PT/INR - ( 03 Apr 2017 03:54 )   PT: 11.9 sec;   INR: 1.09 ratio           I&O's Detail    I & Os for current day (as of 04 Apr 2017 18:08)  =============================================  IN:    Solution: 400 ml    Solution: 100 ml    dexmedetomidine Infusion: 11.5 ml    Total IN: 511.5 ml  ---------------------------------------------  OUT:    Indwelling Catheter - Urethral: 2045 ml    Rectal Tube: 100 ml    Total OUT: 2145 ml  ---------------------------------------------  Total NET: -1633.5 ml

## 2017-04-04 NOTE — PROGRESS NOTE ADULT - ASSESSMENT
61M with COPD, chronic hypoxia, oxygen dependent, chronic hypercarbia with recent admission for COPD exacerbation, acute on chronic hypercarbic respiratory failure requiring only BiPAP. Radmitted for acute on chronic hypercarbic respiratory failure this time requiring intubation, extubated but mostly BiPAP dependent, found to have staph bacteremia. Course with recurrent hypercarbic hypoxic respiratory failure reintubated 3/29.     DX: recurrent acute on chronic hypercarbic hypoxic respiratory failure, complex PNA, staph bacteremia, septic shock resolved now    - pt with end stage COPD  - repeated hypercapnic hypoxic resp failure requiring multiple intubations  - s/p trach yesterday, doing well now  - s/p course of antibiotics for PNA and bacteremia on admission with cultures clearing  - follow up blood and sputum culture, now again with MSSA in blood and sputum  - cont with nafcillin and gentamicin as per ID for MSSA  - will need RANJIT to r/o infective endocarditis, fup with cardiology, plan to do it this week  - off pressors   - fup with GI for PEG  - cont prednisone for COPD, taper slowly  - physical therapy  Condition d/w family in detail at the bedside.     DVT/GI ppx

## 2017-04-04 NOTE — PROGRESS NOTE ADULT - ASSESSMENT
s/p trach   on vent  persistent infection   r/o endo by RANJIT  prognosis dismal  family unrealistic  Get peg and place to vent rehab

## 2017-04-05 LAB
ANION GAP SERPL CALC-SCNC: 12 MMOL/L — SIGNIFICANT CHANGE UP (ref 5–17)
BUN SERPL-MCNC: 9 MG/DL — SIGNIFICANT CHANGE UP (ref 7–23)
CALCIUM SERPL-MCNC: 8.4 MG/DL — LOW (ref 8.5–10.1)
CHLORIDE SERPL-SCNC: 93 MMOL/L — LOW (ref 96–108)
CO2 SERPL-SCNC: 33 MMOL/L — HIGH (ref 22–31)
CREAT SERPL-MCNC: 0.47 MG/DL — LOW (ref 0.5–1.3)
GLUCOSE SERPL-MCNC: 163 MG/DL — HIGH (ref 70–99)
HCT VFR BLD CALC: 30 % — LOW (ref 39–50)
HGB BLD-MCNC: 9.9 G/DL — LOW (ref 13–17)
MAGNESIUM SERPL-MCNC: 2 MG/DL — SIGNIFICANT CHANGE UP (ref 1.8–2.4)
MCHC RBC-ENTMCNC: 27.2 PG — SIGNIFICANT CHANGE UP (ref 27–34)
MCHC RBC-ENTMCNC: 33 GM/DL — SIGNIFICANT CHANGE UP (ref 32–36)
MCV RBC AUTO: 82.3 FL — SIGNIFICANT CHANGE UP (ref 80–100)
PHOSPHATE SERPL-MCNC: 3.7 MG/DL — SIGNIFICANT CHANGE UP (ref 2.5–4.5)
PLATELET # BLD AUTO: 476 K/UL — HIGH (ref 150–400)
POTASSIUM SERPL-MCNC: 3.3 MMOL/L — LOW (ref 3.5–5.3)
POTASSIUM SERPL-SCNC: 3.3 MMOL/L — LOW (ref 3.5–5.3)
RBC # BLD: 3.64 M/UL — LOW (ref 4.2–5.8)
RBC # FLD: 16.6 % — HIGH (ref 11–15)
SODIUM SERPL-SCNC: 138 MMOL/L — SIGNIFICANT CHANGE UP (ref 135–145)
WBC # BLD: 8.9 K/UL — SIGNIFICANT CHANGE UP (ref 3.8–10.5)
WBC # FLD AUTO: 8.9 K/UL — SIGNIFICANT CHANGE UP (ref 3.8–10.5)

## 2017-04-05 PROCEDURE — 99233 SBSQ HOSP IP/OBS HIGH 50: CPT

## 2017-04-05 RX ORDER — METOPROLOL TARTRATE 50 MG
12.5 TABLET ORAL
Qty: 0 | Refills: 0 | Status: DISCONTINUED | OUTPATIENT
Start: 2017-04-05 | End: 2017-04-14

## 2017-04-05 RX ORDER — POTASSIUM CHLORIDE 20 MEQ
40 PACKET (EA) ORAL ONCE
Qty: 0 | Refills: 0 | Status: COMPLETED | OUTPATIENT
Start: 2017-04-05 | End: 2017-04-05

## 2017-04-05 RX ADMIN — Medication 0.25 MILLIGRAM(S): at 13:40

## 2017-04-05 RX ADMIN — Medication 650 MILLIGRAM(S): at 13:40

## 2017-04-05 RX ADMIN — Medication 3 MILLILITER(S): at 11:06

## 2017-04-05 RX ADMIN — ZINC OXIDE 1 APPLICATION(S): 200 OINTMENT TOPICAL at 21:50

## 2017-04-05 RX ADMIN — Medication 1 TABLET(S): at 11:55

## 2017-04-05 RX ADMIN — NAFCILLIN 200 GRAM(S): 10 INJECTION, POWDER, FOR SOLUTION INTRAVENOUS at 10:02

## 2017-04-05 RX ADMIN — Medication 0.25 MILLIGRAM(S): at 05:26

## 2017-04-05 RX ADMIN — Medication 15 MILLIGRAM(S): at 05:25

## 2017-04-05 RX ADMIN — Medication 1 APPLICATION(S): at 21:49

## 2017-04-05 RX ADMIN — Medication 1: at 05:25

## 2017-04-05 RX ADMIN — Medication 3 MILLILITER(S): at 17:27

## 2017-04-05 RX ADMIN — Medication 3 MILLILITER(S): at 00:34

## 2017-04-05 RX ADMIN — ZINC OXIDE 1 APPLICATION(S): 200 OINTMENT TOPICAL at 05:26

## 2017-04-05 RX ADMIN — NAFCILLIN 200 GRAM(S): 10 INJECTION, POWDER, FOR SOLUTION INTRAVENOUS at 05:25

## 2017-04-05 RX ADMIN — Medication 200 MILLIGRAM(S): at 16:42

## 2017-04-05 RX ADMIN — NAFCILLIN 200 GRAM(S): 10 INJECTION, POWDER, FOR SOLUTION INTRAVENOUS at 01:57

## 2017-04-05 RX ADMIN — Medication 2: at 11:56

## 2017-04-05 RX ADMIN — Medication 1 APPLICATION(S): at 13:41

## 2017-04-05 RX ADMIN — HEPARIN SODIUM 5000 UNIT(S): 5000 INJECTION INTRAVENOUS; SUBCUTANEOUS at 17:11

## 2017-04-05 RX ADMIN — HEPARIN SODIUM 5000 UNIT(S): 5000 INJECTION INTRAVENOUS; SUBCUTANEOUS at 05:25

## 2017-04-05 RX ADMIN — NAFCILLIN 200 GRAM(S): 10 INJECTION, POWDER, FOR SOLUTION INTRAVENOUS at 13:40

## 2017-04-05 RX ADMIN — Medication 3 MILLILITER(S): at 05:47

## 2017-04-05 RX ADMIN — Medication 1 APPLICATION(S): at 05:26

## 2017-04-05 RX ADMIN — Medication 40 MILLIEQUIVALENT(S): at 05:26

## 2017-04-05 RX ADMIN — NAFCILLIN 200 GRAM(S): 10 INJECTION, POWDER, FOR SOLUTION INTRAVENOUS at 21:49

## 2017-04-05 RX ADMIN — ZINC OXIDE 1 APPLICATION(S): 200 OINTMENT TOPICAL at 13:40

## 2017-04-05 RX ADMIN — NAFCILLIN 200 GRAM(S): 10 INJECTION, POWDER, FOR SOLUTION INTRAVENOUS at 17:11

## 2017-04-05 RX ADMIN — Medication 0.25 MILLIGRAM(S): at 21:49

## 2017-04-05 RX ADMIN — CHLORHEXIDINE GLUCONATE 1 APPLICATION(S): 213 SOLUTION TOPICAL at 11:55

## 2017-04-05 NOTE — PROGRESS NOTE ADULT - SUBJECTIVE AND OBJECTIVE BOX
HPI:  62 Y/O male w/ PMHx of COPD on home O2, cpap at night, afib, htn, hld, dm, pw c/o SOB. ABG worsening in ED showing 7.26/102/133/44 on Bipap. Pt intubated for hypercapnic respiratory failure. (08 Mar 2017 06:36)    Over 24 Hrs: s/p trach 04/03, remains stable.    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  Accelerated Essential Hypertension: x3 years  S/P appendectomy      ## ROS: Unable to obtain      ## O/E:  Vitals: T(C): 37.7, Max: 37.7 (04-04 @ 19:15)  HR: 96 (79 - 108)  BP: 112/65 (91/57 - 141/61)  BP(mean): 80 (53 - 93)  RR: 17 (14 - 24)  SpO2: 100% (100% - 100%)  Wt(kg): --  Gen: lying comfortably in bed in no apparent distress  HEENT: PERRL, EOMI, trach in place  Resp: CTA B/L no c/r/w  CVS: S1S2 no m/r/g  Abd: soft NT/ND +BS  Ext: no c/c/e  Neuro: A&Ox3      I & Os for current day (as of 04-05 @ 11:42)  =============================================  IN: 1080 ml / OUT: 820 ml / NET: 260 ml    Mode: CPAP with PS, FiO2: 30, PEEP: 5, MAP: 8    ## Labs:                        9.9    8.9   )-----------( 476      ( 05 Apr 2017 03:44 )             30.0     04-05    138  |  93<L>  |  9   ----------------------------<  163<H>  3.3<L>   |  33<H>  |  0.47<L>    Ca    8.4<L>      05 Apr 2017 03:44  Phos  3.7     04-05  Mg     2.0     04-05        ABG - ( 04 Apr 2017 09:44 )  pH: x     /  pCO2: 41    /  pO2: 96    / HCO3: 33    / Base Excess: 9.0   /  SaO2: 98            ## Imaging: reviewed    MEDICATIONS  (STANDING):  zinc oxide 40% Ointment 1Application(s) Topical three times a day  heparin  Injectable 5000Unit(s) SubCutaneous every 12 hours  tiotropium 18 MICROgram(s) Capsule 1Capsule(s) Inhalation daily  multivitamin/minerals 1Tablet(s) Oral daily  BACItracin   Ointment 1Application(s) Topical three times a day  ALPRAZolam 0.25milliGRAM(s) Oral three times a day  chlorhexidine 4% Liquid 1Application(s) Topical daily  insulin lispro (HumaLOG) corrective regimen sliding scale  SubCutaneous every 6 hours  nafcillin  IVPB 2Gram(s) IV Intermittent every 4 hours  gentamicin   IVPB 210milliGRAM(s) IV Intermittent every 24 hours  ALBUTerol/ipratropium for Nebulization 3milliLiter(s) Nebulizer every 6 hours  predniSONE   Tablet 10milliGRAM(s) Oral daily  metoprolol 12.5milliGRAM(s) Oral two times a day    MEDICATIONS  (PRN):  acetaminophen   Tablet 650milliGRAM(s) Oral every 6 hours PRN For Temp greater than 38 C (100.4 F)  acetaminophen   Tablet. 650milliGRAM(s) Oral every 6 hours PRN Mild Pain (1 - 3)      ## Code status:  Goals of care discussion: [x] yes [ ] no  [x] full code  [ ] DNR  [ ] DNI  [ ] LUC

## 2017-04-05 NOTE — CHART NOTE - NSCHARTNOTEFT_GEN_A_CORE
Upon Nutritional Assessment by the Registered Dietitian your patient was determined to meet criteria / has evidence of the following diagnosis/diagnoses:          [ ]  Mild Protein Calorie Malnutrition        [x ]  Moderate Protein Calorie Malnutrition        [ ] Severe Protein Calorie Malnutrition        [ ] Unspecified Protein Calorie Malnutrition        [x ] Underweight / BMI <19        [ ] Morbid Obesity / BMI > 40      Findings as based on:  •  Comprehensive nutrition assessment and consultation  •  Calorie counts (nutrient intake analysis)  •  Food acceptance and intake status from observations by staff  •  Follow up  •  Patient education  •  Intervention secondary to interdisciplinary rounds  •   concerns      Treatment:    The following diet has been recommended:  Continue Glucerna 1.2 @ 50ml/hr via [id number], 1440kcal & 72gm protein & consider swallow evaluation when medically feasible & pt extubated    PROVIDER Section:     By signing this assessment you are acknowledging and agree with the diagnosis/diagnoses assigned by the Registered Dietitian    Comments:
pt stable for transfer to medical floor. signed out to Dr. Tellez
Upon Nutritional Assessment by the Registered Dietitian your patient was determined to meet criteria / has evidence of the following diagnosis/diagnoses:          [ ]  Mild Protein Calorie Malnutrition        [ ]  Moderate Protein Calorie Malnutrition        [x ] Severe Protein Calorie Malnutrition        [ ] Unspecified Protein Calorie Malnutrition        [ ] Underweight / BMI <19        [ ] Morbid Obesity / BMI > 40      Findings as based on:  •  Comprehensive nutrition assessment and consultation  •  Calorie counts (nutrient intake analysis)  •  Food acceptance and intake status from observations by staff  •  Follow up  •  Patient education  •  Intervention secondary to interdisciplinary rounds  •   concerns      Treatment:    The following diet has been recommended:  Start NGT Glucerna 1.5 @ 50ml/hr =1200ml, 1800kcal & 99gm protein    PROVIDER Section:     By signing this assessment you are acknowledging and agree with the diagnosis/diagnoses assigned by the Registered Dietitian    Comments:
pt stable for transfer to medical floor.  signed out to Dr. Barrios

## 2017-04-05 NOTE — PROGRESS NOTE ADULT - SUBJECTIVE AND OBJECTIVE BOX
INTERVAL HPI/OVERNIGHT EVENTS:  with SOB. Intubated for hypercapnic respiratory failure in ED. CT chest and CXR showed multifocal pneumonia.  NO more details of history are available as intubated and not able to provide information.   Was recently admitted with similar presentation, was in ICU with BIPAP support.  Failed weaning for few days but did well on 17 and got extubated, then transferred to medical floor.  17: Events noted, transferred to 2D due to hypercarbic Respiratory failure. Awake and Responsive on BIPAP.  17: now in ICU, resting on BIPAP. RN reports did not do well on nasal O2.  17: Required intubation for severe hypercarbic Respiratory failure. Fever spike to more than 101 noted.  17: underwent Tracheostomy.   17: Transferred to regular floor.  Awake and responsive.    Vital Signs Last 24 Hrs  T(C): 37.4, Max: 38 (- @ 13:20)  T(F): 99.4, Max: 100.4 (- @ 13:20)  HR: 86 (79 - 114)  BP: 95/50 (86/50 - 141/61)  BP(mean): 62 (53 - 83)  RR: 18 (14 - 26)  SpO2: 99% (96% - 100%)    Mode: AC/ CMV (Assist Control/ Continuous Mandatory Ventilation)  RR (machine): 14  TV (machine): 400  FiO2: 30  PEEP: 5  ITime: 1  MAP: 10  PIP: 21    PHYSICAL EXAM:  GEN:        Awake, responsive and comfortable.  HEENT:    Normal.    RESP:      crackles.  CVS:          Regular rate and rhythm.   ABD:         Soft, non-tender, non-distended;   :            No costovertebral angle tenderness  EXTR:          No clubbing, cyanosis or edema  CNS:             Intact sensory and motor function.    MEDICATIONS  (STANDING):  zinc oxide 40% Ointment 1Application(s) Topical three times a day  heparin  Injectable 5000Unit(s) SubCutaneous every 12 hours  tiotropium 18 MICROgram(s) Capsule 1Capsule(s) Inhalation daily  multivitamin/minerals 1Tablet(s) Oral daily  BACItracin   Ointment 1Application(s) Topical three times a day  ALPRAZolam 0.25milliGRAM(s) Oral three times a day  chlorhexidine 4% Liquid 1Application(s) Topical daily  insulin lispro (HumaLOG) corrective regimen sliding scale  SubCutaneous every 6 hours  nafcillin  IVPB 2Gram(s) IV Intermittent every 4 hours  gentamicin   IVPB 210milliGRAM(s) IV Intermittent every 24 hours  ALBUTerol/ipratropium for Nebulization 3milliLiter(s) Nebulizer every 6 hours  predniSONE   Tablet 10milliGRAM(s) Oral daily  metoprolol 12.5milliGRAM(s) Oral two times a day    MEDICATIONS  (PRN):  acetaminophen   Tablet 650milliGRAM(s) Oral every 6 hours PRN For Temp greater than 38 C (100.4 F)  acetaminophen   Tablet. 650milliGRAM(s) Oral every 6 hours PRN Mild Pain (1 - 3)    LABS:                        9.9    8.9   )-----------( 476      ( 2017 03:44 )             30.0     04-05    138  |  93<L>  |  9   ----------------------------<  163<H>  3.3<L>   |  33<H>  |  0.47<L>    Ca    8.4<L>      2017 03:44  Phos  3.7     04-05  Mg     2.0     04-05    RADIOLOGY & ADDITIONAL STUDIES:    EXAM:  CHEST SINGLE VIEW                            PROCEDURE DATE:  2017        INTERPRETATION:  Single AP view of the chest.    CLINICAL INFORMATION: Postop tracheostomy    FINDINGS:  There is a tracheostomy tube in place. There is an NG tube   seen coursing below the diaphragm with the tip off of the film.. There is   a right internal jugular central line with the tip in the region of the   SVC. There are bilateral diffuse patchy airspace opacities in the lungs   are hyperinflated. There is a trace left pleural effusion. Heart size is   within normal limits.    IMPRESSION: Status post tracheostomy tube placement. Otherwise, no   significant change from 2017.    ANJEL FRANCOIS M.D., ATTENDING RADIOLOGIST  This documenthas been electronically signed. Apr  3 2017  7:09PM     EXAM:  TTE WO CON COMPLETE W DOPPL         PROCEDURE DATE:  2017        INTERPRETATION:  REPORT:    TRANSTHORACIC ECHOCARDIOGRAM REPORT           Patient Name:   RUFUS KWON Patient Location: [facility] Rec #:  [id number]         Accession #:      [id number]  Account #:                         Height:           65.0 in 165.0 cm  YOB: 1955          Weight:           130.1 lb 59.00 kg  Patient Age:    61 years           BSA:              1.65 m²  Patient Gender: M            BP:               97/61 mmHg        Date of Exam: 3/29/2017 12:53:56 PM  Sonographer:  DOYLE     Procedure:     2D Echo/Doppler/Color Doppler Complete.  Indications:   Dyspnea, unspecified - R06.00  Diagnosis:     Dyspnea, unspecified - R06.00                 ; Rheumatic aortic stenosis - I06.0  Study Details: Technically suboptimal study. Study quality was adversely                 affected due to the patient being intubated and lung                 interference.           2D AND M-MODEMEASUREMENTS (normal ranges within parentheses):  Left Ventricle:                 Normal    Aorta/Left Atrium:           Normal  IVSd (2D):              0.97 cm (0.7-1.1) Aortic Root (2D):  1.60 cm   (2.4-3.7)  LVPWd (2D):             0.87 cm (0.7-1.1) Left Atrium (2D):  2.08 cm   (1.9-4.0)  LVIDd (2D):             2.43 cm (3.4-5.7)  LVIDs (2D):             1.76 cm  LV FS (2D):             27.6 %  (>25%)  Relative Wall Thickness 0.71    (<0.42)     LV DIASTOLIC FUNCTION:  MV Peak E: 0.76 m/s Decel Time: 230 msec  MV Peak A: 0.57 m/s  E/A Ratio: 1.32     SPECTRAL DOPPLER ANALYSIS (where applicable):  Mitral Valve:  MV P1/2 Time: 66.57 msec  MV Area, PHT: 3.30 cm²     Aortic Valve: AoV Max Vijay: 1.84 m/s AoV Peak P.6 mmHg AoV Mean P.2mmHg     LVOT Vmax: 1.52 m/s LVOT VTI: 0.292 m LVOT Diameter:     Tricuspid Valve and PA/RV Systolic Pressure: TR Max Velocity: 2.74 m/s   RA Pressure: 5 mmHg RVSP/PASP: 35.0 mmHg        PHYSICIAN INTERPRETATION:  Left Ventricle: Normal left ventricular size and wall thicknesses, with   normal systolic and diastolic function. The left ventricular internal   cavity size is normal.  Left ventricular ejection fraction, by visual estimation, is 55 to 60%.  Right Ventricle: Normal right ventricular size and function.  Left Atrium: The left atrium is normal in size. Normal left atrial size.  Right Atrium: The right atrium is normal in size.  Pericardium: There is no evidence of pericardial effusion.  Mitral Valve: Thickening of the anterior and posterior mitral valve   leaflets. Mild mitral valve regurgitation is seen.  Tricuspid Valve: Structurally normal tricuspid valve, with normal leaflet   excursion. The tricuspid valve is normal in structure. Mild tricuspid   regurgitation is visualized.  Aortic Valve: Normal trileaflet aortic valve with normal opening. The   aortic valve was not well visualized. Sclerotic aortic valve with   decreased opening. Trivial aortic valve regurgitation is seen.  Pulmonic Valve: The pulmonic valve was not well visualized.  Aorta: The aortic root is normal in size and structure.  Pulmonary Artery: The main pulmonary artery is normal in size.        Summary:   1. Left ventricular ejection fraction, by visual estimation, is 55 to   60%.   2. Thickening of the anterior and posterior mitral valve leaflets.   3. Sclerotic aortic valve with decreased opening.   4. Limited visualization of aortic valve.   5. Stenosis suggested, not adequately quantitated. Please obtain   additional views.   6. Mild mitral andaortic insufficiency.     [id number] Susan Jaquez MD  Electronically signed on 3/29/2017 at 6:23:58 PM     SUSAN JAQUEZ M.D.; Attending Cardiologist  This docum    ASSESSMENT AND PLAN:  ·	Acute Hypoxic/hypercarbic Respiratory failure.  ·	S/P Tracheostomy on 17  ·	Multifocal pneumonia.  ·	Acute COPD exacerbation.  ·	Functional deconditioning.  ·	Anemia.  ·	CAD with angioplasty.  ·	HTN.  ·	A Fib.  ·	HLD.  ·	Anxiety    Continue Vent support and antibiotics.  Planned for peg and RANJIT.  Start weaning trials after above.

## 2017-04-05 NOTE — PROGRESS NOTE ADULT - ASSESSMENT
61M with COPD, chronic hypoxia, oxygen dependent, chronic hypercarbia with recent admission for COPD exacerbation, acute on chronic hypercarbic respiratory failure requiring only BiPAP. Radmitted for acute on chronic hypercarbic respiratory failure this time requiring intubation, extubated but mostly BiPAP dependent, found to have staph bacteremia. Course with recurrent hypercarbic hypoxic respiratory failure reintubated 3/29.     DX: recurrent acute on chronic hypercarbic hypoxic respiratory failure, complex PNA, staph bacteremia, septic shock resolved now    - pt with end stage COPD  - repeated hypercapnic hypoxic resp failure requiring multiple intubations  - s/p trach 04/03, doing well now  - s/p course of antibiotics for PNA and bacteremia on admission with cultures clearing  - follow up blood and sputum culture, now again with MSSA in blood and sputum  - cont with nafcillin and gentamicin as per ID for MSSA  - will need RANJIT to r/o infective endocarditis, fup with cardiology, plan to do it this week  - off pressors   - fup with GI for PEG  - cont prednisone for COPD, taper slowly  - physical therapy  overall prognosis is poor    DVT/GI ppx

## 2017-04-05 NOTE — PROGRESS NOTE ADULT - SUBJECTIVE AND OBJECTIVE BOX
Patient is a 61y old  Male who presents with a chief complaint of sob (08 Mar 2017 11:44)      INTERVAL HPI / OVERNIGHT EVENTS:s/p trach , no fever    MEDICATIONS  (STANDING):  heparin  Injectable 5000Unit(s) SubCutaneous every 12 hours  dextrose 5%. 1000milliLiter(s) IV Continuous <Continuous>  dextrose 50% Injectable 12.5Gram(s) IV Push once  dextrose 50% Injectable 25Gram(s) IV Push once  ALBUTerol/ipratropium for Nebulization 3milliLiter(s) Nebulizer every 6 hours  ALBUTerol    90 MICROgram(s) HFA Inhaler 1Puff(s) Inhalation every 4 hours  tiotropium 18 MICROgram(s) Capsule 1Capsule(s) Inhalation daily  multivitamin/minerals 1Tablet(s) Oral daily  BACItracin   Ointment 1Application(s) Topical three times a day  ALPRAZolam 0.25milliGRAM(s) Oral three times a day  pantoprazole  Injectable 40milliGRAM(s) IV Push daily  chlorhexidine 0.12% Liquid 15milliLiter(s) Swish and Spit two times a day  chlorhexidine 4% Liquid 1Application(s) Topical daily  predniSONE   Tablet 20milliGRAM(s) Oral daily  BACItracin   Ointment 1Application(s) Topical two times a day  insulin lispro (HumaLOG) corrective regimen sliding scale  SubCutaneous every 6 hours  nafcillin  IVPB  IV Intermittent   nafcillin  IVPB 2Gram(s) IV Intermittent every 4 hours  gentamicin   IVPB  IV Intermittent   gentamicin   IVPB 210milliGRAM(s) IV Intermittent every 24 hours  dexmedetomidine Infusion 0.7MICROgram(s)/kG/Hr IV Continuous <Continuous>  zinc oxide 40%/lanolin Ointment 1Application(s) Topical three times a day    MEDICATIONS  (PRN):  dextrose Gel 1Dose(s) Oral once PRN Blood Glucose LESS THAN 70 milliGRAM(s)/deciliter  glucagon  Injectable 1milliGRAM(s) IntraMuscular once PRN Glucose LESS THAN 70 milligrams/deciliter  acetaminophen   Tablet 650milliGRAM(s) Oral every 6 hours PRN For Temp greater than 38 C (100.4 F)  benzocaine 15 mG/menthol 3.6 mG Lozenge 1Lozenge Oral every 4 hours PRN Sore Throat  acetaminophen   Tablet. 650milliGRAM(s) Oral every 6 hours PRN Mild Pain (1 - 3)      Vital Signs Last 24 Hrs  Tmax: afebrile  HR: 78 (59 - 126)  BP: 89/50 (74/50 - 167/79)  BP(mean): 63 (59 - 117)  RR: 22 (12 - 26)  SpO2: 98% (93% - 100%)    PHYSICAL EXAM:    Constitutional: NAD, cachectic  Respiratory: CTAB/L, vent dependent ,s/p trach  Cardiovascular: S1 and S2, RRR, no M/G/R  Gastrointestinal: BS+, soft, NT/ND  Extremities: No peripheral edema  Vascular: 2+ peripheral pulses  Skin: No rashes  Lines : TLC +      LABS:          WBC -WNL              MICROBIOLOGY:  RECENT CULTURES:  03-30 .Blood Blood/only aerobic received Staphylococcus aureus   Growth in aerobic bottle: Gram Positive Cocci in Clusters  and  Gram Positive Cocci in Pairs and Chains   Growth in aerobic bottle: Staphylococcus aureus  Growth in aerobic bottle: Alpha hemolytic strep  (not Strep. pneumoniae or Enterococcus)  Single set isolate, possible contaminant. Contact  Microbiology if susceptibility testing clinically  indicate    03-30 .Blood Blood XXXX   Growth in aerobic bottle: Gram Positive Cocci in Clusters   Growth in aerobic bottle: Staphylococcus aureus  See previous culture [id number]    03-29 .Urine Catheterized XXXX XXXX   No growth    03-29 .Sputum Sputum Staphylococcus aureus   Moderate WBC's  Few squamous epithelial cells  Moderate Gram Positive Cocci in Clusters  Moderate Gram Negative Rods   Moderate Staphylococcus aureus  Normal Respiratory Cinthia present          RADIOLOGY & ADDITIONAL STUDIES:

## 2017-04-05 NOTE — PROGRESS NOTE ADULT - SUBJECTIVE AND OBJECTIVE BOX
GASTROENTEROLOGY  Patient seen and examined bedside resting comfortably.  No complaints offered. on Trach in ICU on ventilator. tolerating. NGT feedings    T(F): 100.4, Max: 100.4 (04-05 @ 13:20)  HR: 95 (79 - 114)  BP: 101/55 (86/50 - 141/61)  RR: 14 (14 - 23)  SpO2: 99% (99% - 100%)  Wt(kg): --  CAPILLARY BLOOD GLUCOSE  233 (05 Apr 2017 11:52)  181 (05 Apr 2017 05:23)  163 (04 Apr 2017 23:08)  77 (04 Apr 2017 17:56)    PHYSICAL EXAM:  General: NAD, WDWN.   Neuro:  Alert & oriented x 3  HEENT: NCAT, EOMI, conjunctiva clear  CV: +S1+S2 regular rate and rhythm  Lung: clear to ausculation bilaterally, respirations nonlabored, ventilated  + coarse BS  Abdomen: soft, NTND. Normactive BS  Extremities: no pedal edema or calf tenderness noted     LABS:                        9.9    8.9   )-----------( 476      ( 05 Apr 2017 03:44 )             30.0     04-05    138  |  93<L>  |  9   ----------------------------<  163<H>  3.3<L>   |  33<H>  |  0.47<L>    Ca    8.4<L>      05 Apr 2017 03:44  Phos  3.7     04-05  Mg     2.0     04-05          I&O's Detail  I & Os for 24h ending 05 Apr 2017 07:00  =============================================  IN:    Glucerna: 660 ml    Solution: 300 ml    Enteral Tube Flush: 120 ml    Total IN: 1080 ml  ---------------------------------------------  OUT:    Indwelling Catheter - Urethral: 720 ml    Rectal Tube: 100 ml    Total OUT: 820 ml  ---------------------------------------------  Total NET: 260 ml    I & Os for current day (as of 05 Apr 2017 14:45)  =============================================  IN:    Glucerna: 275 ml    Solution: 100 ml    Total IN: 375 ml  ---------------------------------------------  OUT:    Total OUT: 0 ml  ---------------------------------------------  Total NET: 375 ml    04-05 @ 03:44    138 | 93 | 9  /8.4 | 2.0 | 3.7  _______________________/  3.3 | 33 | 0.47                           \par

## 2017-04-06 LAB
ANION GAP SERPL CALC-SCNC: 10 MMOL/L — SIGNIFICANT CHANGE UP (ref 5–17)
BUN SERPL-MCNC: 12 MG/DL — SIGNIFICANT CHANGE UP (ref 7–23)
CALCIUM SERPL-MCNC: 8.7 MG/DL — SIGNIFICANT CHANGE UP (ref 8.5–10.1)
CHLORIDE SERPL-SCNC: 93 MMOL/L — LOW (ref 96–108)
CO2 SERPL-SCNC: 33 MMOL/L — HIGH (ref 22–31)
CREAT SERPL-MCNC: 0.51 MG/DL — SIGNIFICANT CHANGE UP (ref 0.5–1.3)
GLUCOSE SERPL-MCNC: 165 MG/DL — HIGH (ref 70–99)
HCT VFR BLD CALC: 30 % — LOW (ref 39–50)
HGB BLD-MCNC: 10.1 G/DL — LOW (ref 13–17)
MCHC RBC-ENTMCNC: 27.6 PG — SIGNIFICANT CHANGE UP (ref 27–34)
MCHC RBC-ENTMCNC: 33.7 GM/DL — SIGNIFICANT CHANGE UP (ref 32–36)
MCV RBC AUTO: 82.1 FL — SIGNIFICANT CHANGE UP (ref 80–100)
PLATELET # BLD AUTO: 531 K/UL — HIGH (ref 150–400)
POTASSIUM SERPL-MCNC: 3.9 MMOL/L — SIGNIFICANT CHANGE UP (ref 3.5–5.3)
POTASSIUM SERPL-SCNC: 3.9 MMOL/L — SIGNIFICANT CHANGE UP (ref 3.5–5.3)
RBC # BLD: 3.65 M/UL — LOW (ref 4.2–5.8)
RBC # FLD: 16.4 % — HIGH (ref 11–15)
SODIUM SERPL-SCNC: 136 MMOL/L — SIGNIFICANT CHANGE UP (ref 135–145)
WBC # BLD: 10.6 K/UL — HIGH (ref 3.8–10.5)
WBC # FLD AUTO: 10.6 K/UL — HIGH (ref 3.8–10.5)

## 2017-04-06 PROCEDURE — 99233 SBSQ HOSP IP/OBS HIGH 50: CPT

## 2017-04-06 PROCEDURE — 93312 ECHO TRANSESOPHAGEAL: CPT | Mod: 26

## 2017-04-06 PROCEDURE — 99232 SBSQ HOSP IP/OBS MODERATE 35: CPT

## 2017-04-06 RX ORDER — SODIUM CHLORIDE 9 MG/ML
1000 INJECTION, SOLUTION INTRAVENOUS
Qty: 0 | Refills: 0 | Status: DISCONTINUED | OUTPATIENT
Start: 2017-04-06 | End: 2017-04-06

## 2017-04-06 RX ADMIN — Medication 1 APPLICATION(S): at 05:27

## 2017-04-06 RX ADMIN — Medication 3 MILLILITER(S): at 12:07

## 2017-04-06 RX ADMIN — ZINC OXIDE 1 APPLICATION(S): 200 OINTMENT TOPICAL at 05:28

## 2017-04-06 RX ADMIN — Medication 3 MILLILITER(S): at 06:14

## 2017-04-06 RX ADMIN — NAFCILLIN 200 GRAM(S): 10 INJECTION, POWDER, FOR SOLUTION INTRAVENOUS at 09:43

## 2017-04-06 RX ADMIN — NAFCILLIN 200 GRAM(S): 10 INJECTION, POWDER, FOR SOLUTION INTRAVENOUS at 01:30

## 2017-04-06 RX ADMIN — HEPARIN SODIUM 5000 UNIT(S): 5000 INJECTION INTRAVENOUS; SUBCUTANEOUS at 05:27

## 2017-04-06 RX ADMIN — NAFCILLIN 200 GRAM(S): 10 INJECTION, POWDER, FOR SOLUTION INTRAVENOUS at 22:29

## 2017-04-06 RX ADMIN — NAFCILLIN 200 GRAM(S): 10 INJECTION, POWDER, FOR SOLUTION INTRAVENOUS at 17:43

## 2017-04-06 RX ADMIN — Medication 10 MILLIGRAM(S): at 05:27

## 2017-04-06 RX ADMIN — Medication 12.5 MILLIGRAM(S): at 05:27

## 2017-04-06 RX ADMIN — SODIUM CHLORIDE 50 MILLILITER(S): 9 INJECTION, SOLUTION INTRAVENOUS at 16:15

## 2017-04-06 RX ADMIN — ZINC OXIDE 1 APPLICATION(S): 200 OINTMENT TOPICAL at 22:29

## 2017-04-06 RX ADMIN — Medication 0.25 MILLIGRAM(S): at 22:30

## 2017-04-06 RX ADMIN — NAFCILLIN 200 GRAM(S): 10 INJECTION, POWDER, FOR SOLUTION INTRAVENOUS at 05:26

## 2017-04-06 RX ADMIN — Medication 3 MILLILITER(S): at 17:47

## 2017-04-06 RX ADMIN — Medication 2: at 00:28

## 2017-04-06 RX ADMIN — Medication 0.25 MILLIGRAM(S): at 05:30

## 2017-04-06 RX ADMIN — HEPARIN SODIUM 5000 UNIT(S): 5000 INJECTION INTRAVENOUS; SUBCUTANEOUS at 17:45

## 2017-04-06 RX ADMIN — Medication 1 APPLICATION(S): at 22:30

## 2017-04-06 RX ADMIN — Medication 3 MILLILITER(S): at 00:10

## 2017-04-06 NOTE — PROGRESS NOTE ADULT - SUBJECTIVE AND OBJECTIVE BOX
Patient is a 61y old  Male who presents with a chief complaint of sob (08 Mar 2017 11:44)      INTERVAL HPI/OVERNIGHT EVENTS:  transferred from ICU yesterday  s/p trach , awake and alert, attempting to communicate, wants PAS stockings off.    MEDICATIONS  (STANDING):  zinc oxide 40% Ointment 1Application(s) Topical three times a day  heparin  Injectable 5000Unit(s) SubCutaneous every 12 hours  tiotropium 18 MICROgram(s) Capsule 1Capsule(s) Inhalation daily  multivitamin/minerals 1Tablet(s) Oral daily  BACItracin   Ointment 1Application(s) Topical three times a day  ALPRAZolam 0.25milliGRAM(s) Oral three times a day  insulin lispro (HumaLOG) corrective regimen sliding scale  SubCutaneous every 6 hours  nafcillin  IVPB 2Gram(s) IV Intermittent every 4 hours  gentamicin   IVPB 210milliGRAM(s) IV Intermittent every 24 hours  ALBUTerol/ipratropium for Nebulization 3milliLiter(s) Nebulizer every 6 hours  predniSONE   Tablet 10milliGRAM(s) Oral daily  metoprolol 12.5milliGRAM(s) Oral two times a day    MEDICATIONS  (PRN):  acetaminophen   Tablet 650milliGRAM(s) Oral every 6 hours PRN For Temp greater than 38 C (100.4 F)  acetaminophen   Tablet. 650milliGRAM(s) Oral every 6 hours PRN Mild Pain (1 - 3)      Allergies    No Known Allergies    Intolerances        REVIEW OF SYSTEMS:  difficult to assess given trach status  but only complaint indicated is that he would like the PAS taken off  Vital Signs Last 24 Hrs  T(C): 37.8, Max: 38 (04-05 @ 13:20)  T(F): 100, Max: 100.4 (04-05 @ 13:20)  HR: 88 (70 - 109)  BP: 112/75 (95/50 - 112/75)  BP(mean): --  RR: 23 (14 - 26)  SpO2: 100% (96% - 100%)    PHYSICAL EXAM:  GENERAL: NAD, cachetic  HEAD:  Atraumatic, Normocephalic  EYES: EOMI, PERRLA, conjunctiva and sclera clear  ENMT: trach with some surrounding erythema, no discharge  NECK: Supple, No JVD, Normal thyroid  NERVOUS SYSTEM:  Alert & Oriented X3, Good concentration; Motor Strength 4/5 B/L upper and lower extremities; DTRs 2+ intact and symmetric  CHEST/LUNG: mild b/l exp wheeze  HEART: Regular rate and rhythm; No murmurs, rubs, or gallops  ABDOMEN: Soft, Nontender, Nondistended; Bowel sounds present  EXTREMITIES:  2+ Peripheral Pulses, No clubbing, cyanosis, or edema  LYMPH: No lymphadenopathy noted  SKIN: No rashes or lesions    LABS:                        10.1   10.6  )-----------( 531      ( 06 Apr 2017 08:42 )             30.0     04-06    136  |  93<L>  |  12  ----------------------------<  165<H>  3.9   |  33<H>  |  0.51    Ca    8.7      06 Apr 2017 08:42  Phos  3.7     04-05  Mg     2.0     04-05          CAPILLARY BLOOD GLUCOSE  149 (06 Apr 2017 11:30)  150 (06 Apr 2017 05:35)  230 (06 Apr 2017 00:25)  132 (05 Apr 2017 17:08)      RADIOLOGY & ADDITIONAL TESTS:    Imaging Personally Reviewed:  [ ] YES  [ ] NO    Consultant(s) Notes Reviewed:  [ ] YES  [ ] NO    Care Discussed with Consultants/Other Providers [ ] YES  [ ] NO

## 2017-04-06 NOTE — PROGRESS NOTE ADULT - SUBJECTIVE AND OBJECTIVE BOX
INTERVAL HPI/OVERNIGHT EVENTS:  with SOB. Intubated for hypercapnic respiratory failure in ED. CT chest and CXR showed multifocal pneumonia.  NO more details of history are available as intubated and not able to provide information.   Was recently admitted with similar presentation, was in ICU with BIPAP support.  Failed weaning for few days but did well on 03/13/17 and got extubated, then transferred to medical floor.  03/27/17: Events noted, transferred to 2D due to hypercarbic Respiratory failure. Awake and Responsive on BIPAP.  03/28/17: now in ICU, resting on BIPAP. RN reports did not do well on nasal O2.  03/29/17: Required intubation for severe hypercarbic Respiratory failure. Fever spike to more than 101 noted.  04/03/17: underwent Tracheostomy.   04/05/17: Transferred to regular floor.  Resting on Vent, arouse able    Vital Signs Last 24 Hrs  T(C): 37.2, Max: 37.9 (04-06 @ 17:45)  T(F): 99, Max: 100.2 (04-06 @ 17:45)  HR: 107 (70 - 107)  BP: 103/62 (98/52 - 139/76)  BP(mean): --  RR: 18 (16 - 99)  SpO2: 96% (96% - 100%)    Mode: Auto Mode: PRVC/ Volume Support  RR (machine): 14  TV (machine): 400  FiO2: 30  PEEP: 5  ITime: 0.9  MAP: 7  PIP: 11    PHYSICAL EXAM:  GEN:         Awake, responsive and comfortable.  HEENT:    Normal.    RESP:   CVS:             Regular rate and rhythm.   ABD:         Soft, non-tender, non-distended;   :             No costovertebral angle tenderness  EXTR:            No clubbing, cyanosis or edema  CNS:              Intact sensory and motor function.    MEDICATIONS  (STANDING):  zinc oxide 40% Ointment 1Application(s) Topical three times a day  heparin  Injectable 5000Unit(s) SubCutaneous every 12 hours  tiotropium 18 MICROgram(s) Capsule 1Capsule(s) Inhalation daily  multivitamin/minerals 1Tablet(s) Oral daily  BACItracin   Ointment 1Application(s) Topical three times a day  ALPRAZolam 0.25milliGRAM(s) Oral three times a day  insulin lispro (HumaLOG) corrective regimen sliding scale  SubCutaneous every 6 hours  nafcillin  IVPB 2Gram(s) IV Intermittent every 4 hours  gentamicin   IVPB 210milliGRAM(s) IV Intermittent every 24 hours  ALBUTerol/ipratropium for Nebulization 3milliLiter(s) Nebulizer every 6 hours  predniSONE   Tablet 10milliGRAM(s) Oral daily  metoprolol 12.5milliGRAM(s) Oral two times a day    MEDICATIONS  (PRN):  acetaminophen   Tablet 650milliGRAM(s) Oral every 6 hours PRN For Temp greater than 38 C (100.4 F)  acetaminophen   Tablet. 650milliGRAM(s) Oral every 6 hours PRN Mild Pain (1 - 3)    LABS:                        10.1   10.6  )-----------( 531      ( 06 Apr 2017 08:42 )             30.0     04-06    136  |  93<L>  |  12  ----------------------------<  165<H>  3.9   |  33<H>  |  0.51    Ca    8.7      06 Apr 2017 08:42  Phos  3.7     04-05  Mg     2.0     04-05    ASSESSMENT AND PLAN:  ·	Acute Hypoxic/hypercarbic Respiratory failure.  ·	S/P Tracheostomy on 04/03/17  ·	Multifocal pneumonia.  ·	Acute COPD exacerbation.  ·	Functional deconditioning.  ·	Anemia.  ·	CAD with angioplasty.  ·	HTN.  ·	A Fib.  ·	HLD.  ·	Anxiety    Continue Vent, steroids and Bronchodilators.

## 2017-04-06 NOTE — PROGRESS NOTE ADULT - PROBLEM SELECTOR PLAN 2
septic shock with persistent MSSA bacteremia c/w naficillin, gentamicin, repeat blood cultures  awaiting RANJIT  off pressor, shock resolved

## 2017-04-06 NOTE — PROGRESS NOTE ADULT - ASSESSMENT
Pt is a 60 yo WM with h/o COPD on home O2, nocturnal CPAP, CAD/stent, A fib, HTN, DM, HLD and prostate adenoma presented sec to SOB. ABG showed worsening acute on chronic resp acidosis despite being on BiPAP, s/p intubation and icu admission for Acute on chronic resp acidosis/ COPD exacerbation/ PNA/ Staph sepsis/ was difficult to wean initially but then s/p successful extubation on 3/13. palliative care following, prognosis poor but pt/family wants to continue aggressive measures.  s/p intubation again and trach, transferred back to medical floor

## 2017-04-06 NOTE — PROGRESS NOTE ADULT - SUBJECTIVE AND OBJECTIVE BOX
Patient is a 61y old  Male who presents with a chief complaint of sob (08 Mar 2017 11:44)      INTERVAL HPI / OVERNIGHT EVENTS:s/p trach ,low grade fever    MEDICATIONS  (STANDING):  heparin  Injectable 5000Unit(s) SubCutaneous every 12 hours  dextrose 5%. 1000milliLiter(s) IV Continuous <Continuous>  dextrose 50% Injectable 12.5Gram(s) IV Push once  dextrose 50% Injectable 25Gram(s) IV Push once  ALBUTerol/ipratropium for Nebulization 3milliLiter(s) Nebulizer every 6 hours  ALBUTerol    90 MICROgram(s) HFA Inhaler 1Puff(s) Inhalation every 4 hours  tiotropium 18 MICROgram(s) Capsule 1Capsule(s) Inhalation daily  multivitamin/minerals 1Tablet(s) Oral daily  BACItracin   Ointment 1Application(s) Topical three times a day  ALPRAZolam 0.25milliGRAM(s) Oral three times a day  pantoprazole  Injectable 40milliGRAM(s) IV Push daily  chlorhexidine 0.12% Liquid 15milliLiter(s) Swish and Spit two times a day  chlorhexidine 4% Liquid 1Application(s) Topical daily  predniSONE   Tablet 20milliGRAM(s) Oral daily  BACItracin   Ointment 1Application(s) Topical two times a day  insulin lispro (HumaLOG) corrective regimen sliding scale  SubCutaneous every 6 hours  nafcillin  IVPB  IV Intermittent   nafcillin  IVPB 2Gram(s) IV Intermittent every 4 hours  gentamicin   IVPB  IV Intermittent   gentamicin   IVPB 210milliGRAM(s) IV Intermittent every 24 hours  dexmedetomidine Infusion 0.7MICROgram(s)/kG/Hr IV Continuous <Continuous>  zinc oxide 40%/lanolin Ointment 1Application(s) Topical three times a day    MEDICATIONS  (PRN):  dextrose Gel 1Dose(s) Oral once PRN Blood Glucose LESS THAN 70 milliGRAM(s)/deciliter  glucagon  Injectable 1milliGRAM(s) IntraMuscular once PRN Glucose LESS THAN 70 milligrams/deciliter  acetaminophen   Tablet 650milliGRAM(s) Oral every 6 hours PRN For Temp greater than 38 C (100.4 F)  benzocaine 15 mG/menthol 3.6 mG Lozenge 1Lozenge Oral every 4 hours PRN Sore Throat  acetaminophen   Tablet. 650milliGRAM(s) Oral every 6 hours PRN Mild Pain (1 - 3)      Vital Signs Last 24 Hrs  Tmax: 100.2  HR: 78 (59 - 126)  BP: 89/50 (74/50 - 167/79)  BP(mean): 63 (59 - 117)  RR: 22 (12 - 26)  SpO2: 98% (93% - 100%)    PHYSICAL EXAM:    Constitutional: NAD, cachectic  Respiratory: CTAB/L, vent dependent ,s/p trach  Cardiovascular: S1 and S2, RRR, no M/G/R  Gastrointestinal: BS+, soft, NT/ND  Extremities: No peripheral edema  Vascular: 2+ peripheral pulses  Skin: No rashes  Lines : TLC +      LABS:          WBC -WNL              MICROBIOLOGY:  RECENT CULTURES:  03-30 .Blood Blood/only aerobic received Staphylococcus aureus   Growth in aerobic bottle: Gram Positive Cocci in Clusters  and  Gram Positive Cocci in Pairs and Chains   Growth in aerobic bottle: Staphylococcus aureus  Growth in aerobic bottle: Alpha hemolytic strep  (not Strep. pneumoniae or Enterococcus)  Single set isolate, possible contaminant. Contact  Microbiology if susceptibility testing clinically  indicate    03-30 .Blood Blood XXXX   Growth in aerobic bottle: Gram Positive Cocci in Clusters   Growth in aerobic bottle: Staphylococcus aureus  See previous culture [id number]    03-29 .Urine Catheterized XXXX XXXX   No growth    03-29 .Sputum Sputum Staphylococcus aureus   Moderate WBC's  Few squamous epithelial cells  Moderate Gram Positive Cocci in Clusters  Moderate Gram Negative Rods   Moderate Staphylococcus aureus  Normal Respiratory Cinthia present          RADIOLOGY & ADDITIONAL STUDIES:

## 2017-04-06 NOTE — PROGRESS NOTE ADULT - SUBJECTIVE AND OBJECTIVE BOX
GASTROENTEROLOGY  Patient seen and examined bedside resting comfortably.  No complaints offered. on Trach in ICU on ventilator. tolerating. NGT feedings  T(F): 100.2, Max: 100.2 (04-06 @ 17:45)    HR: 107 (70 - 107)  BP: 103/62 (98/52 - 139/76)  RR: 18 (16 - 99)  SpO2: 96% (96% - 100%)  Wt(kg): --  CAPILLARY BLOOD GLUCOSE  86 (06 Apr 2017 18:00)  149 (06 Apr 2017 14:30)  149 (06 Apr 2017 11:30)  150 (06 Apr 2017 05:35)  230 (06 Apr 2017 00:25)        PHYSICAL EXAM:  General: NAD, WDWN.   Neuro:  Alert & oriented x 3  HEENT: NCAT, EOMI, conjunctiva clear  CV: +S1+S2 regular rate and rhythm  Lung: clear to ausculation bilaterally, respirations nonlabored, ventilated  + coarse BS  Abdomen: soft, NTND. Normactive BS  Extremities: no pedal edema or calf tenderness noted   LABS:                        10.1   10.6  )-----------( 531      ( 06 Apr 2017 08:42 )             30.0     04-06    136  |  93<L>  |  12  ----------------------------<  165<H>  3.9   |  33<H>  |  0.51    Ca    8.7      06 Apr 2017 08:42  Phos  3.7     04-05  Mg     2.0     04-05          I&O's Detail  I & Os for 24h ending 06 Apr 2017 07:00  =============================================  IN:    Solution: 400 ml    Glucerna: 275 ml    Total IN: 675 ml  ---------------------------------------------  OUT:    Indwelling Catheter - Urethral: 800 ml    Total OUT: 800 ml  ---------------------------------------------  Total NET: -125 ml    I & Os for current day (as of 06 Apr 2017 18:47)  =============================================  IN:    lactated ringers.: 30 ml    Total IN: 30 ml  ---------------------------------------------  OUT:    Indwelling Catheter - Urethral: 700 ml    Total OUT: 700 ml  ---------------------------------------------  Total NET: -670 ml

## 2017-04-06 NOTE — PROGRESS NOTE ADULT - SUBJECTIVE AND OBJECTIVE BOX
Chief Complaint:  Patient is a 61y old  Male who presents with a chief complaint of sob (08 Mar 2017 11:44)      HPI:  62 Y/O male w/ PMHx of COPD on home O2, cpap at night, paroxysmal afib, htn, hld, dm, pw c/o SOB. ABG worsening in ED showing 7.26/102/133/44 on Bipap. Pt intubated for hypercapnic respiratory failure. Sepsis and s/p trach. Concern of limited views of heart views on TTE. For RANJIT with anesthesia today. Pt is more awake and alert.      Discussed with: Family    Physical Exam:  Vital Signs Last 24 Hrs  T(C): 37.8, Max: 37.8 (04-06 @ 11:14)  T(F): 100, Max: 100 (04-06 @ 11:14)  HR: 88 (70 - 93)  BP: 112/75 (95/50 - 112/75)  RR: 23 (16 - 26)  SpO2: 100% (96% - 100%)    General:  Appears stated age, well-groomed, well-nourished, no distress  HEENT:  NC/AT, patent nares w/ pink mucosa, OP clear w/o lesions, EOMI, conjunctivae clear, no thyromegaly, no JVD  Chest:  Full & symmetric excursion, no increased effort, breath sounds clear  Cardiovascular:  Regular rhythm, S1, S2, no murmur/rub/S3/S4, radial/pedal pulses 2+  Abdomen:  Soft, non-tender, non-distended, normoactive bowel sounds  Extremities: No edema  Skin:  No rash/erythema. Skin is warm/dry  Musculoskeletal:  Full ROM in all joints w/o swelling/tenderness/effusion  Neuro/Psych:  Awake.      Laboratory:                          10.1   10.6  )-----------( 531      ( 06 Apr 2017 08:42 )             30.0     04-06    136  |  93<L>  |  12  ----------------------------<  165<H>  3.9   |  33<H>  |  0.51    Ca    8.7      06 Apr 2017 08:42  Phos  3.7     04-05  Mg     2.0     04-05      Imaging:  EXAM:  CHEST SINGLE VIEW                        PROCEDURE DATE:  04/03/2017  IMPRESSION: Status post tracheostomy tube placement. Otherwise, no   significant change from 4/2/2017.    Assessment: 62 Y/O male w/ PMHx of COPD on home O2, cpap at night, afib, htn, hld, dm, pw c/o SOB. ABG worsening in ED showing 7.26/102/133/44 on Bipap. Pt intubated for hypercapnic respiratory failure. Sepsis concerns. RANJIT ordered. For Trach.    Plan:     Con't with:  MEDICATIONS  (STANDING):  zinc oxide 40% Ointment 1Application(s) Topical three times a day  heparin  Injectable 5000Unit(s) SubCutaneous every 12 hours  tiotropium 18 MICROgram(s) Capsule 1Capsule(s) Inhalation daily  multivitamin/minerals 1Tablet(s) Oral daily  BACItracin   Ointment 1Application(s) Topical three times a day  ALPRAZolam 0.25milliGRAM(s) Oral three times a day  insulin lispro (HumaLOG) corrective regimen sliding scale  SubCutaneous every 6 hours  nafcillin  IVPB 2Gram(s) IV Intermittent every 4 hours  gentamicin   IVPB 210milliGRAM(s) IV Intermittent every 24 hours  ALBUTerol/ipratropium for Nebulization 3milliLiter(s) Nebulizer every 6 hours  predniSONE   Tablet 10milliGRAM(s) Oral daily  metoprolol 12.5milliGRAM(s) Oral two times a day    Consented for RANJIT to help rule out any valvular vegetative process.    D/w son.    Xander Bowen MD, FACC, FASYOSHI, FASNC, FACP  Director, Heart Failure Services  [facility]  , Department of Cardiology  [facility]

## 2017-04-07 LAB
ANION GAP SERPL CALC-SCNC: 12 MMOL/L — SIGNIFICANT CHANGE UP (ref 5–17)
BASOPHILS # BLD AUTO: 0.1 K/UL — SIGNIFICANT CHANGE UP (ref 0–0.2)
BASOPHILS NFR BLD AUTO: 1.1 % — SIGNIFICANT CHANGE UP (ref 0–2)
BUN SERPL-MCNC: 12 MG/DL — SIGNIFICANT CHANGE UP (ref 7–23)
CALCIUM SERPL-MCNC: 8.5 MG/DL — SIGNIFICANT CHANGE UP (ref 8.5–10.1)
CHLORIDE SERPL-SCNC: 92 MMOL/L — LOW (ref 96–108)
CO2 SERPL-SCNC: 32 MMOL/L — HIGH (ref 22–31)
CREAT SERPL-MCNC: 0.54 MG/DL — SIGNIFICANT CHANGE UP (ref 0.5–1.3)
EOSINOPHIL # BLD AUTO: 0.1 K/UL — SIGNIFICANT CHANGE UP (ref 0–0.5)
EOSINOPHIL NFR BLD AUTO: 1.1 % — SIGNIFICANT CHANGE UP (ref 0–6)
GLUCOSE SERPL-MCNC: 139 MG/DL — HIGH (ref 70–99)
HCT VFR BLD CALC: 27.4 % — LOW (ref 39–50)
HGB BLD-MCNC: 9.5 G/DL — LOW (ref 13–17)
LYMPHOCYTES # BLD AUTO: 2.9 K/UL — SIGNIFICANT CHANGE UP (ref 1–3.3)
LYMPHOCYTES # BLD AUTO: 24.3 % — SIGNIFICANT CHANGE UP (ref 13–44)
MCHC RBC-ENTMCNC: 28 PG — SIGNIFICANT CHANGE UP (ref 27–34)
MCHC RBC-ENTMCNC: 34.5 GM/DL — SIGNIFICANT CHANGE UP (ref 32–36)
MCV RBC AUTO: 81.2 FL — SIGNIFICANT CHANGE UP (ref 80–100)
MONOCYTES # BLD AUTO: 1.2 K/UL — HIGH (ref 0–0.9)
MONOCYTES NFR BLD AUTO: 10.4 % — SIGNIFICANT CHANGE UP (ref 2–14)
NEUTROPHILS # BLD AUTO: 7.5 K/UL — HIGH (ref 1.8–7.4)
NEUTROPHILS NFR BLD AUTO: 63.2 % — SIGNIFICANT CHANGE UP (ref 43–77)
PLATELET # BLD AUTO: 552 K/UL — HIGH (ref 150–400)
POTASSIUM SERPL-MCNC: 3.6 MMOL/L — SIGNIFICANT CHANGE UP (ref 3.5–5.3)
POTASSIUM SERPL-SCNC: 3.6 MMOL/L — SIGNIFICANT CHANGE UP (ref 3.5–5.3)
RBC # BLD: 3.38 M/UL — LOW (ref 4.2–5.8)
RBC # FLD: 16.1 % — HIGH (ref 11–15)
SODIUM SERPL-SCNC: 136 MMOL/L — SIGNIFICANT CHANGE UP (ref 135–145)
WBC # BLD: 11.9 K/UL — HIGH (ref 3.8–10.5)
WBC # FLD AUTO: 11.9 K/UL — HIGH (ref 3.8–10.5)

## 2017-04-07 PROCEDURE — 99233 SBSQ HOSP IP/OBS HIGH 50: CPT

## 2017-04-07 RX ORDER — SODIUM CHLORIDE 9 MG/ML
1000 INJECTION, SOLUTION INTRAVENOUS
Qty: 0 | Refills: 0 | Status: DISCONTINUED | OUTPATIENT
Start: 2017-04-07 | End: 2017-04-07

## 2017-04-07 RX ORDER — FENTANYL CITRATE 50 UG/ML
25 INJECTION INTRAVENOUS
Qty: 0 | Refills: 0 | Status: DISCONTINUED | OUTPATIENT
Start: 2017-04-07 | End: 2017-04-07

## 2017-04-07 RX ADMIN — SODIUM CHLORIDE 50 MILLILITER(S): 9 INJECTION, SOLUTION INTRAVENOUS at 11:45

## 2017-04-07 RX ADMIN — Medication 1 APPLICATION(S): at 05:27

## 2017-04-07 RX ADMIN — Medication 3 MILLILITER(S): at 00:58

## 2017-04-07 RX ADMIN — Medication 1 APPLICATION(S): at 14:21

## 2017-04-07 RX ADMIN — NAFCILLIN 200 GRAM(S): 10 INJECTION, POWDER, FOR SOLUTION INTRAVENOUS at 05:26

## 2017-04-07 RX ADMIN — Medication 1 APPLICATION(S): at 21:23

## 2017-04-07 RX ADMIN — NAFCILLIN 200 GRAM(S): 10 INJECTION, POWDER, FOR SOLUTION INTRAVENOUS at 10:05

## 2017-04-07 RX ADMIN — ZINC OXIDE 1 APPLICATION(S): 200 OINTMENT TOPICAL at 05:29

## 2017-04-07 RX ADMIN — Medication 3 MILLILITER(S): at 12:32

## 2017-04-07 RX ADMIN — ZINC OXIDE 1 APPLICATION(S): 200 OINTMENT TOPICAL at 14:21

## 2017-04-07 RX ADMIN — NAFCILLIN 200 GRAM(S): 10 INJECTION, POWDER, FOR SOLUTION INTRAVENOUS at 21:21

## 2017-04-07 RX ADMIN — Medication 3 MILLILITER(S): at 17:27

## 2017-04-07 RX ADMIN — ZINC OXIDE 1 APPLICATION(S): 200 OINTMENT TOPICAL at 21:22

## 2017-04-07 RX ADMIN — NAFCILLIN 200 GRAM(S): 10 INJECTION, POWDER, FOR SOLUTION INTRAVENOUS at 14:21

## 2017-04-07 RX ADMIN — Medication 3 MILLILITER(S): at 06:12

## 2017-04-07 RX ADMIN — NAFCILLIN 200 GRAM(S): 10 INJECTION, POWDER, FOR SOLUTION INTRAVENOUS at 02:16

## 2017-04-07 RX ADMIN — HEPARIN SODIUM 5000 UNIT(S): 5000 INJECTION INTRAVENOUS; SUBCUTANEOUS at 17:23

## 2017-04-07 RX ADMIN — Medication 12.5 MILLIGRAM(S): at 17:23

## 2017-04-07 RX ADMIN — Medication 200 MILLIGRAM(S): at 15:20

## 2017-04-07 RX ADMIN — Medication 0.25 MILLIGRAM(S): at 21:21

## 2017-04-07 RX ADMIN — HEPARIN SODIUM 5000 UNIT(S): 5000 INJECTION INTRAVENOUS; SUBCUTANEOUS at 05:26

## 2017-04-07 RX ADMIN — NAFCILLIN 200 GRAM(S): 10 INJECTION, POWDER, FOR SOLUTION INTRAVENOUS at 17:23

## 2017-04-07 NOTE — PROGRESS NOTE ADULT - SUBJECTIVE AND OBJECTIVE BOX
INTERVAL HPI/OVERNIGHT EVENTS:  with SOB. Intubated for hypercapnic respiratory failure in ED. CT chest and CXR showed multifocal pneumonia.  NO more details of history are available as intubated and not able to provide information.   Was recently admitted with similar presentation, was in ICU with BIPAP support.  Failed weaning for few days but did well on 03/13/17 and got extubated, then transferred to medical floor.  03/27/17: Events noted, transferred to 2D due to hypercarbic Respiratory failure. Awake and Responsive on BIPAP.  03/28/17: now in ICU, resting on BIPAP. RN reports did not do well on nasal O2.  03/29/17: Required intubation for severe hypercarbic Respiratory failure. Fever spike to more than 101 noted.  04/03/17: underwent Tracheostomy.   04/05/17: Transferred to regular floor.  04/06/17; Had RANJIT.  04/07/17:  Peg placement.  Comfortable on Vent.    Vital Signs Last 24 Hrs  T(C): 37.2, Max: 37.4 (04-07 @ 05:17)  T(F): 99, Max: 99.4 (04-07 @ 05:17)  HR: 82 (67 - 102)  BP: 136/60 (95/59 - 144/72)  BP(mean): --  RR: 19 (16 - 22)  SpO2: 100% (97% - 100%)    Mode: AC/ CMV (Assist Control/ Continuous Mandatory Ventilation)  RR (machine): 14  TV (machine): 400  FiO2: 30  PEEP: 5  ITime: 0.9  MAP: 8  PIP: 22    PHYSICAL EXAM:  GEN:         Awake, responsive and comfortable.  HEENT:    Trach  RESP:       no wheezing.  CVS:          Regular rate and rhythm.   ABD:         peg    MEDICATIONS  (STANDING):  zinc oxide 40% Ointment 1Application(s) Topical three times a day  heparin  Injectable 5000Unit(s) SubCutaneous every 12 hours  tiotropium 18 MICROgram(s) Capsule 1Capsule(s) Inhalation daily  multivitamin/minerals 1Tablet(s) Oral daily  BACItracin   Ointment 1Application(s) Topical three times a day  ALPRAZolam 0.25milliGRAM(s) Oral three times a day  insulin lispro (HumaLOG) corrective regimen sliding scale  SubCutaneous every 6 hours  nafcillin  IVPB 2Gram(s) IV Intermittent every 4 hours  gentamicin   IVPB 210milliGRAM(s) IV Intermittent every 24 hours  ALBUTerol/ipratropium for Nebulization 3milliLiter(s) Nebulizer every 6 hours  predniSONE   Tablet 10milliGRAM(s) Oral daily  metoprolol 12.5milliGRAM(s) Oral two times a day    MEDICATIONS  (PRN):  acetaminophen   Tablet 650milliGRAM(s) Oral every 6 hours PRN For Temp greater than 38 C (100.4 F)  acetaminophen   Tablet. 650milliGRAM(s) Oral every 6 hours PRN Mild Pain (1 - 3)    LABS:                        9.5    11.9  )-----------( 552      ( 07 Apr 2017 06:25 )             27.4     04-07    136  |  92<L>  |  12  ----------------------------<  139<H>  3.6   |  32<H>  |  0.54    Ca    8.5      07 Apr 2017 06:25    ASSESSMENT AND PLAN:  ·	Acute Hypoxic/hypercarbic Respiratory failure.  ·	S/P Tracheostomy on 04/03/17  ·	Multifocal pneumonia.  ·	Acute COPD exacerbation.  ·	Functional deconditioning.  ·	Anemia.  ·	CAD with angioplasty.  ·	HTN.  ·	A Fib.  ·	HLD.  ·	Anxiety  ·	S/P Peg      Will start on daily weaning trial.  Discharge planning.

## 2017-04-07 NOTE — PROGRESS NOTE ADULT - SUBJECTIVE AND OBJECTIVE BOX
Gastroenterology    For EGD and PEG. Dementia  and dysphagia  T(F): 99.2, Max: 100.2 (04-06 @ 17:45)  HR: 83 (76 - 107)  BP: 95/59 (95/59 - 139/76)  RR: 18 (16 - 99)  SpO2: 100% (96% - 100%)  Wt(kg): --  CAPILLARY BLOOD GLUCOSE  136 (07 Apr 2017 05:32)  176 (07 Apr 2017 00:09)  86 (06 Apr 2017 18:00)  149 (06 Apr 2017 14:30)  149 (06 Apr 2017 11:30)      PHYSICAL EXAM:  General: NAD, WDWN.   Neuro:  Alert & oriented x 3  HEENT: NCAT, EOMI, conjunctiva clear  CV: +S1+S2 regular rate and rhythm  Lung: clear to ausculation bilaterally, respirations nonlabored, good inspiratory effort  Abdomen: soft, NonTender, No distention Normal active BS  Extremities: no cyanosis, clubbing or edema    LABS:                        9.5    11.9  )-----------( 552      ( 07 Apr 2017 06:25 )             27.4     04-07    136  |  92<L>  |  12  ----------------------------<  139<H>  3.6   |  32<H>  |  0.54    Ca    8.5      07 Apr 2017 06:25          I&O's Detail  I & Os for 24h ending 07 Apr 2017 07:00  =============================================  IN:    Glucerna: 250 ml    Solution: 100 ml    lactated ringers.: 30 ml    Total IN: 380 ml  ---------------------------------------------  OUT:    Indwelling Catheter - Urethral: 700 ml    Rectal Tube: 400 ml    Total OUT: 1100 ml  ---------------------------------------------  Total NET: -720 ml    I & Os for current day (as of 07 Apr 2017 11:10)  =============================================  IN:    Total IN: 0 ml  ---------------------------------------------  OUT:    Total OUT: 0 ml  ---------------------------------------------  Total NET: 0 ml    04-07 @ 06:25    136 | 92 | 12  /8.5 | -- | --  _______________________/  3.6 | 32 | 0.54                           \par

## 2017-04-07 NOTE — PROGRESS NOTE ADULT - PROBLEM SELECTOR PLAN 2
septic shock with persistent MSSA bacteremia c/w naficillin, gentamicin, repeat blood cultures testing  RANJIT neg. for vegetation    off pressor, shock resolved

## 2017-04-07 NOTE — PROGRESS NOTE ADULT - SUBJECTIVE AND OBJECTIVE BOX
Patient is a 61y old  Male who presents with a chief complaint of sob (08 Mar 2017 11:44)      INTERVAL HPI / OVERNIGHT EVENTS:s/p trach ,low grade fever    MEDICATIONS  (STANDING):  heparin  Injectable 5000Unit(s) SubCutaneous every 12 hours  dextrose 5%. 1000milliLiter(s) IV Continuous <Continuous>  dextrose 50% Injectable 12.5Gram(s) IV Push once  dextrose 50% Injectable 25Gram(s) IV Push once  ALBUTerol/ipratropium for Nebulization 3milliLiter(s) Nebulizer every 6 hours  ALBUTerol    90 MICROgram(s) HFA Inhaler 1Puff(s) Inhalation every 4 hours  tiotropium 18 MICROgram(s) Capsule 1Capsule(s) Inhalation daily  multivitamin/minerals 1Tablet(s) Oral daily  BACItracin   Ointment 1Application(s) Topical three times a day  ALPRAZolam 0.25milliGRAM(s) Oral three times a day  pantoprazole  Injectable 40milliGRAM(s) IV Push daily  chlorhexidine 0.12% Liquid 15milliLiter(s) Swish and Spit two times a day  chlorhexidine 4% Liquid 1Application(s) Topical daily  predniSONE   Tablet 20milliGRAM(s) Oral daily  BACItracin   Ointment 1Application(s) Topical two times a day  insulin lispro (HumaLOG) corrective regimen sliding scale  SubCutaneous every 6 hours  nafcillin  IVPB  IV Intermittent   nafcillin  IVPB 2Gram(s) IV Intermittent every 4 hours  gentamicin   IVPB  IV Intermittent   gentamicin   IVPB 210milliGRAM(s) IV Intermittent every 24 hours  dexmedetomidine Infusion 0.7MICROgram(s)/kG/Hr IV Continuous <Continuous>  zinc oxide 40%/lanolin Ointment 1Application(s) Topical three times a day    MEDICATIONS  (PRN):  dextrose Gel 1Dose(s) Oral once PRN Blood Glucose LESS THAN 70 milliGRAM(s)/deciliter  glucagon  Injectable 1milliGRAM(s) IntraMuscular once PRN Glucose LESS THAN 70 milligrams/deciliter  acetaminophen   Tablet 650milliGRAM(s) Oral every 6 hours PRN For Temp greater than 38 C (100.4 F)  benzocaine 15 mG/menthol 3.6 mG Lozenge 1Lozenge Oral every 4 hours PRN Sore Throat  acetaminophen   Tablet. 650milliGRAM(s) Oral every 6 hours PRN Mild Pain (1 - 3)      Vital Signs Last 24 Hrs  Tmax: 100.2  HR: 78 (59 - 126)  BP: 89/50 (74/50 - 167/79)  BP(mean): 63 (59 - 117)  RR: 22 (12 - 26)  SpO2: 98% (93% - 100%)    PHYSICAL EXAM:    Constitutional: NAD, cachectic  Respiratory: CTAB/L, vent dependent ,s/p trach,thich yellow sputum/secretions from trach site  Cardiovascular: S1 and S2, RRR, no M/G/R  Gastrointestinal: BS+, soft, NT/ND  Extremities: No peripheral edema  Vascular: 2+ peripheral pulses  Skin: No rashes  Lines : TLC +      LABS:          WBC -WNL              MICROBIOLOGY:  RECENT CULTURES:  03-30 .Blood Blood/only aerobic received Staphylococcus aureus   Growth in aerobic bottle: Gram Positive Cocci in Clusters  and  Gram Positive Cocci in Pairs and Chains   Growth in aerobic bottle: Staphylococcus aureus  Growth in aerobic bottle: Alpha hemolytic strep  (not Strep. pneumoniae or Enterococcus)  Single set isolate, possible contaminant. Contact  Microbiology if susceptibility testing clinically  indicate    03-30 .Blood Blood XXXX   Growth in aerobic bottle: Gram Positive Cocci in Clusters   Growth in aerobic bottle: Staphylococcus aureus  See previous culture [id number]    03-29 .Urine Catheterized XXXX XXXX   No growth    03-29 .Sputum Sputum Staphylococcus aureus   Moderate WBC's  Few squamous epithelial cells  Moderate Gram Positive Cocci in Clusters  Moderate Gram Negative Rods   Moderate Staphylococcus aureus  Normal Respiratory Cinthia present          RADIOLOGY & ADDITIONAL STUDIES:

## 2017-04-07 NOTE — PROGRESS NOTE ADULT - SUBJECTIVE AND OBJECTIVE BOX
Patient is a 61y old  Male who presents with a chief complaint of sob (08 Mar 2017 11:44)      INTERVAL HPI/OVERNIGHT EVENTS:  s/p peg this am, indicates no complaints    MEDICATIONS  (STANDING):  zinc oxide 40% Ointment 1Application(s) Topical three times a day  heparin  Injectable 5000Unit(s) SubCutaneous every 12 hours  tiotropium 18 MICROgram(s) Capsule 1Capsule(s) Inhalation daily  multivitamin/minerals 1Tablet(s) Oral daily  BACItracin   Ointment 1Application(s) Topical three times a day  ALPRAZolam 0.25milliGRAM(s) Oral three times a day  insulin lispro (HumaLOG) corrective regimen sliding scale  SubCutaneous every 6 hours  nafcillin  IVPB 2Gram(s) IV Intermittent every 4 hours  gentamicin   IVPB 210milliGRAM(s) IV Intermittent every 24 hours  ALBUTerol/ipratropium for Nebulization 3milliLiter(s) Nebulizer every 6 hours  predniSONE   Tablet 10milliGRAM(s) Oral daily  metoprolol 12.5milliGRAM(s) Oral two times a day    MEDICATIONS  (PRN):  acetaminophen   Tablet 650milliGRAM(s) Oral every 6 hours PRN For Temp greater than 38 C (100.4 F)  acetaminophen   Tablet. 650milliGRAM(s) Oral every 6 hours PRN Mild Pain (1 - 3)      Allergies    No Known Allergies    Intolerances        REVIEW OF SYSTEMS:  difficult to assess , given trach status, but indicates no complaints and appears comfortable    Vital Signs Last 24 Hrs  T(C): 37.2, Max: 37.4 (04-07 @ 05:17)  T(F): 99, Max: 99.4 (04-07 @ 05:17)  HR: 89 (67 - 107)  BP: 136/60 (95/59 - 144/72)  BP(mean): --  RR: 19 (16 - 22)  SpO2: 100% (96% - 100%)    PHYSICAL EXAM:  GENERAL: NAD, cachetic  HEAD:  Atraumatic, Normocephalic  EYES: EOMI, PERRLA, conjunctiva and sclera clear  NECK: tach in place, mild erythema  NERVOUS SYSTEM:  Alert & Oriented X3, Good concentration; Motor Strength 4/5 B/L upper and lower extremities; DTRs 2+ intact and symmetric  CHEST/LUNG:exp wheeze b/l  HEART: Regular rate and rhythm; No murmurs, rubs, or gallops  ABDOMEN: PEG tube in place, NT/ND, +BS  EXTREMITIES:  2+ Peripheral Pulses, No clubbing, cyanosis, or edema  LYMPH: No lymphadenopathy noted  SKIN: No rashes or lesions    LABS:                        9.5    11.9  )-----------( 552      ( 07 Apr 2017 06:25 )             27.4     04-07    136  |  92<L>  |  12  ----------------------------<  139<H>  3.6   |  32<H>  |  0.54    Ca    8.5      07 Apr 2017 06:25          CAPILLARY BLOOD GLUCOSE  87 (07 Apr 2017 17:30)  136 (07 Apr 2017 05:32)  176 (07 Apr 2017 00:09)  86 (06 Apr 2017 18:00)      RADIOLOGY & ADDITIONAL TESTS:     EXAM:  RANJIT WO CON         PROCEDURE DATE:  04/06/2017        INTERPRETATION:  REPORT:    TRANSESOPHAGEAL ECHOCARDIOGRAM REPORT   Summary:   1. Left ventricular ejection fraction, by visual estimation, is 55 to   60%.   2. Technically good study.   3. Normal global left ventricular systolic function.   4. Normal left ventricular internal cavity size.   5. Normal right ventricular size and function.   6. There is no evidence of pericardial effusion.   7. Mild mitral annular calcification.   8. Thickening and calcificationof the anterior and posterior mitral   valve leaflets.   9. Trace mitral valve regurgitation.  10. Mild aortic regurgitation.  11. Sclerotic aortic valve with normal opening.  12. Intact intra-atrial septum without shunt.  13. No valvular vegetationsvisualized.     Xander Bowen MD FACC, FASE, FACP  Electronically signed on 4/6/2017 at 6:05:28 PM    Imaging Personally Reviewed:  [x ] YES  [ ] NO    Consultant(s) Notes Reviewed:  [x ] YES  [ ] NO    Care Discussed with Consultants/Other Providers [ x] YES  [ ] NO

## 2017-04-07 NOTE — PROGRESS NOTE ADULT - ASSESSMENT
Pt is a 62 yo WM with h/o COPD on home O2, nocturnal CPAP, CAD/stent, A fib, HTN, DM, HLD and prostate adenoma presented sec to SOB. ABG showed worsening acute on chronic resp acidosis despite being on BiPAP, s/p intubation and icu admission for Acute on chronic resp acidosis/ COPD exacerbation/ PNA/ Staph sepsis/ was difficult to wean initially but then s/p successful extubation on 3/13. palliative care following, prognosis poor but pt/family wants to continue aggressive measures.  s/p intubation again and trach, transferred back to medical floor , RANJIT neg. for vegetation, s/p PEG

## 2017-04-08 LAB
ANION GAP SERPL CALC-SCNC: 8 MMOL/L — SIGNIFICANT CHANGE UP (ref 5–17)
BASOPHILS # BLD AUTO: 0.1 K/UL — SIGNIFICANT CHANGE UP (ref 0–0.2)
BASOPHILS NFR BLD AUTO: 0.9 % — SIGNIFICANT CHANGE UP (ref 0–2)
BUN SERPL-MCNC: 10 MG/DL — SIGNIFICANT CHANGE UP (ref 7–23)
CALCIUM SERPL-MCNC: 8.5 MG/DL — SIGNIFICANT CHANGE UP (ref 8.5–10.1)
CHLORIDE SERPL-SCNC: 93 MMOL/L — LOW (ref 96–108)
CO2 SERPL-SCNC: 34 MMOL/L — HIGH (ref 22–31)
CREAT SERPL-MCNC: 0.52 MG/DL — SIGNIFICANT CHANGE UP (ref 0.5–1.3)
EOSINOPHIL # BLD AUTO: 0.2 K/UL — SIGNIFICANT CHANGE UP (ref 0–0.5)
EOSINOPHIL NFR BLD AUTO: 1.4 % — SIGNIFICANT CHANGE UP (ref 0–6)
GLUCOSE SERPL-MCNC: 164 MG/DL — HIGH (ref 70–99)
GRAM STN FLD: SIGNIFICANT CHANGE UP
HCT VFR BLD CALC: 27.5 % — LOW (ref 39–50)
HGB BLD-MCNC: 9.4 G/DL — LOW (ref 13–17)
LYMPHOCYTES # BLD AUTO: 22.2 % — SIGNIFICANT CHANGE UP (ref 13–44)
LYMPHOCYTES # BLD AUTO: 3 K/UL — SIGNIFICANT CHANGE UP (ref 1–3.3)
MCHC RBC-ENTMCNC: 27.9 PG — SIGNIFICANT CHANGE UP (ref 27–34)
MCHC RBC-ENTMCNC: 34.3 GM/DL — SIGNIFICANT CHANGE UP (ref 32–36)
MCV RBC AUTO: 81.2 FL — SIGNIFICANT CHANGE UP (ref 80–100)
MONOCYTES # BLD AUTO: 1.2 K/UL — HIGH (ref 0–0.9)
MONOCYTES NFR BLD AUTO: 8.9 % — SIGNIFICANT CHANGE UP (ref 2–14)
NEUTROPHILS # BLD AUTO: 9 K/UL — HIGH (ref 1.8–7.4)
NEUTROPHILS NFR BLD AUTO: 66.6 % — SIGNIFICANT CHANGE UP (ref 43–77)
PLATELET # BLD AUTO: 571 K/UL — HIGH (ref 150–400)
POTASSIUM SERPL-MCNC: 3.4 MMOL/L — LOW (ref 3.5–5.3)
POTASSIUM SERPL-SCNC: 3.4 MMOL/L — LOW (ref 3.5–5.3)
RBC # BLD: 3.39 M/UL — LOW (ref 4.2–5.8)
RBC # FLD: 16 % — HIGH (ref 11–15)
SODIUM SERPL-SCNC: 135 MMOL/L — SIGNIFICANT CHANGE UP (ref 135–145)
SPECIMEN SOURCE: SIGNIFICANT CHANGE UP
WBC # BLD: 13.5 K/UL — HIGH (ref 3.8–10.5)
WBC # FLD AUTO: 13.5 K/UL — HIGH (ref 3.8–10.5)

## 2017-04-08 PROCEDURE — 99232 SBSQ HOSP IP/OBS MODERATE 35: CPT

## 2017-04-08 RX ORDER — POTASSIUM CHLORIDE 20 MEQ
20 PACKET (EA) ORAL ONCE
Qty: 0 | Refills: 0 | Status: COMPLETED | OUTPATIENT
Start: 2017-04-08 | End: 2017-04-08

## 2017-04-08 RX ADMIN — Medication 12.5 MILLIGRAM(S): at 17:20

## 2017-04-08 RX ADMIN — Medication 1 APPLICATION(S): at 13:12

## 2017-04-08 RX ADMIN — ZINC OXIDE 1 APPLICATION(S): 200 OINTMENT TOPICAL at 05:59

## 2017-04-08 RX ADMIN — Medication 10 MILLIGRAM(S): at 05:57

## 2017-04-08 RX ADMIN — NAFCILLIN 200 GRAM(S): 10 INJECTION, POWDER, FOR SOLUTION INTRAVENOUS at 13:12

## 2017-04-08 RX ADMIN — Medication 0.25 MILLIGRAM(S): at 22:48

## 2017-04-08 RX ADMIN — Medication 650 MILLIGRAM(S): at 13:45

## 2017-04-08 RX ADMIN — Medication 650 MILLIGRAM(S): at 05:56

## 2017-04-08 RX ADMIN — Medication 0.25 MILLIGRAM(S): at 05:57

## 2017-04-08 RX ADMIN — ZINC OXIDE 1 APPLICATION(S): 200 OINTMENT TOPICAL at 22:47

## 2017-04-08 RX ADMIN — Medication 3 MILLILITER(S): at 06:04

## 2017-04-08 RX ADMIN — NAFCILLIN 200 GRAM(S): 10 INJECTION, POWDER, FOR SOLUTION INTRAVENOUS at 22:47

## 2017-04-08 RX ADMIN — ZINC OXIDE 1 APPLICATION(S): 200 OINTMENT TOPICAL at 13:12

## 2017-04-08 RX ADMIN — Medication 1 TABLET(S): at 11:07

## 2017-04-08 RX ADMIN — Medication 3 MILLILITER(S): at 00:06

## 2017-04-08 RX ADMIN — Medication 3 MILLILITER(S): at 17:04

## 2017-04-08 RX ADMIN — Medication 650 MILLIGRAM(S): at 13:10

## 2017-04-08 RX ADMIN — NAFCILLIN 200 GRAM(S): 10 INJECTION, POWDER, FOR SOLUTION INTRAVENOUS at 17:19

## 2017-04-08 RX ADMIN — Medication 1: at 17:20

## 2017-04-08 RX ADMIN — NAFCILLIN 200 GRAM(S): 10 INJECTION, POWDER, FOR SOLUTION INTRAVENOUS at 05:56

## 2017-04-08 RX ADMIN — HEPARIN SODIUM 5000 UNIT(S): 5000 INJECTION INTRAVENOUS; SUBCUTANEOUS at 17:20

## 2017-04-08 RX ADMIN — Medication 1 APPLICATION(S): at 22:47

## 2017-04-08 RX ADMIN — Medication 20 MILLIEQUIVALENT(S): at 11:08

## 2017-04-08 RX ADMIN — Medication 200 MILLIGRAM(S): at 14:35

## 2017-04-08 RX ADMIN — Medication 3 MILLILITER(S): at 11:02

## 2017-04-08 RX ADMIN — NAFCILLIN 200 GRAM(S): 10 INJECTION, POWDER, FOR SOLUTION INTRAVENOUS at 10:01

## 2017-04-08 RX ADMIN — Medication 0.25 MILLIGRAM(S): at 13:10

## 2017-04-08 RX ADMIN — Medication 2: at 11:06

## 2017-04-08 RX ADMIN — HEPARIN SODIUM 5000 UNIT(S): 5000 INJECTION INTRAVENOUS; SUBCUTANEOUS at 05:57

## 2017-04-08 RX ADMIN — Medication 1: at 06:23

## 2017-04-08 RX ADMIN — NAFCILLIN 200 GRAM(S): 10 INJECTION, POWDER, FOR SOLUTION INTRAVENOUS at 01:07

## 2017-04-08 RX ADMIN — Medication 1 APPLICATION(S): at 05:58

## 2017-04-08 NOTE — PROGRESS NOTE ADULT - SUBJECTIVE AND OBJECTIVE BOX
CC/F/U for: COPD exacerbation, multifocal pneumonia and dysphagia.     INTERVAL HPI/OVERNIGHT EVENTS:  Pt seen and examined at bedside.     Allergies/Intolerance: No Known Allergies      MEDICATIONS  (STANDING):  zinc oxide 40% Ointment 1Application(s) Topical three times a day  heparin  Injectable 5000Unit(s) SubCutaneous every 12 hours  tiotropium 18 MICROgram(s) Capsule 1Capsule(s) Inhalation daily  multivitamin/minerals 1Tablet(s) Oral daily  BACItracin   Ointment 1Application(s) Topical three times a day  ALPRAZolam 0.25milliGRAM(s) Oral three times a day  insulin lispro (HumaLOG) corrective regimen sliding scale  SubCutaneous every 6 hours  nafcillin  IVPB 2Gram(s) IV Intermittent every 4 hours  gentamicin   IVPB 210milliGRAM(s) IV Intermittent every 24 hours  ALBUTerol/ipratropium for Nebulization 3milliLiter(s) Nebulizer every 6 hours  predniSONE   Tablet 10milliGRAM(s) Oral daily  metoprolol 12.5milliGRAM(s) Oral two times a day    MEDICATIONS  (PRN):  acetaminophen   Tablet 650milliGRAM(s) Oral every 6 hours PRN For Temp greater than 38 C (100.4 F)  acetaminophen   Tablet. 650milliGRAM(s) Oral every 6 hours PRN Mild Pain (1 - 3)        ROS: all systems reviewed and wnl      PHYSICAL EXAMINATION:  Vital Signs Last 24 Hrs  T(C): 37.7, Max: 37.7 (04-08 @ 05:05)  T(F): 99.8, Max: 99.8 (04-08 @ 05:05)  HR: 80 (67 - 95)  BP: 101/58 (98/54 - 144/72)  BP(mean): --  RR: 18 (17 - 22)  SpO2: 97% (96% - 100%)  CAPILLARY BLOOD GLUCOSE  205 (08 Apr 2017 11:06)  131 (07 Apr 2017 23:39)  87 (07 Apr 2017 17:30)      GENERAL: NAD, well-groomed, well-developed. On vent.   HEAD:  atraumatic, normocephalic  EYES: sclera anicteric  ENMT: mucous membranes moist  NECK: supple, No JVD  CHEST/LUNG: clear to auscultation bilaterally; no rales, rhonchi, or wheezing b/l  HEART: normal S1, S2  ABDOMEN: BS+, soft, ND, NT . PEG feeds in place. Rectal tube in place.    EXTREMITIES:  pulses palpable; no clubbing, cyanosis, or edema b/l LEs  NEURO: awake, alert, interactive; moves all extremities  SKIN: no rashes or lesions      LABS:                        9.4    13.5  )-----------( 571      ( 08 Apr 2017 07:35 )             27.5     04-08    135  |  93<L>  |  10  ----------------------------<  164<H>  3.4<L>   |  34<H>  |  0.52    Ca    8.5      08 Apr 2017 07:35              RADIOLOGY & ADDITIONAL TESTS: none      ASSESSMENT AND PLAN:  61 year old male with MRSA pneumonia, improving. Continue IV Nafcillin and Gentamycin. PEG feeds started,Glucerna at goal and appears to be helping.     Continue Duonebs and Spiriva for COPD with prednisone 10 mg/day. Cont. vent settings 14, 400, 40 %. Trach wean per pulmonary. Potasium replaced today. Continue supportive care.      Continue Lopressor 25 mg bid for BP.

## 2017-04-08 NOTE — PROGRESS NOTE ADULT - SUBJECTIVE AND OBJECTIVE BOX
CC/F/U for:    INTERVAL HPI/OVERNIGHT EVENTS:  Pt seen and examined at bedside.     Allergies/Intolerance: No Known Allergies      MEDICATIONS  (STANDING):  zinc oxide 40% Ointment 1Application(s) Topical three times a day  heparin  Injectable 5000Unit(s) SubCutaneous every 12 hours  tiotropium 18 MICROgram(s) Capsule 1Capsule(s) Inhalation daily  multivitamin/minerals 1Tablet(s) Oral daily  BACItracin   Ointment 1Application(s) Topical three times a day  ALPRAZolam 0.25milliGRAM(s) Oral three times a day  insulin lispro (HumaLOG) corrective regimen sliding scale  SubCutaneous every 6 hours  nafcillin  IVPB 2Gram(s) IV Intermittent every 4 hours  gentamicin   IVPB 210milliGRAM(s) IV Intermittent every 24 hours  ALBUTerol/ipratropium for Nebulization 3milliLiter(s) Nebulizer every 6 hours  predniSONE   Tablet 10milliGRAM(s) Oral daily  metoprolol 12.5milliGRAM(s) Oral two times a day    MEDICATIONS  (PRN):  acetaminophen   Tablet 650milliGRAM(s) Oral every 6 hours PRN For Temp greater than 38 C (100.4 F)  acetaminophen   Tablet. 650milliGRAM(s) Oral every 6 hours PRN Mild Pain (1 - 3)        ROS: all systems reviewed and wnl      PHYSICAL EXAMINATION:  Vital Signs Last 24 Hrs  T(C): 37.7, Max: 37.7 (04-08 @ 05:05)  T(F): 99.8, Max: 99.8 (04-08 @ 05:05)  HR: 76 (67 - 95)  BP: 101/58 (98/54 - 144/72)  BP(mean): --  RR: 18 (17 - 22)  SpO2: 98% (96% - 100%)  CAPILLARY BLOOD GLUCOSE  205 (08 Apr 2017 11:06)  131 (07 Apr 2017 23:39)  87 (07 Apr 2017 17:30)      GENERAL: NAD, well-groomed, well-developed  HEAD:  atraumatic, normocephalic  EYES: sclera anicteric  ENMT: mucous membranes moist  NECK: supple, No JVD  CHEST/LUNG: clear to auscultation bilaterally; no rales, rhonchi, or wheezing b/l  HEART: normal S1, S2  ABDOMEN: BS+, soft, ND, NT   EXTREMITIES:  pulses palpable; no clubbing, cyanosis, or edema b/l LEs  NEURO: awake, alert, interactive; moves all extremities  SKIN: no rashes or lesions      LABS:                        9.4    13.5  )-----------( 571      ( 08 Apr 2017 07:35 )             27.5     04-08    135  |  93<L>  |  10  ----------------------------<  164<H>  3.4<L>   |  34<H>  |  0.52    Ca    8.5      08 Apr 2017 07:35              RADIOLOGY & ADDITIONAL TESTS:      ASSESSMENT AND PLAN:

## 2017-04-08 NOTE — PROGRESS NOTE ADULT - SUBJECTIVE AND OBJECTIVE BOX
INTERVAL HPI/OVERNIGHT EVENTS:  with SOB. Intubated for hypercapnic respiratory failure in ED. CT chest and CXR showed multifocal pneumonia.  NO more details of history are available as intubated and not able to provide information.   Was recently admitted with similar presentation, was in ICU with BIPAP support.  Failed weaning for few days but did well on 03/13/17 and got extubated, then transferred to medical floor.  03/27/17: Events noted, transferred to 2D due to hypercarbic Respiratory failure. Awake and Responsive on BIPAP.  03/28/17: now in ICU, resting on BIPAP. RN reports did not do well on nasal O2.  03/29/17: Required intubation for severe hypercarbic Respiratory failure. Fever spike to more than 101 noted.  04/03/17: underwent Tracheostomy.   04/05/17: Transferred to regular floor.  04/06/17; Had RANJIT.  04/07/17:  Peg placement.  Awake, responsive and  comfortable on Vent.    Vital Signs Last 24 Hrs  T(C): 37.1, Max: 37.7 (04-08 @ 05:05)  T(F): 98.8, Max: 99.8 (04-08 @ 05:05)  HR: 79 (66 - 82)  BP: 101/61 (98/54 - 101/61)  BP(mean): --  RR: 15 (15 - 18)  SpO2: 99% (96% - 100%)    Mode: CPAP with PS  FiO2: 30  PEEP: 5  PS: 10      PHYSICAL EXAM:  GEN:         Awake, responsive and comfortable.  HEENT:    Normal.    RESP:      no wheezing.  CVS:          Regular rate and rhythm.   ABD:         Soft, non-tender, non-distended;   :             No costovertebral angle tenderness  EXTR:            No clubbing, cyanosis or edema  CNS:              Intact sensory and motor function.    MEDICATIONS  (STANDING):  zinc oxide 40% Ointment 1Application(s) Topical three times a day  heparin  Injectable 5000Unit(s) SubCutaneous every 12 hours  tiotropium 18 MICROgram(s) Capsule 1Capsule(s) Inhalation daily  multivitamin/minerals 1Tablet(s) Oral daily  BACItracin   Ointment 1Application(s) Topical three times a day  ALPRAZolam 0.25milliGRAM(s) Oral three times a day  insulin lispro (HumaLOG) corrective regimen sliding scale  SubCutaneous every 6 hours  nafcillin  IVPB 2Gram(s) IV Intermittent every 4 hours  gentamicin   IVPB 210milliGRAM(s) IV Intermittent every 24 hours  ALBUTerol/ipratropium for Nebulization 3milliLiter(s) Nebulizer every 6 hours  predniSONE   Tablet 10milliGRAM(s) Oral daily  metoprolol 12.5milliGRAM(s) Oral two times a day    MEDICATIONS  (PRN):  acetaminophen   Tablet 650milliGRAM(s) Oral every 6 hours PRN For Temp greater than 38 C (100.4 F)  acetaminophen   Tablet. 650milliGRAM(s) Oral every 6 hours PRN Mild Pain (1 - 3)    LABS:                        9.4    13.5  )-----------( 571      ( 08 Apr 2017 07:35 )             27.5     04-08    135  |  93<L>  |  10  ----------------------------<  164<H>  3.4<L>   |  34<H>  |  0.52    Ca    8.5      08 Apr 2017 07:35    ASSESSMENT AND PLAN:  ·	Acute Hypoxic/hypercarbic Respiratory failure.  ·	S/P Tracheostomy on 04/03/17  ·	Multifocal pneumonia.  ·	Acute COPD exacerbation.  ·	Functional deconditioning.  ·	Anemia.  ·	CAD with angioplasty.  ·	HTN.  ·	A Fib.  ·	HLD.  ·	Anxiety  ·	S/P Peg on 04/07/17.      Daily weaning trial.  Discussed with wife at bed side.

## 2017-04-08 NOTE — PROGRESS NOTE ADULT - SUBJECTIVE AND OBJECTIVE BOX
Gastroenterolgy  Patient seen and examined bedside resting comfortably.  Tolerating PEG tube feedings    T(F): 98.8, Max: 99.8 (04-08 @ 05:05)  HR: 66 (66 - 89)  BP: 101/61 (98/54 - 136/60)  RR: 15 (15 - 19)  SpO2: 96% (96% - 100%)  Wt(kg): --  CAPILLARY BLOOD GLUCOSE  205 (08 Apr 2017 11:06)  131 (07 Apr 2017 23:39)  87 (07 Apr 2017 17:30)      PHYSICAL EXAM:  General: NAD, WDWN.   Neuro:  Responsive to stimuli  HEENT: NCAT, EOMI, conjunctiva clear  CV: +S1+S2 regular rate and rhythm  Lung: clear to ausculation bilaterally, respirations nonlabored, good inspiratory effort  Abdomen: soft, NonTender, No distention Normal active BS, PEG tube in SITU No leak at site  Extremities: no cyanosis, clubbing or edema    LABS:                        9.4    13.5  )-----------( 571      ( 08 Apr 2017 07:35 )             27.5     04-08    135  |  93<L>  |  10  ----------------------------<  164<H>  3.4<L>   |  34<H>  |  0.52    Ca    8.5      08 Apr 2017 07:35          I&O's Detail    I & Os for current day (as of 08 Apr 2017 17:01)  =============================================  IN:    Glucerna: 570 ml    Enteral Tube Flush: 500 ml    Solution: 300 ml    lactated ringers.: 32 ml    Total IN: 1402 ml  ---------------------------------------------  OUT:    Indwelling Catheter - Urethral: 250 ml    Total OUT: 250 ml  ---------------------------------------------  Total NET: 1152 ml    04-08 @ 07:35    135 | 93 | 10  /8.5 | -- | --  _______________________/  3.4 | 34 | 0.52                           \par

## 2017-04-08 NOTE — PROGRESS NOTE ADULT - SUBJECTIVE AND OBJECTIVE BOX
Initial Consultaion Note  Requested by Name:  Date/ Time:  Reason for referral/ Consultation:    HPI:  62 Y/O male w/ PMHx of COPD on home O2, cpap at night, afib, htn, hld, dm, pw c/o SOB. ABG worsening in ED showing 7.26/102/133/44 on Bipap. Pt intubated for hypercapnic respiratory failure. 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 Name/ Phone#:  Significant other/partner:                     Children:                         Denominational/Spirituality:    Baseline ADLs (Prior to admission)    ADVANCE DIRECTIVES:  [ ] YES [ ] NO   DNR [ ] YES [ ] NO  Completed on:                     MOLST  [ ] YES [ ] NO   Completed on:  Living Will  [ ] YES [ ] NO   Completed on:    Allergies    No Known Allergies    Intolerances        Review of Systems:     PAIN : (Y/N) (0-10)  PAIN SITE :  QUALITY/QUANTITY OF PAIN :  PAIN RADIATING :( Y/N)  SEVERITY :  FREQUENCY :  IMPACT ON ADLs :      DYSPNEA: (Y/N) Mild [ ] Moderate [ ] Severe [ ]  NAUSEA/VOMITING: (Y/N)  DEPRESSION: (Y/N) Mild [ ]Moderate [ ] Severe [ ]  ANXIETY: (Y/N)  AGITATION: (Y/N):  SECRETIONS:(Y/N)  CONTIPATION : (Y/N)  DIARRHEA : (Y/N)  FRAILTY SYNDROM (Y/N):  FAILURE TO THRIVE (Y/N):  DIBILITY (Y/N);  OTHER SYMPTOMS :  UNABLE TO OBTAIN DUE TO POOR MENTATION (   )    PHYSICAL EXAM:  T(F): 98.8, Max: 99.8 (04-08 @ 05:05)  HR: 80 (68 - 89)  BP: 101/61 (98/54 - 136/60)  RR: 15 (15 - 22)  SpO2: 97% (96% - 100%)  Wt(kg): --   WEIGHT :    [id number]                  BMI:  I&O's Summary    I & Os for current day (as of 08 Apr 2017 13:22)  =============================================  IN: 1402 ml / OUT: 250 ml / NET: 1152 ml    CAPILLARY BLOOD GLUCOSE  205 (08 Apr 2017 11:06)  131 (07 Apr 2017 23:39)  87 (07 Apr 2017 17:30)      GENERAL: alert [ ] oriented x ______[ ] lethargic [ ] agitated [ ] cachexia [ ] nonverbal [ ] coma [ ]  HEENT: normal [ ] dry mouth [ ] ET tube/trach [ ]   LUNGS: ]  CV :  normal [ ]  GI : normal [ ]  PEG /NG tube [ ]   : normal [ ] incontinent [ ] oliguria/anuria [ ] lawler [ ]  MSK : normal [ ] weakness [ ] edema [ ]              ambulatory [ ] bebbound/wheelchair bound [ ]   SKIN : normal [ ] pressure ulcer (Y/N) Stage ______________ rash (Y/N)    Functional Assessment:Karnofsky Performance Score :  Palliative Performance Status Version 2:         %    LABS:                        9.4    13.5  )-----------( 571      ( 08 Apr 2017 07:35 )             27.5     04-08    135  |  93<L>  |  10  ----------------------------<  164<H>  3.4<L>   |  34<H>  |  0.52    Ca    8.5      08 Apr 2017 07:35            I&O's Detail    I & Os for current day (as of 08 Apr 2017 13:22)  =============================================  IN:    Glucerna: 570 ml    Enteral Tube Flush: 500 ml    Solution: 300 ml    lactated ringers.: 32 ml    Total IN: 1402 ml  ---------------------------------------------  OUT:    Indwelling Catheter - Urethral: 250 ml    Total OUT: 250 ml  ---------------------------------------------  Total NET: 1152 ml      Assessment:   61y Male admitted with MULTIFOCAL PNEUMONIA COPD EXACERBATION  SHORTNESS OF BREATH      PROBLEM LIST :  PROBLEM/RECOMMENDATION: 1  Problem : Goals of care, counseling/ discussion.  Recommendation: met with patient/ family and discussed GOC & Advanced care planning                                    Palliative care info/counseling provided (Y/N)                                      Family meeting                                   Advanced Directives addressed (Y/N)  PROBLEM/ RECOMMENDATION:2  Problem :Resuscitation/ DNR/ DNI,   Recommendation: DNR/ DNI    PROBLEM/ RECOMMENDATION :3  Problem : Medical order for life sustaning treatment  Recommendation : MOLST Form ( initiated/ completed)    PROBLEM/RECOMMENDATION :4  Problem : Advanced care planning  Recommendation : Family meeting.(Y/N)     PLAN:  REFFERALS:                           Unit SW/Case Mgmt (Y/N)                          (Y/N)                         Speech/Swallow (Y/N)                           nutrition                                              Ethics (Y/N)                         PT/OT (Y/N)

## 2017-04-08 NOTE — PROGRESS NOTE ADULT - ASSESSMENT
pt is much more alert , 0n vent via trach  s/p peg   weaning being tried   looking for placement   sx controlled   pronosis poor , suffering index high

## 2017-04-09 LAB
CULTURE RESULTS: SIGNIFICANT CHANGE UP
GRAM STN FLD: SIGNIFICANT CHANGE UP
SPECIMEN SOURCE: SIGNIFICANT CHANGE UP

## 2017-04-09 PROCEDURE — 99232 SBSQ HOSP IP/OBS MODERATE 35: CPT

## 2017-04-09 RX ADMIN — Medication 1 APPLICATION(S): at 05:48

## 2017-04-09 RX ADMIN — Medication 3 MILLILITER(S): at 06:12

## 2017-04-09 RX ADMIN — Medication 3 MILLILITER(S): at 17:23

## 2017-04-09 RX ADMIN — Medication 1: at 06:27

## 2017-04-09 RX ADMIN — Medication 1 APPLICATION(S): at 13:25

## 2017-04-09 RX ADMIN — Medication 1: at 00:10

## 2017-04-09 RX ADMIN — Medication 3 MILLILITER(S): at 00:35

## 2017-04-09 RX ADMIN — Medication 1 APPLICATION(S): at 22:09

## 2017-04-09 RX ADMIN — Medication 0.25 MILLIGRAM(S): at 13:25

## 2017-04-09 RX ADMIN — NAFCILLIN 200 GRAM(S): 10 INJECTION, POWDER, FOR SOLUTION INTRAVENOUS at 10:16

## 2017-04-09 RX ADMIN — Medication 0.25 MILLIGRAM(S): at 22:03

## 2017-04-09 RX ADMIN — ZINC OXIDE 1 APPLICATION(S): 200 OINTMENT TOPICAL at 13:26

## 2017-04-09 RX ADMIN — HEPARIN SODIUM 5000 UNIT(S): 5000 INJECTION INTRAVENOUS; SUBCUTANEOUS at 17:05

## 2017-04-09 RX ADMIN — Medication 3 MILLILITER(S): at 11:18

## 2017-04-09 RX ADMIN — NAFCILLIN 200 GRAM(S): 10 INJECTION, POWDER, FOR SOLUTION INTRAVENOUS at 05:46

## 2017-04-09 RX ADMIN — ZINC OXIDE 1 APPLICATION(S): 200 OINTMENT TOPICAL at 22:09

## 2017-04-09 RX ADMIN — Medication 1 TABLET(S): at 11:22

## 2017-04-09 RX ADMIN — Medication 200 MILLIGRAM(S): at 16:02

## 2017-04-09 RX ADMIN — ZINC OXIDE 1 APPLICATION(S): 200 OINTMENT TOPICAL at 05:47

## 2017-04-09 RX ADMIN — NAFCILLIN 200 GRAM(S): 10 INJECTION, POWDER, FOR SOLUTION INTRAVENOUS at 22:01

## 2017-04-09 RX ADMIN — Medication 0.25 MILLIGRAM(S): at 05:46

## 2017-04-09 RX ADMIN — NAFCILLIN 200 GRAM(S): 10 INJECTION, POWDER, FOR SOLUTION INTRAVENOUS at 02:07

## 2017-04-09 RX ADMIN — Medication 1: at 11:22

## 2017-04-09 RX ADMIN — Medication 10 MILLIGRAM(S): at 05:46

## 2017-04-09 RX ADMIN — NAFCILLIN 200 GRAM(S): 10 INJECTION, POWDER, FOR SOLUTION INTRAVENOUS at 17:05

## 2017-04-09 RX ADMIN — NAFCILLIN 200 GRAM(S): 10 INJECTION, POWDER, FOR SOLUTION INTRAVENOUS at 13:25

## 2017-04-09 RX ADMIN — Medication 12.5 MILLIGRAM(S): at 05:47

## 2017-04-09 RX ADMIN — HEPARIN SODIUM 5000 UNIT(S): 5000 INJECTION INTRAVENOUS; SUBCUTANEOUS at 05:47

## 2017-04-09 NOTE — PROGRESS NOTE ADULT - SUBJECTIVE AND OBJECTIVE BOX
INTERVAL HPI/OVERNIGHT EVENTS:  with SOB. Intubated for hypercapnic respiratory failure in ED. CT chest and CXR showed multifocal pneumonia.  NO more details of history are available as intubated and not able to provide information.   Was recently admitted with similar presentation, was in ICU with BIPAP support.  Failed weaning for few days but did well on 03/13/17 and got extubated, then transferred to medical floor.  03/27/17: Events noted, transferred to 2D due to hypercarbic Respiratory failure. Awake and Responsive on BIPAP.  03/28/17: now in ICU, resting on BIPAP. RN reports did not do well on nasal O2.  03/29/17: Required intubation for severe hypercarbic Respiratory failure. Fever spike to more than 101 noted.  04/03/17: underwent Tracheostomy.   04/05/17: Transferred to regular floor.  04/06/17; Had RANJIT.  04/07/17:  Peg placement.  Did not tolerate weaning today per RN.  Awake and responsive.    Vital Signs Last 24 Hrs  T(C): 37.9, Max: 37.9 (04-09 @ 12:00)  T(F): 100.2, Max: 100.2 (04-09 @ 12:00)  HR: 87 (66 - 113)  BP: 100/59 (100/55 - 121/65)  BP(mean): --  RR: 14 (14 - 16)  SpO2: 99% (95% - 99%)    Mode: AC/ CMV (Assist Control/ Continuous Mandatory Ventilation)  RR (machine): 14  TV (machine): 400  FiO2: 30  PEEP: 5  ITime: 0.9  MAP: 10  PIP: 22    PHYSICAL EXAM:  GEN:        Awake, responsive and comfortable.  HEENT:    Normal.    RESP:      no wheezing.  CVS:          Regular rate and rhythm.   ABD:         Soft, non-tender, non-distended;     MEDICATIONS  (STANDING):  zinc oxide 40% Ointment 1Application(s) Topical three times a day  heparin  Injectable 5000Unit(s) SubCutaneous every 12 hours  tiotropium 18 MICROgram(s) Capsule 1Capsule(s) Inhalation daily  multivitamin/minerals 1Tablet(s) Oral daily  BACItracin   Ointment 1Application(s) Topical three times a day  ALPRAZolam 0.25milliGRAM(s) Oral three times a day  insulin lispro (HumaLOG) corrective regimen sliding scale  SubCutaneous every 6 hours  nafcillin  IVPB 2Gram(s) IV Intermittent every 4 hours  gentamicin   IVPB 210milliGRAM(s) IV Intermittent every 24 hours  ALBUTerol/ipratropium for Nebulization 3milliLiter(s) Nebulizer every 6 hours  predniSONE   Tablet 10milliGRAM(s) Oral daily  metoprolol 12.5milliGRAM(s) Oral two times a day    MEDICATIONS  (PRN):  acetaminophen   Tablet 650milliGRAM(s) Oral every 6 hours PRN For Temp greater than 38 C (100.4 F)  acetaminophen   Tablet. 650milliGRAM(s) Oral every 6 hours PRN Mild Pain (1 - 3)    LABS:                        9.4    13.5  )-----------( 571      ( 08 Apr 2017 07:35 )             27.5     04-08    135  |  93<L>  |  10  ----------------------------<  164<H>  3.4<L>   |  34<H>  |  0.52    Ca    8.5      08 Apr 2017 07:35    ASSESSMENT AND PLAN:  ·	Acute Hypoxic/hypercarbic Respiratory failure.  ·	S/P Tracheostomy on 04/03/17  ·	Multifocal pneumonia.  ·	MSSA bacteremia.  ·	Acute COPD exacerbation.  ·	Functional deconditioning.  ·	Anemia.  ·	CAD with angioplasty.  ·	HTN.  ·	A Fib.  ·	HLD.  ·	Anxiety  ·	S/P Peg on 04/07/17.    Continue Vent support with daily weaning trial.  Continue antibiotics.

## 2017-04-09 NOTE — PROGRESS NOTE ADULT - SUBJECTIVE AND OBJECTIVE BOX
CC/F/U for: Resolving bilateral pneumonia, on trach and recent PEG. No new complaints.     INTERVAL HPI/OVERNIGHT EVENTS:  Pt seen and examined at bedside.     Allergies/Intolerance: No Known Allergies      MEDICATIONS  (STANDING):  zinc oxide 40% Ointment 1Application(s) Topical three times a day  heparin  Injectable 5000Unit(s) SubCutaneous every 12 hours  tiotropium 18 MICROgram(s) Capsule 1Capsule(s) Inhalation daily  multivitamin/minerals 1Tablet(s) Oral daily  BACItracin   Ointment 1Application(s) Topical three times a day  ALPRAZolam 0.25milliGRAM(s) Oral three times a day  insulin lispro (HumaLOG) corrective regimen sliding scale  SubCutaneous every 6 hours  nafcillin  IVPB 2Gram(s) IV Intermittent every 4 hours  gentamicin   IVPB 210milliGRAM(s) IV Intermittent every 24 hours  ALBUTerol/ipratropium for Nebulization 3milliLiter(s) Nebulizer every 6 hours  predniSONE   Tablet 10milliGRAM(s) Oral daily  metoprolol 12.5milliGRAM(s) Oral two times a day    MEDICATIONS  (PRN):  acetaminophen   Tablet 650milliGRAM(s) Oral every 6 hours PRN For Temp greater than 38 C (100.4 F)  acetaminophen   Tablet. 650milliGRAM(s) Oral every 6 hours PRN Mild Pain (1 - 3)        ROS: all systems reviewed and wnl      PHYSICAL EXAMINATION:  Vital Signs Last 24 Hrs  T(C): 37.2, Max: 37.4 (04-08 @ 23:10)  T(F): 99, Max: 99.4 (04-08 @ 23:10)  HR: 113 (66 - 113)  BP: 121/65 (100/55 - 121/65)  BP(mean): --  RR: 16 (15 - 16)  SpO2: 95% (95% - 100%)  CAPILLARY BLOOD GLUCOSE  195 (09 Apr 2017 06:25)  172 (09 Apr 2017 00:10)  182 (08 Apr 2017 17:19)  205 (08 Apr 2017 11:06)      GENERAL: NAD, well-groomed, well-developed  HEAD:  atraumatic, normocephalic  EYES: sclera anicteric  ENMT: mucous membranes moist  NECK: supple, No JVD  CHEST/LUNG: clear to auscultation bilaterally; no rales, rhonchi, or wheezing b/l  HEART: normal S1, S2  ABDOMEN: BS+, soft, ND, NT   EXTREMITIES:  pulses palpable; no clubbing, cyanosis, or edema b/l LEs  NEURO: awake, alert, interactive; moves all extremities  SKIN: no rashes or lesions      LABS:                        9.4    13.5  )-----------( 571      ( 08 Apr 2017 07:35 )             27.5     04-08    135  |  93<L>  |  10  ----------------------------<  164<H>  3.4<L>   |  34<H>  |  0.52    Ca    8.5      08 Apr 2017 07:35              RADIOLOGY & ADDITIONAL TESTS:      ASSESSMENT AND PLAN: CC/F/U for: Resolving bilateral pneumonia, on trach and recent PEG. No new complaints.     INTERVAL HPI/OVERNIGHT EVENTS:  Pt seen and examined at bedside.     Allergies/Intolerance: No Known Allergies      MEDICATIONS  (STANDING):  zinc oxide 40% Ointment 1Application(s) Topical three times a day  heparin  Injectable 5000Unit(s) SubCutaneous every 12 hours  tiotropium 18 MICROgram(s) Capsule 1Capsule(s) Inhalation daily  multivitamin/minerals 1Tablet(s) Oral daily  BACItracin   Ointment 1Application(s) Topical three times a day  ALPRAZolam 0.25milliGRAM(s) Oral three times a day  insulin lispro (HumaLOG) corrective regimen sliding scale  SubCutaneous every 6 hours  nafcillin  IVPB 2Gram(s) IV Intermittent every 4 hours  gentamicin   IVPB 210milliGRAM(s) IV Intermittent every 24 hours  ALBUTerol/ipratropium for Nebulization 3milliLiter(s) Nebulizer every 6 hours  predniSONE   Tablet 10milliGRAM(s) Oral daily  metoprolol 12.5milliGRAM(s) Oral two times a day    MEDICATIONS  (PRN):  acetaminophen   Tablet 650milliGRAM(s) Oral every 6 hours PRN For Temp greater than 38 C (100.4 F)  acetaminophen   Tablet. 650milliGRAM(s) Oral every 6 hours PRN Mild Pain (1 - 3)        ROS: all systems reviewed and wnl      PHYSICAL EXAMINATION:  Vital Signs Last 24 Hrs  T(C): 37.2, Max: 37.4 (04-08 @ 23:10)  T(F): 99, Max: 99.4 (04-08 @ 23:10)  HR: 113 (66 - 113)  BP: 121/65 (100/55 - 121/65)  BP(mean): --  RR: 16 (15 - 16)  SpO2: 95% (95% - 100%)  CAPILLARY BLOOD GLUCOSE  195 (09 Apr 2017 06:25)  172 (09 Apr 2017 00:10)  182 (08 Apr 2017 17:19)  205 (08 Apr 2017 11:06)      GENERAL: NAD, well-groomed, well-developed  HEAD:  atraumatic, normocephalic  EYES: sclera anicteric  ENMT: mucous membranes moist  NECK: supple, No JVD  CHEST/LUNG: clear to auscultation bilaterally; no rales, rhonchi, or wheezing b/l  HEART: normal S1, S2  ABDOMEN: BS+, soft, ND, NT   EXTREMITIES:  pulses palpable; no clubbing, cyanosis, or edema b/l LEs  NEURO: awake, alert, interactive; moves all extremities  SKIN: no rashes or lesions      LABS:                        9.4    13.5  )-----------( 571      ( 08 Apr 2017 07:35 )             27.5     04-08    135  |  93<L>  |  10  ----------------------------<  164<H>  3.4<L>   |  34<H>  |  0.52    Ca    8.5      08 Apr 2017 07:35              RADIOLOGY & ADDITIONAL TESTS: none      ASSESSMENT AND PLAN:  61 year old male with MRSA pneumonia, improving. Continue IV Nafcillin and Gentamycin per ID. PEG feeds started,Glucerna at goal and appears to be helping.     Continue Duonebs and Spiriva for COPD with prednisone 10 mg/day. Cont. vent settings 14, 400, 40 %. Trach wean per pulmonary daily. Potasium replaced today. Continue supportive care.      Continue Lopressor 25 mg bid for BP. Palliative care note reviewed. Continue supportive care. CC/F/U for: Resolving bilateral pneumonia, on trach and recent PEG. No new complaints. Alert, NAD, comfortable in bed.     INTERVAL HPI/OVERNIGHT EVENTS:  Pt seen and examined at bedside.     Allergies/Intolerance: No Known Allergies      MEDICATIONS  (STANDING):  zinc oxide 40% Ointment 1Application(s) Topical three times a day  heparin  Injectable 5000Unit(s) SubCutaneous every 12 hours  tiotropium 18 MICROgram(s) Capsule 1Capsule(s) Inhalation daily  multivitamin/minerals 1Tablet(s) Oral daily  BACItracin   Ointment 1Application(s) Topical three times a day  ALPRAZolam 0.25milliGRAM(s) Oral three times a day  insulin lispro (HumaLOG) corrective regimen sliding scale  SubCutaneous every 6 hours  nafcillin  IVPB 2Gram(s) IV Intermittent every 4 hours  gentamicin   IVPB 210milliGRAM(s) IV Intermittent every 24 hours  ALBUTerol/ipratropium for Nebulization 3milliLiter(s) Nebulizer every 6 hours  predniSONE   Tablet 10milliGRAM(s) Oral daily  metoprolol 12.5milliGRAM(s) Oral two times a day    MEDICATIONS  (PRN):  acetaminophen   Tablet 650milliGRAM(s) Oral every 6 hours PRN For Temp greater than 38 C (100.4 F)  acetaminophen   Tablet. 650milliGRAM(s) Oral every 6 hours PRN Mild Pain (1 - 3)        ROS: all systems reviewed and wnl      PHYSICAL EXAMINATION:  Vital Signs Last 24 Hrs  T(C): 37.2, Max: 37.4 (04-08 @ 23:10)  T(F): 99, Max: 99.4 (04-08 @ 23:10)  HR: 113 (66 - 113)  BP: 121/65 (100/55 - 121/65)  BP(mean): --  RR: 16 (15 - 16)  SpO2: 95% (95% - 100%)  CAPILLARY BLOOD GLUCOSE  195 (09 Apr 2017 06:25)  172 (09 Apr 2017 00:10)  182 (08 Apr 2017 17:19)  205 (08 Apr 2017 11:06)      GENERAL: NAD, well-groomed, well-developed  HEAD:  atraumatic, normocephalic  EYES: sclera anicteric  ENMT: mucous membranes moist  NECK: supple, No JVD  CHEST/LUNG: clear to auscultation bilaterally; no rales, rhonchi, or wheezing b/l. On vent.   HEART: normal S1, S2  ABDOMEN: BS+, soft, ND, NT. Cndom cath in place with rectal tube.    EXTREMITIES:  pulses palpable; no clubbing, cyanosis, or edema b/l LEs  NEURO: awake, alert, interactive; moves all extremities  SKIN: no rashes or lesions. No skin breakdown per nurse.       LABS:                        9.4    13.5  )-----------( 571      ( 08 Apr 2017 07:35 )             27.5     04-08    135  |  93<L>  |  10  ----------------------------<  164<H>  3.4<L>   |  34<H>  |  0.52    Ca    8.5      08 Apr 2017 07:35              RADIOLOGY & ADDITIONAL TESTS: none      ASSESSMENT AND PLAN:  61 year old male with MRSA pneumonia, improving. Continue IV Nafcillin and Gentamycin per ID. PEG feeds started,Glucerna at goal and appears to be helping.     Continue Duonebs and Spiriva for COPD with prednisone 10 mg/day. Cont. vent settings 14, 400, 40 %. Trach wean per pulmonary daily, not tolerating wean trials so far. Potasium replaced today. Continue supportive care.      Continue Lopressor 25 mg bid for BP. Palliative care note reviewed. May need nursing home placement.

## 2017-04-10 PROCEDURE — 99233 SBSQ HOSP IP/OBS HIGH 50: CPT

## 2017-04-10 RX ADMIN — Medication 1: at 11:14

## 2017-04-10 RX ADMIN — NAFCILLIN 200 GRAM(S): 10 INJECTION, POWDER, FOR SOLUTION INTRAVENOUS at 01:51

## 2017-04-10 RX ADMIN — ZINC OXIDE 1 APPLICATION(S): 200 OINTMENT TOPICAL at 05:15

## 2017-04-10 RX ADMIN — Medication 3 MILLILITER(S): at 01:07

## 2017-04-10 RX ADMIN — Medication 650 MILLIGRAM(S): at 01:56

## 2017-04-10 RX ADMIN — NAFCILLIN 200 GRAM(S): 10 INJECTION, POWDER, FOR SOLUTION INTRAVENOUS at 09:48

## 2017-04-10 RX ADMIN — Medication 3 MILLILITER(S): at 17:29

## 2017-04-10 RX ADMIN — Medication 3 MILLILITER(S): at 05:58

## 2017-04-10 RX ADMIN — Medication 1 APPLICATION(S): at 13:47

## 2017-04-10 RX ADMIN — NAFCILLIN 200 GRAM(S): 10 INJECTION, POWDER, FOR SOLUTION INTRAVENOUS at 13:48

## 2017-04-10 RX ADMIN — Medication 1: at 00:15

## 2017-04-10 RX ADMIN — ZINC OXIDE 1 APPLICATION(S): 200 OINTMENT TOPICAL at 13:47

## 2017-04-10 RX ADMIN — NAFCILLIN 200 GRAM(S): 10 INJECTION, POWDER, FOR SOLUTION INTRAVENOUS at 05:14

## 2017-04-10 RX ADMIN — Medication 3 MILLILITER(S): at 12:00

## 2017-04-10 RX ADMIN — Medication 3 MILLILITER(S): at 23:31

## 2017-04-10 RX ADMIN — HEPARIN SODIUM 5000 UNIT(S): 5000 INJECTION INTRAVENOUS; SUBCUTANEOUS at 17:53

## 2017-04-10 RX ADMIN — Medication 1 APPLICATION(S): at 05:14

## 2017-04-10 RX ADMIN — Medication 2: at 05:20

## 2017-04-10 RX ADMIN — NAFCILLIN 200 GRAM(S): 10 INJECTION, POWDER, FOR SOLUTION INTRAVENOUS at 21:24

## 2017-04-10 RX ADMIN — Medication 1 APPLICATION(S): at 22:22

## 2017-04-10 RX ADMIN — HEPARIN SODIUM 5000 UNIT(S): 5000 INJECTION INTRAVENOUS; SUBCUTANEOUS at 05:21

## 2017-04-10 RX ADMIN — NAFCILLIN 200 GRAM(S): 10 INJECTION, POWDER, FOR SOLUTION INTRAVENOUS at 17:52

## 2017-04-10 RX ADMIN — Medication 1: at 23:47

## 2017-04-10 RX ADMIN — Medication 0.25 MILLIGRAM(S): at 05:14

## 2017-04-10 RX ADMIN — Medication 200 MILLIGRAM(S): at 16:18

## 2017-04-10 RX ADMIN — Medication 1 TABLET(S): at 11:16

## 2017-04-10 RX ADMIN — Medication 0.25 MILLIGRAM(S): at 13:47

## 2017-04-10 RX ADMIN — ZINC OXIDE 1 APPLICATION(S): 200 OINTMENT TOPICAL at 21:29

## 2017-04-10 RX ADMIN — Medication 10 MILLIGRAM(S): at 05:14

## 2017-04-10 RX ADMIN — Medication 0.25 MILLIGRAM(S): at 21:24

## 2017-04-10 RX ADMIN — Medication 1: at 17:53

## 2017-04-10 NOTE — PROGRESS NOTE ADULT - ASSESSMENT
Patient has received 23 days approx. of various antibiotics for MSSA bacteremia. repeat cultures are negative and RANJIT is also negative for vegetations. Has no deep line. atient however developed fever of 100.6F and has leukocytosis of 13K. Continue on these antibiotics and if patient remains afebrile for 48 hrs. he can be discharged, i.e. < or = 100.4F. Patient is on oral prednisone for COPD, this is causing leukocytosis, therefore he can be discharged with it.

## 2017-04-10 NOTE — DISCHARGE NOTE ADULT - PATIENT PORTAL LINK FT
“You can access the Follow[facility] Patient Portal, offered by [facility], by registering with the following website: [email or web address]”

## 2017-04-10 NOTE — DISCHARGE NOTE ADULT - CARE PROVIDER_API CALL
Onel Garland (MB[employer]), Medicine  [address]  [place] [address]  Phone: [phone]  Fax: [phone]

## 2017-04-10 NOTE — DISCHARGE NOTE ADULT - PLAN OF CARE
continue with weaning trials secondary to copd, continue with vent support, weaning trials as tolerated completed antibiotics continue with PEG tube feeds oxycontin bid Clonopin bid pt. is DNR oxycodone prn, tylenol tid ATC xanax prn

## 2017-04-10 NOTE — DISCHARGE NOTE ADULT - HOSPITAL COURSE
62 Y/O male w/ PMHx of COPD on home O2, cpap at night, afib, htn, hld, dm, pw c/o SOB. ABG worsening in ED showing 7.26/102/133/44 on Bipap. Pt intubated for hypercapnic respiratory failure. (08 Mar 2017 06:36) Pt is a 60 yo WM with h/o COPD on home O2, nocturnal CPAP, CAD/stent, A fib, HTN, DM, HLD and prostate adenoma presented sec to SOB. ABG showed worsening acute on chronic resp acidosis despite being on BiPAP, s/p intubation and icu admission for Acute on chronic resp acidosis/ COPD exacerbation/ PNA/ Staph sepsis/ was difficult to wean initially but then s/p successful extubation on 3/13. palliative care following, prognosis poor but pt/family wants to continue aggressive measures.  s/p intubation again and trach, transferred back to medical floor , RANJIT neg. for vegetation, s/p PEG   completed course of antibioitics, c/w c-pap trials, complaints of anxiety and pain, c/w pain management and anxiety management, PT. has been able to sit in chair. Multiple goals of care discussions with family but they want to continue with aggressive care Pt is a 62 yo WM with h/o COPD on home O2, nocturnal CPAP, CAD/stent, A fib, HTN, DM, HLD and prostate adenoma presented sec to SOB. ABG showed worsening acute on chronic resp acidosis despite being on BiPAP, s/p intubation and icu admission for Acute on chronic resp acidosis/ COPD exacerbation/ PNA/ Staph sepsis/ was difficult to wean initially but then s/p successful extubation on 3/13. palliative care following, prognosis poor but pt/family wants to continue aggressive measures.  s/p intubation again and trach, transferred back to medical floor , RANJIT neg. for vegetation, s/p PEG   completed course of antibioitics, c/w c-pap trials, complaints of anxiety and pain, c/w pain management and anxiety management, PT. has been able to sit in chair. Multiple goals of care discussions with family but they want to continue with aggressive care initially, however, eventually agreed to DNR status

## 2017-04-10 NOTE — PROGRESS NOTE ADULT - SUBJECTIVE AND OBJECTIVE BOX
INTERVAL HPI/OVERNIGHT EVENTS:  with SOB. Intubated for hypercapnic respiratory failure in ED. CT chest and CXR showed multifocal pneumonia.  NO more details of history are available as intubated and not able to provide information.   Was recently admitted with similar presentation, was in ICU with BIPAP support.  Failed weaning for few days but did well on 03/13/17 and got extubated, then transferred to medical floor.  03/27/17: Events noted, transferred to 2D due to hypercarbic Respiratory failure. Awake and Responsive on BIPAP.  03/28/17: now in ICU, resting on BIPAP. RN reports did not do well on nasal O2.  03/29/17: Required intubation for severe hypercarbic Respiratory failure. Fever spike to more than 101 noted.  04/03/17: underwent Tracheostomy.   04/05/17: Transferred to regular floor.  04/06/17; Had RANJIT.  04/07/17:  Peg placement.  Was on CPAP earlier, no on full vent support.    Vital Signs Last 24 Hrs  T(C): 37.2, Max: 38.1 (04-10 @ 01:59)  T(F): 98.9, Max: 100.6 (04-10 @ 01:59)  HR: 111 (70 - 111)  BP: 106/55 (103/57 - 115/64)  BP(mean): --  RR: 18 (17 - 18)  SpO2: 97% (95% - 99%)    Mode: AC/ CMV (Assist Control/ Continuous Mandatory Ventilation)  RR (machine): 14  TV (machine): 400  FiO2: 30  PEEP: 5  ITime: 0.9  MAP: 7  PIP: 13    PHYSICAL EXAM:  GEN:       Awake, responsive and comfortable.  HEENT:    trach collar.    RESP:       no wheezing.  CVS:          Regular rate and rhythm.   ABD:         Soft, non-tender, non-distended;     MEDICATIONS  (STANDING):  zinc oxide 40% Ointment 1Application(s) Topical three times a day  heparin  Injectable 5000Unit(s) SubCutaneous every 12 hours  tiotropium 18 MICROgram(s) Capsule 1Capsule(s) Inhalation daily  multivitamin/minerals 1Tablet(s) Oral daily  BACItracin   Ointment 1Application(s) Topical three times a day  ALPRAZolam 0.25milliGRAM(s) Oral three times a day  insulin lispro (HumaLOG) corrective regimen sliding scale  SubCutaneous every 6 hours  nafcillin  IVPB 2Gram(s) IV Intermittent every 4 hours  gentamicin   IVPB 210milliGRAM(s) IV Intermittent every 24 hours  ALBUTerol/ipratropium for Nebulization 3milliLiter(s) Nebulizer every 6 hours  predniSONE   Tablet 10milliGRAM(s) Oral daily  metoprolol 12.5milliGRAM(s) Oral two times a day    MEDICATIONS  (PRN):  acetaminophen   Tablet 650milliGRAM(s) Oral every 6 hours PRN For Temp greater than 38 C (100.4 F)  acetaminophen   Tablet. 650milliGRAM(s) Oral every 6 hours PRN Mild Pain (1 - 3)     EXAM:  RANJIT WO CON         PROCEDURE DATE:  04/06/2017        INTERPRETATION:  REPORT:    TRANSESOPHAGEAL ECHOCARDIOGRAM REPORT           Patient Name:   RUFUS KWON Patient Location: [facility] Rec #:  [id number]         Accession #:      [id number]  Account #:                         Height:           65.0 in 165.0 cm  YOB: 1955          Weight:           101.4 lb 46.00 kg  Patient Age:    61 years           BSA:              1.48 m²  Patient Gender: M                  BP:               137/74 mmHg        Date of Exam: 4/6/2017 2:50:48 PM  Sonographer:  JOSE     Procedure:     Transesophageal Echocardiogram.  Indications:   Sepsis, unspecified organism - A41.9  Diagnosis:     Sepsis, unspecified organism - A41.9  Study Details: Technically good study.     SPECTRAL DOPPLER ANALYSIS (where applicable):        PROCEDURE: After discussion of the risks and benefits of the RANJIT, an   informed consent was obtained. Intravenous sedation was performed by   anesthesia. The TEEprobe was passed without difficulty. Imaged were   obtained with the patient in a supine position. Image quality was good.   The patient's vital signs; including heart rate, blood pressure, and   oxygen saturation; remained stable throughout the procedure. The patient   tolerated the procedure well and without complications. The patient   tolerated the procedure well and without complications.     PHYSICIAN INTERPRETATION:  Left Ventricle: The left ventricular internal cavity size is normal.  Global LV systolic function was normal. Left ventricular ejection   fraction, by visual estimation, is 55 to 60%.  Right Ventricle: Normal right ventricular size and function.  Left Atrium: Normal left atrial size. No left atrial appendage thrombus   is seen and normal left atrial appendage velocities. Intact intra-atrial   septum without shunt.  Right Atrium: The right atrium is normal in size.  Pericardium: There is no evidence of pericardial effusion.  Mitral Valve: Thickening and calcification ofthe anterior and posterior   mitral valve leaflets. Mitral leaflet mobility is normal. There is mild   mitral annular calcification. Trace mitral valve regurgitation is seen.  Tricuspid Valve: The tricuspid valve is normal in structure. Trivial   tricuspid regurgitation is visualized.  Aortic Valve: The aortic valve is trileaflet. Sclerotic aortic valve with   normal opening. Mild aortic valve regurgitation is seen.  Pulmonic Valve: The pulmonic valve is thickened with good excursion. No   indication of pulmonic valve regurgitation.  Aorta: Aortic root measured at Sinus of Valsalva is normal.        Summary:   1. Left ventricular ejection fraction, by visual estimation, is 55 to   60%.   2. Technically good study.   3. Normal global left ventricular systolic function.   4. Normal left ventricular internal cavity size.   5. Normal right ventricular size and function.   6. There is no evidence of pericardial effusion.   7. Mild mitral annular calcification.   8. Thickening and calcificationof the anterior and posterior mitral   valve leaflets.   9. Trace mitral valve regurgitation.  10. Mild aortic regurgitation.  11. Sclerotic aortic valve with normal opening.  12. Intact intra-atrial septum without shunt.  13. No valvular vegetationsvisualized.     Mi Lao MD FACC, FASE, FACP  Electronically signed on 4/6/2017 at 6:05:28 PM     MI LAO   This document has been electronically signed. Apr 6 2017  2:50PM      ASSESSMENT AND PLAN:  ·	Acute Hypoxic/hypercarbic Respiratory failure.  ·	S/P Tracheostomy on 04/03/17  ·	Multifocal pneumonia.  ·	MSSA bacteremia.  ·	Acute COPD exacerbation.  ·	Functional deconditioning.  ·	Anemia.  ·	CAD with angioplasty.  ·	HTN.  ·	A Fib.  ·	HLD.  ·	Anxiety  ·	S/P Peg on 04/07/17.    Continue daily weaning trial.  Complete antibiotics per ID.

## 2017-04-10 NOTE — DISCHARGE NOTE ADULT - CARE PLAN
Principal Discharge DX:	Acute on chronic respiratory failure with hypoxia and hypercapnia  Goal:	continue with weaning trials  Instructions for follow-up, activity and diet:	secondary to copd, continue with vent support, weaning trials as tolerated  Secondary Diagnosis:	Bacteremia  Instructions for follow-up, activity and diet:	completed antibiotics  Secondary Diagnosis:	Dysphagia, unspecified type  Instructions for follow-up, activity and diet:	continue with PEG tube feeds  Secondary Diagnosis:	Pain aggravated by anxiety  Instructions for follow-up, activity and diet:	oxycontin bid  Secondary Diagnosis:	Pneumonia, bacterial  Instructions for follow-up, activity and diet:	completed antibiotics  Secondary Diagnosis:	Anxiety  Instructions for follow-up, activity and diet:	Clonopin bid Principal Discharge DX:	Acute on chronic respiratory failure with hypoxia and hypercapnia  Goal:	continue with weaning trials  Instructions for follow-up, activity and diet:	secondary to copd, continue with vent support, weaning trials as tolerated  Secondary Diagnosis:	Bacteremia  Instructions for follow-up, activity and diet:	completed antibiotics  Secondary Diagnosis:	Dysphagia, unspecified type  Instructions for follow-up, activity and diet:	continue with PEG tube feeds  Secondary Diagnosis:	Pain aggravated by anxiety  Instructions for follow-up, activity and diet:	oxycodone prn, tylenol tid ATC  Secondary Diagnosis:	Pneumonia, bacterial  Instructions for follow-up, activity and diet:	completed antibiotics  Secondary Diagnosis:	Anxiety  Instructions for follow-up, activity and diet:	xanax prn  Secondary Diagnosis:	Goals of care, counseling/discussion  Instructions for follow-up, activity and diet:	pt. is DNR

## 2017-04-10 NOTE — DISCHARGE NOTE ADULT - MEDICATION SUMMARY - MEDICATIONS TO STOP TAKING
I will STOP taking the medications listed below when I get home from the hospital:    simvastatin 20 mg oral tablet  -- 1 tab(s) by mouth once a day (at bedtime)    tamsulosin 0.4 mg oral capsule  -- 1 cap(s) by mouth once a day    metFORMIN 1000 mg oral tablet  -- 1 tab(s) by mouth 2 times a day (with meals)    montelukast 10 mg oral tablet  -- 1 tab(s) by mouth once a day    fluticasone-salmeterol  -- 1 puff(s) inhaled 2 times a day    sucralfate 1 g oral tablet  -- 1 tab(s) by mouth 3 times a day    FeroSul 325 mg (65 mg elemental iron) oral tablet  -- 1 tab(s) by mouth 2 times a day    ProAir HFA  -- 2 puff(s) inhaled 4 times a day, As Needed    diltiaZEM 30 mg oral tablet  -- 1 tab(s) by mouth every 6 hours    lactobacillus acidophilus oral capsule  -- 1 cap(s) by mouth 3 times a day (with meals)

## 2017-04-10 NOTE — DISCHARGE NOTE ADULT - MEDICATION SUMMARY - MEDICATIONS TO TAKE
I will START or STAY ON the medications listed below when I get home from the hospital:    predniSONE 10 mg oral tablet  -- 1 tab(s) by mouth once a day  -- Indication: For Acute respiratory failure with hypoxia    acetaminophen 325 mg oral tablet  -- 2 tab(s) by mouth every 6 hours, As needed, For Temp greater than 38 C (100.4 F)  -- Indication: For fevers    acetaminophen 325 mg oral tablet  -- 2 tab(s) by mouth every 6 hours, As needed, Mild Pain (1 - 3)  -- Indication: For Pain aggravated by anxiety    oxyCODONE 10 mg oral tablet, extended release  -- 1 tab(s) by mouth every 12 hours  -- Indication: For Pain aggravated by anxiety    heparin  --   5000 units sc bid  -- Indication: For Clot prevention    insulin lispro 100 units/mL subcutaneous solution  --  subcutaneous every 6 hours; 1 Unit(s) if Glucose 151 - 200  2 Unit(s) if Glucose 201 - 250  3 Unit(s) if Glucose 251 - 300  4 Unit(s) if Glucose 301 - 350  5 Unit(s) if Glucose 351 - 400  6 Unit(s) if Glucose Greater Than 400  -- Indication: For Diabetes Mellitus    albuterol-ipratropium 2.5 mg-0.5 mg/3 mL inhalation solution  -- 3 milliliter(s) inhaled every 6 hours  -- Indication: For Acute on chronic respiratory failure with hypoxia and hypercapnia    tiotropium 18 mcg inhalation capsule  -- 1 cap(s) inhaled once a day  -- Indication: For Acute on chronic respiratory failure with hypoxia and hypercapnia    zinc oxide 40% topical ointment  -- 1 application on skin 3 times a day  -- Indication: For rash    bacitracin 500 units/g topical ointment  -- 1 application on skin 3 times a day  -- Indication: For rash    Multiple Vitamins with Minerals oral tablet  -- 1 tab(s) by mouth once a day  -- Indication: For Supplement I will START or STAY ON the medications listed below when I get home from the hospital:    predniSONE 10 mg oral tablet  -- 1 tab(s) by mouth once a day  -- Indication: For Acute respiratory failure with hypoxia    acetaminophen 325 mg oral tablet  -- 2 tab(s) by mouth every 6 hours, As needed, For Temp greater than 38 C (100.4 F)  -- Indication: For fevers    Tylenol 325 mg oral capsule  -- 1 cap(s) by mouth 3 times a day  -- Indication: For Pain aggravated by anxiety    oxyCODONE 5 mg oral tablet  -- 1 tab(s) by mouth every 4 hours, As needed, Moderate Pain (4 - 6)  -- Indication: For Pain aggravated by anxiety    heparin  --   5000 units sc bid  -- Indication: For Clot prevention    insulin lispro 100 units/mL subcutaneous solution  --  subcutaneous every 6 hours; 1 Unit(s) if Glucose 151 - 200  2 Unit(s) if Glucose 201 - 250  3 Unit(s) if Glucose 251 - 300  4 Unit(s) if Glucose 301 - 350  5 Unit(s) if Glucose 351 - 400  6 Unit(s) if Glucose Greater Than 400  -- Indication: For Diabetes Mellitus    Xanax 0.5 mg oral tablet  -- 1 tab(s) by mouth 3 times a day, As Needed for anxiety  -- Indication: For Anxiety    albuterol-ipratropium 2.5 mg-0.5 mg/3 mL inhalation solution  -- 3 milliliter(s) inhaled every 6 hours  -- Indication: For Acute on chronic respiratory failure with hypoxia and hypercapnia    tiotropium 18 mcg inhalation capsule  -- 1 cap(s) inhaled once a day  -- Indication: For Acute on chronic respiratory failure with hypoxia and hypercapnia    zinc oxide 40% topical ointment  -- 1 application on skin 3 times a day  -- Indication: For rash    bacitracin 500 units/g topical ointment  -- 1 application on skin 3 times a day  -- Indication: For rash    midodrine 10 mg oral tablet  -- 1 tab(s) by mouth every 8 hours  -- Indication: For low blood pressure    Multiple Vitamins with Minerals oral tablet  -- 1 tab(s) by mouth once a day  -- Indication: For Supplement

## 2017-04-10 NOTE — PROGRESS NOTE ADULT - SUBJECTIVE AND OBJECTIVE BOX
Patient is a 61y old  Male who presents with a chief complaint of sob (08 Mar 2017 11:44)      INTERVAL HPI/OVERNIGHT EVENTS:  no new complaints, having low grade temps    MEDICATIONS  (STANDING):  zinc oxide 40% Ointment 1Application(s) Topical three times a day  heparin  Injectable 5000Unit(s) SubCutaneous every 12 hours  tiotropium 18 MICROgram(s) Capsule 1Capsule(s) Inhalation daily  multivitamin/minerals 1Tablet(s) Oral daily  BACItracin   Ointment 1Application(s) Topical three times a day  ALPRAZolam 0.25milliGRAM(s) Oral three times a day  insulin lispro (HumaLOG) corrective regimen sliding scale  SubCutaneous every 6 hours  nafcillin  IVPB 2Gram(s) IV Intermittent every 4 hours  gentamicin   IVPB 210milliGRAM(s) IV Intermittent every 24 hours  ALBUTerol/ipratropium for Nebulization 3milliLiter(s) Nebulizer every 6 hours  predniSONE   Tablet 10milliGRAM(s) Oral daily  metoprolol 12.5milliGRAM(s) Oral two times a day    MEDICATIONS  (PRN):  acetaminophen   Tablet 650milliGRAM(s) Oral every 6 hours PRN For Temp greater than 38 C (100.4 F)  acetaminophen   Tablet. 650milliGRAM(s) Oral every 6 hours PRN Mild Pain (1 - 3)      Allergies    No Known Allergies    Intolerances        REVIEW OF SYSTEMS:  difficult to assess given trach status but is awake , alert and indicates no complaints other than gesturing to me to cover his legs with blankets  Vital Signs Last 24 Hrs  T(C): 37.2, Max: 38.1 (04-10 @ 01:59)  T(F): 98.9, Max: 100.6 (04-10 @ 01:59)  HR: 111 (70 - 111)  BP: 106/55 (106/55 - 115/64)  BP(mean): --  RR: 18 (17 - 18)  SpO2: 97% (95% - 99%)    PHYSICAL EXAM:  GENERAL: NAD, cachetic  HEAD:  Atraumatic, Normocephalic  EYES: EOMI, PERRLA, conjunctiva and sclera clear  ENMT: No tonsillar erythema, exudates, or enlargement; Moist mucous membranes,  NECK: + trach  NERVOUS SYSTEM:  Alert & Oriented X3, Good concentration; Motor Strength 4/5 B/L upper and lower extremities; DTRs 2+ intact and symmetric  CHEST/LUNG: mild exp wheeze  HEART: Regular rate and rhythm; No murmurs, rubs, or gallops  ABDOMEN: Soft, Nontender,+PEG in place  EXTREMITIES:  2+ Peripheral Pulses, No clubbing, cyanosis, or edema  LYMPH: No lymphadenopathy noted  SKIN: No rashes or lesions    LABS:              CAPILLARY BLOOD GLUCOSE  190 (10 Apr 2017 11:12)  215 (10 Apr 2017 05:20)  177 (10 Apr 2017 00:13)      RADIOLOGY & ADDITIONAL TESTS:    Imaging Personally Reviewed:  [ ] YES  [ ] NO    Consultant(s) Notes Reviewed:  [ x] YES  [ ] NO    Care Discussed with Consultants/Other Providers [ x] YES  [ ] NO

## 2017-04-10 NOTE — PROGRESS NOTE ADULT - SUBJECTIVE AND OBJECTIVE BOX
INTERVAL HPI / OVERNIGHT EVENTS:        REVIEW OF SYSTEMS:    ROS:  CONSTITUTIONAL:  Negative fever or chills, feels well, good appetite  EYES:  Negative  blurry vision or double vision  CARDIOVASCULAR:  Negative for chest pain or palpitations  RESPIRATORY:  Negative for cough, wheezing, or SOB   GASTROINTESTINAL:  Negative for nausea, vomiting, diarrhea, constipation, or abdominal pain  GENITOURINARY:  Negative frequency, urgency or dysuria  NEUROLOGIC:  No headache, confusion, dizziness, lightheadedness      MEDICATIONS  (STANDING):  zinc oxide 40% Ointment 1Application(s) Topical three times a day  heparin  Injectable 5000Unit(s) SubCutaneous every 12 hours  tiotropium 18 MICROgram(s) Capsule 1Capsule(s) Inhalation daily  multivitamin/minerals 1Tablet(s) Oral daily  BACItracin   Ointment 1Application(s) Topical three times a day  ALPRAZolam 0.25milliGRAM(s) Oral three times a day  insulin lispro (HumaLOG) corrective regimen sliding scale  SubCutaneous every 6 hours  nafcillin  IVPB 2Gram(s) IV Intermittent every 4 hours  gentamicin   IVPB 210milliGRAM(s) IV Intermittent every 24 hours  ALBUTerol/ipratropium for Nebulization 3milliLiter(s) Nebulizer every 6 hours  predniSONE   Tablet 10milliGRAM(s) Oral daily  metoprolol 12.5milliGRAM(s) Oral two times a day    MEDICATIONS  (PRN):  acetaminophen   Tablet 650milliGRAM(s) Oral every 6 hours PRN For Temp greater than 38 C (100.4 F)  acetaminophen   Tablet. 650milliGRAM(s) Oral every 6 hours PRN Mild Pain (1 - 3)      Vital Signs Last 24 Hrs  T(C): 37.2, Max: 38.1 (04-10 @ 01:59)  T(F): 98.9, Max: 100.6 (04-10 @ 01:59)  HR: 89 (70 - 93)  BP: 106/55 (103/57 - 115/64)  BP(mean): --  RR: 18 (17 - 18)  SpO2: 98% (95% - 99%)    PHYSICAL EXAM:    Constitutional: NAD, well-groomed, well-developed  HEENT: PERRLA, EOMI, Normal Hearing, MMM  Neck: No LAD, No JVD  Back: Normal spine flexure, No CVA tenderness  Respiratory: CTABCardiovascular: S1 and S2, RRR, no M/G/R  Gastrointestinal: BS+, soft, NT/ND  Extremities: No peripheral edema  Vascular: 2+ peripheral pulses  Neurological: A/O x 3, no focal deficits  Psychiatric: Normal mood, normal affect  Musculoskeletal: 5/5 strength b/l upper and lower extremities  Skin: No rashes      LABS:                  MICROBIOLOGY:    RADIOLOGY & ADDITIONAL STUDIES:

## 2017-04-10 NOTE — PROGRESS NOTE ADULT - PROBLEM SELECTOR PLAN 2
septic shock with persistent MSSA bacteremia c/w naficillin, gentamicin, repeat blood cultures have been negative thus far  RANJIT neg. for vegetation  off pressor, shock resolved  plan was to d/c gent and complete a course of nafacillin if blood cultures negative but having low grade fevers - ID (Dr. Swift) to follow-up with recs

## 2017-04-10 NOTE — DISCHARGE NOTE ADULT - SECONDARY DIAGNOSIS.
Bacteremia Dysphagia, unspecified type Pain aggravated by anxiety Pneumonia, bacterial Anxiety Goals of care, counseling/discussion

## 2017-04-11 ENCOUNTER — TRANSCRIPTION ENCOUNTER (OUTPATIENT)
Age: 62
End: 2017-04-11

## 2017-04-11 PROCEDURE — 99233 SBSQ HOSP IP/OBS HIGH 50: CPT

## 2017-04-11 RX ORDER — MORPHINE SULFATE 50 MG/1
1 CAPSULE, EXTENDED RELEASE ORAL ONCE
Qty: 0 | Refills: 0 | Status: DISCONTINUED | OUTPATIENT
Start: 2017-04-11 | End: 2017-04-11

## 2017-04-11 RX ORDER — ALPRAZOLAM 0.25 MG
0.25 TABLET ORAL THREE TIMES A DAY
Qty: 0 | Refills: 0 | Status: DISCONTINUED | OUTPATIENT
Start: 2017-04-11 | End: 2017-04-11

## 2017-04-11 RX ADMIN — Medication 3 MILLILITER(S): at 05:15

## 2017-04-11 RX ADMIN — NAFCILLIN 200 GRAM(S): 10 INJECTION, POWDER, FOR SOLUTION INTRAVENOUS at 22:19

## 2017-04-11 RX ADMIN — ZINC OXIDE 1 APPLICATION(S): 200 OINTMENT TOPICAL at 05:34

## 2017-04-11 RX ADMIN — HEPARIN SODIUM 5000 UNIT(S): 5000 INJECTION INTRAVENOUS; SUBCUTANEOUS at 17:17

## 2017-04-11 RX ADMIN — NAFCILLIN 200 GRAM(S): 10 INJECTION, POWDER, FOR SOLUTION INTRAVENOUS at 17:17

## 2017-04-11 RX ADMIN — Medication 3 MILLILITER(S): at 17:58

## 2017-04-11 RX ADMIN — ZINC OXIDE 1 APPLICATION(S): 200 OINTMENT TOPICAL at 13:26

## 2017-04-11 RX ADMIN — NAFCILLIN 200 GRAM(S): 10 INJECTION, POWDER, FOR SOLUTION INTRAVENOUS at 10:42

## 2017-04-11 RX ADMIN — ZINC OXIDE 1 APPLICATION(S): 200 OINTMENT TOPICAL at 22:27

## 2017-04-11 RX ADMIN — Medication 1 APPLICATION(S): at 13:26

## 2017-04-11 RX ADMIN — Medication 0.25 MILLIGRAM(S): at 13:27

## 2017-04-11 RX ADMIN — NAFCILLIN 200 GRAM(S): 10 INJECTION, POWDER, FOR SOLUTION INTRAVENOUS at 05:24

## 2017-04-11 RX ADMIN — Medication 3 MILLILITER(S): at 12:22

## 2017-04-11 RX ADMIN — HEPARIN SODIUM 5000 UNIT(S): 5000 INJECTION INTRAVENOUS; SUBCUTANEOUS at 05:24

## 2017-04-11 RX ADMIN — Medication 10 MILLIGRAM(S): at 05:24

## 2017-04-11 RX ADMIN — Medication 1 TABLET(S): at 11:03

## 2017-04-11 RX ADMIN — Medication 1 APPLICATION(S): at 05:25

## 2017-04-11 RX ADMIN — Medication 0.25 MILLIGRAM(S): at 05:24

## 2017-04-11 RX ADMIN — Medication 1 APPLICATION(S): at 22:27

## 2017-04-11 RX ADMIN — NAFCILLIN 200 GRAM(S): 10 INJECTION, POWDER, FOR SOLUTION INTRAVENOUS at 13:27

## 2017-04-11 RX ADMIN — Medication 200 MILLIGRAM(S): at 16:17

## 2017-04-11 RX ADMIN — MORPHINE SULFATE 1 MILLIGRAM(S): 50 CAPSULE, EXTENDED RELEASE ORAL at 16:09

## 2017-04-11 RX ADMIN — Medication 12.5 MILLIGRAM(S): at 05:24

## 2017-04-11 RX ADMIN — MORPHINE SULFATE 1 MILLIGRAM(S): 50 CAPSULE, EXTENDED RELEASE ORAL at 16:40

## 2017-04-11 RX ADMIN — NAFCILLIN 200 GRAM(S): 10 INJECTION, POWDER, FOR SOLUTION INTRAVENOUS at 02:18

## 2017-04-11 RX ADMIN — Medication 1: at 17:16

## 2017-04-11 RX ADMIN — Medication 2: at 05:44

## 2017-04-11 RX ADMIN — Medication 1: at 11:03

## 2017-04-11 RX ADMIN — Medication 0.25 MILLIGRAM(S): at 22:19

## 2017-04-11 NOTE — PROGRESS NOTE ADULT - PROBLEM SELECTOR PLAN 2
septic shock with persistent MSSA bacteremia c/w naficillin, gentamicin, repeat blood cultures have been negative thus far  RANJIT neg. for vegetation  off pressor, shock resolved  plan was to d/c gent and complete a course of nafacillin if blood cultures negative but has been afebrile, if no fever, can stop abx tomorrow and d/c to NH placement

## 2017-04-11 NOTE — PROGRESS NOTE ADULT - SUBJECTIVE AND OBJECTIVE BOX
INTERVAL HPI / OVERNIGHT EVENTS:        REVIEW OF SYSTEMS:    ROS:  CONSTITUTIONAL:  Negative fever or chills, feels well, good appetite  EYES:  Negative  blurry vision or double vision  CARDIOVASCULAR:  Negative for chest pain or palpitations  RESPIRATORY:  Negative for cough, wheezing, or SOB   GASTROINTESTINAL:  Negative for nausea, vomiting, diarrhea, constipation, or abdominal pain  GENITOURINARY:  Negative frequency, urgency or dysuria  NEUROLOGIC:  No headache, confusion, dizziness, lightheadedness      MEDICATIONS  (STANDING):  zinc oxide 40% Ointment 1Application(s) Topical three times a day  heparin  Injectable 5000Unit(s) SubCutaneous every 12 hours  tiotropium 18 MICROgram(s) Capsule 1Capsule(s) Inhalation daily  multivitamin/minerals 1Tablet(s) Oral daily  BACItracin   Ointment 1Application(s) Topical three times a day  insulin lispro (HumaLOG) corrective regimen sliding scale  SubCutaneous every 6 hours  nafcillin  IVPB 2Gram(s) IV Intermittent every 4 hours  gentamicin   IVPB 210milliGRAM(s) IV Intermittent every 24 hours  ALBUTerol/ipratropium for Nebulization 3milliLiter(s) Nebulizer every 6 hours  predniSONE   Tablet 10milliGRAM(s) Oral daily  metoprolol 12.5milliGRAM(s) Oral two times a day  ALPRAZolam 0.25milliGRAM(s) Oral three times a day    MEDICATIONS  (PRN):  acetaminophen   Tablet 650milliGRAM(s) Oral every 6 hours PRN For Temp greater than 38 C (100.4 F)  acetaminophen   Tablet. 650milliGRAM(s) Oral every 6 hours PRN Mild Pain (1 - 3)      Vital Signs Last 24 Hrs  T(C): 36.9, Max: 37.7 (04-11 @ 00:05)  T(F): 98.4, Max: 99.8 (04-11 @ 00:05)  HR: 86 (70 - 115)  BP: 99/52 (95/55 - 119/66)  BP(mean): --  RR: 24 (18 - 24)  SpO2: 98% (96% - 100%)    PHYSICAL EXAM:    Constitutional: NAD, well-groomed, well-developed  HEENT: PERRLA, EOMI, Normal Hearing, MMM  Neck: No LAD, No JVD  Back: Normal spine flexure, No CVA tenderness  Respiratory: CTABCardiovascular: S1 and S2, RRR, no M/G/R  Gastrointestinal: BS+, soft, NT/ND  Extremities: No peripheral edema  Vascular: 2+ peripheral pulses  Neurological: A/O x 3, no focal deficits  Psychiatric: Normal mood, normal affect  Musculoskeletal: 5/5 strength b/l upper and lower extremities  Skin: No rashes      LABS:                  MICROBIOLOGY:    RADIOLOGY & ADDITIONAL STUDIES:

## 2017-04-11 NOTE — PROGRESS NOTE ADULT - SUBJECTIVE AND OBJECTIVE BOX
Patient is a 61y old  Male who presents with a chief complaint of shortness of breath (10 Apr 2017 18:29)      INTERVAL HPI/OVERNIGHT EVENTS:  sitting in chair , but indicates pain in trach area  MEDICATIONS  (STANDING):  zinc oxide 40% Ointment 1Application(s) Topical three times a day  heparin  Injectable 5000Unit(s) SubCutaneous every 12 hours  tiotropium 18 MICROgram(s) Capsule 1Capsule(s) Inhalation daily  multivitamin/minerals 1Tablet(s) Oral daily  BACItracin   Ointment 1Application(s) Topical three times a day  insulin lispro (HumaLOG) corrective regimen sliding scale  SubCutaneous every 6 hours  nafcillin  IVPB 2Gram(s) IV Intermittent every 4 hours  gentamicin   IVPB 210milliGRAM(s) IV Intermittent every 24 hours  ALBUTerol/ipratropium for Nebulization 3milliLiter(s) Nebulizer every 6 hours  predniSONE   Tablet 10milliGRAM(s) Oral daily  metoprolol 12.5milliGRAM(s) Oral two times a day  ALPRAZolam 0.25milliGRAM(s) Oral three times a day    MEDICATIONS  (PRN):  acetaminophen   Tablet 650milliGRAM(s) Oral every 6 hours PRN For Temp greater than 38 C (100.4 F)  acetaminophen   Tablet. 650milliGRAM(s) Oral every 6 hours PRN Mild Pain (1 - 3)      Allergies    No Known Allergies    Intolerances        REVIEW OF SYSTEMS:  difficult to obtain given trach status but indicates discomfort/pain in trach area     Vital Signs Last 24 Hrs  T(C): 36.9, Max: 37.7 (04-11 @ 00:05)  T(F): 98.4, Max: 99.8 (04-11 @ 00:05)  HR: 85 (70 - 115)  BP: 99/52 (95/55 - 119/66)  BP(mean): --  RR: 24 (18 - 24)  SpO2: 98% (96% - 100%)    PHYSICAL EXAM:  GENERAL: mild distress due to discomfort in sitting in chair/anxiety/trach/breathing, cachetic  HEAD:  Atraumatic, Normocephalic  EYES: EOMI, PERRLA, conjunctiva and sclera clear  ENMT: No tonsillar erythema, exudates, or enlargement; Moist mucous membranes,   NECK: +trach   NERVOUS SYSTEM:  Alert & Oriented X3, Good concentration; Motor Strength 4/5 B/L upper and lower extremities; DTRs 2+ intact and symmetric  CHEST/LUNG:   HEART: Regular rate and rhythm; No murmurs, rubs, or gallops  ABDOMEN: Soft, Nontender, Nondistended; Bowel sounds present, rectal tube placed  EXTREMITIES:  2+ Peripheral Pulses, No clubbing, cyanosis, or edema  LYMPH: No lymphadenopathy noted  SKIN: No rashes or lesions    LABS:              CAPILLARY BLOOD GLUCOSE  162 (11 Apr 2017 17:15)  183 (11 Apr 2017 11:03)  204 (11 Apr 2017 05:38)  181 (10 Apr 2017 23:49)      RADIOLOGY & ADDITIONAL TESTS:    Imaging Personally Reviewed:  [ x] YES  [ ] NO    Consultant(s) Notes Reviewed:  [x ] YES  [ ] NO    Care Discussed with Consultants/Other Providers [ x] YES  [ ] NO

## 2017-04-12 DIAGNOSIS — F41.9 ANXIETY DISORDER, UNSPECIFIED: ICD-10-CM

## 2017-04-12 DIAGNOSIS — F45.42 PAIN DISORDER WITH RELATED PSYCHOLOGICAL FACTORS: ICD-10-CM

## 2017-04-12 LAB
ANION GAP SERPL CALC-SCNC: 9 MMOL/L — SIGNIFICANT CHANGE UP (ref 5–17)
BASOPHILS # BLD AUTO: 0.2 K/UL — SIGNIFICANT CHANGE UP (ref 0–0.2)
BASOPHILS NFR BLD AUTO: 1.7 % — SIGNIFICANT CHANGE UP (ref 0–2)
BUN SERPL-MCNC: 17 MG/DL — SIGNIFICANT CHANGE UP (ref 7–23)
CALCIUM SERPL-MCNC: 9.5 MG/DL — SIGNIFICANT CHANGE UP (ref 8.5–10.1)
CHLORIDE SERPL-SCNC: 90 MMOL/L — LOW (ref 96–108)
CO2 SERPL-SCNC: 37 MMOL/L — HIGH (ref 22–31)
CREAT SERPL-MCNC: 0.55 MG/DL — SIGNIFICANT CHANGE UP (ref 0.5–1.3)
CULTURE RESULTS: SIGNIFICANT CHANGE UP
CULTURE RESULTS: SIGNIFICANT CHANGE UP
EOSINOPHIL # BLD AUTO: 0.1 K/UL — SIGNIFICANT CHANGE UP (ref 0–0.5)
EOSINOPHIL NFR BLD AUTO: 0.6 % — SIGNIFICANT CHANGE UP (ref 0–6)
GLUCOSE SERPL-MCNC: 155 MG/DL — HIGH (ref 70–99)
HCT VFR BLD CALC: 27.5 % — LOW (ref 39–50)
HGB BLD-MCNC: 9.7 G/DL — LOW (ref 13–17)
LYMPHOCYTES # BLD AUTO: 24.6 % — SIGNIFICANT CHANGE UP (ref 13–44)
LYMPHOCYTES # BLD AUTO: 3.4 K/UL — HIGH (ref 1–3.3)
MCHC RBC-ENTMCNC: 28.6 PG — SIGNIFICANT CHANGE UP (ref 27–34)
MCHC RBC-ENTMCNC: 35.1 GM/DL — SIGNIFICANT CHANGE UP (ref 32–36)
MCV RBC AUTO: 81.3 FL — SIGNIFICANT CHANGE UP (ref 80–100)
MONOCYTES # BLD AUTO: 1.4 K/UL — HIGH (ref 0–0.9)
MONOCYTES NFR BLD AUTO: 10.5 % — SIGNIFICANT CHANGE UP (ref 2–14)
NEUTROPHILS # BLD AUTO: 8.6 K/UL — HIGH (ref 1.8–7.4)
NEUTROPHILS NFR BLD AUTO: 62.6 % — SIGNIFICANT CHANGE UP (ref 43–77)
PLATELET # BLD AUTO: 631 K/UL — HIGH (ref 150–400)
POTASSIUM SERPL-MCNC: 3.6 MMOL/L — SIGNIFICANT CHANGE UP (ref 3.5–5.3)
POTASSIUM SERPL-SCNC: 3.6 MMOL/L — SIGNIFICANT CHANGE UP (ref 3.5–5.3)
RBC # BLD: 3.38 M/UL — LOW (ref 4.2–5.8)
RBC # FLD: 16 % — HIGH (ref 11–15)
SODIUM SERPL-SCNC: 136 MMOL/L — SIGNIFICANT CHANGE UP (ref 135–145)
SPECIMEN SOURCE: SIGNIFICANT CHANGE UP
SPECIMEN SOURCE: SIGNIFICANT CHANGE UP
WBC # BLD: 13.8 K/UL — HIGH (ref 3.8–10.5)
WBC # FLD AUTO: 13.8 K/UL — HIGH (ref 3.8–10.5)

## 2017-04-12 PROCEDURE — 99233 SBSQ HOSP IP/OBS HIGH 50: CPT

## 2017-04-12 RX ORDER — HEPARIN SODIUM 5000 [USP'U]/ML
0 INJECTION INTRAVENOUS; SUBCUTANEOUS
Qty: 0 | Refills: 0 | COMMUNITY
Start: 2017-04-12

## 2017-04-12 RX ORDER — IPRATROPIUM/ALBUTEROL SULFATE 18-103MCG
3 AEROSOL WITH ADAPTER (GRAM) INHALATION
Qty: 0 | Refills: 0 | COMMUNITY
Start: 2017-04-12

## 2017-04-12 RX ORDER — MORPHINE SULFATE 50 MG/1
2 CAPSULE, EXTENDED RELEASE ORAL ONCE
Qty: 0 | Refills: 0 | Status: DISCONTINUED | OUTPATIENT
Start: 2017-04-12 | End: 2017-04-12

## 2017-04-12 RX ORDER — ZINC OXIDE 200 MG/G
1 OINTMENT TOPICAL
Qty: 0 | Refills: 0 | COMMUNITY
Start: 2017-04-12

## 2017-04-12 RX ORDER — INSULIN LISPRO 100/ML
0 VIAL (ML) SUBCUTANEOUS
Qty: 0 | Refills: 0 | COMMUNITY
Start: 2017-04-12

## 2017-04-12 RX ORDER — CLONAZEPAM 1 MG
0.5 TABLET ORAL
Qty: 0 | Refills: 0 | Status: DISCONTINUED | OUTPATIENT
Start: 2017-04-12 | End: 2017-04-13

## 2017-04-12 RX ORDER — OXYCODONE HYDROCHLORIDE 5 MG/1
1 TABLET ORAL
Qty: 0 | Refills: 0 | COMMUNITY
Start: 2017-04-12

## 2017-04-12 RX ORDER — OXYCODONE HYDROCHLORIDE 5 MG/1
10 TABLET ORAL EVERY 12 HOURS
Qty: 0 | Refills: 0 | Status: DISCONTINUED | OUTPATIENT
Start: 2017-04-12 | End: 2017-04-13

## 2017-04-12 RX ORDER — CLONAZEPAM 1 MG
1 TABLET ORAL
Qty: 0 | Refills: 0 | COMMUNITY
Start: 2017-04-12

## 2017-04-12 RX ORDER — ACETAMINOPHEN 500 MG
2 TABLET ORAL
Qty: 0 | Refills: 0 | COMMUNITY
Start: 2017-04-12

## 2017-04-12 RX ORDER — TIOTROPIUM BROMIDE 18 UG/1
1 CAPSULE ORAL; RESPIRATORY (INHALATION)
Qty: 0 | Refills: 0 | COMMUNITY
Start: 2017-04-12

## 2017-04-12 RX ORDER — BACITRACIN ZINC 500 UNIT/G
1 OINTMENT IN PACKET (EA) TOPICAL
Qty: 0 | Refills: 0 | COMMUNITY
Start: 2017-04-12

## 2017-04-12 RX ORDER — MULTIVIT-MIN/FERROUS GLUCONATE 9 MG/15 ML
1 LIQUID (ML) ORAL
Qty: 0 | Refills: 0 | COMMUNITY
Start: 2017-04-12

## 2017-04-12 RX ADMIN — Medication 3 MILLILITER(S): at 00:23

## 2017-04-12 RX ADMIN — Medication 3 MILLILITER(S): at 06:00

## 2017-04-12 RX ADMIN — NAFCILLIN 200 GRAM(S): 10 INJECTION, POWDER, FOR SOLUTION INTRAVENOUS at 13:13

## 2017-04-12 RX ADMIN — ZINC OXIDE 1 APPLICATION(S): 200 OINTMENT TOPICAL at 22:26

## 2017-04-12 RX ADMIN — Medication 650 MILLIGRAM(S): at 21:39

## 2017-04-12 RX ADMIN — Medication 650 MILLIGRAM(S): at 22:59

## 2017-04-12 RX ADMIN — Medication 1: at 00:16

## 2017-04-12 RX ADMIN — Medication 3 MILLILITER(S): at 11:53

## 2017-04-12 RX ADMIN — HEPARIN SODIUM 5000 UNIT(S): 5000 INJECTION INTRAVENOUS; SUBCUTANEOUS at 17:43

## 2017-04-12 RX ADMIN — Medication 200 MILLIGRAM(S): at 16:27

## 2017-04-12 RX ADMIN — Medication 1 APPLICATION(S): at 22:27

## 2017-04-12 RX ADMIN — Medication 1: at 06:34

## 2017-04-12 RX ADMIN — NAFCILLIN 200 GRAM(S): 10 INJECTION, POWDER, FOR SOLUTION INTRAVENOUS at 21:40

## 2017-04-12 RX ADMIN — Medication 3 MILLILITER(S): at 17:31

## 2017-04-12 RX ADMIN — Medication 2: at 23:55

## 2017-04-12 RX ADMIN — ZINC OXIDE 1 APPLICATION(S): 200 OINTMENT TOPICAL at 13:13

## 2017-04-12 RX ADMIN — NAFCILLIN 200 GRAM(S): 10 INJECTION, POWDER, FOR SOLUTION INTRAVENOUS at 02:13

## 2017-04-12 RX ADMIN — Medication 1 APPLICATION(S): at 13:13

## 2017-04-12 RX ADMIN — OXYCODONE HYDROCHLORIDE 10 MILLIGRAM(S): 5 TABLET ORAL at 19:13

## 2017-04-12 RX ADMIN — OXYCODONE HYDROCHLORIDE 10 MILLIGRAM(S): 5 TABLET ORAL at 17:43

## 2017-04-12 RX ADMIN — Medication 1 APPLICATION(S): at 06:24

## 2017-04-12 RX ADMIN — MORPHINE SULFATE 2 MILLIGRAM(S): 50 CAPSULE, EXTENDED RELEASE ORAL at 13:12

## 2017-04-12 RX ADMIN — HEPARIN SODIUM 5000 UNIT(S): 5000 INJECTION INTRAVENOUS; SUBCUTANEOUS at 06:33

## 2017-04-12 RX ADMIN — NAFCILLIN 200 GRAM(S): 10 INJECTION, POWDER, FOR SOLUTION INTRAVENOUS at 06:22

## 2017-04-12 RX ADMIN — Medication 1 TABLET(S): at 11:37

## 2017-04-12 RX ADMIN — ZINC OXIDE 1 APPLICATION(S): 200 OINTMENT TOPICAL at 06:32

## 2017-04-12 RX ADMIN — MORPHINE SULFATE 2 MILLIGRAM(S): 50 CAPSULE, EXTENDED RELEASE ORAL at 13:25

## 2017-04-12 RX ADMIN — NAFCILLIN 200 GRAM(S): 10 INJECTION, POWDER, FOR SOLUTION INTRAVENOUS at 17:43

## 2017-04-12 RX ADMIN — Medication 12.5 MILLIGRAM(S): at 06:24

## 2017-04-12 RX ADMIN — Medication 1: at 11:37

## 2017-04-12 RX ADMIN — Medication 10 MILLIGRAM(S): at 06:24

## 2017-04-12 RX ADMIN — NAFCILLIN 200 GRAM(S): 10 INJECTION, POWDER, FOR SOLUTION INTRAVENOUS at 09:28

## 2017-04-12 RX ADMIN — Medication 325 MILLIGRAM(S): at 21:37

## 2017-04-12 NOTE — PROGRESS NOTE ADULT - ATTENDING COMMENTS
Dr Swift will follow up in my abence
likely d/c to [facility]/LTC monday or tuesday if repeat blood cultures negative.
d/c planning, awaiting NH placement
palliative care involved - pt. remains full code.

## 2017-04-12 NOTE — PROGRESS NOTE ADULT - SUBJECTIVE AND OBJECTIVE BOX
INTERVAL HPI/OVERNIGHT EVENTS:  with SOB. Intubated for hypercapnic respiratory failure in ED. CT chest and CXR showed multifocal pneumonia.  NO more details of history are available as intubated and not able to provide information.   Was recently admitted with similar presentation, was in ICU with BIPAP support.  Failed weaning for few days but did well on 03/13/17 and got extubated, then transferred to medical floor.  03/27/17: Events noted, transferred to 2D due to hypercarbic Respiratory failure. Awake and Responsive on BIPAP.  03/28/17: now in ICU, resting on BIPAP. RN reports did not do well on nasal O2.  03/29/17: Required intubation for severe hypercarbic Respiratory failure. Fever spike to more than 101 noted.  04/03/17: underwent Tracheostomy.   04/05/17: Transferred to regular floor.  04/06/17; Had RANJIT.  04/07/17:  Peg placement.  Tolerated CPAP well today. Awake and comfortable.    Vital Signs Last 24 Hrs  T(C): 37.7, Max: 37.7 (04-12 @ 12:30)  T(F): 99.8, Max: 99.8 (04-12 @ 12:30)  HR: 93 (60 - 93)  BP: 102/58 (99/54 - 137/68)  BP(mean): --  RR: 17 (17 - 22)  SpO2: 96% (96% - 100%)    Mode: Auto Mode: PRVC/ Volume Support  RR (machine): 12  TV (machine): 350  FiO2: 30  PEEP: 5  ITime: 1  MAP: 7  PIP: 13    PHYSICAL EXAM:  GEN:        Awake, responsive and comfortable.  HEENT:    Normal.    RESP:      no wheezing.  CVS:         Regular rate and rhythm.   ABD:         Soft, non-tender, non-distended;     MEDICATIONS  (STANDING):  zinc oxide 40% Ointment 1Application(s) Topical three times a day  heparin  Injectable 5000Unit(s) SubCutaneous every 12 hours  tiotropium 18 MICROgram(s) Capsule 1Capsule(s) Inhalation daily  multivitamin/minerals 1Tablet(s) Oral daily  BACItracin   Ointment 1Application(s) Topical three times a day  insulin lispro (HumaLOG) corrective regimen sliding scale  SubCutaneous every 6 hours  nafcillin  IVPB 2Gram(s) IV Intermittent every 4 hours  gentamicin   IVPB 210milliGRAM(s) IV Intermittent every 24 hours  ALBUTerol/ipratropium for Nebulization 3milliLiter(s) Nebulizer every 6 hours  predniSONE   Tablet 10milliGRAM(s) Oral daily  metoprolol 12.5milliGRAM(s) Oral two times a day  oxyCODONE ER Tablet 10milliGRAM(s) Oral every 12 hours    MEDICATIONS  (PRN):  acetaminophen   Tablet 650milliGRAM(s) Oral every 6 hours PRN For Temp greater than 38 C (100.4 F)  acetaminophen   Tablet. 650milliGRAM(s) Oral every 6 hours PRN Mild Pain (1 - 3)    LABS:                        9.7    13.8  )-----------( 631      ( 12 Apr 2017 06:53 )             27.5     04-12    136  |  90<L>  |  17  ----------------------------<  155<H>  3.6   |  37<H>  |  0.55    Ca    9.5      12 Apr 2017 06:53    ASSESSMENT AND PLAN:  ·	Acute Hypoxic/hypercarbic Respiratory failure.  ·	S/P Tracheostomy on 04/03/17  ·	Multifocal pneumonia.  ·	MSSA bacteremia.  ·	Acute COPD exacerbation.  ·	Functional deconditioning.  ·	Anemia.  ·	CAD with angioplasty.  ·	HTN.  ·	A Fib.  ·	HLD.  ·	Anxiety  ·	S/P Peg on 04/07/17.    Will try on Tach collar in am.  Reduce TV and RR.

## 2017-04-12 NOTE — PROGRESS NOTE ADULT - PROBLEM SELECTOR PLAN 2
septic shock with persistent MSSA bacteremia c/w naficillin, gentamicin, repeat blood cultures have been negative thus far, sepsis resolved  RANJIT neg. for vegetation  off pressor, shock resolved  plan was to d/c gent and complete a course of nafacillin if blood cultures negative but has been afebrile, if no fever, can stop abx tomorrow and d/c to NH placement

## 2017-04-12 NOTE — PROGRESS NOTE ADULT - SUBJECTIVE AND OBJECTIVE BOX
Patient is a 61y old  Male who presents with a chief complaint of shortness of breath (10 Apr 2017 18:29)      INTERVAL HPI/OVERNIGHT EVENTS  indicates pain at trach site    MEDICATIONS  (STANDING):  zinc oxide 40% Ointment 1Application(s) Topical three times a day  heparin  Injectable 5000Unit(s) SubCutaneous every 12 hours  tiotropium 18 MICROgram(s) Capsule 1Capsule(s) Inhalation daily  multivitamin/minerals 1Tablet(s) Oral daily  BACItracin   Ointment 1Application(s) Topical three times a day  insulin lispro (HumaLOG) corrective regimen sliding scale  SubCutaneous every 6 hours  nafcillin  IVPB 2Gram(s) IV Intermittent every 4 hours  ALBUTerol/ipratropium for Nebulization 3milliLiter(s) Nebulizer every 6 hours  predniSONE   Tablet 10milliGRAM(s) Oral daily  metoprolol 12.5milliGRAM(s) Oral two times a day  oxyCODONE ER Tablet 10milliGRAM(s) Oral every 12 hours    MEDICATIONS  (PRN):  acetaminophen   Tablet 650milliGRAM(s) Oral every 6 hours PRN For Temp greater than 38 C (100.4 F)  acetaminophen   Tablet. 650milliGRAM(s) Oral every 6 hours PRN Mild Pain (1 - 3)      Allergies    No Known Allergies    Intolerances        REVIEW OF SYSTEMS:  difficult to assess given trach, but indicates pain at trach site    Vital Signs Last 24 Hrs  T(C): 37.7, Max: 37.7 (04-12 @ 12:30)  T(F): 99.8, Max: 99.8 (04-12 @ 12:30)  HR: 92 (60 - 93)  BP: 102/58 (99/54 - 137/68)  BP(mean): --  RR: 17 (17 - 22)  SpO2: 98% (96% - 100%)    PHYSICAL EXAM:  GENERAL: mild distress, appears anxious, cachetic  HEAD:  Atraumatic, Normocephalic  EYES: EOMI, PERRLA, conjunctiva and sclera clear  ENMT: No tonsillar erythema, exudates, or enlargement; Moist mucous membranes,   NECK: +trach  NERVOUS SYSTEM:  Alert & Oriented X3,moves all extremities  CHEST/LUNG: Clear to percussion bilaterally; No rales, rhonchi, wheezing, or rubs  HEART: Regular rate and rhythm; No murmurs, rubs, or gallops  ABDOMEN: Soft, Nontender, +PEG  EXTREMITIES:  2+ Peripheral Pulses, No clubbing, cyanosis, or edema  LYMPH: No lymphadenopathy noted  SKIN: No rashes or lesions    LABS:                        9.7    13.8  )-----------( 631      ( 12 Apr 2017 06:53 )             27.5     04-12    136  |  90<L>  |  17  ----------------------------<  155<H>  3.6   |  37<H>  |  0.55    Ca    9.5      12 Apr 2017 06:53          CAPILLARY BLOOD GLUCOSE  144 (12 Apr 2017 17:11)  173 (12 Apr 2017 05:27)  196 (12 Apr 2017 00:09)      RADIOLOGY & ADDITIONAL TESTS:    Imaging Personally Reviewed:  [ x] YES  [ ] NO    Consultant(s) Notes Reviewed:  [ x] YES  [ ] NO    Care Discussed with Consultants/Other Providers [ x] YES  [ ] NO

## 2017-04-12 NOTE — PROGRESS NOTE ADULT - SUBJECTIVE AND OBJECTIVE BOX
INTERVAL HPI / OVERNIGHT EVENTS:  Afebrile      REVIEW OF SYSTEMS:    ROS:  CONSTITUTIONAL:  Negative fever or chills, feels well, good appetite  EYES:  Negative  blurry vision or double vision  CARDIOVASCULAR:  Negative for chest pain or palpitations  RESPIRATORY:  Negative for cough, wheezing, or SOB   GASTROINTESTINAL:  Negative for nausea, vomiting, diarrhea, constipation, or abdominal pain  GENITOURINARY:  Negative frequency, urgency or dysuria  NEUROLOGIC:  No headache, confusion, dizziness, lightheadedness      MEDICATIONS  (STANDING):  zinc oxide 40% Ointment 1Application(s) Topical three times a day  heparin  Injectable 5000Unit(s) SubCutaneous every 12 hours  tiotropium 18 MICROgram(s) Capsule 1Capsule(s) Inhalation daily  multivitamin/minerals 1Tablet(s) Oral daily  BACItracin   Ointment 1Application(s) Topical three times a day  insulin lispro (HumaLOG) corrective regimen sliding scale  SubCutaneous every 6 hours  nafcillin  IVPB 2Gram(s) IV Intermittent every 4 hours  ALBUTerol/ipratropium for Nebulization 3milliLiter(s) Nebulizer every 6 hours  predniSONE   Tablet 10milliGRAM(s) Oral daily  metoprolol 12.5milliGRAM(s) Oral two times a day  oxyCODONE ER Tablet 10milliGRAM(s) Oral every 12 hours    MEDICATIONS  (PRN):  acetaminophen   Tablet 650milliGRAM(s) Oral every 6 hours PRN For Temp greater than 38 C (100.4 F)  acetaminophen   Tablet. 650milliGRAM(s) Oral every 6 hours PRN Mild Pain (1 - 3)      Vital Signs Last 24 Hrs  T(C): 37.7, Max: 37.7 (04-12 @ 12:30)  T(F): 99.8, Max: 99.8 (04-12 @ 12:30)  HR: 93 (60 - 93)  BP: 102/58 (99/54 - 137/68)  BP(mean): --  RR: 17 (17 - 22)  SpO2: 96% (96% - 100%)    PHYSICAL EXAM:    Constitutional: NAD, well-groomed, well-developed  HEENT: PERRLA, EOMI, Normal Hearing, MMM  Neck: No LAD, No JVD  Back: Normal spine flexure, No CVA tenderness  Respiratory: CTABCardiovascular: S1 and S2, RRR, no M/G/R  Gastrointestinal: BS+, soft, NT/ND  Extremities: No peripheral edema  Vascular: 2+ peripheral pulses  Neurological: A/O x 3, no focal deficits  Psychiatric: Normal mood, normal affect  Musculoskeletal: 5/5 strength b/l upper and lower extremities  Skin: No rashes      LABS:                        9.7    13.8  )-----------( 631      ( 12 Apr 2017 06:53 )             27.5     04-12    136  |  90<L>  |  17  ----------------------------<  155<H>  3.6   |  37<H>  |  0.55    Ca    9.5      12 Apr 2017 06:53              MICROBIOLOGY:    RADIOLOGY & ADDITIONAL STUDIES:

## 2017-04-13 PROCEDURE — 99233 SBSQ HOSP IP/OBS HIGH 50: CPT

## 2017-04-13 RX ORDER — CLONAZEPAM 1 MG
0.5 TABLET ORAL EVERY 12 HOURS
Qty: 0 | Refills: 0 | Status: DISCONTINUED | OUTPATIENT
Start: 2017-04-13 | End: 2017-04-14

## 2017-04-13 RX ORDER — OXYCODONE HYDROCHLORIDE 5 MG/1
5 TABLET ORAL EVERY 4 HOURS
Qty: 0 | Refills: 0 | Status: DISCONTINUED | OUTPATIENT
Start: 2017-04-13 | End: 2017-04-14

## 2017-04-13 RX ORDER — OXYCODONE HYDROCHLORIDE 5 MG/1
5 TABLET ORAL EVERY 4 HOURS
Qty: 0 | Refills: 0 | Status: DISCONTINUED | OUTPATIENT
Start: 2017-04-13 | End: 2017-04-13

## 2017-04-13 RX ADMIN — Medication 1 APPLICATION(S): at 06:01

## 2017-04-13 RX ADMIN — NAFCILLIN 200 GRAM(S): 10 INJECTION, POWDER, FOR SOLUTION INTRAVENOUS at 10:00

## 2017-04-13 RX ADMIN — Medication 1: at 23:56

## 2017-04-13 RX ADMIN — Medication 1 TABLET(S): at 11:40

## 2017-04-13 RX ADMIN — Medication 3 MILLILITER(S): at 05:31

## 2017-04-13 RX ADMIN — Medication 0.5 MILLIGRAM(S): at 22:35

## 2017-04-13 RX ADMIN — Medication 1 APPLICATION(S): at 22:19

## 2017-04-13 RX ADMIN — Medication 1 APPLICATION(S): at 14:52

## 2017-04-13 RX ADMIN — Medication 1: at 06:38

## 2017-04-13 RX ADMIN — HEPARIN SODIUM 5000 UNIT(S): 5000 INJECTION INTRAVENOUS; SUBCUTANEOUS at 17:43

## 2017-04-13 RX ADMIN — Medication 3 MILLILITER(S): at 00:28

## 2017-04-13 RX ADMIN — Medication 3 MILLILITER(S): at 17:49

## 2017-04-13 RX ADMIN — OXYCODONE HYDROCHLORIDE 10 MILLIGRAM(S): 5 TABLET ORAL at 06:00

## 2017-04-13 RX ADMIN — NAFCILLIN 200 GRAM(S): 10 INJECTION, POWDER, FOR SOLUTION INTRAVENOUS at 01:59

## 2017-04-13 RX ADMIN — NAFCILLIN 200 GRAM(S): 10 INJECTION, POWDER, FOR SOLUTION INTRAVENOUS at 06:03

## 2017-04-13 RX ADMIN — NAFCILLIN 200 GRAM(S): 10 INJECTION, POWDER, FOR SOLUTION INTRAVENOUS at 17:44

## 2017-04-13 RX ADMIN — HEPARIN SODIUM 5000 UNIT(S): 5000 INJECTION INTRAVENOUS; SUBCUTANEOUS at 05:59

## 2017-04-13 RX ADMIN — NAFCILLIN 200 GRAM(S): 10 INJECTION, POWDER, FOR SOLUTION INTRAVENOUS at 14:52

## 2017-04-13 RX ADMIN — NAFCILLIN 200 GRAM(S): 10 INJECTION, POWDER, FOR SOLUTION INTRAVENOUS at 22:19

## 2017-04-13 RX ADMIN — Medication 0.5 MILLIGRAM(S): at 17:59

## 2017-04-13 RX ADMIN — ZINC OXIDE 1 APPLICATION(S): 200 OINTMENT TOPICAL at 06:04

## 2017-04-13 RX ADMIN — Medication 0.5 MILLIGRAM(S): at 06:03

## 2017-04-13 RX ADMIN — Medication 3 MILLILITER(S): at 12:07

## 2017-04-13 RX ADMIN — ZINC OXIDE 1 APPLICATION(S): 200 OINTMENT TOPICAL at 14:53

## 2017-04-13 RX ADMIN — ZINC OXIDE 1 APPLICATION(S): 200 OINTMENT TOPICAL at 22:20

## 2017-04-13 RX ADMIN — Medication 1: at 11:41

## 2017-04-13 RX ADMIN — Medication 10 MILLIGRAM(S): at 05:58

## 2017-04-13 RX ADMIN — Medication 12.5 MILLIGRAM(S): at 17:59

## 2017-04-13 NOTE — PROGRESS NOTE ADULT - PROBLEM SELECTOR PROBLEM 4
Acute respiratory failure with hypoxia
Atrial fibrillation

## 2017-04-13 NOTE — PROGRESS NOTE ADULT - PROBLEM SELECTOR PLAN 5
palliative following, pt. and family want to continue aggressive care, pt. is full code
speech and swallow eval rec dysphagia diet, appetite poor  encourage po intake
speech and swallow eval rec dysphagia diet, appetite poor  start fluids
speech and swallow eval rec dysphagia diet, appetite poor  start fluids
speech and swallow eval

## 2017-04-13 NOTE — PROGRESS NOTE ADULT - PROBLEM SELECTOR PLAN 2
septic shock with persistent MSSA bacteremia c/w naficillin, gentamicin, repeat blood cultures have been negative thus far, sepsis resolved  RANJIT neg. for vegetation  off pressor, shock resolved  plan was to d/c gent and complete a course of nafacillin if blood cultures negative but has been afebrile,can d/c abx on discharge

## 2017-04-13 NOTE — PROGRESS NOTE ADULT - SUBJECTIVE AND OBJECTIVE BOX
Patient is a 61y old  Male who presents with a chief complaint of shortness of breath (10 Apr 2017 18:29)      INTERVAL HPI/OVERNIGHT EVENTS:  pt.'s breathing status improved today, seems less anxious with klonipin and better pain control    MEDICATIONS  (STANDING):  zinc oxide 40% Ointment 1Application(s) Topical three times a day  heparin  Injectable 5000Unit(s) SubCutaneous every 12 hours  tiotropium 18 MICROgram(s) Capsule 1Capsule(s) Inhalation daily  multivitamin/minerals 1Tablet(s) Oral daily  BACItracin   Ointment 1Application(s) Topical three times a day  insulin lispro (HumaLOG) corrective regimen sliding scale  SubCutaneous every 6 hours  nafcillin  IVPB 2Gram(s) IV Intermittent every 4 hours  ALBUTerol/ipratropium for Nebulization 3milliLiter(s) Nebulizer every 6 hours  predniSONE   Tablet 10milliGRAM(s) Oral daily  metoprolol 12.5milliGRAM(s) Oral two times a day  clonazePAM Tablet 0.5milliGRAM(s) Oral every 12 hours    MEDICATIONS  (PRN):  acetaminophen   Tablet 650milliGRAM(s) Oral every 6 hours PRN For Temp greater than 38 C (100.4 F)  acetaminophen   Tablet. 650milliGRAM(s) Oral every 6 hours PRN Mild Pain (1 - 3)  oxyCODONE IR 5milliGRAM(s) Oral every 4 hours PRN Moderate Pain (4 - 6)      Allergies    No Known Allergies    Intolerances        REVIEW OF SYSTEMS:  difficult to assess given trach status but denies any complaints at this time    Vital Signs Last 24 Hrs  T(C): 37.2, Max: 37.4 (04-13 @ 00:11)  T(F): 99, Max: 99.4 (04-13 @ 00:11)  HR: 95 (68 - 98)  BP: 138/71 (89/49 - 138/71)  BP(mean): --  RR: 20 (16 - 20)  SpO2: 100% (98% - 100%)    PHYSICAL EXAM:  GENERAL: NAD, cachetic  HEAD:  Atraumatic, Normocephalic  EYES: EOMI, PERRLA, conjunctiva and sclera clear  ENMT: +trach in place  NECK: Supple, No JVD, Normal thyroid  NERVOUS SYSTEM:  Alert & Oriented X3, Good concentration; Motor Strength 4/5 B/L upper and lower extremities; DTRs 2+ intact and symmetric  CHEST/LUNG: coarse BS b/l  HEART: Regular rate and rhythm; No murmurs, rubs, or gallops  ABDOMEN: Soft, Nontender, Nondistended; Bowel sounds present  EXTREMITIES:  2+ Peripheral Pulses, No clubbing, cyanosis, or edema  LYMPH: No lymphadenopathy noted  SKIN: No rashes or lesions    LABS:                        9.7    13.8  )-----------( 631      ( 12 Apr 2017 06:53 )             27.5     04-12    136  |  90<L>  |  17  ----------------------------<  155<H>  3.6   |  37<H>  |  0.55    Ca    9.5      12 Apr 2017 06:53          CAPILLARY BLOOD GLUCOSE  128 (13 Apr 2017 17:38)  161 (13 Apr 2017 11:38)  172 (13 Apr 2017 06:36)  218 (12 Apr 2017 23:56)      RADIOLOGY & ADDITIONAL TESTS:    Imaging Personally Reviewed:  [ ] YES  [ ] NO    Consultant(s) Notes Reviewed:  [x ] YES  [ ] NO    Care Discussed with Consultants/Other Providers [x ] YES  [ ] NO

## 2017-04-13 NOTE — PROGRESS NOTE ADULT - PROBLEM SELECTOR PLAN 6
DIANNE, awaiting placement
Discussed father's poor prognosis with son Miguel and full code status, son states that he will be discussing with his family about these issues and will be here today or tomorrow , they have not made any decisions as of yet, and pt. stays full code,
Discussed father's poor prognosis with son Miguel and full code status, son states that he will be discussing with his family about these issues and will be here today or tomorrow , they have not made any decisions as of yet, and pt. stays full code,
Discussed father's poor prognosis with son Miguel and pt. stays full code status, family await daughter to come from back home to visit pt.
PT recommends DIANNE once medically stable for DC
PT recommends DIANNE once medically stable for DC
PT recommends DIANNE once medically stable for DC-   plan for d/c tomorrow
PT recommends DIANNE once medically stable for DC-   plan for d/c tomorrow
PT recommends DIANNE once medically stable for DC- depends on ID recs re: cleared for d/c to complete course of abx in rehab. will need midline
discussed father's poor prognosis with son Miguel again on 3/24 at 1:55pm, and full code status, son states that he will be discussing with his family about these issues over the weekend,  but they have not made any decisions as of yet, and pt. stays full code,
discussed father's poor prognosis with son and full code status, son states that he is talking with his family about these issues but they have not made any decisions, he is also requesting a letter regarding his father's condition to help his sister obtain a visa to come here. Letter will be provided

## 2017-04-13 NOTE — PROGRESS NOTE ADULT - PROBLEM SELECTOR PLAN 4
PAF  c/w BB  not on AC due to fall/bleed risk
PAF  c/w cardizem, NATANAEL for rate control  not on AC due to fall/bleed risk
c/w cardizem, BB
c/w cardizem, BB
c/w cardizem, BB for bonnie control  not on AC due to fall/bleed risk
planned for trach followed by RANJIT
s/p trach
c/w cardizem, BB
planned for trach followed by RANJIT

## 2017-04-13 NOTE — PROGRESS NOTE ADULT - PROBLEM SELECTOR PROBLEM 8
Anemia, unspecified type
Pain aggravated by anxiety
Pain aggravated by anxiety
Anemia, unspecified type

## 2017-04-13 NOTE — PROGRESS NOTE ADULT - PROBLEM SELECTOR PROBLEM 6
Generalized weakness
Goals of care, counseling/discussion

## 2017-04-13 NOTE — PROGRESS NOTE ADULT - PROBLEM SELECTOR PLAN 8
anemia of chronic disease  h/h stable
will start pain medications prn
will start pain medications bid
fall in h/h, will check Iron, ferritin, TIBC and stool OB

## 2017-04-13 NOTE — PROGRESS NOTE ADULT - PROBLEM SELECTOR PROBLEM 5
Goals of care, counseling/discussion
Oropharyngeal dysphagia

## 2017-04-13 NOTE — PROGRESS NOTE ADULT - PROBLEM SELECTOR PROBLEM 7
Anemia, unspecified type
Generalized weakness
Generalized weakness

## 2017-04-14 VITALS — OXYGEN SATURATION: 97 %

## 2017-04-14 LAB
ALBUMIN SERPL ELPH-MCNC: 1.3 G/DL — LOW (ref 3.3–5)
ALP SERPL-CCNC: 119 U/L — SIGNIFICANT CHANGE UP (ref 40–120)
ALT FLD-CCNC: 9 U/L — LOW (ref 12–78)
ANION GAP SERPL CALC-SCNC: 9 MMOL/L — SIGNIFICANT CHANGE UP (ref 5–17)
APPEARANCE UR: ABNORMAL
AST SERPL-CCNC: 11 U/L — LOW (ref 15–37)
BASOPHILS # BLD AUTO: 0.3 K/UL — HIGH (ref 0–0.2)
BASOPHILS NFR BLD AUTO: 1.9 % — SIGNIFICANT CHANGE UP (ref 0–2)
BILIRUB SERPL-MCNC: 0.7 MG/DL — SIGNIFICANT CHANGE UP (ref 0.2–1.2)
BILIRUB UR-MCNC: NEGATIVE — SIGNIFICANT CHANGE UP
BUN SERPL-MCNC: 19 MG/DL — SIGNIFICANT CHANGE UP (ref 7–23)
CALCIUM SERPL-MCNC: 8.5 MG/DL — SIGNIFICANT CHANGE UP (ref 8.5–10.1)
CHLORIDE SERPL-SCNC: 93 MMOL/L — LOW (ref 96–108)
CO2 SERPL-SCNC: 35 MMOL/L — HIGH (ref 22–31)
COLOR SPEC: YELLOW — SIGNIFICANT CHANGE UP
CREAT SERPL-MCNC: 0.64 MG/DL — SIGNIFICANT CHANGE UP (ref 0.5–1.3)
DIFF PNL FLD: ABNORMAL
EOSINOPHIL # BLD AUTO: 0.1 K/UL — SIGNIFICANT CHANGE UP (ref 0–0.5)
EOSINOPHIL NFR BLD AUTO: 0.5 % — SIGNIFICANT CHANGE UP (ref 0–6)
GLUCOSE SERPL-MCNC: 215 MG/DL — HIGH (ref 70–99)
GLUCOSE UR QL: 100 MG/DL
HCT VFR BLD CALC: 24.3 % — LOW (ref 39–50)
HGB BLD-MCNC: 8.5 G/DL — LOW (ref 13–17)
KETONES UR-MCNC: NEGATIVE — SIGNIFICANT CHANGE UP
LACTATE SERPL-SCNC: 1.2 MMOL/L — SIGNIFICANT CHANGE UP (ref 0.7–2)
LEUKOCYTE ESTERASE UR-ACNC: ABNORMAL
LYMPHOCYTES # BLD AUTO: 21.7 % — SIGNIFICANT CHANGE UP (ref 13–44)
LYMPHOCYTES # BLD AUTO: 3 K/UL — SIGNIFICANT CHANGE UP (ref 1–3.3)
MCHC RBC-ENTMCNC: 28.4 PG — SIGNIFICANT CHANGE UP (ref 27–34)
MCHC RBC-ENTMCNC: 35 GM/DL — SIGNIFICANT CHANGE UP (ref 32–36)
MCV RBC AUTO: 81.2 FL — SIGNIFICANT CHANGE UP (ref 80–100)
MONOCYTES # BLD AUTO: 1.3 K/UL — HIGH (ref 0–0.9)
MONOCYTES NFR BLD AUTO: 9.2 % — SIGNIFICANT CHANGE UP (ref 2–14)
NEUTROPHILS # BLD AUTO: 9.2 K/UL — HIGH (ref 1.8–7.4)
NEUTROPHILS NFR BLD AUTO: 66.8 % — SIGNIFICANT CHANGE UP (ref 43–77)
NITRITE UR-MCNC: NEGATIVE — SIGNIFICANT CHANGE UP
PH UR: 7 — SIGNIFICANT CHANGE UP (ref 4.8–8)
PLATELET # BLD AUTO: 512 K/UL — HIGH (ref 150–400)
POTASSIUM SERPL-MCNC: 3.3 MMOL/L — LOW (ref 3.5–5.3)
POTASSIUM SERPL-SCNC: 3.3 MMOL/L — LOW (ref 3.5–5.3)
PROCALCITONIN SERPL-MCNC: <0.05 NG/ML — SIGNIFICANT CHANGE UP (ref 0–0.05)
PROT SERPL-MCNC: 6 GM/DL — SIGNIFICANT CHANGE UP (ref 6–8.3)
PROT UR-MCNC: 15 MG/DL
RBC # BLD: 3 M/UL — LOW (ref 4.2–5.8)
RBC # FLD: 16.2 % — HIGH (ref 11–15)
SODIUM SERPL-SCNC: 137 MMOL/L — SIGNIFICANT CHANGE UP (ref 135–145)
SP GR SPEC: 1 — LOW (ref 1.01–1.02)
UROBILINOGEN FLD QL: NEGATIVE MG/DL — SIGNIFICANT CHANGE UP
WBC # BLD: 13.8 K/UL — HIGH (ref 3.8–10.5)
WBC # FLD AUTO: 13.8 K/UL — HIGH (ref 3.8–10.5)

## 2017-04-14 PROCEDURE — 99239 HOSP IP/OBS DSCHRG MGMT >30: CPT

## 2017-04-14 RX ORDER — OXYCODONE HYDROCHLORIDE 5 MG/1
1 TABLET ORAL
Qty: 0 | Refills: 0 | COMMUNITY
Start: 2017-04-14

## 2017-04-14 RX ORDER — SODIUM CHLORIDE 9 MG/ML
1000 INJECTION INTRAMUSCULAR; INTRAVENOUS; SUBCUTANEOUS ONCE
Qty: 0 | Refills: 0 | Status: COMPLETED | OUTPATIENT
Start: 2017-04-14 | End: 2017-04-14

## 2017-04-14 RX ORDER — MIDODRINE HYDROCHLORIDE 2.5 MG/1
20 TABLET ORAL ONCE
Qty: 0 | Refills: 0 | Status: COMPLETED | OUTPATIENT
Start: 2017-04-14 | End: 2017-04-14

## 2017-04-14 RX ORDER — ACETAMINOPHEN 500 MG
1 TABLET ORAL
Qty: 0 | Refills: 0 | COMMUNITY

## 2017-04-14 RX ORDER — ALPRAZOLAM 0.25 MG
1 TABLET ORAL
Qty: 0 | Refills: 0 | COMMUNITY

## 2017-04-14 RX ORDER — MIDODRINE HYDROCHLORIDE 2.5 MG/1
1 TABLET ORAL
Qty: 0 | Refills: 0 | COMMUNITY
Start: 2017-04-14

## 2017-04-14 RX ORDER — MIDODRINE HYDROCHLORIDE 2.5 MG/1
10 TABLET ORAL EVERY 8 HOURS
Qty: 0 | Refills: 0 | Status: DISCONTINUED | OUTPATIENT
Start: 2017-04-14 | End: 2017-04-14

## 2017-04-14 RX ORDER — SODIUM CHLORIDE 9 MG/ML
1000 INJECTION INTRAMUSCULAR; INTRAVENOUS; SUBCUTANEOUS
Qty: 0 | Refills: 0 | Status: DISCONTINUED | OUTPATIENT
Start: 2017-04-14 | End: 2017-04-14

## 2017-04-14 RX ORDER — POTASSIUM CHLORIDE 20 MEQ
20 PACKET (EA) ORAL ONCE
Qty: 0 | Refills: 0 | Status: COMPLETED | OUTPATIENT
Start: 2017-04-14 | End: 2017-04-14

## 2017-04-14 RX ADMIN — ZINC OXIDE 1 APPLICATION(S): 200 OINTMENT TOPICAL at 13:10

## 2017-04-14 RX ADMIN — Medication 3 MILLILITER(S): at 05:33

## 2017-04-14 RX ADMIN — Medication 1: at 06:09

## 2017-04-14 RX ADMIN — Medication 650 MILLIGRAM(S): at 00:07

## 2017-04-14 RX ADMIN — Medication 1: at 11:08

## 2017-04-14 RX ADMIN — Medication 1 TABLET(S): at 11:09

## 2017-04-14 RX ADMIN — Medication 3 MILLILITER(S): at 12:10

## 2017-04-14 RX ADMIN — SODIUM CHLORIDE 100 MILLILITER(S): 9 INJECTION INTRAMUSCULAR; INTRAVENOUS; SUBCUTANEOUS at 13:11

## 2017-04-14 RX ADMIN — SODIUM CHLORIDE 100 MILLILITER(S): 9 INJECTION INTRAMUSCULAR; INTRAVENOUS; SUBCUTANEOUS at 04:42

## 2017-04-14 RX ADMIN — MIDODRINE HYDROCHLORIDE 10 MILLIGRAM(S): 2.5 TABLET ORAL at 13:09

## 2017-04-14 RX ADMIN — Medication 10 MILLIGRAM(S): at 06:11

## 2017-04-14 RX ADMIN — HEPARIN SODIUM 5000 UNIT(S): 5000 INJECTION INTRAVENOUS; SUBCUTANEOUS at 06:09

## 2017-04-14 RX ADMIN — Medication 20 MILLIEQUIVALENT(S): at 02:07

## 2017-04-14 RX ADMIN — NAFCILLIN 200 GRAM(S): 10 INJECTION, POWDER, FOR SOLUTION INTRAVENOUS at 02:07

## 2017-04-14 RX ADMIN — NAFCILLIN 200 GRAM(S): 10 INJECTION, POWDER, FOR SOLUTION INTRAVENOUS at 06:10

## 2017-04-14 RX ADMIN — Medication 3 MILLILITER(S): at 17:25

## 2017-04-14 RX ADMIN — SODIUM CHLORIDE 1000 MILLILITER(S): 9 INJECTION INTRAMUSCULAR; INTRAVENOUS; SUBCUTANEOUS at 00:38

## 2017-04-14 RX ADMIN — Medication 3 MILLILITER(S): at 00:06

## 2017-04-14 RX ADMIN — ZINC OXIDE 1 APPLICATION(S): 200 OINTMENT TOPICAL at 06:11

## 2017-04-14 RX ADMIN — Medication 12.5 MILLIGRAM(S): at 17:08

## 2017-04-14 RX ADMIN — MIDODRINE HYDROCHLORIDE 20 MILLIGRAM(S): 2.5 TABLET ORAL at 01:04

## 2017-04-14 RX ADMIN — MIDODRINE HYDROCHLORIDE 10 MILLIGRAM(S): 2.5 TABLET ORAL at 06:10

## 2017-04-14 RX ADMIN — Medication 1 APPLICATION(S): at 06:09

## 2017-04-14 RX ADMIN — Medication 1 APPLICATION(S): at 13:10

## 2017-04-14 RX ADMIN — SODIUM CHLORIDE 1000 MILLILITER(S): 9 INJECTION INTRAMUSCULAR; INTRAVENOUS; SUBCUTANEOUS at 01:13

## 2017-04-14 RX ADMIN — HEPARIN SODIUM 5000 UNIT(S): 5000 INJECTION INTRAVENOUS; SUBCUTANEOUS at 17:07

## 2017-04-14 NOTE — PROGRESS NOTE ADULT - ASSESSMENT
Leukocytosis persists. Has been on antibiotics since 3/8/17. Completed treatment  for MSSA bacteremia. Afebrile

## 2017-04-14 NOTE — PROGRESS NOTE ADULT - PROBLEM SELECTOR PROBLEM 3
Pneumonia, bacterial
Severe protein-calorie malnutrition
Chronic respiratory failure with hypoxia and hypercapnia
Multifocal pneumonia
Severe protein-calorie malnutrition

## 2017-04-14 NOTE — PROGRESS NOTE ADULT - PROBLEM SELECTOR PLAN 1
Acute resp. failure resolved  O2 supplementation and bipap hs/ prn, prednisone, nebs, pulm following
BiPAP settings changed  ICU evaluation  called and d/w son Miguel, prognosis poor, they want everything to be done, as they await daughter to come from back home, pt. remains full code, I explained to octaviano Rivera the critical condition of poor prognosis in detail, also discussed about advanced directives, he states "do everything"  will follow ICU evaluation  monitor pulse ox.  upgrade to telemetry
Likely post resuscitation. Stable sodium levels. Will follow lytes. Stable renal function. D/w family at bedside. BP stable.
MSSA + in Blood culture and sputum (persistent )  RANJIT neg for IE  repeat Blood culture done last night -f/u   cont nafcillin and genta  genta level pending
MSSA + in Blood culture and sputum (persistent )  RANJIT soon   cont nafcillin and genta
MSSA + in Blood culture and sputum (persistent )  RANJIT soon   cont nafcillin and genta  genta level  repeat Blood culture soon
MSSA + in Blood culture and sputum (persistent )  RANJIT soon   reepat Blood culture  cont nafcillin and genta  genta level
O2 supplementation and bipap hs/ prn, prednisone, nebs, pulm following
O2 supplementation and bipap hs/ prn, prednisone, nebs, pulm following
bipap prn, prednisone, nebs, fu pulm recs
bipap prn, prednisone, nebs, fu pulm recs,
s/p trach, c/w vent support  prednisone, nebs, spiriva
s/p trach, c/w vent support  prednisone, nebs, spiriva
s/p trach, c/w vent support  prednisone, nebs, spiriva  as per pulm oxygen requirements decreasing
s/p trach, c/w vent support, tolerating weaning trials today with better control of anxiety and pain  prednisone, nebs, spiriva
Continue nafcillin and gentamicin, likely 24-48 hrs.
Current regimen to continue
Discontinue gentamicin.  Continue nafcillin  Discontinue on discharge
FOR PEG placement  NPO, Hold NGT feedings after midnight
FOR PEG placement  NPO, Hold NGT feedings after midnight.
Patient stable for PEG
Risks, benefits and alternatives discussed at length with patients son  and informed consent obtained for percutaneous endoscopic gastrostomy tube placement. All questions were answered. will schedule accordingly
bipap prn, prednisone, nebs
resolved
s/p PEG, Continue feedings  Elevate HOB during feedings  reconsult PRN
MSSA + in Blood culture and sputum (persistent )  RANJIT soon   also alpha hemo strept (pending speciation -? contmaination  cont nafcillin and genta

## 2017-04-14 NOTE — PROGRESS NOTE ADULT - PROVIDER SPECIALTY LIST ADULT
CCU
CCU
Cardiology
Critical Care
Gastroenterology
Hospitalist
Infectious Disease
Nephrology
Nephrology
Palliative Care
Pulmonology

## 2017-04-14 NOTE — PROGRESS NOTE ADULT - PROBLEM SELECTOR PROBLEM 2
MSSA (methicillin susceptible Staphylococcus aureus)
Bacteremia
MSSA (methicillin susceptible Staphylococcus aureus)
Multifocal pneumonia
Acute respiratory failure with hypoxia
COPD exacerbation
MSSA (methicillin susceptible Staphylococcus aureus)
Multifocal pneumonia

## 2017-04-14 NOTE — PROGRESS NOTE ADULT - SUBJECTIVE AND OBJECTIVE BOX
INTERVAL HPI / OVERNIGHT EVENTS:    Patient is sleeping. Has trach, afebrile    REVIEW OF SYSTEMS:    ROS:  CONSTITUTIONAL:  Negative fever or chills, feels well, good appetite  EYES:  Negative  blurry vision or double vision  CARDIOVASCULAR:  Negative for chest pain or palpitations  RESPIRATORY:  Negative for cough, wheezing, or SOB   GASTROINTESTINAL:  Negative for nausea, vomiting, diarrhea, constipation, or abdominal pain  GENITOURINARY:  Negative frequency, urgency or dysuria  NEUROLOGIC:  No headache, confusion, dizziness, lightheadedness      MEDICATIONS  (STANDING):  zinc oxide 40% Ointment 1Application(s) Topical three times a day  heparin  Injectable 5000Unit(s) SubCutaneous every 12 hours  tiotropium 18 MICROgram(s) Capsule 1Capsule(s) Inhalation daily  multivitamin/minerals 1Tablet(s) Oral daily  BACItracin   Ointment 1Application(s) Topical three times a day  insulin lispro (HumaLOG) corrective regimen sliding scale  SubCutaneous every 6 hours  ALBUTerol/ipratropium for Nebulization 3milliLiter(s) Nebulizer every 6 hours  predniSONE   Tablet 10milliGRAM(s) Oral daily  metoprolol 12.5milliGRAM(s) Oral two times a day  clonazePAM Tablet 0.5milliGRAM(s) Oral every 12 hours  midodrine 10milliGRAM(s) Oral every 8 hours  sodium chloride 0.9%. 1000milliLiter(s) IV Continuous <Continuous>    MEDICATIONS  (PRN):  acetaminophen   Tablet 650milliGRAM(s) Oral every 6 hours PRN For Temp greater than 38 C (100.4 F)  acetaminophen   Tablet. 650milliGRAM(s) Oral every 6 hours PRN Mild Pain (1 - 3)  oxyCODONE IR 5milliGRAM(s) Oral every 4 hours PRN Moderate Pain (4 - 6)      Vital Signs Last 24 Hrs  T(C): 37.1, Max: 38 (04-13 @ 23:41)  T(F): 98.8, Max: 100.4 (04-13 @ 23:41)  HR: 56 (52 - 120)  BP: 106/69 (77/50 - 138/71)  BP(mean): --  RR: 14 (12 - 20)  SpO2: 99% (97% - 100%)    PHYSICAL EXAM:    Constitutional: NAD, well-groomed, well-developed  HEENT: PERRLA, EOMI, Normal Hearing, MMM  Neck: No LAD, No JVD  Back: Normal spine flexure, No CVA tenderness  Respiratory: CTABCardiovascular: S1 and S2, RRR, no M/G/R  Gastrointestinal: BS+, soft, NT/ND  Extremities: No peripheral edema  Vascular: 2+ peripheral pulses  Neurological: A/O x 3, no focal deficits  Psychiatric: Normal mood, normal affect  Musculoskeletal: 5/5 strength b/l upper and lower extremities  Skin: No rashes      LABS:                        8.5    13.8  )-----------( 512      ( 14 Apr 2017 01:01 )             24.3     04-14    137  |  93<L>  |  19  ----------------------------<  215<H>  3.3<L>   |  35<H>  |  0.64    Ca    8.5      14 Apr 2017 01:01    TPro  6.0  /  Alb  1.3<L>  /  TBili  0.7  /  DBili  x   /  AST  11<L>  /  ALT  9<L>  /  AlkPhos  119  04-14            MICROBIOLOGY:    RADIOLOGY & ADDITIONAL STUDIES:

## 2017-04-14 NOTE — PROGRESS NOTE ADULT - PROBLEM SELECTOR PLAN 3
Protein supplement  encourage PO intake
Protein supplement  encourage PO intake
as above
as above  Pt having thick spututm secretionand had low grade fever too  send sputum c/s   cont genta and nafcillin  genta level pending
dysphagia, trach  s/p PEG, advance tube feeds as tolerated
dysphagia, trach  s/p PEG, tolerating tube feeds
dysphagia, trach  tube feeds on hold for PEG today
nutrition consult
supplement
As per Pulm.
Improved.
nutrition consult
as above

## 2017-04-15 LAB
CULTURE RESULTS: SIGNIFICANT CHANGE UP
SPECIMEN SOURCE: SIGNIFICANT CHANGE UP

## 2017-04-18 DIAGNOSIS — Z85.46 PERSONAL HISTORY OF MALIGNANT NEOPLASM OF PROSTATE: ICD-10-CM

## 2017-04-18 DIAGNOSIS — Z66 DO NOT RESUSCITATE: ICD-10-CM

## 2017-04-18 DIAGNOSIS — J44.1 CHRONIC OBSTRUCTIVE PULMONARY DISEASE WITH (ACUTE) EXACERBATION: ICD-10-CM

## 2017-04-18 DIAGNOSIS — Z79.84 LONG TERM (CURRENT) USE OF ORAL HYPOGLYCEMIC DRUGS: ICD-10-CM

## 2017-04-18 DIAGNOSIS — Z79.51 LONG TERM (CURRENT) USE OF INHALED STEROIDS: ICD-10-CM

## 2017-04-18 DIAGNOSIS — J90 PLEURAL EFFUSION, NOT ELSEWHERE CLASSIFIED: ICD-10-CM

## 2017-04-18 DIAGNOSIS — E44.0 MODERATE PROTEIN-CALORIE MALNUTRITION: ICD-10-CM

## 2017-04-18 DIAGNOSIS — F03.90 UNSPECIFIED DEMENTIA, UNSPECIFIED SEVERITY, WITHOUT BEHAVIORAL DISTURBANCE, PSYCHOTIC DISTURBANCE, MOOD DISTURBANCE, AND ANXIETY: ICD-10-CM

## 2017-04-18 DIAGNOSIS — A41.01 SEPSIS DUE TO METHICILLIN SUSCEPTIBLE STAPHYLOCOCCUS AUREUS: ICD-10-CM

## 2017-04-18 DIAGNOSIS — Z95.5 PRESENCE OF CORONARY ANGIOPLASTY IMPLANT AND GRAFT: ICD-10-CM

## 2017-04-18 DIAGNOSIS — I48.91 UNSPECIFIED ATRIAL FIBRILLATION: ICD-10-CM

## 2017-04-18 DIAGNOSIS — Z99.81 DEPENDENCE ON SUPPLEMENTAL OXYGEN: ICD-10-CM

## 2017-04-18 DIAGNOSIS — J96.22 ACUTE AND CHRONIC RESPIRATORY FAILURE WITH HYPERCAPNIA: ICD-10-CM

## 2017-04-18 DIAGNOSIS — E78.5 HYPERLIPIDEMIA, UNSPECIFIED: ICD-10-CM

## 2017-04-18 DIAGNOSIS — J18.9 PNEUMONIA, UNSPECIFIED ORGANISM: ICD-10-CM

## 2017-04-18 DIAGNOSIS — Z90.49 ACQUIRED ABSENCE OF OTHER SPECIFIED PARTS OF DIGESTIVE TRACT: ICD-10-CM

## 2017-04-18 DIAGNOSIS — R13.10 DYSPHAGIA, UNSPECIFIED: ICD-10-CM

## 2017-04-18 DIAGNOSIS — D50.9 IRON DEFICIENCY ANEMIA, UNSPECIFIED: ICD-10-CM

## 2017-04-18 DIAGNOSIS — I10 ESSENTIAL (PRIMARY) HYPERTENSION: ICD-10-CM

## 2017-04-18 DIAGNOSIS — F17.210 NICOTINE DEPENDENCE, CIGARETTES, UNCOMPLICATED: ICD-10-CM

## 2017-04-18 DIAGNOSIS — F41.9 ANXIETY DISORDER, UNSPECIFIED: ICD-10-CM

## 2017-04-18 DIAGNOSIS — J96.21 ACUTE AND CHRONIC RESPIRATORY FAILURE WITH HYPOXIA: ICD-10-CM

## 2017-04-18 DIAGNOSIS — Z79.82 LONG TERM (CURRENT) USE OF ASPIRIN: ICD-10-CM

## 2017-04-18 DIAGNOSIS — K26.9 DUODENAL ULCER, UNSPECIFIED AS ACUTE OR CHRONIC, WITHOUT HEMORRHAGE OR PERFORATION: ICD-10-CM

## 2017-04-18 DIAGNOSIS — E87.2 ACIDOSIS: ICD-10-CM

## 2017-04-18 DIAGNOSIS — E11.44 TYPE 2 DIABETES MELLITUS WITH DIABETIC AMYOTROPHY: ICD-10-CM

## 2017-04-18 DIAGNOSIS — I25.10 ATHEROSCLEROTIC HEART DISEASE OF NATIVE CORONARY ARTERY WITHOUT ANGINA PECTORIS: ICD-10-CM

## 2017-04-18 DIAGNOSIS — Z79.52 LONG TERM (CURRENT) USE OF SYSTEMIC STEROIDS: ICD-10-CM

## 2017-04-19 LAB
CULTURE RESULTS: SIGNIFICANT CHANGE UP
SPECIMEN SOURCE: SIGNIFICANT CHANGE UP

## 2018-01-05 NOTE — DISCHARGE NOTE ADULT - DISCHARGE DATE
Pt able to void in adequate amounts, denies pain or burning. IV d/c'd. All  Scheduled medications given. All personal belongings given to pt.  will be picking her up around 2100. Pt d/c'd in stable condition.           NURSING DISCHARGE NOTE    Disch 10-Apr-2017 13-Apr-2017 14-Apr-2017

## 2019-02-13 NOTE — ED ADULT TRIAGE NOTE - BP NONINVASIVE SYSTOLIC (MM HG)
Patient Instructions by Carmine Spain OD at 10/05/18 11:45 AM     Author:  Carmine Spain OD Service:  (none) Author Type:  Optometrist     Filed:  10/05/18 11:46 AM Encounter Date:  10/5/2018 Status:  Signed     :  Carmine Spain OD (Optometrist)            PLAN:    Obtain supply of lenses  Return to clinic for yearly follow up examination. ASAP if any ocular problems arise. i.e reddnes, pain, decrease/change  in vision       Additional Educational Resources: For additional resources regarding your symptoms, diagnosis, or further health information, please visit the Health Resources section on [email or web address] or the Online Health Resources section in [employer].         Revision History        User Key Date/Time User Provider Type Action    > [N/A] 10/05/18 11:46 AM Felicitas Deal OD Optometrist Sign
[phone]

## 2019-02-20 NOTE — ED ADULT NURSE NOTE - TEMPLATE
Bed: ED24  Expected date:   Expected time:   Means of arrival:   Comments:  antonio - 75 M leg pain eta 1435   Respiratory

## 2019-09-18 NOTE — PROGRESS NOTE ADULT - PROBLEM/PLAN-7
[FreeTextEntry1] : 58 year old male presents today for routine surveillance of carotid artery stenosis s/p L TCAR (3/2018). 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 Today he reports feeling well. Denies focal neurologic deficits including sudden changes in vision/speech, weakness in the arms/legs, and sudden difficulty walking. \par \par PMH: SCC of the oropharynx (s/p resection, chemo RT), bilateral PE treated with Xarelto, hypothyroidism,  L TCAR (3/2018)\par \par Carotid duplex from today demonstrates a patent L carotid stent with no significant restenosis. R ICA with <50% stenosis. \par \par Follow up recommended in 6 months for repeat carotid ultrasound.  DISPLAY PLAN FREE TEXT

## 2020-01-08 NOTE — DIETITIAN INITIAL EVALUATION ADULT. - PROBLEM SELECTOR PLAN 1
-admit to critical care  -steroids, antibtiotics and duonebs  -on bipap machine now, wean as tolearted  -follow up with abg, if condition worsening may need intubation.  -will do rapid viral panel
No

## 2021-07-03 NOTE — PHYSICAL THERAPY INITIAL EVALUATION ADULT - COORDINATION ASSESSED, REHAB EVAL
Alert-The patient is alert, awake and responds to voice. The patient is oriented to time, place, and person. The triage nurse is able to obtain subjective information. Intact/finger to nose

## 2021-08-05 NOTE — ED ADULT NURSE NOTE - BREATHING, MLM
Quality 226: Preventive Care And Screening: Tobacco Use: Screening And Cessation Intervention: Patient screened for tobacco use and is an ex/non-smoker Detail Level: Detailed Quality 130: Documentation Of Current Medications In The Medical Record: Current Medications Documented Quality 431: Preventive Care And Screening: Unhealthy Alcohol Use - Screening: Patient screened for unhealthy alcohol use using a single question and scores less than 2 times per year Quality 110: Preventive Care And Screening: Influenza Immunization: Influenza Immunization Administered during Influenza season Spontaneous, unlabored and symmetrical

## 2022-07-08 NOTE — ED PROVIDER NOTE - CONDUCTED A DETAILED DISCUSSION WITH PATIENT AND/OR GUARDIAN REGARDING, MDM
decreased endurance/impaired balance/impaired postural control/decreased ROM/decreased strength
radiology results/need to admit/lab results

## 2022-11-23 NOTE — PHYSICAL THERAPY INITIAL EVALUATION ADULT - PERTINENT HX OF CURRENT PROBLEM, REHAB EVAL
62 y/o male HX of COPD on Home O2, EX Smoker, HX of GI Bleed, HX of A Fib on ASA, HTN, High Cholesterol, DM, CAD with Stent, Prostate cancer, S/P RTX, Iron def. Anemia , pt was admitted for SOB, Lethargy, decreased po intake, + Productive cough, + Chills, x 4-5 days, no fever, NO CP, no abdominal pain, no HA, no dizziness, no dysuria, never intubated, has been on BIPAP and PO Steroid in the past, pt with hypercarbia on ABG. 21-Nov-2022

## 2023-11-16 NOTE — ED ADULT TRIAGE NOTE - HEIGHT IN FEET
----- Message from Christina Graham RN sent at 11/6/2023 12:52 PM CST -----  Please contact pt for PP depression screening.        5

## 2023-11-21 NOTE — PROCEDURE NOTE - NSTIMEOUT_GEN_A_CORE
Pts mother has called and left a vm requesting a return call. Looks like it is possibly for scheduling.     
Patient's first and last name, , procedure, and correct site confirmed prior to the start of procedure.
Patient's first and last name, , procedure, and correct site confirmed prior to the start of procedure.

## 2024-07-04 NOTE — SWALLOW BEDSIDE ASSESSMENT ADULT - SWALLOW EVAL: DIAGNOSIS
Pt is a 61 y o male admitted with multifocal pneumonia and COPD presented with oropharyngeal phases of the swallow marked by reduced mastication, moderate palatal and lingual stasis with soft solid, suspect delay in swallow trigger, and decreased laryngeal elevation. No overt signs of aspiration. never true Pt is a 61 y o male admitted with multifocal pneumonia and COPD presented with oropharyngeal phases of the swallow marked by reduced mastication, reduced bolus formation causing moderate palatal and lingual stasis with soft solid, suspect delay in swallow trigger, and decreased laryngeal elevation. No overt signs of aspiration.

## 2024-07-09 NOTE — PHYSICAL THERAPY INITIAL EVALUATION ADULT - NS ASR RISK AREAS PT EVAL
Operative Note      Patient: Adenike Dasilva  YOB: 1938  MRN: [id number]    Date of Procedure: 7/9/2024    PROCEDURES PERFORMED:   Radiofrequency neuroablation of the medial branches Perez L3, L4 and L5    PREPROCEDURE DIAGNOSIS: lumbar spondylosis with chronic back pain.    POST PROCEDURE DIAGNOSIS: lumbar spondylosis with chronic back pain.    SURGEON: FRANDY Robertson M.D.     HISTORY OF PRESENT ILLNESS AND INDICATIONS FOR THE PROCEDURE: This patient was previously given diagnostic successful blocks of the facet joints innervated by the aforementioned medial branches and is here today for ablation and neurolysis of those branches. She has previously failed conservative management which has proceeded to injection therapy. She has had over 50% pain relief for over 3 months with prior RFA treatment.    FINDINGS AND PROCEDURES:  Adequate informed consent was obtained and the patient was taken to the procedure room and placed in the prone position on the procedure table. A time out was held for patient verification. The patient had monitoring throughout the procedure at cycled one minute blood pressure, pulse, and pulse oximetry.The skin was prepped and draped in the normal sterile fashion.     Using fluoroscopic guidance in anteroposterior, oblique, and lateral views, the appropriate superior articular processes and pedicles were identified. After identification 1 percent lidocaine skin anesthesia was administered at each site. Using a radiofrequency ablation needle, a 22 gauge 10 millimeter active tip was placed at the medial branch nerves, bilateral L3, L4 and L5. The initial impedance in Ohms was within acceptable limits.  Each level was also tested at 2 Hertz with no distal motor movement or referred pain pattern. The lesion site was injected with 0.5 milliliters of 0.5 percent bupivacaine anesthesia of the nerve branch. Next a lesion was applied for 90 seconds at 80 degrees. Permanent fluoroscopic 
fall

## 2025-01-06 NOTE — PROGRESS NOTE ADULT - PROBLEM SELECTOR PLAN 4
"Chief Complaint   Patient presents with    RECHECK     Follow up      BP (!) 145/97 (BP Location: Right arm, Cuff Size: Adult Regular)   Pulse (!) 122   Ht 1.651 m (5' 5\")   Wt 72.2 kg (159 lb 2 oz)   SpO2 99%   BMI 26.48 kg/m    Haylee Quinteros, AJ on 1/6/2025 at 1:53 PM    " ISS, add LA, adjust in the setting of IV steroids

## 2025-07-22 NOTE — PROGRESS NOTE ADULT - PROBLEM SELECTOR PLAN 7
Optimize BG readings.   See above.   On ARB and SGLT2     
PT recommends DIANNE once medically stable for DC
anemia of chronic disease  h/h stable
PT eval for possible DIANNE placement given marked functional decline  DC planning in am if remains stable. d/w pulmonary.